# Patient Record
Sex: MALE
[De-identification: names, ages, dates, MRNs, and addresses within clinical notes are randomized per-mention and may not be internally consistent; named-entity substitution may affect disease eponyms.]

---

## 2020-01-02 NOTE — ER
Nurse's Notes                                                                                     

 [facility]                                                                 

Name: Robert Tietz                                                                                

Age: 54 yrs                                                                                       

Sex: Male                                                                                         

: 1965                                                                                   

MRN: [id number]                                                                                   

Arrival Date: 2020                                                                          

Time: 00:29                                                                                       

Account#: [id number]                                                                            

Bed 18                                                                                            

Private MD: Reese Avila T                                                                        

Diagnosis: Twitching left eye. Hypertension                                                       

                                                                                                  

Presentation:                                                                                     

                                                                                             

00:58 Presenting complaint: Patient states: using the language line pt c/o twitching to left  bb  

      eye area no changes in vision x 2 or 3 days. Transition of care: patient was not            

      received from another setting of care. Onset of symptoms was 2019. Risk        

      Assessment: Do you want to hurt yourself or someone else? Patient reports no desire to      

      harm self or others. Initial Sepsis Screen: Does the patient meet any 2 criteria? No.       

      Patient's initial sepsis screen is negative. Does the patient have a suspected source       

      of infection? No. Patient's initial sepsis screen is negative. Care prior to arrival:       

      None.                                                                                       

00:58 Method Of Arrival: Ambulatory                                                           bb  

00:58 Acuity: AMOS 3                                                                           bb  

                                                                                                  

Historical:                                                                                       

- Allergies:                                                                                      

01:00 No Known Allergies;                                                                     bb  

- Home Meds:                                                                                      

01:00 None [Active];                                                                          bb  

- PMHx:                                                                                           

01:00 Deaf;                                                                                   bb  

- PSHx:                                                                                           

01:00 Tonsillectomy;                                                                          bb  

                                                                                                  

- Immunization history:: Adult Immunizations up to date.                                          

- Social history:: Smoking status: Patient/guardian denies using tobacco.                         

- Ebola Screening: : No symptoms or risks identified at this time.                                

                                                                                                  

                                                                                                  

Screenin:00 Abuse screen: Denies threats or abuse. Nutritional screening: No deficits noted.        fc  

      Tuberculosis screening: No symptoms or risk factors identified. Fall Risk None              

      identified.                                                                                 

                                                                                                  

Assessment:                                                                                       

01:00 General: Appears in no apparent distress. comfortable, slender, well groomed, Behavior  fc  

      is calm, cooperative, appropriate for age. Pain: Denies pain. Neuro: Level of               

      Consciousness is awake, alert, obeys commands, Oriented to person, place, time,             

      situation, Appropriate for age. Cardiovascular: No deficits noted. Respiratory: Airway      

      is patent Respiratory effort is even, unlabored, Respiratory pattern is regular,            

      symmetrical, Breath sounds are clear bilaterally. Denies cough, shortness of breath.        

      GI: No deficits noted. : No deficits noted. EENT: Eyes twitching of left eye. Denies      

      pain or vision loss. Derm: Skin is pink, warm \T\ dry. Musculoskeletal: Circulation,        

      motion, and sensation intact. Capillary refill < 3 seconds, Range of motion: intact in      

      all extremities.                                                                            

01:20 Reassessment: Pt has just returned from CT Scan via stretcher.                          fc  

01:55 Reassessment: No changes from previously documented assessment. Patient and/or family   fc  

      updated on plan of care and expected duration. Pain level reassessed. Patient is alert,     

      oriented x 3, equal unlabored respirations, skin warm/dry/pink. Discussed bp with dr        

      and new orders rec'd.                                                                       

02:53 Reassessment: No changes from previously documented assessment. Patient and/or family   fc  

      updated on plan of care and expected duration. Pain level reassessed. Patient is alert,     

      oriented x 3, equal unlabored respirations, skin warm/dry/pink. Dr Mcclain states that all      

      test are normal and pt is ready to be discharged.                                           

03:02 Reassessment: discharge instructions given to pt with help of  Brando RODRÍGUEZ #[id number].   

                                                                                                  

Vital Signs:                                                                                      

01:00  / 109; Pulse 89; Resp 14 S; Temp 98.5(O); Pulse Ox 96% on R/A; Weight 70.76 kg   bb  

      (R); Height 5 ft. 5 in. (165.10 cm) (R); Pain 0/10;                                         

01:22  / 99; Pulse 96; Resp 18; Pulse Ox 97% ;                                          fc  

01:50  / 104; Pulse 75; Resp 18; Pulse Ox 98% on R/A; Pain 0/10;                        fc  

02:19  / 94; Pulse 78; Resp 18; Pulse Ox 98% on R/A;                                    fc  

02:47  / 89; Pulse 72; Resp 18; Pulse Ox 96% on R/A; Pain 0/10;                         fc  

01:00 Body Mass Index 25.96 (70.76 kg, 165.10 cm)                                             bb  

                                                                                                  

Visual Acuity:                                                                                    

01:02 Left Eye Visual acuity 20/25, Pupil size 4 mm, Reactive To Accomodation; Right Eye      bb  

      Visual acuity 20/25, Pupil size 4 mm, Reactive To Accomodation; Both Eyes Visual acuity     

      20/20; With Lenses;                                                                         

                                                                                                  

ED Course:                                                                                        

00:29 Patient arrived in ED.                                                                  es  

00:30 Reese Avila MD is Private Physician.                                                   es  

00:44 Armando Mcclain MD is Attending Physician.                                                    pkl 

00:59 Triage completed.                                                                       bb  

01:00 Arm band placed on Patient placed in an exam room, on a stretcher, on pulse oximetry.   bb  

01:00 Patient has correct armband on for positive identification. Placed in gown. Bed in low  fc  

      position. Call light in reach. Side rails up X2. Cardiac monitor on. Pulse ox on. NIBP      

      on.                                                                                         

01:00 No provider procedures requiring assistance completed.                                  fc  

01:29 Initial lab(s) drawn, by me, sent to lab.                                               fc  

01:31 CT completed. Patient tolerated procedure well. Patient moved to CT via stretcher.        

      Patient moved back from CT.                                                                 

01:40 CT Head Brain wo Cont In Process Unspecified.                                           EDMS

02:28 Amada Aguilar MD is Referral Physician.                                               pkl 

03:03 Patient did not have IV access during this emergency room visit.                        fc  

                                                                                                  

Administered Medications:                                                                         

01:54 Not Given (Physician Discretion): cloNIDine 0.1 mg PO once                              bb  

01:57 Drug: Metoprolol TARTRATE (Lopressor) 50 mg Route: PO;                                  fc  

03:04 Follow up: Response: No adverse reaction; Marked relief of symptoms; Blood pressure is  fc  

      lowered                                                                                     

                                                                                                  

                                                                                                  

Outcome:                                                                                          

02:29 Discharge ordered by MD.                                                                pkl 

03:03 Discharged to home ambulatory.                                                          fc  

03:03 Condition: good                                                                             

03:03 Discharge instructions given to patient, Instructed on discharge instructions, follow       

      up and referral plans. medication usage, Demonstrated understanding of instructions,        

      follow-up care, medications, Prescriptions given X 1.                                       

03:03 Patient left the ED.                                                                    fc  

                                                                                                  

Signatures:                                                                                       

Dispatcher [employer]                           EDMS                                                 

Armando Mcclain MD MD   pkl                                                  

Renu Saldaña Ervin                                                                                   

Leigha Jalloh RN                   RN                                                      

Norah Page RN                     RN   bb                                                   

                                                                                                  

Corrections: (The following items were deleted from the chart)                                    

01:38 01:00 EENT: Eyes twitching of right eye. Denies pain or vision loss. fc                 fc  

                                                                                                  

**************************************************************************************************

## 2020-01-02 NOTE — RAD REPORT
EXAM DESCRIPTION:  CT - Head Brain Wo Cont - 1/2/2020 1:40 am

 

CLINICAL HISTORY:  The patient is 54 years old and is Male; twitching left   eye

 

TECHNIQUE:  Axial computed tomography images of the head/brain without intravenous contrast.   Sagitt
al and coronal reformatted images were created and reviewed.   This CT exam was performed using one o
r more of the following dose reduction techniques:   automated exposure control, adjustment of the mA
 and/or kV according to patient size, and/or use of iterative reconstruction technique.

 

COMPARISON:  No relevant prior studies available.

 

FINDINGS:  BRAIN:   Unremarkable.   The gray-white matter differentiation is preserved . No hemorrhag
e.   No significant white matter disease.   No edema. No extra-axial fluid collections.

   VENTRICLES:   Unremarkable.   No ventriculomegaly.

   BONES/JOINTS:   No acute fracture.

   SOFT TISSUES:   Unremarkable.

   SINUSES: A right maxillary sinus mucus retention cyst is present.   No acute sinusitis.

   MASTOID AIR CELLS:   Unremarkable as visualized.   No mastoid effusion.

   ORBITS:   Unremarkable as visualized.

 

IMPRESSION:  No acute intracranial findings.

 

Electronically signed by:   Lia Herrera MD   1/2/2020 1:58 AM CST Workstation: [id number]

 

 

Due to temporary technical issues with the PACS/Fluency reporting system, reports are being signed by
 the in house radiologist as a courtesy to ensure prompt reporting. The interpreting radiologist is f
ully responsible for the content of the report.

## 2021-09-28 ENCOUNTER — HOSPITAL ENCOUNTER (EMERGENCY)
Dept: HOSPITAL 97 - ER | Age: 56
LOS: 1 days | Discharge: HOME | End: 2021-09-29
Payer: COMMERCIAL

## 2021-09-28 DIAGNOSIS — R51.9: Primary | ICD-10-CM

## 2021-09-28 DIAGNOSIS — H55.89: ICD-10-CM

## 2021-09-28 DIAGNOSIS — N39.0: ICD-10-CM

## 2021-09-28 DIAGNOSIS — M25.511: ICD-10-CM

## 2021-09-28 DIAGNOSIS — I10: ICD-10-CM

## 2021-09-28 PROCEDURE — 85025 COMPLETE CBC W/AUTO DIFF WBC: CPT

## 2021-09-28 PROCEDURE — 81003 URINALYSIS AUTO W/O SCOPE: CPT

## 2021-09-28 PROCEDURE — 93005 ELECTROCARDIOGRAM TRACING: CPT

## 2021-09-28 PROCEDURE — 71045 X-RAY EXAM CHEST 1 VIEW: CPT

## 2021-09-28 PROCEDURE — 83880 ASSAY OF NATRIURETIC PEPTIDE: CPT

## 2021-09-28 PROCEDURE — 70450 CT HEAD/BRAIN W/O DYE: CPT

## 2021-09-28 PROCEDURE — 84484 ASSAY OF TROPONIN QUANT: CPT

## 2021-09-28 PROCEDURE — 83735 ASSAY OF MAGNESIUM: CPT

## 2021-09-28 PROCEDURE — 84100 ASSAY OF PHOSPHORUS: CPT

## 2021-09-28 PROCEDURE — 85610 PROTHROMBIN TIME: CPT

## 2021-09-28 PROCEDURE — 72125 CT NECK SPINE W/O DYE: CPT

## 2021-09-28 PROCEDURE — 36415 COLL VENOUS BLD VENIPUNCTURE: CPT

## 2021-09-28 PROCEDURE — 99283 EMERGENCY DEPT VISIT LOW MDM: CPT

## 2021-09-28 PROCEDURE — 80048 BASIC METABOLIC PNL TOTAL CA: CPT

## 2021-09-28 PROCEDURE — 80076 HEPATIC FUNCTION PANEL: CPT

## 2021-09-28 PROCEDURE — 81015 MICROSCOPIC EXAM OF URINE: CPT

## 2021-09-29 VITALS — DIASTOLIC BLOOD PRESSURE: 98 MMHG | TEMPERATURE: 98.3 F | SYSTOLIC BLOOD PRESSURE: 144 MMHG

## 2021-09-29 VITALS — OXYGEN SATURATION: 100 %

## 2021-09-29 LAB
ALBUMIN SERPL BCP-MCNC: 4.1 G/DL (ref 3.4–5)
ALP SERPL-CCNC: 123 U/L (ref 45–117)
ALT SERPL W P-5'-P-CCNC: 44 U/L (ref 12–78)
AST SERPL W P-5'-P-CCNC: 21 U/L (ref 15–37)
BUN BLD-MCNC: 18 MG/DL (ref 7–18)
GLUCOSE SERPLBLD-MCNC: 83 MG/DL (ref 74–106)
HCT VFR BLD CALC: 49.2 % (ref 39.6–49)
INR BLD: 1.03
LYMPHOCYTES # SPEC AUTO: 1.3 K/UL (ref 0.7–4.9)
MAGNESIUM SERPL-MCNC: 2.2 MG/DL (ref 1.8–2.4)
NT-PROBNP SERPL-MCNC: 51 PG/ML (ref ?–125)
PMV BLD: 8.3 FL (ref 7.6–11.3)
POTASSIUM SERPL-SCNC: 3.7 MMOL/L (ref 3.5–5.1)
RBC # BLD: 6.26 M/UL (ref 4.33–5.43)
TROPONIN (EMERG DEPT USE ONLY): < 0.02 NG/ML (ref 0–0.04)

## 2021-09-29 NOTE — RAD REPORT
EXAM DESCRIPTION:  RAD - Shoulder  Right 2 View - 9/29/2021 1:56 am

 

CLINICAL HISTORY:  PAIN

 

COMPARISON:  No comparisons

 

FINDINGS:  No acute fracture. No malalignment. No significant focal degenerative changes.

 

IMPRESSION:  No acute osseous abnormality involving the right shoulder.

## 2021-09-29 NOTE — RAD REPORT
Can shower daily, leave packing inside. Reapply dressing once done. Return to ED in 3 days for recheck.    EXAM DESCRIPTION:  CT - Head C Spine Mpr Wo Con - 9/29/2021 6:29 am

 

COMPARISON:  CT head January 2, 2020

 

CLINICAL HISTORY:  [facility] MAIN PAIN

 

TECHNIQUE:  Axial images were obtained from skull base to vertex without intravenous contrast.   Imag
es viewed on bone and brain windows. Multiplanar reformats were performed.   Automated exposure contr
ol was utilized on this examination as a dose lowering technique.

 

FINDINGS:  Brain parenchyma, ventricles, dura, meninges, and extra-axial spaces: Ventricles and sulci
 are normal.   No abnormal attenuation of brain parenchyma is present.    No acute intracranial hemor
rhage or abnormal extra-axial fluid collections are present.

Vascular structures: No hyperdense arteries or veins.

Calvarium, mastoid air cells, paranasal sinuses and orbits: The calvarium is normal. The mastoid air 
cells are clear. 1.8 cm right maxillary sinus mucosal retention cyst or polyp. Orbital structures are
 unremarkable.

 

EXAM DESCRIPTION:  CT Cervical Spine

 

COMPARISON:  None.

 

CLINICAL HISTORY:  [facility] MAIN PAIN

 

TECHNIQUE:  Axial CT images were obtained through the entire cervical spine without   contrast. Sagit
nathanael and coronal reconstructions are provided. Automated exposure control was utilized on this examina
tion as a dose lowering technique.

 

FINDINGS:  Vertebrae:   Vertebral statures and alignment are normal. No acute fracture, dislocation o
r destructive osseous process is present.

Spinal canal, foramina, and facet joints:

No significant spinal canal or foraminal stenoses. No significant facet arthropathy.

Paraspinous soft-tissues: Normal.

Thyroid: Normal.

Other Findings: None.

 

IMPRESSION:  HEAD IMPRESSION:

No acute intracranial abnormality.

C-SPINE IMPRESSION:

No acute findings of the cervical spine.

 

Electronically signed by:   Campos Ortiz MD   9/29/2021 2:29 AM CDT Workstation: [id number]

 

 

Due to temporary technical issues with the PACS/Fluency reporting system, reports are being signed by
 the in house radiologists without review as a courtesy to insure prompt reporting. The interpreting 
radiologist is fully responsible for the content of the report.

## 2021-09-29 NOTE — RAD REPORT
EXAM DESCRIPTION:  RAD - Chest Single View - 9/29/2021 1:56 am

 

CLINICAL HISTORY:  Hypertension

 

COMPARISON:  No comparisons

 

FINDINGS:  Lines: None.

Lungs: Low lung volumes which accentuates the pulmonary vasculature.

Pleural: No significant pleural effusions or pneumothorax.

Cardiac: Low lung volumes which accentuates the cardiac silhouette.

Bones: No acute fractures.

Other:

 

IMPRESSION:  Low lung volumes which accentuates the pulmonary vasculature. Difficult to exclude eithe
r vascular congestion and/or mild edema.

## 2021-09-29 NOTE — EDPHYS
Physician Documentation                                                                           

 [facility]                                                                 

Name: Robert Tietz                                                                                

Age: 55 yrs                                                                                       

Sex: Male                                                                                         

: 1965                                                                                   

MRN: [id number]                                                                                   

Arrival Date: 2021                                                                          

Time: 23:49                                                                                       

Account#: [id number]                                                                            

Bed 27                                                                                            

Private MD:                                                                                       

ED Physician Marco Amador                                                                      

HPI:                                                                                              

                                                                                             

00:57 This 55 yrs old  Male presents to ER via Ambulatory with complaints of          mh7 

      Headache. Left eye twitching.                                                               

00:57 The patient complains of pain to the Left side of head. The patient describes the       mh7 

      headache as throbbing. Onset: The symptoms/episode began/occurred 4 month(s) ago.           

      Associated signs and symptoms: Pertinent positives: dizziness, Left eye twitching,          

      Pertinent negatives: altered mental status, fever, malaise, nausea, neck stiffness,         

      paresthesias, Photophobia rash, sinus congestion, sinus tenderness, vision changes,         

      vision loss, vomiting, weakness, vertigo. Severity of symptoms: At its worst the pain       

      was moderate, 3 months ago, in the emergency department the pain has improved,              

      moderately. Headache History: The patient has had previous headaches and this one is        

      similar to previous episodes. The symptoms are alleviated by over the counter pain          

      medication, OTC NSAIDS, the symptoms are aggravated by nothing. The patient has             

      experienced similar episodes in the past, several times. Patient reports intermittent       

      left-sided headache for least 4 months. He also reports intermittent twitching of left      

      eye and some intermittent tearing of the eye. Denies any visual disturbances. He also       

      complains of pain and unusual sensation to right shoulder has been intermittent for         

      several days. Denies any fever, neck pain, chest pain, abdominal pain, shortness of         

      breath, cough, nausea, vomiting, diarrhea, weakness. He states that he has seen a           

      doctor in the past with the symptoms but was not given any information by doctors in        

      the past..                                                                                  

                                                                                                  

Historical:                                                                                       

- Allergies:                                                                                      

00:12 No Known Allergies;                                                                     wg  

- Home Meds:                                                                                      

00:12 None [Active];                                                                          wg  

- PMHx:                                                                                           

00:12 Deaf;                                                                                   wg  

                                                                                                  

- Immunization history:: Adult Immunizations up to date.                                          

- Social history:: Smoking status: Patient denies any tobacco usage or history of.                

                                                                                                  

                                                                                                  

ROS:                                                                                              

00:57 Constitutional: Negative for fever, chills, and weight loss, ENT: Negative for injury,  mh7 

      pain, and discharge, Neck: Negative for injury, pain, and swelling, Cardiovascular:         

      Negative for chest pain, palpitations, and edema, Respiratory: Negative for shortness       

      of breath, cough, wheezing, and pleuritic chest pain, Abdomen/GI: Negative for              

      abdominal pain, nausea, vomiting, diarrhea, and constipation, Back: Negative for injury     

      and pain, : Negative for injury, bleeding, discharge, and swelling, Skin: Negative        

      for injury, rash, and discoloration, Psych: Negative for depression, anxiety, suicide       

      ideation, homicidal ideation, and hallucinations, Allergy/Immunology: Negative for          

      hives, rash, and allergies, Endocrine: Negative for neck swelling, polydipsia,              

      polyuria, polyphagia, and marked weight changes, Hematologic/Lymphatic: Negative for        

      swollen nodes, abnormal bleeding, and unusual bruising.                                     

                                                                                                  

Exam:                                                                                             

00:57 Constitutional:  This is a well developed, well nourished patient who is awake, alert,  mh7 

      and in no acute distress. Head/Face:  Normocephalic, atraumatic. Eyes:  Pupils equal        

      round and reactive to light, extra-ocular motions intact.  Lids and lashes normal.          

      Conjunctiva and sclera are non-icteric and not injected.  Cornea within normal limits.      

      Periorbital areas with no swelling, redness, or edema. ENT:  Nares patent. No nasal         

      discharge, no septal abnormalities noted.  Tympanic membranes are normal and external       

      auditory canals are clear.  Oropharynx with no redness, swelling, or masses, exudates,      

      or evidence of obstruction, uvula midline.  Mucous membranes moist. Neck:  Trachea          

      midline, no thyromegaly or masses palpated, and no cervical lymphadenopathy.  Supple,       

      full range of motion without nuchal rigidity, or vertebral point tenderness.  No            

      Meningismus. Chest/axilla:  Normal chest wall appearance and motion.  Nontender with no     

      deformity.  No lesions are appreciated. Cardiovascular:  Regular rate and rhythm with a     

      normal S1 and S2.  No gallops, murmurs, or rubs.  Normal PMI, no JVD.  No pulse             

      deficits. Respiratory:  Lungs have equal breath sounds bilaterally, clear to                

      auscultation and percussion.  No rales, rhonchi or wheezes noted.  No increased work of     

      breathing, no retractions or nasal flaring. Abdomen/GI:  Soft, non-tender, with normal      

      bowel sounds.  No distension or tympany.  No guarding or rebound.  No evidence of           

      tenderness throughout. Back:  No spinal tenderness.  No costovertebral tenderness.          

      Full range of motion. Skin:  Warm, dry with normal turgor.  Normal color with no            

      rashes, no lesions, and no evidence of cellulitis. MS/ Extremity:  Pulses equal, no         

      cyanosis.  Neurovascular intact.  Full, normal range of motion. Neuro:  Awake and           

      alert, GCS 15, oriented to person, place, time, and situation.  Cranial nerves II-XII       

      grossly intact.  Motor strength 5/5 in all extremities.  Sensory grossly intact.            

      Cerebellar exam normal.  Normal gait. Psych:  Awake, alert, with orientation to person,     

      place and time.  Behavior, mood, and affect are within normal limits.                       

                                                                                                  

Vital Signs:                                                                                      

00:06  / 112; Pulse 78; Resp 18; Temp 98.5; Pulse Ox 100% on R/A; Weight 71.21 kg;      wg  

      Height 5 ft. 5 in. (165.10 cm); Pain 7/10;                                                  

04:01  / 98; Pulse 72; Resp 15; Temp 98.3(O); Pulse Ox 100% on R/A; Pain 0/10;          bc5 

00:06 Body Mass Index 26.13 (71.21 kg, 165.10 cm)                                             wg  

                                                                                                  

[place] Coma Score:                                                                               

03:37 Eye Response: spontaneous(4). Verbal Response: oriented(5). Motor Response: obeys       mh7 

      commands(6). Total: 15.                                                                     

                                                                                                  

MDM:                                                                                              

03:37 Differential diagnosis: cluster headache, hypertensive headache, hypoglycemia,          mh7 

      hyponatremia, intracerebral hemorrhage, migraine, neoplasm, tension headache. Data          

      reviewed: vital signs, nurses notes, old medical records, lab test result(s), cardiac       

      enzymes, CBC, electrolytes, urinalysis, EKG, radiologic studies, CT scan, plain films.      

      Data interpreted: Pulse oximetry: on room air is 100 %. Interpretation: normal.             

      Counseling: I had a detailed discussion with the patient and/or guardian regarding: the     

      historical points, exam findings, and any diagnostic results supporting the                 

      discharge/admit diagnosis, the presence of at least one elevated blood pressure reading     

      (>120/80) during this emergency department visit, lab results, radiology results, the       

      need for outpatient follow up, an opthalmologist, a neurologist, to return to the           

      emergency department if symptoms worsen or persist or if there are any questions or         

      concerns that arise at home. Response to treatment: the patient's symptoms have             

      markedly improved after treatment.                                                          

03:41 Patient medically screened.                                                             [facility] 

                                                                                                  

                                                                                             

00:40 Order name: Urine Dipstick-Ancillary                                                    [facility]

                                                                                             

00:54 Order name: Basic Metabolic Panel                                                       [facility] 

                                                                                             

00:54 Order name: CBC with Diff; Complete Time: 02:11                                         [facility] 

                                                                                             

00:54 Order name: LFT's; Complete Time: 02:11                                                 [facility] 

                                                                                             

00:54 Order name: Magnesium; Complete Time: 02:11                                             [facility] 

                                                                                             

00:54 Order name: NT PRO-BNP; Complete Time: 02:11                                            [facility] 

                                                                                             

00:54 Order name: PT-INR; Complete Time: 02:11                                                [facility] 

                                                                                             

00:54 Order name: Troponin (emerg Dept Use Only); Complete Time: 02:11                        [facility] 

                                                                                             

00:54 Order name: XRAY Chest (1 view)                                                         [facility] 

                                                                                             

00:54 Order name: Phosphorus; Complete Time: 02:11                                            [facility] 

                                                                                             

00:54 Order name: Shoulder Right (2 View) XRAY                                                [facility] 

                                                                                             

01:16 Order name: Basic Metabolic Panel; Complete Time: 02:11                                 [facility]

                                                                                             

01:36 Order name: Urine Dipstick-Ancillary; Complete Time: 01:58                              [facility]

                                                                                             

01:59 Order name: Urine Microscopic Only; Complete Time: 03:21                                [facility] 

                                                                                             

00:54 Order name: EKG; Complete Time: 01:16                                                   [facility] 

                                                                                             

00:54 Order name: Cardiac monitoring; Complete Time: 01:18                                    [facility] 

                                                                                             

00:54 Order name: EKG - Nurse/Tech; Complete Time: :29                                      [facility] 

                                                                                             

00:54 Order name: IV Saline Lock; Complete Time: 01:18                                        [facility] 

                                                                                             

00:54 Order name: Labs collected and sent; Complete Time: :23                               [facility] 

                                                                                             

00:54 Order name: O2 Per Protocol; Complete Time: 01:13                                       [facility] 

                                                                                             

00:54 Order name: O2 Sat Monitoring; Complete Time: 01:18                                     [facility] 

                                                                                             

00:54 Order name: CT Head C Spine                                                             [facility] 

                                                                                             

00:54 Order name: Urine Dipstick-Ancillary (obtain specimen); Complete Time: 01:28            mh7 

                                                                                                  

Administered Medications:                                                                         

01:18 Drug: Tylenol 1000 mg Route: PO;                                                        bc5 

04:00 Follow up: Response: Pain is decreased                                                  bc5 

                                                                                                  

                                                                                                  

Disposition Summary:                                                                              

21 03:41                                                                                    

Discharge Ordered                                                                                 

      Location: Home                                                                          [facility] 

      Problem: chronic                                                                        mh7 

      Symptoms: have improved                                                                 mh7 

      Condition: Stable                                                                       mh7 

      Diagnosis                                                                                   

        - Headache                                                                            mh7 

        - Left Eye Twitching                                                                  mh7 

        - Shoulder Pain, Right                                                                mh7 

        - Essential (primary) hypertension                                                    mh7 

        - UTI/ Urinary tract infection, site not specified                                    7 

      Followup:                                                                               7 

        - With: Private Physician                                                                  

        - When: 1 - 2 days                                                                         

        - Reason: Worsening of condition, Recheck today's complaints, Continuance of care,         

      Re-evaluation by your physician                                                             

      Followup:                                                                               7 

        - With: Rehan Upton MD                                                               

        - When: 1 - 2 days                                                                         

        - Reason: Worsening of condition, Recheck today's complaints                               

      Followup:                                                                               7 

        - With: Amada Aguilar MD                                                                 

        - When: 1 - 2 days                                                                         

        - Reason: Worsening of condition, Recheck today's complaints                               

      Followup:                                                                               7 

        - With: Tucker Armendariz MD                                                                  

        - When: 1 - 2 days                                                                         

        - Reason: Worsening of condition, Recheck today's complaints                               

      Discharge Instructions:                                                                     

        - Discharge Summary Sheet                                                             mh7 

        - General Headache Without Cause                                                      mh7 

        - Hypertension, Adult                                                                 mh7 

        - Muscle Pain, Adult                                                                  mh7 

        - Urinary Tract Infection, Adult, Easy-to-Read                                        7 

        - Form - Excuse from Work, School, or Physical Activity                               [facility] 

      Forms:                                                                                      

        - Medication Reconciliation Form                                                      7 

        - Thank You Letter                                                                    7 

        - Antibiotic Education                                                                7 

        - Prescription Opioid Use                                                             7 

        - Work release form                                                                   df1 

      Prescriptions:                                                                              

        - Cephalexin 500 mg Oral Capsule                                                           

            - take 1 capsule by ORAL route every 12 hours for 7 days; 14 capsule; Refills: 0, mh7 

      Product Selection Permitted                                                                 

Signatures:                                                                                       

Dispatcher [employer]                           Marco Hernandez MD MD   7                                                  

Slick Stewart RN wg Corado, Bella, RN                       RN   bc5                                                  

                                                                                                  

**************************************************************************************************

## 2021-09-29 NOTE — ER
Nurse's Notes                                                                                     

 [facility]                                                                 

Name: Robert Tietz                                                                                

Age: 55 yrs                                                                                       

Sex: Male                                                                                         

: 1965                                                                                   

MRN: [id number]                                                                                   

Arrival Date: 2021                                                                          

Time: 23:49                                                                                       

Account#: [id number]                                                                            

Bed 27                                                                                            

Private MD:                                                                                       

Diagnosis: Headache;Left Eye Twitching;Shoulder Pain, Right;Essential (primary) hypertension;UTI/ 

  Urinary tract infection, site not specified                                                     

                                                                                                  

Presentation:                                                                                     

                                                                                             

00:06 Chief complaint: Patient states: Pt is deaf and communicates through sign-language and  wg  

      writing. Sign- used. Pt c/o Left Eye throbbing sensation along with     

      the left side of his head throbbing that has been intermittent for the past 4 months.       

      States his eye bass often and makes it hard to drive. Pt states he was seen about 4       

      months ago and given eye drops but it hasn't gotten better. Pt a;sp complaining of his      

      right shoulder "Feels funny". Pt denies any trauma or injury but states when he wakes       

      up it feels funny. Pt states he had good ROM and denies pain in the arm/shoulder but        

      states it just feels funny. Pt denies SOB, CP, N/V/D, dizziness and abd pain.               

      Coronavirus screen: Vaccine status: Patient reports receiving the 2nd dose of the covid     

      vaccine. Date 2021 At this time, the client does not indicate any symptoms        

      associated with coronavirus-19. Ebola Screen: No symptoms or risks identified at this       

      time. Initial Sepsis Screen: Does the patient meet any 2 criteria? No. Patient's            

      initial sepsis screen is negative. Does the patient have a suspected source of              

      infection? No. Patient's initial sepsis screen is negative. Risk Assessment: Do you         

      want to hurt yourself or someone else? Patient reports no desire to harm self or            

      others. Onset of symptoms was May 2021.                                                     

00:06 Method Of Arrival: Ambulatory                                                           wg  

00:06 Acuity: AMOS 3                                                                           wg  

                                                                                                  

Triage Assessment:                                                                                

00:12 General: Appears uncomfortable, Behavior is calm, cooperative, appropriate for age.     wg  

      Pain: Complains of pain in Left eye, left side of head and right shoulder Pain              

      currently is 7 out of 10 on a pain scale.                                                   

                                                                                                  

Historical:                                                                                       

- Allergies:                                                                                      

00:12 No Known Allergies;                                                                     wg  

- Home Meds:                                                                                      

00:12 None [Active];                                                                          wg  

- PMHx:                                                                                           

00:12 Deaf;                                                                                   wg  

                                                                                                  

- Immunization history:: Adult Immunizations up to date.                                          

- Social history:: Smoking status: Patient denies any tobacco usage or history of.                

                                                                                                  

                                                                                                  

Screenin:54 Abuse screen: Denies threats or abuse. Denies injuries from another. Nutritional        bc5 

      screening: No deficits noted. Tuberculosis screening: No symptoms or risk factors           

      identified. Fall Risk None identified.                                                      

                                                                                                  

Assessment:                                                                                       

00:54 Reassessment:  Rafal [id number]. Pt c/o left eye watering and twitching, left  bc5 

      sided head pain onset 4 months ago and right shoulder pain onset 9 am yesterday             

      21. Pt denies injury. A\T\O x 3, RR is even and unlabored, speaking in clear and       

      complete sentences at this time. Pt agrees to use written communication for short and       

      simple questions/requests "but for more detailed communication like results please use      

      the ".                                                                           

01:37 Reassessment:.                                                                          bc5 

04:02 Reassessment:  Rafal [id number] used to teach discharge instructions.          bc5 

                                                                                                  

Vital Signs:                                                                                      

00:06  / 112; Pulse 78; Resp 18; Temp 98.5; Pulse Ox 100% on R/A; Weight 71.21 kg;      wg  

      Height 5 ft. 5 in. (165.10 cm); Pain 7/10;                                                  

04:01  / 98; Pulse 72; Resp 15; Temp 98.3(O); Pulse Ox 100% on R/A; Pain 0/10;          bc5 

00:06 Body Mass Index 26.13 (71.21 kg, 165.10 cm)                                               

                                                                                                  

[place] Score:                                                                               

03:37 Eye Response: spontaneous(4). Verbal Response: oriented(5). Motor Response: obeys       mh7 

      commands(6). Total: 15.                                                                     

                                                                                                  

ED Course:                                                                                        

                                                                                             

23:49 Patient arrived in ED.                                                                  cf2 

                                                                                             

00:12 Triage completed.                                                                       wg  

00:12 Arm band placed on left wrist.                                                          wg  

00:19 Priti Bansal, RN is Primary Nurse.                                                     bc5 

00:26 Marco Amador MD is Attending Physician.                                             7 

00:43 Urine Dipstick-Ancillary Sent.                                                          bc5 

00:54 Patient has correct armband on for positive identification. Call light in reach. Side   bc5 

      rails up X 1.                                                                               

00:54 No provider procedures requiring assistance completed.                                  bc5 

01:46 CT Head C Spine In Process Unspecified.                                                 EDMS

01:56 XRAY Chest (1 view) In Process Unspecified.                                             EDMS

01:56 Shoulder Right (2 View) XRAY In Process Unspecified.                                    EDMS

03:39 Rehan Upton MD is Referral Physician.                                             mh7 

03:39 Amada Aguilar MD is Referral Physician.                                               7 

03:40 Tucker Armendariz MD is Referral Physician.                                                7 

04:01 Patient did not have IV access during this emergency room visit.                        bc5 

                                                                                                  

Administered Medications:                                                                         

01:18 Drug: Tylenol 1000 mg Route: PO;                                                        bc5 

04:00 Follow up: Response: Pain is decreased                                                  [facility] 

                                                                                                  

                                                                                                  

Outcome:                                                                                          

03:41 Discharge ordered by MD.                                                                [facility] 

04:00 Condition: improved                                                                     bc5 

04:00 Discharge instructions given to patient, family, Instructed on discharge instructions,      

      follow up and referral plans. medication usage.                                             

04:01 Discharged to home ambulatory, with family.                                             bc5 

04:03 Patient left the ED.                                                                    bc5 

                                                                                                  

Signatures:                                                                                       

Dispatcher [employer]                           Kwabena Burnett                             cf2                                                  

Marco Amador MD MD   7                                                  

Slick Stewart, RN                                                                                 

Priti Bansal, CHIDI                       RN   bc5                                                  

                                                                                                  

Corrections: (The following items were deleted from the chart)                                    

00:14 00:06 Chief complaint: Patient states: Pt communicates through sign-language and          

      writing. Sign- used. Pt c/o Left Eye throbbing sensation along with     

      the left side of his head throbbing that has been intermittent for the past 4 months.       

      States his eye bass often and makes it hard to drive. Pt states he was seen about 4       

      months ago and given eye drops but it hasn't gotten better. Pt a;sp complaining of his      

      right shoulder "Feels funny". Pt denies any trauma or injury but states when he wakes       

      up it feels funny. Pt states he had good ROM and denies pain in the arm/shoulder but        

      states it just feels funny. Pt denies SOB, CP, N/V/D, dizziness and abd pain.             

01:40 00:54 Reassessment:  Rafal [id number]. Pt c/o left eye watering and twitching, bc5 

      left sided head pain onset 4 months ago and right shoulder pain onset 9 am yesterday        

      21. Pt denies injury. A\T\O x 3, RR is even and unlabored, speaking in clear and       

      complete sentences at this time bc5                                                         

                                                                                                  

**************************************************************************************************

## 2021-09-30 NOTE — EKG
Test Date:    2021-09-29               Test Time:    01:25:31

Technician:   PRESTON                                    

                                                     

MEASUREMENT RESULTS:                                       

Intervals:                                           

Rate:         69                                     

LA:           156                                    

QRSD:         94                                     

QT:           398                                    

QTc:          426                                    

Axis:                                                

P:            17                                     

LA:           156                                    

QRS:          22                                     

T:            16                                     

                                                     

INTERPRETIVE STATEMENTS:                                       

                                                     

Normal sinus rhythm

Minimal voltage criteria for LVH, may be normal variant

Borderline ECG

Compared to ECG 01/26/2016 23:12:21

Left ventricular hypertrophy now present



Electronically Signed On 09-30-21 15:56:43 CDT by Chavo Ortiz

## 2022-02-27 ENCOUNTER — HOSPITAL ENCOUNTER (EMERGENCY)
Dept: HOSPITAL 97 - ER | Age: 57
Discharge: ANOTHER HOSPITAL | End: 2022-02-27
Payer: COMMERCIAL

## 2022-02-27 VITALS — TEMPERATURE: 97.5 F

## 2022-02-27 VITALS — OXYGEN SATURATION: 95 % | DIASTOLIC BLOOD PRESSURE: 90 MMHG | SYSTOLIC BLOOD PRESSURE: 151 MMHG

## 2022-02-27 DIAGNOSIS — U07.1: ICD-10-CM

## 2022-02-27 DIAGNOSIS — R29.701: ICD-10-CM

## 2022-02-27 DIAGNOSIS — R27.0: ICD-10-CM

## 2022-02-27 DIAGNOSIS — I62.9: Primary | ICD-10-CM

## 2022-02-27 LAB
ALBUMIN SERPL BCP-MCNC: 3.6 G/DL (ref 3.4–5)
ALP SERPL-CCNC: 115 U/L (ref 45–117)
ALT SERPL W P-5'-P-CCNC: 34 U/L (ref 12–78)
AST SERPL W P-5'-P-CCNC: 16 U/L (ref 15–37)
BUN BLD-MCNC: 24 MG/DL (ref 7–18)
GLUCOSE SERPLBLD-MCNC: 158 MG/DL (ref 74–106)
HCT VFR BLD CALC: 47.7 % (ref 39.6–49)
INR BLD: 1.17
LYMPHOCYTES # SPEC AUTO: 1.5 K/UL (ref 0.7–4.9)
MAGNESIUM SERPL-MCNC: 2.2 MG/DL (ref 1.8–2.4)
NT-PROBNP SERPL-MCNC: 104 PG/ML (ref ?–125)
PMV BLD: 7.7 FL (ref 7.6–11.3)
POTASSIUM SERPL-SCNC: 3.6 MMOL/L (ref 3.5–5.1)
RBC # BLD: 6.06 M/UL (ref 4.33–5.43)
TROPONIN I SERPL HS-MCNC: 9.9 PG/ML (ref ?–58.9)

## 2022-02-27 PROCEDURE — 71045 X-RAY EXAM CHEST 1 VIEW: CPT

## 2022-02-27 PROCEDURE — 99285 EMERGENCY DEPT VISIT HI MDM: CPT

## 2022-02-27 PROCEDURE — 83735 ASSAY OF MAGNESIUM: CPT

## 2022-02-27 PROCEDURE — 85610 PROTHROMBIN TIME: CPT

## 2022-02-27 PROCEDURE — 80076 HEPATIC FUNCTION PANEL: CPT

## 2022-02-27 PROCEDURE — 96375 TX/PRO/DX INJ NEW DRUG ADDON: CPT

## 2022-02-27 PROCEDURE — 96374 THER/PROPH/DIAG INJ IV PUSH: CPT

## 2022-02-27 PROCEDURE — 84484 ASSAY OF TROPONIN QUANT: CPT

## 2022-02-27 PROCEDURE — 85025 COMPLETE CBC W/AUTO DIFF WBC: CPT

## 2022-02-27 PROCEDURE — 80048 BASIC METABOLIC PNL TOTAL CA: CPT

## 2022-02-27 PROCEDURE — 96361 HYDRATE IV INFUSION ADD-ON: CPT

## 2022-02-27 PROCEDURE — 36415 COLL VENOUS BLD VENIPUNCTURE: CPT

## 2022-02-27 PROCEDURE — 93005 ELECTROCARDIOGRAM TRACING: CPT

## 2022-02-27 PROCEDURE — 70450 CT HEAD/BRAIN W/O DYE: CPT

## 2022-02-27 PROCEDURE — 83880 ASSAY OF NATRIURETIC PEPTIDE: CPT

## 2022-02-27 NOTE — RAD REPORT
EXAM DESCRIPTION:  CT - Head Brain Wo Cont - 2/27/2022 5:24 pm

 

CLINICAL HISTORY:  L facial droop and ataxia

 

COMPARISON:  No comparisonsHead Brain Wo Cont dated 1/2/2020

 

TECHNIQUE:  All CT scans are performed using dose optimization technique as appropriate and may inclu
de automated exposure control or mA/KV adjustment according to patient size.

 

FINDINGS:  2.8 cm x 1.3 cm right basal ganglia hemorrhageno mass effect or midline shift.No areas of 
brain edema or evidence of midline shift.

 

Mucous retention cysts in the right maxillary sinus. The calvarium is intact.

 

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 No sig
nificant mass effect.

 

Discussed with Dr. Guaman by Dr. Carballo at 173 on 2/27/22

## 2022-02-27 NOTE — ER
Nurse's Notes                                                                                     

 [facility]                                                                 

Name: Robert Tietz                                                                                

Age: 56 yrs                                                                                       

Sex: Male                                                                                         

: 1965                                                                                   

MRN: [id number]                                                                                   

Arrival Date: 2022                                                                          

Time: 16:36                                                                                       

Account#: [id number]                                                                            

Bed 23                                                                                            

Private MD:                                                                                       

Diagnosis: hemorrhagic stroke;Coronavirus infection, unspecified                                  

                                                                                                  

Presentation:                                                                                     

                                                                                             

16:38 Chief complaint: Patient states: Intermittent dizziness, L arm numbness/ tingling and L ss  

      sided facial droop that began 3.5 days ago. Pt is frustrated because he is deaf and has     

      had trouble communicating with individuals at work. Coronavirus screen: Client denies       

      travel out of the [place] in the last 14 days. Ebola Screen: Patient denies exposure to        

      infectious person. Patient denies travel to an Ebola-affected area in the 21 days           

      before illness onset. Pre-hospital glucose is not applicable to this patient. No acute      

      neurological deficit is noted. Initial Sepsis Screen: Does the patient meet any 2           

      criteria? No. Patient's initial sepsis screen is negative. Does the patient have a          

      suspected source of infection? No. Patient's initial sepsis screen is negative. Risk        

      Assessment: Do you want to hurt yourself or someone else? Patient reports no desire to      

      harm self or others. Onset of symptoms was 2022.                               

16:38 Method Of Arrival: Ambulatory                                                           ss  

16:38 Acuity: AMOS 3                                                                           ss  

                                                                                                  

Triage Assessment:                                                                                

18:21 The onset of the patients symptoms was 2022 at 05:00.                      lr4 

                                                                                                  

Stroke Activation: Symptom onset > 6 hours                                                        

 Physician: Stroke Attending; Name: ; Notified At: ; Arrived At:                                  

 Physician: Chief Stroke Resident; Name: ; Notified At: ; Arrived At:                             

 Physician: Stroke Resident; Name: ; Notified At: ; Arrived At:                                   

 Physician: ED Attending; Name: ; Notified At: ; Arrived At:                                      

 Physician: ED Resident; Name: ; Notified At: ; Arrived At:                                       

                                                                                                  

Historical:                                                                                       

- Allergies:                                                                                      

16:53 No Known Allergies;                                                                     ss  

- Home Meds:                                                                                      

16:53 None [Active];                                                                          ss  

- PMHx:                                                                                           

16:53 Deaf;                                                                                   ss  

- PSHx:                                                                                           

16:53 None;                                                                                   ss  

                                                                                                  

- Immunization history:: Client reports receiving the 2nd dose of the Covid vaccine.              

- Social history:: Smoking status: Patient denies any tobacco usage or history of.                

                                                                                                  

                                                                                                  

Screenin:54 Abuse screen: Denies threats or abuse. Denies injuries from another. Nutritional        ss  

      screening: No deficits noted. Tuberculosis screening: Never had TB. Fall Risk None          

      identified.                                                                                 

                                                                                                  

Assessment:                                                                                       

18:16 VAN Scoring: Arm Drift: Patients demonstrates NO arm weakness. Patient is VAN Negative. lr4 

      Visual Disturbance: No visual disturbance noted. Aphasia: No aphasia noted. Neglect: No     

      neglect noted. The patient is alert, and able to follow commands. The patient does not      

      exhibit slurred or garbled speech. The patient is not exhibiting difficulty speaking.       

      The patient does not exhibit difficulty understanding words. The patient is able to         

      swallow own secretions with no drooling or need for suction. Patient tolerated one          

      teaspoon of water. No drooling, immediate coughing, gurgling, or clearing of the throat     

      was noted. The patient tolerated 90mL of water. No drooling, immediate coughing,            

      gurgling, or clearing of the throat was noted. The patient passed the bedside swallow       

      screening. Oral medications may be given as ordered. Contact Physician for further diet     

      orders. Provider notified of bedside swallow screening results: De Guaman MD. The        

      patient has not been NPO before screening. The patient is currently on the following        

      diet: regular. T-PA (Activase) Screening: Contraindications: Intracranial hemorrhage        

      and its risk factor and suspicion of subarachnoid bleed: Yes. General: Appears in no        

      apparent distress. comfortable, Behavior is calm, cooperative. Pain: Denies pain.           

      Neuro: No deficits noted. Reports dizziness, paresthesias. Cardiovascular: No deficits      

      noted. Respiratory: No deficits noted.                                                      

19:47 Reassessment: No changes from previously documented assessment. Patient denies pain at  lr4 

      this time. Patient states feeling better.                                                   

20:43 Reassessment: report given to Nichol MURILLO at . Patient denies pain at this time.        lr4 

      Patient states feeling better. Pt departed ed with [facility] with all             

      personal effects, resp even an unlabored, vss. .                                            

                                                                                                  

Vital Signs:                                                                                      

16:38 Pulse 102; Resp 18; Temp 97.5; Pulse Ox 99% ;                                           ss  

16:38  / 107; Weight 68.95 kg; Height 5 ft. 5 in. (165.10 cm); Pain 0/10;               ss  

18:02  / 110; Pulse 100; Resp 20; Pulse Ox 97% on R/A;                                  lr4 

18:20  / 110; Pulse 104; Resp 20; Pulse Ox 95% on R/A;                                  lr4 

18:34  / 98; Pulse 104 LA; Resp 18; Pulse Ox 99% on R/A;                                lr4 

18:54  / 92; Pulse 103; Resp 18; Pulse Ox 95% ; Pain 0/10;                              lr4 

19:00  / 88; Pulse 101; Resp 20; Pulse Ox 100% on R/A;                                  lr4 

19:15  / 93; Pulse 100; Resp 20; Pulse Ox 94% on R/A;                                   lr4 

19:30  / 128; Pulse 102; Resp 20; Pulse Ox 97% ;                                        lr4 

19:44  / 81; Pulse 102; Resp 18; Pulse Ox 96% on R/A; Pain 0/10;                        lr4 

20:00  / 87; Pulse 95; Resp 18; Pulse Ox 98% on R/A;                                    lr4 

20:07  / 87; Pulse 95; Resp 18; Pulse Ox 96% on R/A;                                    lr4 

20:42  / 90; Pulse 96; Resp 20; Pulse Ox 95% on R/A; Pain 0/10;                         lr4 

16:38 Body Mass Index 25.29 (68.95 kg, 165.10 cm)                                               

                                                                                                  

NIH Stroke Scale Scores:                                                                          

18:16 NIHSS Score: 1                                                                          lr4 

                                                                                                  

ED Course:                                                                                        

16:36 Patient arrived in ED.                                                                  ss  

16:42 De Guaman MD is Attending Physician.                                               [id number]

16:50 Triage completed.                                                                       ss  

16:53 Arm band placed on right wrist.                                                         ss  

16:54 Patient has correct armband on for positive identification. Bed in low position. Call   ss  

      light in reach.                                                                             

17:17 Ksenia Holt, RN is Primary Nurse.                                                 lr4 

17:24 CT Head Brain wo Cont In Process Unspecified.                                           EDMS

17:35 Inserted saline lock: 20 gauge in right antecubital area, using aseptic technique.      lr4 

17:38 XRAY Chest (1 view) Sent.                                                               lr4 

18:00 initiated a transfer with Nunu Costa Rn from the [facility].      eb  

18:03 COVID-19 SARS RT PCR (Document "Date of Onset" if Symptomatic) Sent.                    lr4 

18:06 XRAY Chest (1 view) In Process Unspecified.                                             EDMS

18:18 connected Dr. Paez the Neuro on call with Dr. Guaman for patient transfer            eb  

      consultation.                                                                               

18:21 No provider procedures requiring assistance completed.                                  lr4 

18:37 administrative approval given by Nunu Lazaro/ patient has been accepted to [address] to [address] Neuro ICU [address] [address] bed 7512/ Dr. Paez accepted the patient in           

      transfer/report to be called to [phone].                                               

19:17 called [facility] spoke to Rachael Tobin to give her the patients Covid      [facility] 

      results.                                                                                    

19:34 [facility] denied transfer due to no Covid ICU beds.                                     mw2 

19:50 initiated a transfer with Bella from [facility].                 mw2 

20:[address] Conne[address] Amador with Dr. Shirley from [facility].                         mw2 

20:06 administrative approval given by Bella Shaver/ patient has been accepted to       [address] to the Stroke Unit/ Dr. Blake accepted the patient in transfer/        

      report to be called to [phone].                                                        

20:45 Report given to Nichol RN \T\ .                                                         lr4 

20:45 Patient transferred, IV remains in place.                                               lr4 

                                                                                                  

Administered Medications:                                                                         

17:38 Drug: NS 0.9% 1000 ml Route: IV; Rate: 1 bolus; Site: right antecubital;                lr4 

18:15 Follow up: IV Status: Infusion continued                                                lr4 

17:38 Drug: Reglan (metoCLOPramide) 10 mg Route: IVP; Site: right antecubital;                lr4 

18:15 Follow up: Response: No adverse reaction; Nausea is decreased                           lr4 

17:38 Drug: Tylenol 1000 mg Route: PO;                                                        lr4 

18:15 Follow up: Response: No adverse reaction; Pain is decreased                             lr4 

18:10 Drug: niCARdipine (25mg/250ml) 5 mg/hr Route: IV; Rate: 2.5 Titrate; Site: right        lr4 

      antecubital;                                                                                

18:16 Follow up: IV Status: Infusion continued                                                lr4 

18:36 Follow up: Rate change 7.5 mg/hr                                                        lr4 

19:45 Follow up: Rate change 5 mg/hr                                                          lr4 

                                                                                                  

                                                                                                  

Point of Care Testing:                                                                            

      Blood Glucose:                                                                              

18:21 Blood Glucose: 158 mg/dL;                                                               lr4 

      Ranges:                                                                                     

                                                                                                  

Outcome:                                                                                          

18:23 Condition: stable                                                                       lr4 

18:38 ER care complete, transfer ordered by MD.                                               [id number]

20:45 Instructed on the need for transfer.                                                    lr4 

20:46 Transferred by ground EMS to [facility], Transfer form completed. X-rays sent lr4 

      w/ patient.                                                                                 

20:46 Patient left the ED.                                                                    lr4 

                                                                                                  

                                                                                                  

NIH Stroke Scale - NIH Stroke Score                                                               

Date: 2022                                                                                  

Time: 18:16                                                                                       

Total Score = 1                                                                                   

  1a. Level of Consciousness (LOC) - 0(Alert)                                                     

  1b. Level of Consciousness (LOC) (Month \T\ Age) - 0(Both)                                      

  1c. LOC Commands (Open \T\ Closes Eyes/) - 0(Both)                                          

   2. Best Gaze (Lateral Gaze Paresis) - 0(Normal)                                                

   3. Visual Field Loss - 0(No visual loss)                                                       

   4. Facial Palsy - 1(Minor Paralysis)                                                           

  5a. Left Arm: Motor (10-second hold) - 0(No drift)                                              

  5b. Right Arm: Motor (10-second hold) - 0(No drift)                                             

  6a. Left Leg: Motor (5-second hold - always test supine) - 0(No drift)                          

  6b. Right Leg: Motor (5-second hold - always test supine) - 0(No drift)                         

   7. Limb Ataxia (finger/nose \T\ heel/shin - test with eyes open) - 0(Absent)                   

   8. Sensory Loss (pinprick arms/legs/face) - 0(Normal)                                          

   9. Best Language: Aphasia (description/naming/reading) - 0(No aphasia)                         

  10. Dysarthria (speech clarity - read or repeat words) - 0(Normal)                              

  11. Extinction and Inattention (visual/tactile/auditory/spatial/personal) - 0(No                

      abnormality)                                                                                

Initials: lr4                                                                                     

                                                                                                  

Signatures:                                                                                       

Dispatcher [employer]                           EDMS                                                 

Karissa Coe RN                      RN                                                      

Mark Nielsen                            mw2                                                  

Jillian Osorio Jose, MD MD   jr11                                                 

Ksenia Holt RN                   RN   lr4                                                  

                                                                                                  

Corrections: (The following items were deleted from the chart)                                    

19:35 18:50 administrative approval given by Nunu Lazaro/ patient has been        mw2         

      accepted to [facility] to the Neuro ICU [address] [place] [address] bed 7512/ Dr. Paez                

      accepted the patient in transfer/report to be called to [phone]                    

                                                                                                  

**************************************************************************************************

## 2022-02-27 NOTE — RAD REPORT
EXAM DESCRIPTION:  RAD - Chest Single View - 2/27/2022 6:06 pm

 

CLINICAL HISTORY:  dizziness

 

COMPARISON:  Abdomen   Pelvis Wo Contrast dated 11/20/2021; Stone Protocol dated 10/18/2021; Stone Pr
otocol dated 6/27/2021; Abdomen   Pelvis Wo Contrast dated 6/8/2021Head Brain Wo Cont dated 12/21/202
1; Head Brain Wo Cont dated 5/3/2021Chest Single View dated 9/29/2021

 

FINDINGS:  Lines: None.

Lungs: No evidence of edema or pneumonia.

Pleural: No significant pleural effusions or pneumothorax.

Cardiac: The heart size is within normal limits.

Bones: No acute fractures.

Other:

 

IMPRESSION:  No acute cardiopulmonary disease.

## 2022-02-27 NOTE — EDPHYS
Physician Documentation                                                                           

 [facility]                                                                 

Name: Robert Tietz                                                                                

Age: 56 yrs                                                                                       

Sex: Male                                                                                         

: 1965                                                                                   

MRN: [id number]                                                                                   

Arrival Date: 2022                                                                          

Time: 16:36                                                                                       

Account#: [id number]                                                                            

Bed 23                                                                                            

Private MD:                                                                                       

ED Physician De Guaman                                                                        

HPI:                                                                                              

                                                                                             

17:17 This 56 yrs old  Male presents to ER via Ambulatory with complaints of          jr11

      Dizziness, Arm Problem.                                                                     

17:17 The patient presents with dizziness, feeling faint, lightheadedness, feeling off        jr11

      balance, vertigo. Onset: The symptoms/episode began/occurred 3 day(s) ago. Context:         

      occurred at home, occurred while the patient was working. Modifying factors: The            

      symptoms are alleviated by nothing, the symptoms are aggravated by movement of head,        

      changing position. Associated signs and symptoms: Pertinent positives: ataxia,              

      headache, L facial droop and eye twitching x 1 mo. Severity of symptoms: At their worst     

      the symptoms were moderate in the emergency department the symptoms are worse Pain is       

      currently a 8 / 10. Patient is a 56-year-old male that has had a history of dizziness       

      for 3 days, has been falling over to the left, and has had a left-sided facial droop        

      for 3 months. Patient also with greater than 8 hours episode of chest pain, only when       

      coughing. Denies a productive cough or shortness of breath. Denies any weakness..           

                                                                                                  

Historical:                                                                                       

- Allergies:                                                                                      

16:53 No Known Allergies;                                                                     ss  

- Home Meds:                                                                                      

16:53 None [Active];                                                                          ss  

- PMHx:                                                                                           

16:53 Deaf;                                                                                   ss  

- PSHx:                                                                                           

16:53 None;                                                                                   ss  

                                                                                                  

- Immunization history:: Client reports receiving the 2nd dose of the Covid vaccine.              

- Social history:: Smoking status: Patient denies any tobacco usage or history of.                

                                                                                                  

                                                                                                  

ROS:                                                                                              

17:17 Constitutional: Negative for fever, chills Eyes: Negative for injury, pain, redness,    jr11

      and discharge, ENT: Negative for injury, pain, and discharge, Neck: Negative for            

      injury, pain, and swelling, Cardiovascular: Negative for chest pain, palpitations, and      

      edema, Respiratory: Negative for shortness of breath, cough Back: Negative for injury       

      and pain, : Negative for injury, bleeding, discharge, and swelling, MS/Extremity:         

      Negative for injury and deformity, Skin: Negative for injury, rash, and discoloration,      

      Endocrine: Negative for neck swelling, polydipsia, polyuria, polyphagia, and marked         

      weight changes.                                                                             

                                                                                                  

Exam:                                                                                             

17:17 Constitutional:  This is a well developed, well nourished patient who is awake, alert,  jr11

      and in no acute distress. Head/Face:  Normocephalic, atraumatic. L sided facial droop,      

      complete Eyes:  Extra-ocular motions intact.  Lids and lashes normal.  Conjunctiva and      

      sclera are non-icteric and not injected.  Cornea within normal limits.  Periorbital         

      areas with no swelling, redness, or edema. ENT:  Nares patent. No nasal discharge, no       

      septal abnormalities noted.  Oropharynx with no redness, swelling, or masses, exudates,     

      or evidence of obstruction, uvula midline.  Mucous membranes moist. Neck:  Trachea          

      midline, no thyromegaly or masses palpated, and no cervical lymphadenopathy.  Supple,       

      full range of motion without nuchal rigidity, or vertebral point tenderness.  No            

      Meningismus. Chest/axilla:  Normal chest wall appearance and motion.  Nontender with no     

      deformity.  No lesions are appreciated. Cardiovascular:  Regular rate and rhythm with a     

      normal S1 and S2.  No gallops, murmurs, or rubs.  Normal PMI, no JVD.  No pulse             

      deficits. Respiratory:  Lungs have equal breath sounds bilaterally, clear to                

      auscultation and percussion.  No rales, rhonchi or wheezes noted.  No increased work of     

      breathing, no retractions or nasal flaring. Abdomen/GI:  Soft, non-tender, with normal      

      bowel sounds.  No distension or tympany.  No guarding or rebound.  No evidence of           

      tenderness throughout. Skin:  Warm, dry with normal turgor.  Normal color with no           

      rashes, no lesions, and no evidence of cellulitis. Neuro:  Awake and alert, GCS 15,         

      oriented to person, place, time, and situation.  No gross motor or sensory deficits.        

      +ataxia and diziness on feet.                                                               

17:35 g EKG interpreted by me shows normal sinus rhythm, normal axis, T wave inversions in    jr11

      lead II and aVF also V5 V6 concerning for inferior and lateral ischemia.  No acute ST       

      changes.                                                                                    

                                                                                                  

Vital Signs:                                                                                      

16:38 Pulse 102; Resp 18; Temp 97.5; Pulse Ox 99% ;                                           ss  

16:38  / 107; Weight 68.95 kg; Height 5 ft. 5 in. (165.10 cm); Pain 0/10;               ss  

18:02  / 110; Pulse 100; Resp 20; Pulse Ox 97% on R/A;                                  lr4 

18:20  / 110; Pulse 104; Resp 20; Pulse Ox 95% on R/A;                                  lr4 

18:34  / 98; Pulse 104 LA; Resp 18; Pulse Ox 99% on R/A;                                lr4 

18:54  / 92; Pulse 103; Resp 18; Pulse Ox 95% ; Pain 0/10;                              lr4 

19:00  / 88; Pulse 101; Resp 20; Pulse Ox 100% on R/A;                                  lr4 

19:15  / 93; Pulse 100; Resp 20; Pulse Ox 94% on R/A;                                   lr4 

19:30  / 128; Pulse 102; Resp 20; Pulse Ox 97% ;                                        lr4 

19:44  / 81; Pulse 102; Resp 18; Pulse Ox 96% on R/A; Pain 0/10;                        lr4 

20:00  / 87; Pulse 95; Resp 18; Pulse Ox 98% on R/A;                                    lr4 

20:07  / 87; Pulse 95; Resp 18; Pulse Ox 96% on R/A;                                    lr4 

20:42  / 90; Pulse 96; Resp 20; Pulse Ox 95% on R/A; Pain 0/10;                         lr4 

16:38 Body Mass Index 25.29 (68.95 kg, 165.10 cm)                                             ss  

                                                                                                  

NIH Stroke Scale Scores:                                                                          

18:16 NIHSS Score: 1                                                                          lr4 

                                                                                                  

MDM:                                                                                              

17:06 Patient medically screened.                                                             jr11

17:17 Differential diagnosis: cardiac arrhythmia, generalized weakness, TIA, vertigo. Data    [facility]

      reviewed: vital signs, nurses notes, lab test result(s), EKG.                               

20:15 ED course: Patient not accepted to [facility] due to positive COVID test and no COVID   [facility] 

      beds available. Accepted for transfer to [facility]..                             

                                                                                                  

                                                                                             

17:09 Order name: Basic Metabolic Panel; Complete Time: 18:17                                 

                                                                                             

17:09 Order name: CBC with Diff; Complete Time: 17:55                                         [facility]

                                                                                             

17:09 Order name: LFT's; Complete Time: 18:17                                                 [facility]

                                                                                             

17:09 Order name: Magnesium; Complete Time: 18:17                                                                                                                                          

17:09 Order name: NT PRO-BNP; Complete Time: 18:17                                                                                                                                         

17:09 Order name: PT-INR; Complete Time: 17:55                                                                                                                                             

17:09 Order name: Troponin HS; Complete Time: 18:17                                                                                                                                        

17:09 Order name: XRAY Chest (1 view); Complete Time: 19:37                                                                                                                                

17:10 Order name: CT Head Brain wo Cont; Complete Time: 17:55                                                                                                                              

17:48 Order name: COVID-19 SARS RT PCR (Document "Date of Onset" if Symptomatic); Complete    ss  

      Time: 19:37                                                                                 

                                                                                             

17:09 Order name: EKG; Complete Time: 17:09                                                                                                                                                

17:09 Order name: Cardiac monitoring; Complete Time: 17:38                                                                                                                                 

17:09 Order name: EKG - Nurse/Tech; Complete Time: 17:39                                                                                                                                   

17:09 Order name: IV Saline Lock; Complete Time: 17:39                                                                                                                                     

17:09 Order name: Labs collected and sent; Complete Time: 17:39                                                                                                                            

17:09 Order name: O2 Per Protocol; Complete Time: 17:39                                                                                                                                    

17:09 Order name: O2 Sat Monitoring; Complete Time: 17:39                                     

                                                                                                  

Administered Medications:                                                                         

17:38 Drug: NS 0.9% 1000 ml Route: IV; Rate: 1 bolus; Site: right antecubital;                lr4 

18:15 Follow up: IV Status: Infusion continued                                                lr4 

17:38 Drug: Reglan (metoCLOPramide) 10 mg Route: IVP; Site: right antecubital;                lr4 

18:15 Follow up: Response: No adverse reaction; Nausea is decreased                           lr4 

17:38 Drug: Tylenol 1000 mg Route: PO;                                                        lr4 

18:15 Follow up: Response: No adverse reaction; Pain is decreased                             lr4 

18:10 Drug: niCARdipine (25mg/250ml) 5 mg/hr Route: IV; Rate: 2.5 Titrate; Site: right        lr4 

      antecubital;                                                                                

18:16 Follow up: IV Status: Infusion continued                                                lr4 

18:36 Follow up: Rate change 7.5 mg/hr                                                        lr4 

19:45 Follow up: Rate change 5 mg/hr                                                          lr4 

                                                                                                  

                                                                                                  

Point of Care Testing:                                                                            

      Blood Glucose:                                                                              

18:21 Blood Glucose: 158 mg/dL;                                                               lr4 

      Ranges:                                                                                     

      Critical Glucose Levels:Adult <50 mg/dl or >400 mg/dl  <40 mg/dl or >180 mg/dl       

Disposition Summary:                                                                              

22 18:38                                                                                    

Transfer Ordered                                                                                  

      Reason: Higher level of care                                                            jr11

      Condition: Serious                                                                      jr11

      Problem: new                                                                            jr11

      Symptoms: are unchanged                                                                 jr11

      Transfer Location: [facility](22 20:15)                              [facility] 

      Accepting Physician: Dr. Blake(22 20:46)                                          lr4 

      Diagnosis                                                                                   

        - hemorrhagic stroke                                                                  jr11

        - Coronavirus infection, unspecified                                                  [facility] 

      Forms:                                                                                      

        - Medication Reconciliation Form                                                      jr11

        - SBAR form                                                                           jr11

Critical care time excluding procedures:                                                          

18:00 Critical care time: Bedside Care: 15 minutes, Consultation: 15 minutes, Family          jr11

      Intervention: 7 minutes. Total time: 37 minutes                                             

                                                                                                  

                                                                                                  

NIH Stroke Scale - NIH Stroke Score                                                               

Date: 2022                                                                                  

Time: 18:16                                                                                       

Total Score = 1                                                                                   

  1a. Level of Consciousness (LOC) - 0(Alert)                                                     

  1b. Level of Consciousness (LOC) (Month \T\ Age) - 0(Both)                                      

  1c. LOC Commands (Open \T\ Closes Eyes/) - 0(Both)                                          

   2. Best Gaze (Lateral Gaze Paresis) - 0(Normal)                                                

   3. Visual Field Loss - 0(No visual loss)                                                       

   4. Facial Palsy - 1(Minor Paralysis)                                                           

  5a. Left Arm: Motor (10-second hold) - 0(No drift)                                              

  5b. Right Arm: Motor (10-second hold) - 0(No drift)                                             

  6a. Left Leg: Motor (5-second hold - always test supine) - 0(No drift)                          

  6b. Right Leg: Motor (5-second hold - always test supine) - 0(No drift)                         

   7. Limb Ataxia (finger/nose \T\ heel/shin - test with eyes open) - 0(Absent)                   

   8. Sensory Loss (pinprick arms/legs/face) - 0(Normal)                                          

   9. Best Language: Aphasia (description/naming/reading) - 0(No aphasia)                         

  10. Dysarthria (speech clarity - read or repeat words) - 0(Normal)                              

  11. Extinction and Inattention (visual/tactile/auditory/spatial/personal) - 0(No                

      abnormality)                                                                                

Initials: lr4                                                                                     

                                                                                                  

Signatures:                                                                                       

Dispatcher [employer]                           Karissa Bell, RN                      RN   ss                                                   

Marco Amador MD MD   7                                                  

De Guaman MD MD   [facility]                                                 

Ksenia Holt RN                   RN   lr4                                                  

                                                                                                  

Corrections: (The following items were deleted from the chart)                                    

20:15 18:38 Destiny [address]         

20:15 18:38 [place] [address]         

20:46 20:15 Dr. Blake [facility]                                                             lr4         

                                                                                                  

**************************************************************************************************

## 2022-02-28 NOTE — EKG
Test Date:    2022-02-27               Test Time:    17:34:09

Technician:   СЕРГЕЙ                                   

                                                     

MEASUREMENT RESULTS:                                       

Intervals:                                           

Rate:         90                                     

KS:           134                                    

QRSD:         86                                     

QT:           362                                    

QTc:          442                                    

Axis:                                                

P:            40                                     

KS:           134                                    

QRS:          51                                     

T:            -30                                    

                                                     

INTERPRETIVE STATEMENTS:                                       

                                                     

Normal sinus rhythm

Cannot rule out Inferior infarct, age undetermined

Cannot rule out Anterior infarct, age undetermined

Abnormal ECG

Compared to ECG 09/29/2021 01:25:31

Myocardial infarct finding now present

Left ventricular hypertrophy no longer present



Electronically Signed On 02-28-22 08:47:43 CST by Chavo Ortiz

## 2023-07-03 ENCOUNTER — HOSPITAL ENCOUNTER (EMERGENCY)
Dept: HOSPITAL 97 - ER | Age: 58
Discharge: ANOTHER HOSPITAL | End: 2023-07-03
Payer: COMMERCIAL

## 2023-07-03 VITALS — SYSTOLIC BLOOD PRESSURE: 162 MMHG | DIASTOLIC BLOOD PRESSURE: 94 MMHG

## 2023-07-03 VITALS — OXYGEN SATURATION: 97 %

## 2023-07-03 VITALS — TEMPERATURE: 97.8 F

## 2023-07-03 DIAGNOSIS — R29.730: ICD-10-CM

## 2023-07-03 DIAGNOSIS — I61.9: Primary | ICD-10-CM

## 2023-07-03 LAB
ALBUMIN SERPL BCP-MCNC: 3.8 G/DL (ref 3.4–5)
ALP SERPL-CCNC: 99 U/L (ref 45–117)
ALT SERPL W P-5'-P-CCNC: 25 U/L (ref 16–61)
AST SERPL W P-5'-P-CCNC: 20 U/L (ref 15–37)
BILIRUB INDIRECT SERPL-MCNC: (no result) MG/DL (ref 0.2–0.8)
BUN BLD-MCNC: 20 MG/DL (ref 7–18)
GLUCOSE SERPLBLD-MCNC: 122 MG/DL (ref 74–106)
HCT VFR BLD CALC: 47.4 % (ref 39.6–49)
INR BLD: 1.02
LYMPHOCYTES # SPEC AUTO: 0.3 K/UL (ref 0.7–4.9)
MCV RBC: 79.2 FL (ref 80–100)
METHAMPHET UR QL SCN: NEGATIVE
PMV BLD: 7.8 FL (ref 7.6–11.3)
POTASSIUM SERPL-SCNC: 4 MEQ/L (ref 3.5–5.1)
RBC # BLD: 5.99 M/UL (ref 4.33–5.43)
THC SERPL-MCNC: NEGATIVE NG/ML

## 2023-07-03 PROCEDURE — 51702 INSERT TEMP BLADDER CATH: CPT

## 2023-07-03 PROCEDURE — 85730 THROMBOPLASTIN TIME PARTIAL: CPT

## 2023-07-03 PROCEDURE — 85610 PROTHROMBIN TIME: CPT

## 2023-07-03 PROCEDURE — 85025 COMPLETE CBC W/AUTO DIFF WBC: CPT

## 2023-07-03 PROCEDURE — 82077 ASSAY SPEC XCP UR&BREATH IA: CPT

## 2023-07-03 PROCEDURE — 93005 ELECTROCARDIOGRAM TRACING: CPT

## 2023-07-03 PROCEDURE — 84484 ASSAY OF TROPONIN QUANT: CPT

## 2023-07-03 PROCEDURE — 80307 DRUG TEST PRSMV CHEM ANLYZR: CPT

## 2023-07-03 PROCEDURE — 72125 CT NECK SPINE W/O DYE: CPT

## 2023-07-03 PROCEDURE — 36415 COLL VENOUS BLD VENIPUNCTURE: CPT

## 2023-07-03 PROCEDURE — 70450 CT HEAD/BRAIN W/O DYE: CPT

## 2023-07-03 PROCEDURE — 71045 X-RAY EXAM CHEST 1 VIEW: CPT

## 2023-07-03 PROCEDURE — 80076 HEPATIC FUNCTION PANEL: CPT

## 2023-07-03 PROCEDURE — 80143 DRUG ASSAY ACETAMINOPHEN: CPT

## 2023-07-03 PROCEDURE — 80179 DRUG ASSAY SALICYLATE: CPT

## 2023-07-03 PROCEDURE — 99285 EMERGENCY DEPT VISIT HI MDM: CPT

## 2023-07-03 PROCEDURE — 80048 BASIC METABOLIC PNL TOTAL CA: CPT

## 2023-07-03 NOTE — RAD REPORT
EXAM DESCRIPTION:  RAD - Chest Single View - 7/3/2023 9:11 pm

 

CLINICAL HISTORY:  COUGH

Chest pain.

 

COMPARISON:  Chest Single View dated 2/27/2022; Chest Single View dated 9/29/2021

 

FINDINGS:  Portable technique limits examination quality.

 

The lungs are underinflated but grossly clear. The heart is normal in size. No displaced fractures.

 

IMPRESSION:  No acute intrathoracic process suspected.

## 2023-07-03 NOTE — RAD REPORT
EXAM DESCRIPTION:  CT - CTHCSPWOC - 7/3/2023 9:35 pm

 

CLINICAL HISTORY:  Trauma, head and neck injury.

DIZZINESS

 

COMPARISON:  Head C Spine Mpr Wo Con dated 9/29/2021

 

TECHNIQUE:  Axial 5 mm thick images of the head were obtained.

 

Axial 2 mm thick images of the cervical spine were obtained with sagittal and coronal reconstruction 
images generated and reviewed.

 

All CT scans are performed using dose optimization technique as appropriate and may include automated
 exposure control or mA/KV adjustment according to patient size.

 

FINDINGS:  CT HEAD WITHOUT CONTRAST:

 

There is a 23 x 19 mm acute hemorrhage in the left aspect of the brainstem, predominately in the rosa
on of the morgan. There is moderate mass effect on the fourth ventricle without obstructive hydrocephal
us.No significant midline shift seen. No additional areas of hemorrhage. No hydrocephalus or extra-ax
ial collection.

 

The paranasal sinuses and mastoids are clear.The calvarium is intact.

 

CT CERVICAL SPINE WITHOUT CONTRAST:

 

No fracture or subluxation.Mild lower cervical spondylosis.No prevertebral soft tissues swelling is i
dentified.

 

IMPRESSION:  23 x 19 mm acute bleed left aspect of the brainstem.

 

The findings were discussed with doctor Aguilar in the ER on 07/03/2023 at 9:40 p.m. by telephone.

## 2023-07-03 NOTE — XMS REPORT
Continuity of Care Document

                             Created on:July 3, 2023



Patient:TIETZ, ROBERT EDWARD

Sex:Male

:1965

External Reference #:[id number]





Demographics







                          Address                   [address]



                                                    [place] [address]

 

                          Home Phone                [phone]

 

                          Work Phone                [phone]

 

                          Email Address             NONE

 

                          Preferred Language        sgn

 

                          Marital Status            Unknown

 

                          Amish Affiliation     Unknown

 

                          Race                      Unknown

 

                          Ethnic Group              Unknown









Author







                          Organization              [facility]

t

 

                          Address                   [address]



                                                    [place] [address]

 

                          Phone                     [phone]









Support







                Name            Relationship    Address         Phone

 

                SHEREE MERINO          [address]    [phone]

1



                                                [place] [address] 

 

                TIETZ, HEIDI M               Unavailable     [phone]









Care Team Providers







                    Name                Role                Phone

 

                    JERICHO LOWERY      Attending Clinician Unavailable

 

                    VAZQUEZ PAYNE     Attending Clinician Unavailable

 

                    GUILLERMO CONNELL Attending Clinician Unavailable

 

                    Gio Williamson Attending Clinician [phone]

 

                    GUILLERMO CONNELL Admitting Clinician Unavailable









Payers







           Payer Name Policy Type Policy Number Effective Date Expiration Date S

ource

 

           [employer] PPO            [id number] 2022 00:00:00            

 

           [employer] PPO POS EPO            [id number] 2022 00:00:00       

     



           CHOICE                                                 







Problems







       Condition Condition Condition Status Onset  Resolution Last   Treating Co

mments 

Source



       Name   Details Category        Date   Date   Treatment Clinician        



                                                 Date                 

 

       Hemifacial  Hemifacia Problem Active               2022            

   [facility]



       spasm  l spasm                             22:01:28               l



       (finding) (finding)                                                  Mary lucero



              Active                                                  



              Problem                                                  



              2022                                                  



              [facility]                                                  



              Neuro                                                   

 

       Cerebral  Cerebral Problem Active               2022               

[facility]



       hemorrhage hemorrhage                             22:01:28               

l



       (disorder) (disorder)                                                  Charan jain



              Active                                                  



              Problem                                                  



              2022                                                  



              [facility]                                                  



              Neuro                                                   

 

       Hypertensi  Hypertens Problem Active               2022            

   [facility]



       ve     [place]                                22:01:28               l



       disorder, disorder,                                                  Mary lucero



       systemic systemic                                                  



       arterial arterial                                                  



       (disorder) (disorder)                                                  



              Active                                                  



              Problem                                                  



              2022                                                  



              [facility]                                                  



              Neuro                                                   

 

       Hypertensi  Hypertens Problem Active               2022            

   [facility]



       ve     [place]                                22:01:28               l



       emergency emergency                                                  Mary lucero



       (disorder) (disorder)                                                  



              Active                                                  



              Problem                                                  



              2022                                                  



               [facility]                                                  



              Neuro                                                   

 

       Blepharosp  Blepharos Problem Active               2022            

   [facility]



       asm    pasm                               22:01:28               l



       (disorder) (disorder)                                                  He

rmann



              Active                                                  



              Problem                                                  



              2022                                                  



              [facility]                                                  



              Neuro                                                   







Allergies, Adverse Reactions, Alerts

This patient has no known allergies or adverse reactions.



Social History







           Social Habit Start Date Stop Date  Quantity   Comments   Source

 

           Social History 2022                       [facility]



                      05:05:44   05:05:44                         

 

           Sex Assigned At [birth year]                       PHIL Rahman



           [facility]







Medications







       Ordered Filled Start  Stop   Current Ordering Indication Dosage Frequency

 Signature

                    Comments            Components          Source



     Medication Medication Date Date Medication? Clinician                (SIG) 

          



     Name Name                                                   

 

     baclofen 10            Yes                      10 mg = 1           M

emoria



     mg oral      4-27                               tab, PO,           l



     tablet      18:43:                               Bedtime, #           Libby

nn



               00                                 30 tab, 3           



                                                  Refill(s),           



                                                  Pharmacy:           



                                                  [facility]           



                                                  Pharmacy           



                                                  [id number],           



                                                  162.56,           



                                                  cm,            



                                                  22           



                                                  13:19:00           



                                                  CDT,           



                                                  Height,           



                                                  75.455,           



                                                  kg,            



                                                  22           



                                                  13:19:00           



                                                  CDT,           



                                                  Weight           







Vital Signs







             Vital Name   Observation Time Observation Value Comments     Source

 

             Systolic (mm Hg) 2022 18:10:00                           [facility]

rial [facility]

 

             Diastolic (mm Hg) 2022 18:10:00                           [facility] [place]

 

             Heart Rate   2022 18:10:00                           [facility]

 [place]

 

             Respitory Rate 2022 18:10:00                           [facility]

al [place]

 

             Height       2022 18:10:00 162.56 cm                 [facility]

 

             Weight       2022 18:10:00                           [facility]

 

             BMI Calculated 2022 18:10:00                           [facility]

al [place]

 

             Systolic (mm Hg) 2020 20:22:00                           [facility]

rial [place]

 

             Diastolic (mm Hg) 2020 20:22:00                           [facility]

orial [place]

 

             Heart Rate   2020 20:22:00                           [facility]

 [place]

 

             Respitory Rate 2020 20:22:00                           [facility]

al [place]

 

             Height       2020 20:22:00 162.56 cm                 [facility]

 

             Weight       2020 20:22:00                           [facility]

 [place]

 

             BMI Calculated 2020 20:22:00                           [facility]







Procedures

This patient has no known procedures.



Encounters







        Start   End     Encounter Admission Attending Care    Care    Encounter 

Source



        Date/Time Date/Time Type    Type    Clinicians Facility Department ID   

   

 

        2022         Outpatient                 [facility]     [facility]     [id number]

 [facility]



        12:58:22                                                 [id number] [facility]

 

        2022         Outpatient         [facility], [facility]     [id number]

 [place]



        17:00:59                         [facility]

 

        2022         Outpatient         KERRY, [facility]     [id number]

 [place]



        10:22:46                         [facility]

 

        2022         Inpatient ER      [facility]   Neurosurger [id number]

[address]



        18:28:49                         [facility]

 

        2022 Ambulatory                 nullFlavo [facility]     [id number]

[id number] [facility]



        16:00:00 16:00:00 Pre-Reg                 r       Neurology 02      l



                                                        Sari         [place]

 

        2022 Outpatient         [facility]

[id number] 



        11:00:00 11:00:00                 Gio                   02      



                                        Joaquin                         

 

        2022 Outpatient                 [facility]    [id number]

365 [facility]



        13:45:00 13:45:00                                         02      cirilo Castillo

 

        2022 Outpatient                 nullFlavo [facility]     [id number]

[id number] [facility]



        18:00:00 04:59:59                         r       Neurology 01      l



                                                        Sari Castillo

 

        2022 Outpatient         [facility] [address]

[id number] 



        13:00:00 23:59:59                 Gio                   01      



                                        Joaquin                         

 

        2022 Outpatient                 [facility]    [id number]

[facility]



        13:00:00 13:00:00                                         01      cirilo Castillo

 

        2020 Outpatient                 nullFlavo [facility]     [id number]

[id number] [facility]



        20:15:00 05:59:59                         r       Neurology 00      l



                                                        Sari Castillo

 

        2020 Outpatient         [facility] [address]

[id number] 



        14:15:00 23:59:59                 Gio                   00      



                                        Joaquin                         

 

        2020 Outpatient                 [place]    [id number]

[facility]



        14:15:00 14:15:00                                         00      cirilo Castillo







Results

This patient has no known results.

## 2023-07-03 NOTE — ER
Nurse's Notes                                                                                     

 [facility]                                                                 

Name: Robert Tietz                                                                                

Age: 57 yrs                                                                                       

Sex: Male                                                                                         

: 1965                                                                                   

MRN: [id number]                                                                                   

Arrival Date: 2023                                                                          

Time: 20:04                                                                                       

Account#: [id number]                                                                            

Bed 3                                                                                             

Private MD:                                                                                       

Diagnosis: Nontraumatic intracerebral hemorrhage, unspecified                                     

                                                                                                  

Presentation:                                                                                     

                                                                                             

20:15 Chief complaint: EMS states: called out for fall/unresponsive. pt has a history of      as6 

      seizures. it appears pt had a seizure, had a fall and hit head and is now post ictal.       

      Coronavirus screen: At this time, the client does not indicate any symptoms associated      

      with coronavirus-19. Ebola Screen: No symptoms or risks identified at this time.            

      Initial Sepsis Screen: Does the patient meet any 2 criteria? No. Patient's initial          

      sepsis screen is negative. Does the patient have a suspected source of infection? No.       

      Patient's initial sepsis screen is negative. Risk Assessment: Do you want to hurt           

      yourself or someone else? Unable to obtain. Onset of symptoms was 2023.            

20:15 Method Of Arrival: EMS: [facility]                                                as6 

20:15 Acuity: AMOS 2                                                                           as6 

                                                                                                  

Historical:                                                                                       

- PMHx:                                                                                           

20:18 Deaf; Seizure;                                                                          as6 

                                                                                                  

- Immunization history:: Adult Immunizations unknown.                                             

- Social history:: Smoking status: unknown.                                                       

- Family history:: not pertinent.                                                                 

                                                                                                  

                                                                                                  

Screenin:20 [facility] ED Fall Risk Assessment (Adult) Score/Fall Risk Level 0 - 2 = Low Risk. Abuse  as6 

      screen: Denies threats or abuse. Denies injuries from another. Nutritional screening:       

      No deficits noted. Tuberculosis screening: No symptoms or risk factors identified.          

                                                                                                  

Assessment:                                                                                       

20:18 General: Appears in no apparent distress. ill, Behavior is drowsy. Pain: Unable to use  as6 

      pain scale. FLACC scale score is 0 out of 10. Neuro: Level of Consciousness is              

      lethargic, post ictal. Respiratory: Respiratory effort is even, unlabored.                  

20:19 Derm: Wound noted left flank and left knee Wound is abrasions noted.                    as6 

20:28 General: wife reports pt was at work and was walking to the bathroom and had a seizure  as6 

      and fell.                                                                                   

20:59 General:  used with wife. wife reports pt does not have a history of         as6 

      seizures and this is the first time this had happened .                                     

21:25 Reassessment: Pt remains drowsy. Does not open eyes to stimuli. IV fluids infusing      jb4 

      without issue. Family remains at the bedside.                                               

21:56 Reassessment: Received verbal order for nicardipine drip to maintain a Systolic B/p no  jb4 

      greater than 130.                                                                           

                                                                                                  

Vital Signs:                                                                                      

20:15  / 98; Pulse 75; Resp 20 S; Temp 97.1(Ca); Pulse Ox 98% on R/A; Weight 68.04 kg;  as6 

21:01  / 94; Pulse 80; Resp 20 S; Temp 98.3(Ca); Pulse Ox 97% on R/A;                   as6 

21:47  / 101; Pulse 77; Resp 17 S; Temp 97.8(Ca); Pulse Ox 97% on R/A;                  as6 

21:56  / 88; Pulse 84; Resp 18 S; Temp 97.8(Ca); Pulse Ox 98% on R/A;                   as6 

22:07  / 88; Pulse 94; Resp 16; Pulse Ox 97% on R/A;                                    jb4 

22:20  / 94; Pulse 90; Resp 17; Pulse Ox 97% on R/A;                                    jb4 

                                                                                                  

[place] Score:                                                                               

21:27 Eye Response: to pain(2). Modifying Factors: Medicated. Motor Response: localizes       jb4 

      pain(5). Verbal Response: none(1). Total: 8.                                                

22:32 Eye Response: to pain(2). Modifying Factors: Medicated. Motor Response: localizes       jb4 

      pain(5). Verbal Response: none(1). Total: 8.                                                

                                                                                                  

NIH Stroke Scale Scores:                                                                          

21:44 NIHSS Score: 30                                                                         [facility] 

                                                                                                  

ED Course:                                                                                        

20:15 Patient arrived in ED.                                                                  as6 

20:15 Arm band placed on.                                                                     as6 

20:16 Dallin Aguilar MD is Attending Physician.                                             [facility] 

20:18 Triage completed.                                                                       as6 

20:20 Phuc John, CHIDI is Primary Nurse.                                                    as6 

20:20 Placed in gown. Bed in low position. Call light in reach. Side rails up X2. Client      as6 

      placed on continuous cardiac and pulse oximetry monitoring. NIBP monitoring applied.        

20:20 Lopez cath inserted, using sterile technique, 16 Fr., by me, balloon inflated, to       as6 

      gravity drainage, urine specimen collected. returned clear yellow urine. Patient            

      tolerated well. Maintain EMS IV. Dressing intact. Good blood return noted. Site clean \T\   

      dry. Gauge \T\ site: 20G Left hand.                                                         

20:21 EKG done, by ED staff, reviewed by Dallin Aguilar MD.                                   mc5 

20:31 Seizure precautions initiated.                                                          mc5 

20:32 Acetaminophen Sent.                                                                     jb4 

20:32 Basic Metabolic Panel Sent.                                                             jb4 

20:32 CBC with Diff Sent.                                                                     jb4 

20:32 ETOH Level Sent.                                                                        jb4 

20:32 Hepatic Function Sent.                                                                  jb4 

20:32 PT-INR Sent.                                                                            jb4 

20:32 Ptt, Activated Sent.                                                                    jb4 

20:32 Salicylate Sent.                                                                        jb4 

20:33 Urine Drug Screen Sent.                                                                 jb4 

21:13 Chest Single View XRAY In Process Unspecified.                                          EDMS

21:37 CT Head C Spine In Process Unspecified.                                                 EDMS

21:47 Inserted saline lock: 18 gauge in right forearm, using aseptic technique.               as6 

21:58 Physician approval from [facility] by dr Arthur Paez.                         1 

22:00 [facility] [employer] contacted.                                                 ah1 

22:09 Faxed over face sheet to [facility] [email or web address].                                  ah1 

22:11 Hospital approval from [facility] room 7 south 5 bed 11, [facility]             1 

22:23 [facility] arrived.                                                                    1 

22:25 No provider procedures requiring assistance completed. Patient transferred, IV remains  as6 

      in place.                                                                                   

22:30 Initial contact to [facility] by Dr Aguilar. He spoke with Ana Street RN [facility].    1 

                                                                                                  

Administered Medications:                                                                         

20:39 Drug: NS 0.9%  ml Route: IV; Rate: bolus; Site: left hand;                        jb4 

22:26 Follow up: Response: No adverse reaction; IV Status: Completed infusion; IV Intake:     as6 

      500ml                                                                                       

20:39 Drug: NS 0.9% IV 1000 ml Route: IV; Rate: 125 ml/hr; Site: left hand;                   jb4 

22:26 Follow up: Response: No adverse reaction; IV Status: Infusion continued upon transfer;  as6 

      IV Intake: 200ml                                                                            

20:56 Drug: Keppra IV 2000 mg Route: IV; Rate: per protocol; Site: left hand;                 jb4 

22:26 Follow up: Response: No adverse reaction; IV Status: Completed infusion; IV Intake:     as6 

      200ml                                                                                       

20:56 Drug: Ativan IVP 1 mg Route: IVP; Site: left hand;                                      jb4 

22:26 Follow up: Response: No adverse reaction                                                as6 

20:56 Drug: foLIC Acid IVPB 1 mg Route: IVPB; Site: left hand;                                jb4 

22:26 Follow up: Response: No adverse reaction; IV Status: Completed infusion; IV Intake: [id number] 

21:52 Drug: niCARdipine IV 5 mg/hr Route: IV; Rate: per protocol; Site: right forearm;        jb4 

22:07 Follow up: Rate change 7.5 mg/hr                                                        jb4 

22:25 Follow up: Response: No adverse reaction; IV Status: Infusion continued upon transfer;  as6 

      IV Intake: 20ml                                                                             

21:55 Drug: Decadron - Dexamethasone IVP 10 mg Route: IVP; Site: left hand;                   jb4 

22:25 Follow up: Response: No adverse reaction                                                as6 

22:05 Drug: Mannitol IVP (25%) 50 ml Route: IVP; Site: left hand;                             as6 

22:25 Follow up: Response: No adverse reaction                                                as6 

                                                                                                  

                                                                                                  

Medication:                                                                                       

20:20 VIS not applicable for this client.                                                     as6 

                                                                                                  

Intake:                                                                                           

22:25 IV: 20ml; Total: 20ml.                                                                  as6 

22:26 IV: 50ml; Total: 70ml.                                                                  as6 

22:26 IV: 200ml; Total: 270ml.                                                                as6 

22:26 IV: 500ml; Total: 770ml.                                                                as6 

22:26 IV: 200ml; Total: 970ml.                                                                as6 

                                                                                                  

Outcome:                                                                                          

21:58 ER care complete, transfer ordered by MD. naranjo 

22:25 Transferred by helicopter to [facility], Transfer form completed.    as6 

      X-rays sent w/ patient.                                                                     

22:25 critical                                                                                    

22:25 Instructed on the need for transfer.                                                        

22:27 Patient left the ED.                                                                    as6 

                                                                                                  

                                                                                                  

NIH Stroke Scale - NIH Stroke Score                                                               

Date: 2023                                                                                  

Time: 21:44                                                                                       

Total Score = 30                                                                                  

  10. Dysarthria (speech clarity - read or repeat words) - 2(Severe)                              

  11. Extinction and Inattention (visual/tactile/auditory/spatial/personal) -                     

      2(Profound)                                                                                 

  1a. Level of Consciousness (LOC) - 3(Unresponsive)                                              

  1b. Level of Consciousness (LOC) (Month \T\ Age) - 2(Neither)                                   

  1c. LOC Commands (Open \T\ Closes Eyes/) - 2(Neither)                                       

   2. Best Gaze (Lateral Gaze Paresis) - 0(Normal)                                                

   3. Visual Field Loss - 0(No visual loss)                                                       

   4. Facial Palsy - 1(Minor Paralysis)                                                           

  5a. Left Arm: Motor (10-second hold) - 4(No movement)                                           

  5b. Right Arm: Motor (10-second hold) - 4(No movement)                                          

  6a. Left Leg: Motor (5-second hold - always test supine) - 2(Drift, some effort                 

      against gravity)                                                                            

  6b. Right Leg: Motor (5-second hold - always test supine) - 2(Drift, some effort                

      against gravity)                                                                            

   7. Limb Ataxia (finger/nose \T\ heel/shin - test with eyes open) - 2(Present in two            

      limbs)                                                                                      

   8. Sensory Loss (pinprick arms/legs/face) - 1(Mild to moderate loss)                           

   9. Best Language: Aphasia (description/naming/reading) - 3(Mute, global aphasia)               

Initials: jose a                                                                                     

                                                                                                  

Signatures:                                                                                       

Dispatcher [employer]                           EDDallin Weber MD MD cha Bryson, James, RN RN   jb4                                                  

Phuc John RN RN as6                                                  

Juvenal Broussard                            1                                                  

Madisyn Borja                               5                                                  

                                                                                                  

Corrections: (The following items were deleted from the chart)                                    

: 21:25 Reassessment: Pt remains drowsy. Does not open eyes to stimuli. IV fluids jb4         

      infusing without issue. Family remains at the bedside. jb4                                  

21:28 21:25 Reassessment: Pt remains drowsy. Does not open eyes to verbal stimuli. IV jb4         

      fluids infusing without issue. Family remains at the bedside. jb4                           

: 21:27 GCS: 8, Medicated, jb4                                                    jb4         

22:33 21:27 GCS: 9, Medicated, jb4                                                    jb4         

22:33 22:32 GCS: 8, jb4                                                               jb4         

                                                                                                  

**************************************************************************************************

## 2023-07-03 NOTE — EDPHYS
Physician Documentation                                                                           

 [facility]                                                                 

Name: Robert Tietz                                                                                

Age: 57 yrs                                                                                       

Sex: Male                                                                                         

: 1965                                                                                   

MRN: [id number]                                                                                   

Arrival Date: 2023                                                                          

Time: 20:04                                                                                       

Account#: [id number]                                                                            

Bed 3                                                                                             

Private MD:                                                                                       

ED Physician Dallin Aguilar                                                                      

HPI:                                                                                              

                                                                                             

21:44 This 57 yrs old  Male presents to ER via EMS with complaints of unresponsive,   jose a 

      seizure at work.                                                                            

21:44 The patient's problem is reported as unresponsive. Onset: The symptoms/episode          jose a 

      began/occurred just prior to arrival. Duration: The episode is continuous. The symptoms     

      are alleviated by nothing. The symptoms are aggravated by nothing. The patient presents     

      with decreased responsiveness. Possible causes: CVA or TIA, head injury, seizure,           

      sepsis. The patient presents after having a single isolated seizure, that lasted an         

      unknown period of time. Seizure onset: just prior to arrival. Associated injury:            

      Head/face: forehead.                                                                        

                                                                                                  

Historical:                                                                                       

- PMHx:                                                                                           

20:18 Deaf; Seizure;                                                                          as6 

                                                                                                  

- Immunization history:: Adult Immunizations unknown.                                             

- Social history:: Smoking status: unknown.                                                       

- Family history:: not pertinent.                                                                 

                                                                                                  

                                                                                                  

ROS:                                                                                              

21:44 Constitutional: Negative for fever, chills, and weight loss, Eyes: Negative for injury, jose a 

      pain, redness, and discharge, ENT: Negative for injury, pain, and discharge, Neck:          

      Negative for injury, pain, and swelling, Cardiovascular: Negative for chest pain,           

      palpitations, and edema, Respiratory: Negative for shortness of breath, cough,              

      wheezing, and pleuritic chest pain, Abdomen/GI: Negative for abdominal pain, nausea,        

      vomiting, diarrhea, and constipation, Back: Negative for injury and pain, : Negative      

      for injury, bleeding, discharge, and swelling, MS/Extremity: Negative for injury and        

      deformity, Skin: Negative for injury, rash, and discoloration, Psych: Negative for          

      depression, anxiety, suicide ideation, homicidal ideation, and hallucinations,              

      Allergy/Immunology: Negative for hives, rash, and allergies, Endocrine: Negative for        

      neck swelling, polydipsia, polyuria, polyphagia, and marked weight changes,                 

      Hematologic/Lymphatic: Negative for swollen nodes, abnormal bleeding, and unusual           

      bruising.                                                                                   

21:44 Neuro: Positive for altered mental status, seizure activity.                                

                                                                                                  

Exam:                                                                                             

21:44 Radiologist reports: left brainstem hemorrhage [id number]                                  jose a 

21:44 Constitutional:  This is a well developed, well nourished patient who is awake, alert,      

      and in no acute distress. Head/Face:  Normocephalic, atraumatic. Eyes:  Pupils equal        

      round and reactive to light, extra-ocular motions intact.  Lids and lashes normal.          

      Conjunctiva and sclera are non-icteric and not injected.  Cornea within normal limits.      

      Periorbital areas with no swelling, redness, or edema. ENT:  Nares patent. No nasal         

      discharge, no septal abnormalities noted.  Tympanic membranes are normal and external       

      auditory canals are clear.  Oropharynx with no redness, swelling, or masses, exudates,      

      or evidence of obstruction, uvula midline.  Mucous membranes moist. Neck:  Trachea          

      midline, no thyromegaly or masses palpated, and no cervical lymphadenopathy.  Supple,       

      full range of motion without nuchal rigidity, or vertebral point tenderness.  No            

      Meningismus. Chest/axilla:  Normal chest wall appearance and motion.  Nontender with no     

      deformity.  No lesions are appreciated. Cardiovascular:  Regular rate and rhythm with a     

      normal S1 and S2.  No gallops, murmurs, or rubs.  Normal PMI, no JVD.  No pulse             

      deficits. Respiratory:  Lungs have equal breath sounds bilaterally, clear to                

      auscultation and percussion.  No rales, rhonchi or wheezes noted.  No increased work of     

      breathing, no retractions or nasal flaring. Abdomen/GI:  Soft, non-tender, with normal      

      bowel sounds.  No distension or tympany.  No guarding or rebound.  No evidence of           

      tenderness throughout. Back:  No spinal tenderness.  No costovertebral tenderness.          

      Full range of motion. Male :  Normal genitalia with no discharge or lesions. Skin:        

      Warm, dry with normal turgor.  Normal color with no rashes, no lesions, and no evidence     

      of cellulitis. MS/ Extremity:  Pulses equal, no cyanosis.  Neurovascular intact.  Full,     

      normal range of motion. Psych:  Awake, alert, with orientation to person, place and         

      time.  Behavior, mood, and affect are within normal limits.                                 

21:44 Neuro: Orientation: unable to test, the patient is comatose, Mentation: unable to           

      follow commands, Memory: unable to test, Cranial nerves: unable to test, Cerebellar         

      function: unable to test, Sensation: unable to test, Gait: not tested. Deep tendon          

      reflexes are 1 (trace) + in the  bilateral brachioradialis, bicep, tricep and patellar      

      and Achilles tendons, seizure activity.                                                     

                                                                                                  

Vital Signs:                                                                                      

20:15  / 98; Pulse 75; Resp 20 S; Temp 97.1(Ca); Pulse Ox 98% on R/A; Weight 68.04 kg;  as6 

21:01  / 94; Pulse 80; Resp 20 S; Temp 98.3(Ca); Pulse Ox 97% on R/A;                   as6 

21:47  / 101; Pulse 77; Resp 17 S; Temp 97.8(Ca); Pulse Ox 97% on R/A;                  as6 

21:56  / 88; Pulse 84; Resp 18 S; Temp 97.8(Ca); Pulse Ox 98% on R/A;                   as6 

22:07  / 88; Pulse 94; Resp 16; Pulse Ox 97% on R/A;                                    jb4 

22:20  / 94; Pulse 90; Resp 17; Pulse Ox 97% on R/A;                                    jb4 

                                                                                                  

NIH Stroke Scale Scores:                                                                          

21:44 NIHSS Score: 30                                                                         jose a 

                                                                                                  

[place] Coma Score:                                                                               

21:27 Eye Response: to pain(2). Modifying Factors: Medicated. Motor Response: localizes       jb4 

      pain(5). Verbal Response: none(1). Total: 8.                                                

22:32 Eye Response: to pain(2). Modifying Factors: Medicated. Motor Response: localizes       jb4 

      pain(5). Verbal Response: none(1). Total: 8.                                                

                                                                                                  

MDM:                                                                                              

20:16 Patient medically screened.                                                             jose a 

21:53 Differential diagnosis: CVA, TIA, Dementia, paralysis, metabolic disorder. Differential jose a 

      Diagnosis: CVA, electrolyte abnormality, hypoglycemia, intracranial bleed, meningitis,      

      seizure, TIA, volume depletion, cardiac arrhythmia. Data reviewed: vital signs, nurses      

      notes, lab test result(s), EKG, radiologic studies, CT scan, plain films. Consideration     

      of Admission/Observation Escalation of care including admission/observation considered.     

      I considered the following discharge prescriptions or medication management in the          

      emergency department Medications were administered in the Emergency Department. See         

      MAR. Test considered but Not performed: MRI: no brain mri. Care significantly affected      

      by the following chronic conditions: Hypertension, deaf, seizures. Counseling: I had a      

      detailed discussion with the patient and/or guardian regarding: the historical points,      

      exam findings, and any diagnostic results supporting the discharge/admit diagnosis, the     

      presence of at least one elevated blood pressure reading (>120/80) during this              

      emergency department visit, lab results, radiology results, the need to transfer to         

      another facility, for higher level of care, [facility] does not           

      immediately have the required specialist.                                                   

                                                                                                  

                                                                                             

20:23 Order name: Acetaminophen; Complete Time: 21:38                                         [facility] 

                                                                                             

20:23 Order name: Basic Metabolic Panel; Complete Time: 21:38                                 [facility] 

                                                                                             

20:23 Order name: CBC with Diff; Complete Time: 21:38                                         [facility] 

                                                                                             

20:23 Order name: ETOH Level; Complete Time: 21:38                                            [facility] 

                                                                                             

20:23 Order name: Hepatic Function; Complete Time: 21:38                                      [facility] 

                                                                                             

20:23 Order name: PT-INR; Complete Time: 21:38                                                [facility] 

                                                                                             

20:23 Order name: Ptt, Activated; Complete Time: 21:38                                        [facility] 

                                                                                             

20:23 Order name: Salicylate; Complete Time: 21:38                                            [facility] 

                                                                                             

20:23 Order name: Urine Drug Screen; Complete Time: 21:38                                     [facility] 

                                                                                             

20:38 Order name: Troponin High Sensitivity; Complete Time: 21:38                             [facility] 

                                                                                             

20:25 Order name: CT Head C Spine; Complete Time: 21:58                                       [facility] 

                                                                                             

20:45 Order name: Chest Single View XRAY; Complete Time: 21:38                                [facility] 

                                                                                             

20:23 Order name: EKG; Complete Time: 20:24                                                   [facility] 

                                                                                             

20:23 Order name: EKG - Nurse/Tech; Complete Time: 20:23                                      [facility] 

                                                                                             

20:23 Order name: IV Saline Lock; Complete Time: 20:23                                        [facility] 

                                                                                             

20:23 Order name: Labs collected and sent; Complete Time: 20:23                               [facility] 

                                                                                             

20:26 Order name: Seizure Precautions; Complete Time: 20:30                                   [facility] 

                                                                                             

21:43 Order name: IV Saline Lock - Large Bore; Complete Time: 21:47                           [facility] 

                                                                                             

22:00 Order name: Lopez; Complete Time: 22:00                                                 [facility] 

                                                                                                  

Administered Medications:                                                                         

20:39 Drug: NS 0.9%  ml Route: IV; Rate: bolus; Site: left hand;                        [facility] 

22:26 Follow up: Response: No adverse reaction; IV Status: Completed infusion; IV Intake:     as6 

      500ml                                                                                       

20:39 Drug: NS 0.9% IV 1000 ml Route: IV; Rate: 125 ml/hr; Site: left hand;                   jb4 

22:26 Follow up: Response: No adverse reaction; IV Status: Infusion continued upon transfer;  as6 

      IV Intake: 200ml                                                                            

20:56 Drug: Keppra IV 2000 mg Route: IV; Rate: per protocol; Site: left hand;                 jb4 

22:26 Follow up: Response: No adverse reaction; IV Status: Completed infusion; IV Intake:     as6 

      200ml                                                                                       

20:56 Drug: Ativan IVP 1 mg Route: IVP; Site: left hand;                                      jb4 

22:26 Follow up: Response: No adverse reaction                                                as6 

20:56 Drug: foLIC Acid IVPB 1 mg Route: IVPB; Site: left hand;                                jb4 

22:26 Follow up: Response: No adverse reaction; IV Status: Completed infusion; IV Intake: [id number] 

21:52 Drug: niCARdipine IV 5 mg/hr Route: IV; Rate: per protocol; Site: right forearm;        jb4 

22:07 Follow up: Rate change 7.5 mg/hr                                                        jb4 

22:25 Follow up: Response: No adverse reaction; IV Status: Infusion continued upon transfer;  as6 

      IV Intake: 20ml                                                                             

21:55 Drug: Decadron - Dexamethasone IVP 10 mg Route: IVP; Site: left hand;                   jb4 

22:25 Follow up: Response: No adverse reaction                                                as6 

22:05 Drug: Mannitol IVP (25%) 50 ml Route: IVP; Site: left hand;                             as6 

22:25 Follow up: Response: No adverse reaction                                                as6 

                                                                                                  

                                                                                                  

Disposition Summary:                                                                              

23 21:58                                                                                    

Transfer Ordered                                                                                  

      Transfer Location: [facility]                                jose a 

      Reason: Higher level of care                                                            jose a 

      Condition: Critical                                                                     jose a 

      Problem: new                                                                            jose a 

      Symptoms: have improved                                                                 jose a 

      Accepting Physician: dr dotson(23 22:27)                                         as6 

      Diagnosis                                                                                   

        - Nontraumatic intracerebral hemorrhage, unspecified                                  jose a 

      Forms:                                                                                      

        - Medication Reconciliation Form                                                      jose a 

        - SBAR form                                                                           jose a 

                                                                                                  

NIH Stroke Scale - NIH Stroke Score                                                               

Date: 2023                                                                                  

Time: 21:44                                                                                       

Total Score = 30                                                                                  

  10. Dysarthria (speech clarity - read or repeat words) - 2(Severe)                              

  11. Extinction and Inattention (visual/tactile/auditory/spatial/personal) -                     

      2(Profound)                                                                                 

  1a. Level of Consciousness (LOC) - 3(Unresponsive)                                              

  1b. Level of Consciousness (LOC) (Month \T\ Age) - 2(Neither)                                   

  1c. LOC Commands (Open \T\ Closes Eyes/) - 2(Neither)                                       

   2. Best Gaze (Lateral Gaze Paresis) - 0(Normal)                                                

   3. Visual Field Loss - 0(No visual loss)                                                       

   4. Facial Palsy - 1(Minor Paralysis)                                                           

  5a. Left Arm: Motor (10-second hold) - 4(No movement)                                           

  5b. Right Arm: Motor (10-second hold) - 4(No movement)                                          

  6a. Left Leg: Motor (5-second hold - always test supine) - 2(Drift, some effort                 

      against gravity)                                                                            

  6b. Right Leg: Motor (5-second hold - always test supine) - 2(Drift, some effort                

      against gravity)                                                                            

   7. Limb Ataxia (finger/nose \T\ heel/shin - test with eyes open) - 2(Present in two            

      limbs)                                                                                      

   8. Sensory Loss (pinprick arms/legs/face) - 1(Mild to moderate loss)                           

   9. Best Language: Aphasia (description/naming/reading) - 3(Mute, global aphasia)               

Initials: jose a                                                                                     

                                                                                                  

Signatures:                                                                                       

Dispatcher [employer]                           EDDallin Weber MD MD cha Bryson, James RN                       CHIDI   jb4                                                  

Phuc John RN RN   as6                                                  

                                                                                                  

Corrections: (The following items were deleted from the chart)                                    

20:26 20:23 Suicide Screening ([place]) ordered. lambert naranjo         

22:27 21:58 dr nila naranjo                                                            as6         

                                                                                                  

**************************************************************************************************

## 2023-07-05 NOTE — EKG
Test Date:    2023-07-03               Test Time:    20:17:40

Technician:   CM                                     

                                                     

MEASUREMENT RESULTS:                                       

Intervals:                                           

Rate:         75                                     

AK:           152                                    

QRSD:         88                                     

QT:           392                                    

QTc:          437                                    

Axis:                                                

P:            34                                     

AK:           152                                    

QRS:          57                                     

T:            11                                     

                                                     

INTERPRETIVE STATEMENTS:                                       

                                                     

Normal sinus rhythm

Minimal voltage criteria for LVH, may be normal variant

Borderline ECG

Compared to ECG 02/27/2022 17:34:09

Left ventricular hypertrophy now present

Myocardial infarct finding no longer present



Electronically Signed On 07-05-23 12:32:40 CDT by Jose Ibrahim

## 2023-07-26 ENCOUNTER — HOSPITAL ENCOUNTER (INPATIENT)
Dept: HOSPITAL 97 - 5TH | Age: 58
LOS: 2 days | Discharge: ANOTHER HOSPITAL | DRG: 56 | End: 2023-07-28
Attending: PSYCHIATRY & NEUROLOGY | Admitting: PSYCHIATRY & NEUROLOGY
Payer: COMMERCIAL

## 2023-07-26 VITALS — BODY MASS INDEX: 25 KG/M2

## 2023-07-26 DIAGNOSIS — D64.9: ICD-10-CM

## 2023-07-26 DIAGNOSIS — G47.00: ICD-10-CM

## 2023-07-26 DIAGNOSIS — R13.12: ICD-10-CM

## 2023-07-26 DIAGNOSIS — E46: ICD-10-CM

## 2023-07-26 DIAGNOSIS — Z93.1: ICD-10-CM

## 2023-07-26 DIAGNOSIS — E83.52: ICD-10-CM

## 2023-07-26 DIAGNOSIS — R33.9: ICD-10-CM

## 2023-07-26 DIAGNOSIS — J18.9: ICD-10-CM

## 2023-07-26 DIAGNOSIS — I10: ICD-10-CM

## 2023-07-26 DIAGNOSIS — I69.391: Primary | ICD-10-CM

## 2023-07-26 PROCEDURE — 97165 OT EVAL LOW COMPLEX 30 MIN: CPT

## 2023-07-26 PROCEDURE — 87086 URINE CULTURE/COLONY COUNT: CPT

## 2023-07-26 PROCEDURE — 94640 AIRWAY INHALATION TREATMENT: CPT

## 2023-07-26 PROCEDURE — 92526 ORAL FUNCTION THERAPY: CPT

## 2023-07-26 PROCEDURE — 80048 BASIC METABOLIC PNL TOTAL CA: CPT

## 2023-07-26 PROCEDURE — 92610 EVALUATE SWALLOWING FUNCTION: CPT

## 2023-07-26 PROCEDURE — 84134 ASSAY OF PREALBUMIN: CPT

## 2023-07-26 PROCEDURE — 81001 URINALYSIS AUTO W/SCOPE: CPT

## 2023-07-26 PROCEDURE — 97110 THERAPEUTIC EXERCISES: CPT

## 2023-07-26 PROCEDURE — 97112 NEUROMUSCULAR REEDUCATION: CPT

## 2023-07-26 PROCEDURE — 97163 PT EVAL HIGH COMPLEX 45 MIN: CPT

## 2023-07-26 PROCEDURE — 82947 ASSAY GLUCOSE BLOOD QUANT: CPT

## 2023-07-26 PROCEDURE — 36415 COLL VENOUS BLD VENIPUNCTURE: CPT

## 2023-07-26 PROCEDURE — 82040 ASSAY OF SERUM ALBUMIN: CPT

## 2023-07-26 PROCEDURE — 94668 MNPJ CHEST WALL SBSQ: CPT

## 2023-07-26 PROCEDURE — 74176 CT ABD & PELVIS W/O CONTRAST: CPT

## 2023-07-26 PROCEDURE — 97530 THERAPEUTIC ACTIVITIES: CPT

## 2023-07-26 PROCEDURE — 85025 COMPLETE CBC W/AUTO DIFF WBC: CPT

## 2023-07-26 PROCEDURE — 74018 RADEX ABDOMEN 1 VIEW: CPT

## 2023-07-26 PROCEDURE — 83735 ASSAY OF MAGNESIUM: CPT

## 2023-07-26 PROCEDURE — 71045 X-RAY EXAM CHEST 1 VIEW: CPT

## 2023-07-26 PROCEDURE — 97116 GAIT TRAINING THERAPY: CPT

## 2023-07-26 PROCEDURE — 87088 URINE BACTERIA CULTURE: CPT

## 2023-07-26 RX ADMIN — SODIUM CHLORIDE SCH MLS: 9 INJECTION, SOLUTION INTRAVENOUS at 21:22

## 2023-07-26 RX ADMIN — ALBUTEROL SULFATE SCH MG: 2.5 SOLUTION RESPIRATORY (INHALATION) at 21:40

## 2023-07-26 RX ADMIN — DULOXETINE HYDROCHLORIDE SCH MG: 30 CAPSULE, DELAYED RELEASE ORAL at 21:01

## 2023-07-26 RX ADMIN — HUMAN INSULIN SCH: 100 INJECTION, SOLUTION SUBCUTANEOUS at 17:15

## 2023-07-26 RX ADMIN — TERAZOSIN HYDROCHLORIDE ANHYDROUS SCH MG: 1 CAPSULE ORAL at 21:00

## 2023-07-26 RX ADMIN — IPRATROPIUM BROMIDE SCH MG: 0.5 SOLUTION RESPIRATORY (INHALATION) at 21:40

## 2023-07-26 RX ADMIN — PANTOPRAZOLE SODIUM SCH MG: 40 GRANULE, DELAYED RELEASE ORAL at 21:01

## 2023-07-26 RX ADMIN — ENOXAPARIN SODIUM SCH MG: 40 INJECTION SUBCUTANEOUS at 17:42

## 2023-07-26 RX ADMIN — Medication SCH DROP: at 17:44

## 2023-07-26 RX ADMIN — Medication SCH DROP: at 21:54

## 2023-07-26 NOTE — RAD REPORT
EXAM DESCRIPTION:  RAD - Chest Single View - 7/26/2023 5:15 pm

 

CLINICAL HISTORY:  cough

Chest pain.

 

COMPARISON:  Chest Single View dated 7/3/2023; Chest Single View dated 2/27/2022; Chest Single View d
ated 9/29/2021; Head C Spine Mpr Wo Con dated 7/3/2023

 

FINDINGS:  Portable technique limits examination quality.

 

The lungs are underinflated but grossly clear. The heart is normal in size. No displaced fractures.

## 2023-07-26 NOTE — XMS REPORT
Continuity of Care Document

                            Created on:2023



Patient:TIETZ, ROBERT EDWARD

Sex:Male

:1965

External Reference #:[id number]





Demographics







                          Address                   [address]



                                                    [place] [address]

 

                          Home Phone                [phone]

 

                          Work Phone                [phone]

 

                          Email Address             [email or web address]

 

                          Preferred Language        en-US

 

                          Marital Status            Unknown

 

                          Yazdanism Affiliation     Unknown

 

                          Race                      Unknown

 

                          Additional Race(s)        Unavailable

 

                          Ethnic Group              Unknown









Author







                          Organization              [facility]

t

 

                          Address                   [address]



                                                    [place] [address]

 

                          Phone                     [phone]









Support







                Name            Relationship    Address         Phone

 

                SHEREE MERINO Sister          [address]    [phone]

1



                                                [place] [address] 

 

                TIETZ, HEIDI M               Unavailable     [phone]









Care Team Providers







                    Name                Role                Phone

 

                    NISHANT MUELLER Attending Clinician Unavailable

 

                    CURLY WRIGHT Attending Clinician Unavailable

 

                    ARNOL IYER Attending Clinician Unavailable

 

                    VIC RIVERA Attending Clinician Unavailable

 

                    GUILLERMO CONNELL Attending Clinician Unavailable

 

                    JERICHO LOWERY      Attending Clinician Unavailable

 

                    VAZQUEZ PAYNE     Attending Clinician Unavailable

 

                    Gio Williamson Attending Clinician [phone]

 

                    VIC RIVERA Admitting Clinician Unavailable

 

                    GUILLERMO CONNELL Admitting Clinician Unavailable









Payers







           Payer Name Policy Type Policy Number Effective Date Expiration Date S

ource

 

           [employer]               [id number] 2023 00:00:00            



           POS/PPO/EPO                                             

 

           [place]            [id number] 2022 00:00:00        

    



           OUT OF STATE                                             







Problems







       Condition Condition Condition Status Onset  Resolution Last   Treating Co

mments 

Source



       Name   Details Category        Date   Date   Treatment Clinician        



                                                 Date                 

 

       Blepharosp  Blepharos Problem Active               2022            

   [facility]



       asm    pasm                               22:01:28               l



       (disorder) (disorder)                                                  Charan baileyann



              Active                                                  



              Problem                                                  



              2022                                                  



              [facility]                                                  



              Neuro                                                   

 

       Hemifacial  Hemifacia Problem Active               2022            

   [facility]



       spasm  l spasm                             22:01:28               l



       (finding) (finding)                                                  Mary

jazmine



              Active                                                  



              Problem                                                  



              2022                                                  



              [facility]                                                  



              Neuro                                                   

 

       Cerebral  Cerebral Problem Active               2022               

[facility]



       hemorrhage hemorrhage                             22:01:28               

l



       (disorder) (disorder)                                                  He

rmann



              Active                                                  



              Problem                                                  



              2022                                                  



              [facility]                                                  



              Neuro                                                   

 

       Hypertensi  Hypertens Problem Active               2022            

   [facility]



       ve     dakota                                22:01:28               l



       disorder, disorder,                                                  Herm

jazmine



       systemic systemic                                                  



       arterial arterial                                                  



       (disorder) (disorder)                                                  



              Active                                                  



              Problem                                                  



              2022                                                  



              [facility]                                                  



              Neuro                                                   

 

       Hypertensi  Hypertens Problem Active               2022            

   [facility]



       ve     d[place]ota                                22:01:28               l



       emergency emergency                                                  Herm

jazmine



       (disorder) (disorder)                                                  



              Active                                                  



              Problem                                                  



              2022                                                  



              [facility]                                                  



              Neuro                                                   







Allergies, Adverse Reactions, Alerts







       Allergy Allergy Status Severity Reaction(s) Onset  Inactive Treating Comm

ents 

Source



       Name   Type                        Date   Date   Clinician        

 

       NO KNOWN Allergy Active                                           [facility]







Social History







           Social Habit Start Date Stop Date  Quantity   Comments   Source

 

           Social History 2022                       [facility]



                      05:05:44   05:05:44                         

 

           Sex Assigned At [birth year]                       [facility]



           Birth      00:00:00   00:00:00                         [facility]







Medications







       Ordered Filled Start  Stop   Current Ordering Indication Dosage Frequency

 Signature

                    Comments            Components          Source



     Medication Medication Date Date Medication? Clinician                (SIG) 

          



     Name Name                                                   

 

     baclofen 10      2022-0      Yes                      10 mg = 1           M

[facility]



     mg oral      4-27                               tab, PO,           l



     tablet      18:43:                               Bedtime, #           [facility]

nn



               00                                 30 tab, 3           



                                                  Refill(s),           



                                                  Pharmacy:           



                                                  [facility]           



                                                  Pharmacy           



                                                  808,           



                                                  162.56,           



                                                  cm,            



                                                  22           



                                                  13:19:00           



                                                  CDT,           



                                                  Height,           



                                                  75.455,           



                                                  kg,            



                                                  22           



                                                  13:19:00           



                                                  CDT,           



                                                  Weight           

 

     baclofen 10      2022-0      Yes                      10 mg = 1           M

[facility]



     mg oral      4-27                               tab, PO,           l



     tablet      18:43:                               Bedtime, #           [facility]

nn



               00                                 30 tab, 3           



                                                  Refill(s),           



                                                  Pharmacy:           



                                                  [facility]           



                                                  Pharmacy           



                                                  808,           



                                                  162.56,           



                                                  cm,            



                                                  22           



                                                  13:19:00           



                                                  CDT,           



                                                  Height,           



                                                  75.455,           



                                                  kg,            



                                                  22           



                                                  13:19:00           



                                                  CDT,           



                                                  Weight           

 

     baclofen 10            Yes                      10 mg = 1           M

[facility]



     mg oral      4-27                               tab, PO,           l



     tablet      18:43:                               Bedtime, #           [facility]

nn



               00                                 30 tab, 3           



                                                  Refill(s),           



                                                  Pharmacy:           



                                                  [facility]           



                                                  Pharmacy           



                                                  808,           



                                                  162.56,           



                                                  cm,            



                                                  22           



                                                  13:19:00           



                                                  CDT,           



                                                  Height,           



                                                  75.455,           



                                                  kg,            



                                                  22           



                                                  13:19:00           



                                                  CDT,           



                                                  Weight           

 

     baclofen 10      -0      Yes                      10 mg = 1           M

[facility]



     mg oral      4-27                               tab, PO,           l



     tablet      18:43:                               Bedtime, #           [facility]

nn



               00                                 30 tab, 3           



                                                  Refill(s),           



                                                  Pharmacy:           



                                                  [facility]           



                                                  Pharmacy           



                                                  808,           



                                                  162.56,           



                                                  cm,            



                                                  22           



                                                  13:19:00           



                                                  CDT,           



                                                  Height,           



                                                  75.455,           



                                                  kg,            



                                                  22           



                                                  13:19:00           



                                                  CDT,           



                                                  Weight           

 

     baclofen 10      -0      Yes                      10 mg = 1           M

[facility]



     mg oral      4-27                               tab, PO,           l



     tablet      18:43:                               Bedtime, #           [facility]

nn



               00                                 30 tab, 3           



                                                  Refill(s),           



                                                  Pharmacy:           



                                                  [facility]           



                                                  Pharmacy           



                                                  808,           



                                                  162.56,           



                                                  cm,            



                                                  22           



                                                  13:19:00           



                                                  CDT,           



                                                  Height,           



                                                  75.455,           



                                                  kg,            



                                                  22           



                                                  13:19:00           



                                                  CDT,           



                                                  Weight           

 

     baclofen 10      -0      Yes                      10 mg = 1           M

[facility]



     mg oral      4-27                               tab, PO,           l



     tablet      18:43:                               Bedtime, #           [facility]

nn



               00                                 30 tab, 3           



                                                  Refill(s),           



                                                  Pharmacy:           



                                                  [facility]           



                                                  Pharmacy           



                                                  808,           



                                                  162.56,           



                                                  cm,            



                                                  22           



                                                  13:19:00           



                                                  CDT,           



                                                  Height,           



                                                  75.455,           



                                                  kg,            



                                                  22           



                                                  13:19:00           



                                                  CDT,           



                                                  Weight           

 

     baclofen 10      -0      Yes                      10 mg = 1           M

[facility]



     mg oral      4-27                               tab, PO,           l



     tablet      18:43:                               Bedtime, #           [facility]

nn



               00                                 30 tab, 3           



                                                  Refill(s),           



                                                  Pharmacy:           



                                                  [facility]           



                                                  Pharmacy           



                                                  808,           



                                                  162.56,           



                                                  cm,            



                                                  22           



                                                  13:19:00           



                                                  CDT,           



                                                  Height,           



                                                  75.455,           



                                                  kg,            



                                                  22           



                                                  13:19:00           



                                                  CDT,           



                                                  Weight           

 

     baclofen 10      -0      Yes                      10 mg = 1           M

[facility]



     mg oral      4-27                               tab, PO,           l



     tablet      18:43:                               Bedtime, #           [facility]

nn



               00                                 30 tab, 3           



                                                  Refill(s),           



                                                  Pharmacy:           



                                                  [facility]           



                                                  Pharmacy           



                                                  808,           



                                                  162.56,           



                                                  cm,            



                                                  22           



                                                  13:19:00           



                                                  CDT,           



                                                  Height,           



                                                  75.455,           



                                                  kg,            



                                                  22           



                                                  13:19:00           



                                                  CDT,           



                                                  Weight           

 

     baclofen 10      -0      Yes                      10 mg = 1           M

[facility]oria



     mg oral      4-27                               tab, PO,           l



     tablet      18:43:                               Bedtime, #           [facility]

nn



               00                                 30 tab, 3           



                                                  Refill(s),           



                                                  Pharmacy:           



                                                  [facility]           



                                                  Pharmacy           



                                                  808,           



                                                  162.56,           



                                                  cm,            



                                                  22           



                                                  13:19:00           



                                                  CDT,           



                                                  Height,           



                                                  75.455,           



                                                  kg,            



                                                  22           



                                                  13:19:00           



                                                  CDT,           



                                                  Weight           

 

     baclofen 10      -0      Yes                      10 mg = 1           M

[facility]



     mg oral      4-27                               tab, PO,           l



     tablet      18:43:                               Bedtime, #           [facility]

nn



               00                                 30 tab, 3           



                                                  Refill(s),           



                                                  Pharmacy:           



                                                  [facility]           



                                                  Pharmacy           



                                                  808,           



                                                  162.56,           



                                                  cm,            



                                                  22           



                                                  13:19:00           



                                                  CDT,           



                                                  Height,           



                                                  75.455,           



                                                  kg,            



                                                  22           



                                                  13:19:00           



                                                  CDT,           



                                                  Weight           

 

     baclofen 10      -0      Yes                      10 mg = 1           M

[facility]



     mg oral      4-27                               tab, PO,           l



     tablet      18:43:                               Bedtime, #           [facility]

nn



               00                                 30 tab, 3           



                                                  Refill(s),           



                                                  Pharmacy:           



                                                  [facility]           



                                                  808,           



                                                  162.56,           



                                                  cm,            



                                                  22           



                                                  13:19:00           



                                                  CDT,           



                                                  Height,           



                                                  75.455,           



                                                  kg,            



                                                  22           



                                                  13:19:00           



                                                  CDT,           



                                                  Weight           

 

     baclofen 10      -0      Yes                      10 mg = 1           M

[facility]



     mg oral      4-27                               tab, PO,           l



     tablet      18:43:                               Bedtime, #           [place]

nn



               00                                 30 tab, 3           



                                                  Refill(s),           



                                                  Pharmacy:           



                                                  [facility]           



                                                  Pharmacy           



                                                  808,           



                                                  162.56,           



                                                  cm,            



                                                  22           



                                                  13:19:00           



                                                  CDT,           



                                                  Height,           



                                                  75.455,           



                                                  kg,            



                                                  22           



                                                  13:19:00           



                                                  CDT,           



                                                  Weight           

 

     baclofen 10      -0      Yes                      10 mg = 1           M

em[facility]



     mg oral      4-27                               tab, PO,           l



     tablet      18:43:                               Bedtime, #           [facility]

nn



               00                                 30 tab, 3           



                                                  Refill(s),           



                                                  Pharmacy:           



                                                  [facility]           



                                                  Pharmacy           



                                                  808,           



                                                  162.56,           



                                                  cm,            



                                                  22           



                                                  13:19:00           



                                                  CDT,           



                                                  Height,           



                                                  75.455,           



                                                  kg,            



                                                  22           



                                                  13:19:00           



                                                  CDT,           



                                                  Weight           

 

     baclofen 10      -0      Yes                      10 mg = 1           M

[facility]



     mg oral      4-27                               tab, PO,           l



     tablet      18:43:                               Bedtime, #           [facility]

nn



               00                                 30 tab, 3           



                                                  Refill(s),           



                                                  Pharmacy:           



                                                  [facility]           



                                                  Pharmacy           



                                                  808,           



                                                  162.56,           



                                                  cm,            



                                                  22           



                                                  13:19:00           



                                                  CDT,           



                                                  Height,           



                                                  75.455,           



                                                  kg,            



                                                  22           



                                                  13:19:00           



                                                  CDT,           



                                                  Weight           

 

     baclofen 10      -0      Yes                      10 mg = 1           M

[facility]



     mg oral      4-27                               tab, PO,           l



     tablet      18:43:                               Bedtime, #           [place]

nn



               00                                 30 tab, 3           



                                                  Refill(s),           



                                                  Pharmacy:           



                                                  [facility]           



                                                  Pharmacy           



                                                  808,           



                                                  162.56,           



                                                  cm,            



                                                  22           



                                                  13:19:00           



                                                  CDT,           



                                                  Height,           



                                                  75.455,           



                                                  kg,            



                                                  22           



                                                  13:19:00           



                                                  CDT,           



                                                  Weight           







Vital Signs







             Vital Name   Observation Time Observation Value Comments     Source

 

             WEIGHT       2023 05:26:00 68.675 kg                 

 

             WEIGHT       2023-07-15 06:00:00 68 kg                     

 

             WEIGHT       2023 06:00:00 68 kg                     

 

             WEIGHT       2023 00:00:00 68.5 kg                   

 

             WEIGHT       2023 06:00:00 68.3 kg                   

 

             WEIGHT       2023 06:00:00 68.2 kg                   

 

             HEIGHT       2023 07:00:00 162.6 cm                  

 

             WEIGHT       2023 00:00:00 68 kg                     

 

             WEIGHT       2023 05:26:00 68.675 kg                 

 

             WEIGHT       2023-07-15 06:00:00 68 kg                     

 

             WEIGHT       2023 06:00:00 68 kg                     

 

             WEIGHT       2023 00:00:00 68.5 kg                   

 

             WEIGHT       2023 06:00:00 68.3 kg                   

 

             WEIGHT       2023 06:00:00 68.2 kg                   

 

             HEIGHT       2023 07:00:00 162.6 cm                  

 

             WEIGHT       2023 00:00:00 68 kg                     

 

             Systolic (mm Hg) 2022 18:10:00                           [facility]

[facility] [place]

 

             Diastolic (mm Hg) 2022 18:10:00                           [facility]

[facility] [place]

 

             Heart Rate   2022 18:10:00                           [facility]

 [facility]

 

             Respitory Rate 2022 18:10:00                           [facility]

[facility] [place]

 

             Height       2022 18:10:00 162.56 cm                 [facility]

 [facility]

 

             Weight       2022 18:10:00                           [facility]

 [place]

 

             BMI Calculated 2022 18:10:00                           [facility]

[facility] [place]

 

             Systolic (mm Hg) 2020 20:22:00                           [facility]

[facility] [place]

 

             Diastolic (mm Hg) 2020 20:22:00                           [facility]

[facility] [place]

 

             Heart Rate   2020 20:22:00                           [facility]

 [place]

 

             Respitory Rate 2020 20:22:00                           [facility]

[facility] [place]

 

             Height       2020 20:22:00 162.56 cm                 [facility]

 [place]

 

             Weight       2020 20:22:00                           [facility]

 [place]

 

             BMI Calculated 2020 20:22:00                           [facility]

[facility] [place]







Procedures

This patient has no known procedures.



Encounters







        Start   End     Encounter Admission Attending Care    Care    Encounter 

Source



        Date/Time Date/Time Type    Type    Clinicians Facility Department ID   

   

 

        2023         [facility]    [id number]

8 [facility]



        16:43:43                         MRINALINI                         

 

        2023         Inpatient ER      KYLE [facility]    [id number]

4 [facility]



        00:00:00                         CURLY                          

 

        2023         Inpatient ER      ERVIN [facility]    [id number]

5 [facility]



        13:43:58                         MRINALINI                         

 

        2023         Inpatient ER      ERVIN [facility]    [id number]

7 [facility]



        12:07:38                         MRINALINI                         

 

        2023         Inpatient ER      ERVIN [facility]    [id number]

9 [facility]



        18:57:40                         MRINALINI                         

 

        2023         Inpatient ER      ERVIN [facility]    [id number]

7 [facility]



        13:21:32                         MRINALINI                         

 

        2023         Inpatient ER      ERVIN [facility]    [id number]

9 [facility]



        [facility]                         

 

        2023-07-15         Inpatient ER      [facility]IN[facility]    [facility]    [id number]

5 [facility]



        18:09:15                         MRINALINI                         

 

        2023         Inpatient ER      RAMACHANDRA [facility]    [facility]    [id number]

091 [facility]



        10:47:24                         N, [facility]                         

 

        2023         Inpatient ER      BERSHAD, [facility]    [facility]    [id number]

 [facility]



        08:14:58                         [facility]                            

 

        2023         Inpatient ER      BERSHAD, [facility]    [facility]    [id number]

 [facility]



        08:14:51                         VIC                            

 

        2023         Inpatient ER      WRIGHT, [facility]    [facility]    [id number]

0 [facility]



        04:37:56                         [facility]                          

 

        2023         Inpatient ER      WRIGHT, [facility]    [facility]    [id number]

8 [facility]



        04:20:00                         [facility]                          

 

        2023         Inpatient ER      BERSHAD, [facility]    [facility]    [id number]

 [facility]



        00:25:30                         [facility]                            

 

        2023-07-10         Inpatient ER      BERSHAD, [facility]    [facility]    [id number]

 [facility]



        00:05:49                         VIC                            

 

        2023         Inpatient ER      BERSHAD, [facility]    [facility]    [id number]

 [facility]



        00:27:15                         VIC                            

 

        2023         Inpatient ER              [facility]    [facility]    [id number] 

[facility]



        00:26:38                                                         

 

        2023         Inpatient ER              [facility]    [facility]    [id number] 

[facility]



        15:12:58                                                         

 

        2023         Inpatient ER      BERSHAD, [facility]    [facility]    [id number]

 [facility]



        14:35:00                         [facility]IC                            

 

        2023         Inpatient ER              [facility]    [facility]    [id number] 

[facility]



        13:27:25                                                         

 

        2023         Inpatient ER      RAMACHANDRA [facility]    [facility]    [id number]

018 [facility]



        08:29:48                         [facility]                         

 

        2023         Inpatient ER      RAMACHANDRA [facility]    [facility]    [id number]

127 [facility]



        07:58:55                         N, [facility]                         

 

        2023         Inpatient ER              [facility]    [facility]    [id number] 

[facility]



        00:07:44                                                         

 

        2023         Inpatient ER      BERSHAD, [facility]    [facility]    [id number]

 [facility]



        20:29:11                         VIC                            

 

        2023         Inpatient ER              [facility]    [facility]    [id number] 

[facility]



        17:15:27                                                         

 

        2023         Inpatient ER              [facility]    [facility]    [id number] 

[facility]



        17:15:20                                                         

 

        2023         Inpatient ER      RAMACHANDRA [facility]    SLE    [id number]

918 [facility]



        16:03:43                         N, [facility]                         

 

        2023         Inpatient ER      RAMACHANDRA [facility]    [facility]    [id number]

911 [facility]



        16:03:37                         N, [facility]                         

 

        2023         Inpatient ER      BERSHAD, [facility]    [facility]    [id number]

 [facility]



        13:19:28                         [facility]                            

 

        2023         Inpatient ER      BERSHAD, [facility]    [facility]    [id number]

 [facility]



        00:25:37                         [facility]                            

 

        2023         Inpatient ER      BERSHAD, [facility]    [facility]    [id number]

 [facility]



        09:20:39                         [facility]                            

 

        2023         Inpatient ER      BERSHAD, [facility]    [facility]    [id number]

 [facility]



        14:30:51                         [facility]                            

 

        2023         Inpatient ER      RAMACHANDRA [facility]    [facility]    [id number]

039 [facility]



        10:05:39                         N, [facility]                         

 

        2023         Inpatient ER      KO, [facility]    [facility]    [id number] 

[facility]



        06:27:46                         [facility]                           

 

        2023         Inpatient ER      KO, [facility]    [facility]    [id number] 

[facility]



        06:27:39                         [facility]                           

 

        2023         Inpatient ER      KO, [facility]    [facility]    [id number] 

[facility]



        06:27:32                         [facility]                           

 

        2023         Inpatient ER      KO, [facility]    [facility]    [id number] 

[facility]



        00:28:35                         [facility]                           

 

        2023         Outpatient                 UTH     UTH     [id number]

 [facility]



        23:37:31                                                 [id number] [facility]

 

        2023         Inpatient ER      KO, [facility]    [facility]    [id number] 

[facility]



        23:02:31                         [facility]                           

 

        2022         Outpatient                 UTH     UTH     [id number]

 [facility]



        12:58:22                                                 [id number] [facility]

 

        2022         Outpatient         BEVERLEY, [facility]     [id number]

 [place]



        17:00:59                         [facility]

 

        2022         Outpatient         KERRY [facility]     [id number]

 [facility]



        10:22:46                         [facility]

 

        2022         Inpatient ER      KO [facility]   Neurosurger [id number]

[address]



        18:28:49                         [facility]

 

        2023 Inpatient ER      ERVIN, [facility]    Neuro ICU 

[id number] [facility]



        22:50:00 10:56:00                 MRINALINI                         

 

        2023 Inpatient ER      RAMACHANDRA [facility]    

[id number] [facility]



        12:12:14 00:00:00                 NARNOL                         

 

        2023 Inpatient ER      KO [facility]    [id number]

85 [facility]



        00:38:59 00:00:00                 [facility]                           

 

        2022 Ambulatory                 nullFlavo [facility]     [id number]

[id number] [facility]



        16:00:00 16:00:00 Pre-Reg                 r       Neurology 02      l



                                                        Sari         [place]

 

        2022 Ambulatory                 nullFlavo [facility]     [id number]

[id number] [facility]



        16:00:00 16:00:00 Pre-Reg                 r       Neurology 02      l



                                                        Sari         [place]

 

        2022 [facility]

[id number] 



        11:00:00 11:00:00                 Gio                   02      



                                        Joaquin                         

 

        2022 Outpatient                 [facility]    SUZIEIE    [id number]

365 [facility]



        13:45:00 13:45:00                                         02      cirilo Hernandezann

 

        2022 Outpatient                 nullFlavo [facility]     [id number]

[id number] [facility]



        18:00:00 04:59:59                         r       Neurology 01      l



                                                        Sari         [place]

 

        2022 Outpatient                 nullFlavo [facility]     [id number]

[id number] [facility]



        18:00:00 04:59:59                         r       Neurology 01      l



                                                        [place]         [place]

 

        2022 Outpatient         WHITNEY Williamson [facility] [address]

[id number] 



        13:00:00 23:59:59                 Gio                   01      



                                        Joaquin                         

 

        2022 Outpatient                 [facility]    IE    [id number]

365 [facility]



        13:00:00 13:00:00                                         01      l



                                                                        [place]

 

        2020 Outpatient                 nullFlavo [facility]     [id number]

[id number] [facility]



        20:15:00 05:59:59                         r       Neurology 00      l



                                                        Sari Castillo

 

        2020 Outpatient                 nullFlavo [facility]     [id number]

[id number] [facility]



        20:15:00 05:59:59                         r       Neurology 00      l



                                                        Sari Castillo

 

        2020 [facility]         [facility]

[id number] 



        14:15:00 23:59:59                 Gio                   00      



                                        Joaquin                         

 

        2020 Outpatient                 [facility]    IE    [id number]

365 [facility]



        14:15:00 14:15:00                                         00      l



                                                                        [place]







Results







           Test Description Test Time  Test Comments Results    Result Comments 

Source









                    POCT-GLUCOSE METER  2023 07:10:15 









                      Test Item  Value      Reference Range Interpretation Comme

nts









             POC-GLUCOSE METER (BEAKER) 113 mg/dL           H            :

 TESTED AT [facility] [address] [facility]



             (test code = 1538)                                        [facility]

X, [address]205:



                                                                 /Techni

deejay ID = [id number] for



                                                                 PRECIOUS SMITH

FRIEDMAN



[id number] 06:34:45





             Test Item    Value        Reference Range Interpretation Comments

 

             MAGNESIUM (BEAKER) (test code = 1.8 mg/dL    1.6-2.6               

    



             627)                                                



 ID - [id number] 06:34:45





             Test Item    Value        Reference Range Interpretation Comments

 

             PHOSPHORUS (BEAKER) (test code = 2.8 mg/dL    2.3-4.7              

     



             604)                                                



 ID - ADMINPOCT-GLUCOSE [id number] 01:10:15





             Test Item    Value        Reference Range Interpretation Comments

 

             POC-GLUCOSE METER 100 mg/dL                        : TESTED A

[facility] [address]



             (BEAKER) (test code =                                        [place],



             1538)                                               [id number]:



                                                                 /Techni

deejay ID



                                                                 = [id number] for FABBY MARTIN



POCT-GLUCOSE [id number] 17:12:52





             Test Item    Value        Reference Range Interpretation Comments

 

             POC-GLUCOSE METER 104 mg/dL                        : TESTED A

[facility] [address]



             (BEAKER) (test code =                                        [place],



             1538)                                               [address]:



                                                                 /Techni

deejay ID



                                                                 = [id number] for An

Vannesa adams



POCT-GLUCOSE [id number] 12:42:29





             Test Item    Value        Reference Range Interpretation Comments

 

             POC-GLUCOSE METER 109 mg/dL                        : TESTED A

T [facility] [address]



             (BEAKER) (test code =                                        [place],



             1538)                                               [id number]:



                                                                 /Techni

deejay ID



                                                                 = [id number] for An

Vannesa adams



[id number] 05:29:16





             Test Item    Value        Reference Range Interpretation Comments

 

             PHOSPHORUS (BEAKER) (test code = 2.7 mg/dL    2.3-4.7              

     



             604)                                                



 ID - CHIKIS WBASIC METABOLIC [id number] 05:29:15





             Test Item    Value        Reference Range Interpretation Comments

 

             SODIUM (BEAKER) 142 meq/L    136-145                   



             (test code = 381)                                        

 

             POTASSIUM    3.9 meq/L    3.5-5.1                   



             (BEAKER) (test                                        



             code = 379)                                         

 

             CHLORIDE (BEAKER) 109 meq/L           H            



             (test code = 382)                                        

 

             CO2 (BEAKER) 24 meq/L     22-29                     



             (test code = 355)                                        

 

             BLOOD UREA   28 mg/dL     7-21         H            



             NITROGEN (BEAKER)                                        



             (test code = 354)                                        

 

             CREATININE   0.78 mg/dL   0.57-1.25                 



             (BEAKER) (test                                        



             code = 358)                                         

 

             GLUCOSE RANDOM 112 mg/dL           H            



             (BEAKER) (test                                        



             code = 652)                                         

 

             CALCIUM (BEAKER) 8.9 mg/dL    8.4-10.2                  



             (test code = 697)                                        

 

             EGFR (BEAKER) 105                                     Interpretatio

n of eGFR



             (test code = mL/min/1.73                            values Stage De

scription



             1092)        sq m                                   Result G1 Althea

l or high



                                                                 >=90 G2 Mildly 

decreased



                                                                 60-89 G3a Mildl

y to



                                                                 moderately 45-5

9 G3b



                                                                 Moderately to s

everely



                                                                 30-44 G4 Severl

y decreased



                                                                 15-29 G5 Kidney

 failure



                                                                 <15Reported eGF

R is based



                                                                 on the CKD-EPI 





                                                                 equation that d

oes not use



                                                                 a race



                                                                 coefficientEsti

mated GFR



                                                                 is not as accur

ate as



                                                                 Creatinine Porsha

ekaterina in



                                                                 predicting glom

erular



                                                                 filtration rate

. Estimated



                                                                 GFR is not appl

icable for



                                                                 dialysis patien

ts



 ID - CHIKIS [id number] 05:29:15





             Test Item    Value        Reference Range Interpretation Comments

 

             MAGNESIUM (BEAKER) (test code = 2.0 mg/dL    1.6-2.6               

    



             627)                                                



 ID - CHIKIS WPOCT-GLUCOSE [id number] 01:39:19





             Test Item    Value        Reference Range Interpretation Comments

 

             POC-GLUCOSE METER 129 mg/dL           H            : TESTED A

T [facility] [address]



             (BEAKER) (test code =                                        [place],



             [address]38)                                               [id number]:



                                                                 /Techni

deejay ID



                                                                 = [id number] for FABBY MARTIN



CBC W/PLT COUNT &amp; AUTO [id number] 10:38:05





             Test Item    Value        Reference Range Interpretation Comments

 

             WHITE BLOOD CELL COUNT (BEAKER) 17.7 K/ L    3.5-10.5     H        

    



             (test code = 775)                                        

 

             RED BLOOD CELL COUNT (BEAKER) 4.70 M/ L    4.63-6.08               

  



             (test code = 761)                                        

 

             HEMOGLOBIN (BEAKER) (test code = 12.2 GM/DL   13.7-17.5    L       

     



             410)                                                

 

             HEMATOCRIT (BEAKER) (test code = 38.1 %       40.1-51.0    L       

     



             411)                                                

 

             MEAN CORPUSCULAR VOLUME (BEAKER) 81 fL        79-92                

     



             (test code = 753)                                        

 

             MEAN CORPUSCULAR HEMOGLOBIN 26.0 pg      25.7-32.2                 



             (BEAKER) (test code = 751)                                        

 

             MEAN CORPUSCULAR HEMOGLOBIN CONC 32.0 GM/DL   32.3-36.5    L       

     



             (BEAKER) (test code = 752)                                        

 

             RED CELL DISTRIBUTION WIDTH 14.3 %       11.6-14.4                 



             (BEAKER) (test code = 412)                                        

 

             PLATELET COUNT (BEAKER) (test 382 K/CU MM  150-450                 

  



             code = 756)                                         

 

             MEAN PLATELET VOLUME (BEAKER) 9.5 fL       9.4-12.4                

  



             (test code = 754)                                        

 

             NUCLEATED RED BLOOD CELLS 0 /100 WBC   0-0                       



             (BEAKER) (test code = 413)                                        

 

             NEUTROPHILS RELATIVE PERCENT 86 %                                  

 



             (BEAKER) (test code = 429)                                        

 

             LYMPHOCYTES RELATIVE PERCENT 6 %                                   

 



             (BEAKER) (test code = 430)                                        

 

             MONOCYTES RELATIVE PERCENT 6 %                                    



             (BEAKER) (test code = 431)                                        

 

             EOSINOPHILS RELATIVE PERCENT 1 %                                   

 



             (BEAKER) (test code = 432)                                        

 

             BASOPHILS RELATIVE PERCENT 0 %                                    



             (BEAKER) (test code = 437)                                        

 

             NEUTROPHILS ABSOLUTE COUNT 15.23 K/ L   1.78-5.38    H            



             (BEAKER) (test code = 670)                                        

 

             LYMPHOCYTES ABSOLUTE COUNT 1.01 K/ L    1.32-3.57    L            



             (BEAKER) (test code = 414)                                        

 

             MONOCYTES ABSOLUTE COUNT (BEAKER) 1.13 K/ L    0.30-0.82    H      

      



             (test code = 415)                                        

 

             EOSINOPHILS ABSOLUTE COUNT 0.18 K/ L    0.04-0.54                 



             (BEAKER) (test code = 416)                                        

 

             BASOPHILS ABSOLUTE COUNT (BEAKER) 0.06 K/ L    0.01-0.08           

      



             (test code = 417)                                        

 

             IMMATURE GRANULOCYTES-RELATIVE 0.50 %       0.00-1.00              

   



             PERCENT (BEAKER) (test code =                                      

  



             2801)                                               



POCT-GLUCOSE [id number] 06:03:32





             Test Item    Value        Reference Range Interpretation Comments

 

             POC-GLUCOSE METER 123 mg/dL           H            : TESTED A

T [facility] [address]



             (BEAKER) (test code =                                        [facility],



             153)                                               [address]:



                                                                 /Techni

deejay ID



                                                                 = [id number] for SON MONTANA



[id number] 05:07:42





             Test Item    Value        Reference Range Interpretation Comments

 

             PHOSPHORUS (BEAKER) (test code = 2.5 mg/dL    2.3-4.7              

     



             604)                                                



 ID - CHIKIS [id number] 05:07:41





             Test Item    Value        Reference Range Interpretation Comments

 

             MAGNESIUM (BEAKER) (test code = 2.0 mg/dL    1.6-2.6               

    



             627)                                                



 ID - CHIKIS WPOCT-GLUCOSE [id number] 00:05:37





             Test Item    Value        Reference Range Interpretation Comments

 

             POC-GLUCOSE METER 122 mg/dL           H            : TESTED A

T [facility] [address]



             (BEAKER) (test code =                                        [facility],



             1538)                                               [id number]:



                                                                 /Techni

deejay ID



                                                                 = [id number] for SON MONTANA



URINALYSIS W/ REFLEX URINE [id number] 18:50:27





             Test Item    Value        Reference Range Interpretation Comments

 

             COLOR (BEAKER) (test code = 470) Yellow                            

     

 

             CLARITY (BEAKER) (test code = 469) Hazy                            

       

 

             SPECIFIC GRAVITY UA (BEAKER) (test 1.029        1.001-1.035        

       



             code = 468)                                         

 

             PH UA (BEAKER) (test code = 467) 7.0          5.0-8.0              

     

 

             PROTEIN UA (BEAKER) (test code = 20 mg/dL     Negative     A       

     



             464)                                                

 

             GLUCOSE UA (BEAKER) (test code = Negative     Negative             

     



             365)                                                

 

             KETONES UA (BEAKER) (test code = Negative     Negative             

     



             371)                                                

 

             BILIRUBIN UA (BEAKER) (test code = Negative     Negative           

       



             462)                                                

 

             BLOOD UA (BEAKER) (test code = 461) Negative     Negative          

        

 

             NITRITE UA (BEAKER) (test code = Negative     Negative             

     



             465)                                                

 

             LEUKOCYTE ESTERASE UA (BEAKER) (test Negative     Negative         

         



             code = 466)                                         

 

             UROBILINOGEN UA (BEAKER) (test code 2            0.2-1.0      H    

        



             = 463)                                              

 

             RBC UA (BEAKER) (test code = 519) 2 /HPF                           

      

 

             WBC UA (BEAKER) (test code = 520) 5 /HPF                           

      

 

             MUCUS (BEAKER) (test code = 1574) Rare                             

      

 

             SOURCE(BEAKER) (test code = 2795)                                  

      



 ID - [auto] ID - techPOCT-GLUCOSE [id number] 17:27:05





             Test Item    Value        Reference Range Interpretation Comments

 

             POC-GLUCOSE METER 144 mg/dL           H            : TESTED A

T [facility] [address]



             (BEAKER) (test code =                                        [place] [facility],



             [address]38)                                               [id number]:



                                                                 /Techni

deejay ID



                                                                 = [id number] for An

Vannesa adams



XR CHEST 1 VIEW PORTABLE / [id number] 17:17:52
************************************************************[facility] 1965 Sex: 
M************************************************************Chest one v
iew:HISTORY: follow up PneumoniaComparison: 2023There has been partial 
clearing of the left base. Patchy airspaceopacity in the right lung base is now 
noted. There is no pleuraleffusion identified. Cardiac size is within normal 
limits. The feedingtube noted previously has been removed in the inter
kimberly.IMPRESSION:Partial clearing of the left lung base with new limitedpatchy 
airspace disease in theright base.Electronically Signed By: Tawanda Balbuena2023 17:19 CDTWorkstation Name: [id number]-GLUCOSE METER
2023 12:28:58





             Test Item    Value        Reference Range Interpretation Comments

 

             POC-GLUCOSE METER 124 mg/dL           H            : TESTED A

T [facility] [address]



             (BEAKER) (test code =                                        [facility],



             1538)                                               [address]:



                                                                 /Techni

deejay ID



                                                                 = [id number] for Vannesa Griffiths



POCT-GLUCOSE [id number] 06:37:26





             Test Item    Value        Reference Range Interpretation Comments

 

             POC-GLUCOSE METER 130 mg/dL           H            : TESTED A

T [facility] [address]



             (BEAKER) (test code =                                        [facility],



             1538)                                               [address]:



                                                                 /Techni

deejay ID



                                                                 = [id number] for SON MONTANA



[id number] 05:52:45





             Test Item    Value        Reference Range Interpretation Comments

 

             MAGNESIUM (BEAKER) (test code = 2.1 mg/dL    1.6-2.6               

    



             627)                                                



 ID - [id number] 05:52:45





             Test Item    Value        Reference Range Interpretation Comments

 

             PHOSPHORUS (BEAKER) (test code = 2.7 mg/dL    2.3-4.7              

     



             604)                                                



 ID - MMPOCT-GLUCOSE [id number] 00:24:47





             Test Item    Value        Reference Range Interpretation Comments

 

             POC-GLUCOSE METER 105 mg/dL                        : TESTED A

T [facility] [address]



             (BEAKER) (test code =                                        [facility],



             1538)                                               [address]:



                                                                 /Techni

deejay ID



                                                                 = [id number] for AKHIL MONTANAA



POCT-GLUCOSE [id number] 12:59:07





             Test Item    Value        Reference Range Interpretation Comments

 

             POC-GLUCOSE METER 90 mg/dL                         : TESTED A

T [facility] [address]



             (BEAKER) (test code =                                        [place],



             [address]38)                                               [address]:



                                                                 /Techni

deejay ID =



                                                                 [id number] for MIMI OFX



POCT-GLUCOSE [id number] 06:53:06





             Test Item    Value        Reference Range Interpretation Comments

 

             POC-GLUCOSE METER 79 mg/dL                         : TESTED A

T [facility] [address]



             (BEAKER) (test code =                                        [place],



             [address])                                               [address]:



                                                                 /Techni

deejay ID =



                                                                 [id number] for SOPHIA RAY



[id number] 06:00:35





             Test Item    Value        Reference Range Interpretation Comments

 

             MAGNESIUM (BEAKER) (test code = 2.1 mg/dL    1.6-2.6               

    



             627)                                                



 ID - [id number] 06:00:35





             Test Item    Value        Reference Range Interpretation Comments

 

             PHOSPHORUS (BEAKER) (test code = 3.2 mg/dL    2.3-4.7              

     



             604)                                                



 ID - BSBASIC METABOLIC [id number] 06:00:34





             Test Item    Value        Reference Range Interpretation Comments

 

             SODIUM (BEAKER) 144 meq/L    136-145                   



             (test code = 381)                                        

 

             POTASSIUM    4.4 meq/L    3.5-5.1                   



             (BEAKER) (test                                        



             code = 379)                                         

 

             CHLORIDE (BEAKER) 111 meq/L           H            



             (test code = 382)                                        

 

             CO2 (BEAKER) 25 meq/L     22-29                     



             (test code = 355)                                        

 

             BLOOD UREA   35 mg/dL     7-21         H            



             NITROGEN (BEAKER)                                        



             (test code = 354)                                        

 

             CREATININE   0.87 mg/dL   0.57-1.25                 



             (BEAKER) (test                                        



             code = 358)                                         

 

             GLUCOSE RANDOM 81 mg/dL                         



             (BEAKER) (test                                        



             code = 652)                                         

 

             CALCIUM (BEAKER) 8.9 mg/dL    8.4-10.2                  



             (test code = 697)                                        

 

             EGFR (BEAKER) 102                                     Interpretatio

n of eGFR



             (test code = mL/min/1.73                            values Stage De

scription



             1092)        sq m                                   Result G1 Althea

l or high



                                                                 >=90 G2 Mildly 

decreased



                                                                 60-89 G3a Mildl

y to



                                                                 moderately 45-5

9 G3b



                                                                 Moderately to s

everely



                                                                 30-44 G4 Severl

y decreased



                                                                 15-29 G5 Kidney

 failure



                                                                 <15Reported eGF

R is based



                                                                 on the CKD-EPI 





                                                                 equation that d

oes not use



                                                                 a race



                                                                 coefficientEsti

mated GFR



                                                                 is not as accur

ate as



                                                                 Creatinine Porsha tobar in



                                                                 predicting glom

erular



                                                                 filtration rate

. Estimated



                                                                 GFR is not appl

icable for



                                                                 dialysis patien

ts



 ID - BSCBC W/PLT COUNT &amp; AUTO [id number] 05:31:38





             Test Item    Value        Reference Range Interpretation Comments

 

             WHITE BLOOD CELL COUNT (BEAKER) 10.1 K/ L    3.5-10.5              

    



             (test code = 775)                                        

 

             RED BLOOD CELL COUNT (BEAKER) 4.79 M/ L    4.63-6.08               

  



             (test code = 761)                                        

 

             HEMOGLOBIN (BEAKER) (test code = 12.4 GM/DL   13.7-17.5    L       

     



             410)                                                

 

             HEMATOCRIT (BEAKER) (test code = 40.1 %       40.1-51.0            

     



             411)                                                

 

             MEAN CORPUSCULAR VOLUME (BEAKER) 84 fL        79-92                

     



             (test code = 753)                                        

 

             MEAN CORPUSCULAR HEMOGLOBIN 25.9 pg      25.7-32.2                 



             (BEAKER) (test code = 751)                                        

 

             MEAN CORPUSCULAR HEMOGLOBIN CONC 30.9 GM/DL   32.3-36.5    L       

     



             (BEAKER) (test code = 752)                                        

 

             RED CELL DISTRIBUTION WIDTH 14.1 %       11.6-14.4                 



             (BEAKER) (test code = 412)                                        

 

             PLATELET COUNT (BEAKER) (test 392 K/CU MM  150-450                 

  



             code = 756)                                         

 

             MEAN PLATELET VOLUME (BEAKER) 9.7 fL       9.4-12.4                

  



             (test code = 754)                                        

 

             NUCLEATED RED BLOOD CELLS 0 /100 WBC   0-0                       



             (BEAKER) (test code = 413)                                        

 

             NEUTROPHILS RELATIVE PERCENT 78 %                                  

 



             (BEAKER) (test code = 429)                                        

 

             LYMPHOCYTES RELATIVE PERCENT 11 %                                  

 



             (BEAKER) (test code = 430)                                        

 

             MONOCYTES RELATIVE PERCENT 8 %                                    



             (BEAKER) (test code = 431)                                        

 

             EOSINOPHILS RELATIVE PERCENT 2 %                                   

 



             (BEAKER) (test code = 432)                                        

 

             BASOPHILS RELATIVE PERCENT 1 %                                    



             (BEAKER) (test code = 437)                                        

 

             NEUTROPHILS ABSOLUTE COUNT 7.85 K/ L    1.78-5.38    H            



             (BEAKER) (test code = 670)                                        

 

             LYMPHOCYTES ABSOLUTE COUNT 1.12 K/ L    1.32-3.57    L            



             (BEAKER) (test code = 414)                                        

 

             MONOCYTES ABSOLUTE COUNT (BEAKER) 0.76 K/ L    0.30-0.82           

      



             (test code = 415)                                        

 

             EOSINOPHILS ABSOLUTE COUNT 0.24 K/ L    0.04-0.54                 



             (BEAKER) (test code = 416)                                        

 

             BASOPHILS ABSOLUTE COUNT (BEAKER) 0.08 K/ L    0.01-0.08           

      



             (test code = 417)                                        

 

             IMMATURE GRANULOCYTES-RELATIVE 0.50 %       0.00-1.00              

   



             PERCENT (BEAKER) (test code =                                      

  



             2801)                                               



POCT-GLUCOSE [id number] 01:00:35





             Test Item    Value        Reference Range Interpretation Comments

 

             POC-GLUCOSE METER 93 mg/dL                         : TESTED A

T [facility] [address]



             (BEAKER) (test code =                                        [facility],



             1538)                                               [address]:



                                                                 /Techni

deejay ID =



                                                                 [id number] for SOPHIA RAY



POCT-GLUCOSE [id number] 18:15:53





             Test Item    Value        Reference Range Interpretation Comments

 

             POC-GLUCOSE METER 88 mg/dL                         : TESTED A

T [facility] [address]



             (BEAKER) (test code =                                        [facility],



             1538)                                               [id number]:



                                                                 /Techni

deejay ID =



                                                                 [id number] for MART

YOLA AVILAILIA



POCT-GLUCOSE [id number] 13:34:49





             Test Item    Value        Reference Range Interpretation Comments

 

             POC-GLUCOSE METER 100 mg/dL                        : TESTED A

T [facility] 6720



             (BEAKER) (test code =                                        [facility],



             1538)                                               [address]:



                                                                 /Techni

deejay ID



                                                                 = [id number] for MA

EDUARDO SOTO



PROTHROMBIN TIME/[id number] 06:38:54





             Test Item    Value        Reference Range Interpretation Comments

 

             PROTIME (BEAKER) (test code = 14.7 seconds 11.9-14.2    H          

  



             759)                                                

 

             INR (BEAKER) (test code = 370) 1.23         <=5.90                 

   



RECOMMENDED COUMADIN/WARFARIN INR THERAPY RANGESSTANDARD DOSE: 2.0 - 3.0 
Includes: PROPHYLAXIS for venous thrombosis, systemic embolization; TREATMENT 
for venous thrombosis and/or pulmonary embolus.HIGH RISK: Target INR is 2.5-3.5 
for patients with mechanical heart valves.POCT-GLUCOSE [id number] 06:23:43





             Test Item    Value        Reference Range Interpretation Comments

 

             POC-GLUCOSE METER 108 mg/dL                        : TESTED A

T [facility] [address]



             (BEAKER) (test code =                                        [place],



             1538)                                               [address]:



                                                                 /Techni

deejay ID



                                                                 = [id number] for VAZQUEZ SIERRA



[id number] 04:40:04





             Test Item    Value        Reference Range Interpretation Comments

 

             MAGNESIUM (BEAKER) (test code = 2.0 mg/dL    1.6-2.6               

    



             627)                                                



 ID - [id number] 04:40:04





             Test Item    Value        Reference Range Interpretation Comments

 

             PHOSPHORUS (BEAKER) (test code = 3.1 mg/dL    2.3-4.7              

     



             604)                                                



 ID - DBBASIC METABOLIC [id number] 04:40:03





             Test Item    Value        Reference Range Interpretation Comments

 

             SODIUM (BEAKER) 143 meq/L    136-145                   



             (test code = 381)                                        

 

             POTASSIUM    3.8 meq/L    3.5-5.1                   



             (BEAKER) (test                                        



             code = 379)                                         

 

             CHLORIDE (BEAKER) 109 meq/L           H            



             (test code = 382)                                        

 

             CO2 (BEAKER) 25 meq/L     22-29                     



             (test code = 355)                                        

 

             BLOOD UREA   36 mg/dL     7-21         H            



             NITROGEN (BEAKER)                                        



             (test code = 354)                                        

 

             CREATININE   0.92 mg/dL   0.57-1.25                 



             (BEAKER) (test                                        



             code = 358)                                         

 

             GLUCOSE RANDOM 110 mg/dL           H            



             (BEAKER) (test                                        



             code = 652)                                         

 

             CALCIUM (BEAKER) 8.7 mg/dL    8.4-10.2                  



             (test code = 697)                                        

 

             EGFR (BEAKER) 99                                      Interpretatio

n of eGFR



             (test code = mL/min/1.73                            values Stage De

scription



             1092)        sq m                                   Result  G1 Norm

al or high



                                                                 >=90 G2 Mildly 

decreased



                                                                 60-89 G3a Mildl

y to



                                                                 moderately 45-5

9 G3b



                                                                 Moderately to s

everely



                                                                 30-44 G4 Severl

y decreased



                                                                 15-29 G5 Kidney

 failure



                                                                 <15Reported eGF

R is based



                                                                 on the CKD-EPI 

1



                                                                 equation that d

oes not use



                                                                 a race



                                                                 coefficientEsti

mated GFR



                                                                 is not as accur

ate as



                                                                 Creatinine Porsha

ekaterina in



                                                                 predicting glom

erular



                                                                 filtration rate

. Estimated



                                                                 GFR is not appl

icable for



                                                                 dialysis patien

ts



 ID - DBCBC W/PLT COUNT &amp; AUTO [id number] 04:38:30





             Test Item    Value        Reference Range Interpretation Comments

 

             WHITE BLOOD CELL COUNT (BEAKER) 11.4 K/ L    3.5-10.5     H        

    



             (test code = 775)                                        

 

             RED BLOOD CELL COUNT (BEAKER) 4.69 M/ L    4.63-6.08               

  



             (test code = 761)                                        

 

             HEMOGLOBIN (BEAKER) (test code = 11.9 GM/DL   13.7-17.5    L       

     



             410)                                                

 

             HEMATOCRIT (BEAKER) (test code = 37.9 %       40.1-51.0    L       

     



             411)                                                

 

             MEAN CORPUSCULAR VOLUME (BEAKER) 81 fL        79-92                

     



             (test code = 753)                                        

 

             MEAN CORPUSCULAR HEMOGLOBIN 25.4 pg      25.7-32.2    L            



             (BEAKER) (test code = 751)                                        

 

             MEAN CORPUSCULAR HEMOGLOBIN CONC 31.4 GM/DL   32.3-36.5    L       

     



             (BEAKER) (test code = 752)                                        

 

             RED CELL DISTRIBUTION WIDTH 14.5 %       11.6-14.4    H            



             (BEAKER) (test code = 412)                                        

 

             PLATELET COUNT (BEAKER) (test 455 K/CU MM  150-450      H          

  



             code = 756)                                         

 

             MEAN PLATELET VOLUME (BEAKER) 9.6 fL       9.4-12.4                

  



             (test code = 754)                                        

 

             NUCLEATED RED BLOOD CELLS 0 /100 WBC   0-0                       



             (BEAKER) (test code = 413)                                        

 

             NEUTROPHILS RELATIVE PERCENT 76 %                                  

 



             (BEAKER) (test code = 429)                                        

 

             LYMPHOCYTES RELATIVE PERCENT 12 %                                  

 



             (BEAKER) (test code = 430)                                        

 

             MONOCYTES RELATIVE PERCENT 9 %                                    



             (BEAKER) (test code = 431)                                        

 

             EOSINOPHILS RELATIVE PERCENT 3 %                                   

 



             (BEAKER) (test code = 432)                                        

 

             BASOPHILS RELATIVE PERCENT 1 %                                    



             (BEAKER) (test code = 437)                                        

 

             NEUTROPHILS ABSOLUTE COUNT 8.59 K/ L    1.78-5.38    H            



             (BEAKER) (test code = 670)                                        

 

             LYMPHOCYTES ABSOLUTE COUNT 1.32 K/ L    1.32-3.57                 



             (BEAKER) (test code = 414)                                        

 

             MONOCYTES ABSOLUTE COUNT (BEAKER) 1.01 K/ L    0.30-0.82    H      

      



             (test code = 415)                                        

 

             EOSINOPHILS ABSOLUTE COUNT 0.29 K/ L    0.04-0.54                 



             (BEAKER) (test code = 416)                                        

 

             BASOPHILS ABSOLUTE COUNT (BEAKER) 0.07 K/ L    0.01-0.08           

      



             (test code = 417)                                        

 

             IMMATURE GRANULOCYTES-RELATIVE 0.60 %       0.00-1.00              

   



             PERCENT (BEAKER) (test code =                                      

  



             2801)                                               



POCT-GLUCOSE [id number] 23:40:37





             Test Item    Value        Reference Range Interpretation Comments

 

             POC-GLUCOSE METER 140 mg/dL           H            : TESTED A

T [facility] [address]



             (BEAKER) (test code =                                        [facility],



             153)                                               [address]:



                                                                 /Techni

deejay ID



                                                                 = [id number] for VAZQUEZ SIERRA



POCT-GLUCOSE [id number] 17:58:13





             Test Item    Value        Reference Range Interpretation Comments

 

             POC-GLUCOSE METER 118 mg/dL           H            : TESTED A

T [facility] [address]



             (BEAKER) (test code =                                        [facility],



             153)                                               [address]:



                                                                 /Techni

deejay ID



                                                                 = [id number] for Brianna adams



                                                                 Vannesa



POCT-GLUCOSE [id number] 12:12:53





             Test Item    Value        Reference Range Interpretation Comments

 

             POC-GLUCOSE METER 123 mg/dL           H            : TESTED A

T [facility] [address]



             (BEAKER) (test code =                                        [facility],



             153)                                               [address]:



                                                                 /Techni

deejay ID



                                                                 = [id number] for Brianna adams



                                                                 Vannesa



POCT-GLUCOSE [id number] 06:03:50





             Test Item    Value        Reference Range Interpretation Comments

 

             POC-GLUCOSE METER 120 mg/dL           H            : TESTED A

T [facility] [address]



             (BEAKER) (test code =                                        [facility],



             153)                                               [address]:



                                                                 /Techni

deejay ID



                                                                 = [id number] for SON [place]



[id number] 06:03:44





             Test Item    Value        Reference Range Interpretation Comments

 

             PHOSPHORUS (BEAKER) (test code = 3.2 mg/dL    2.3-4.7              

     



             604)                                                



 ID - [id number] 06:03:43





             Test Item    Value        Reference Range Interpretation Comments

 

             MAGNESIUM (BEAKER) (test code = 2.1 mg/dL    1.6-2.6               

    



             627)                                                



 ID - BSBASIC METABOLIC [id number] 06:03:42





             Test Item    Value        Reference Range Interpretation Comments

 

             SODIUM (BEAKER) 139 meq/L    136-145                   



             (test code = 381)                                        

 

             POTASSIUM    4.1 meq/L    3.5-5.1                   



             (BEAKER) (test                                        



             code = 379)                                         

 

             CHLORIDE (BEAKER) 107 meq/L                        



             (test code = 382)                                        

 

             CO2 (BEAKER) 25 meq/L     22-29                     



             (test code = 355)                                        

 

             BLOOD UREA   37 mg/dL     7-21         H            



             NITROGEN (BEAKER)                                        



             (test code = 354)                                        

 

             CREATININE   0.93 mg/dL   0.57-1.25                 



             (BEAKER) (test                                        



             code = 358)                                         

 

             GLUCOSE RANDOM 125 mg/dL           H            



             (BEAKER) (test                                        



             code = 652)                                         

 

             CALCIUM (BEAKER) 8.8 mg/dL    8.4-10.2                  



             (test code = 697)                                        

 

             EGFR (BEAKER) 98                                      Interpretatio

n of eGFR



             (test code = mL/min/1.73                            values Stage De

scription



             1092)        sq m                                   Result G1  Norm

al or high



                                                                 >=90 G2 Mildly 

decreased



                                                                 60-89 G3a Mildl

y to



                                                                 moderately 45-5

9 G3b



                                                                 Moderately to s

everely



                                                                 30-44 G4 Severl

y decreased



                                                                  15-29 G5 Kidne

y failure



                                                                 <15Reported eGF

R is based



                                                                 on the CKD-EPI 

202



                                                                 equation that d

oes not use



                                                                 a race



                                                                 coefficientEsti

mated GFR



                                                                 is not as accur

ate as



                                                                 Creatinine Porsha tobar in



                                                                 predicting glom

erular



                                                                 filtration rate

. Estimated



                                                                 GFR is not appl

icable for



                                                                 dialysis patien

ts



 ID - BSCBC W/PLT COUNT &amp; AUTO [id number] 05:13:39





             Test Item    Value        Reference Range Interpretation Comments

 

             WHITE BLOOD CELL COUNT (BEAKER) 17.6 K/ L    3.5-10.5     H        

    



             (test code = 775)                                        

 

             RED BLOOD CELL COUNT (BEAKER) 4.84 M/ L    4.63-6.08               

  



             (test code = 761)                                        

 

             HEMOGLOBIN (BEAKER) (test code = 12.7 GM/DL   13.7-17.5    L       

     



             410)                                                

 

             HEMATOCRIT (BEAKER) (test code = 38.9 %       40.1-51.0    L       

     



             411)                                                

 

             MEAN CORPUSCULAR VOLUME (BEAKER) 80 fL        79-92                

     



             (test code = 753)                                        

 

             MEAN CORPUSCULAR HEMOGLOBIN 26.2 pg      25.7-32.2                 



             (BEAKER) (test code = 751)                                        

 

             MEAN CORPUSCULAR HEMOGLOBIN CONC 32.6 GM/DL   32.3-36.5            

     



             (BEAKER) (test code = 752)                                        

 

             RED CELL DISTRIBUTION WIDTH 14.5 %       11.6-14.4    H            



             (BEAKER) (test code = 412)                                        

 

             PLATELET COUNT (BEAKER) (test 438 K/CU MM  150-450                 

  



             code = 756)                                         

 

             MEAN PLATELET VOLUME (BEAKER) 9.6 fL       9.4-12.4                

  



             (test code = 754)                                        

 

             NUCLEATED RED BLOOD CELLS 0 /100 WBC   0-0                       



             (BEAKER) (test code = 413)                                        

 

             NEUTROPHILS RELATIVE PERCENT 81 %                                  

 



             (BEAKER) (test code = 429)                                        

 

             LYMPHOCYTES RELATIVE PERCENT 8 %                                   

 



             (BEAKER) (test code = 430)                                        

 

             MONOCYTES RELATIVE PERCENT 8 %                                    



             (BEAKER) (test code = 431)                                        

 

             EOSINOPHILS RELATIVE PERCENT 2 %                                   

 



             (BEAKER) (test code = 432)                                        

 

             BASOPHILS RELATIVE PERCENT 1 %                                    



             (BEAKER) (test code = 437)                                        

 

             NEUTROPHILS ABSOLUTE COUNT 14.33 K/ L   1.78-5.38    H            



             (BEAKER) (test code = 670)                                        

 

             LYMPHOCYTES ABSOLUTE COUNT 1.43 K/ L    1.32-3.57                 



             (BEAKER) (test code = 414)                                        

 

             MONOCYTES ABSOLUTE COUNT (BEAKER) 1.35 K/ L    0.30-0.82    H      

      



             (test code = 415)                                        

 

             EOSINOPHILS ABSOLUTE COUNT 0.26 K/ L    0.04-0.54                 



             (BEAKER) (test code = 416)                                        

 

             BASOPHILS ABSOLUTE COUNT (BEAKER) 0.08 K/ L    0.01-0.08           

      



             (test code = 417)                                        

 

             IMMATURE GRANULOCYTES-RELATIVE 0.90 %       0.00-1.00              

   



             PERCENT (BEAKER) (test code =                                      

  



             2801)                                               



POCT-GLUCOSE [id number] 00:21:43





             Test Item    Value        Reference Range Interpretation Comments

 

             POC-GLUCOSE METER 122 mg/dL           H            : TESTED A

T [facility] [address]



             (BEAKER) (test code =                                        [facility],



             1538)                                               [id number]:



                                                                 /Techni

deejay ID



                                                                 = [id number] for PRECIOUS MONTANALLUVIALANDY



POCT-GLUCOSE [id number] 17:44:59





             Test Item    Value        Reference Range Interpretation Comments

 

             POC-GLUCOSE METER 91 mg/dL                         : TESTED A

T [facility] 6720



             (BEAKER) (test code =                                        [facility],



             1538)                                               [address]:



                                                                 /Techni

deejay ID =



                                                                 [id number] for RODG

ERS,



                                                                 PAULIEECA



XR ABDOMEN/KUB 1 VIEW [id number] 15:07:20
************************************************************[facility] : 1965 Sex: 
M************************************************************TECHNIQUE:XR 
ABDOMEN/KUB 1 VIEW PORTABLEINDICATION: NG tube placement.COMPARISON: 
2023FINDINGS:Feeding tube tip projects over the second portion of the 
duodenum. Acatheter projects over the pelvis. Nonobstructive bowel gas 
pattern.Supine radiographs are insensitive for detection of free 
intraperitonealair.IMPRESSION:1. Feeding tube tip projects over the second 
portion of the duodenum.Electronically SignedBy: Ravi Ppdgedcnvbw99/19/2023 
15:09 CDTWorkstation Name: [id number]-GLUCOSE [id number] 11:55:25





             Test Item    Value        Reference Range Interpretation Comments

 

             POC-GLUCOSE METER 85 mg/dL                         : TESTED A

T [facility] [address]



             (BEAKER) (test code =                                        [place],



             1538)                                               [id number]:



                                                                 /Techni

deejay ID =



                                                                 [id number] for RODPAULIE BENTLEYECA



(CELLAVISION MANUAL DIFF)2023 06:41:39





             Test Item    Value        Reference Range Interpretation Comments

 

             NEUTROPHILS - REL 88 %                                   



             (CELLAVISION)(BEAKER) (test code                                   

     



             = 2816)                                             

 

             LYMPHOCYTES - REL 7 %                                    



             (CELLAVISION)(BEAKER) (test code                                   

     



             = 2817)                                             

 

             MONOCYTES - REL 3 %                                    



             (CELLAVISION)(BEAKER) (test code                                   

     



             = 2818)                                             

 

             EOSINOPHILS - REL 1 %                                    



             (CELLAVISION)(BEAKER) (test code                                   

     



             = 2819)                                             

 

             BASOPHILS - REL 1 %                                    



             (CELLAVISION)(BEAKER) (test code                                   

     



             = 2820)                                             

 

             NEUTROPHILS - ABS 16.10 K/ul   1.78-5.38    H            



             (CELLAVISION)(BEAKER) (test code                                   

     



             = 2830)                                             

 

             LYMPHOCYTES - ABS 1.28 K/ul    1.32-3.57    L            



             (CELLAVISION)(BEAKER) (test code                                   

     



             = 2831)                                             

 

             MONOCYTES - ABS 0.55 K/uL    0.30-0.82                 



             (CELLAVISION)(BEAKER) (test code                                   

     



             = 2832)                                             

 

             EOSINOPHILS - ABS 0.18 K/uL    0.04-0.54                 



             (CELLAVISION)(BEAKER) (test code                                   

     



             = 2834)                                             

 

             BASOPHILS - ABS 0.18 K/uL    0.01-0.08    H            



             (CELLAVISION)(BEAKER) (test code                                   

     



             = 2835)                                             

 

             TOTAL COUNTED (BEAKER) (test code 100                              

      



             = 1351)                                             

 

             WBC MORPHOLOGY (BEAKER) (test Normal                               

  



             code = 487)                                         

 

             PLT MORPHOLOGY (BEAKER) (test Normal                               

  



             code = 486)                                         

 

             ANISOCYTOSIS (BEAKER) (test code 2+ moderate                       

     



             = 961)                                              

 

             MICROCYTES (BEAKER) (test code = 2+ moderate                       

     



             965)                                                

 

             POIKILOCYTES (BEAKER) (test code 1+ few                            

     



             = 966)                                              

 

             OVALOCYTES (BEAKER) (test code = 1+ few                            

     



             477)                                                

 

             ARTIFACT (CELLAVISION)(BEAKER) Present                             

   



             (test code = 3432)                                        

 

             PLATELET CONCENTRATION Adequate                               



             (CELLAVISION)(BEAKER) (test code                                   

     



             = 3438)                                             



 ID - Laurie Bashir comments: Slide comments:CBC W/PLT COUNT 
&amp; AUTO [id number] 06:41:38





             Test Item    Value        Reference Range Interpretation Comments

 

             WHITE BLOOD CELL COUNT (BEAKER) 18.3 K/ L    3.5-10.5     H        

    



             (test code = 775)                                        

 

             RED BLOOD CELL COUNT (BEAKER) 5.21 M/ L    4.63-6.08               

  



             (test code = 761)                                        

 

             HEMOGLOBIN (BEAKER) (test code = 13.5 GM/DL   13.7-17.5    L       

     



             410)                                                

 

             HEMATOCRIT (BEAKER) (test code = 41.8 %       40.1-51.0            

     



             411)                                                

 

             MEAN CORPUSCULAR VOLUME (BEAKER) 80 fL        79-92                

     



             (test code = 753)                                        

 

             MEAN CORPUSCULAR HEMOGLOBIN 25.9 pg      25.7-32.2                 



             (BEAKER) (test code = 751)                                        

 

             MEAN CORPUSCULAR HEMOGLOBIN CONC 32.3 GM/DL   32.3-36.5            

     



             (BEAKER) (test code = 752)                                        

 

             RED CELL DISTRIBUTION WIDTH 14.6 %       11.6-14.4    H            



             (BEAKER) (test code = 412)                                        

 

             PLATELET COUNT (BEAKER) (test 410 K/CU MM  150-450                 

  



             code = 756)                                         

 

             MEAN PLATELET VOLUME (BEAKER) 9.4 fL       9.4-12.4                

  



             (test code = 754)                                        

 

             NUCLEATED RED BLOOD CELLS 0 /100 WBC   0-0                       



             (BEAKER) (test code = 413)                                        



POCT-GLUCOSE [id number] 06:12:53





             Test Item    Value        Reference Range Interpretation Comments

 

             POC-GLUCOSE METER 98 mg/dL                         : TESTED A

[facility]



             (BEAKER) (test code =                                        [place]

[place],



             [address])                                               [id number]:



                                                                 /Techni

deejay ID =



                                                                 [id number] for SON GLYNN



[id number] 04:43:04





             Test Item    Value        Reference Range Interpretation Comments

 

             PHOSPHORUS (BEAKER) (test code = 3.5 mg/dL    2.3-4.7              

     



             604)                                                



 ID - EOOBASIC METABOLIC [id number] 04:43:03





             Test Item    Value        Reference Range Interpretation Comments

 

             SODIUM (BEAKER) 139 meq/L    136-145                   



             (test code = 381)                                        

 

             POTASSIUM    4.2 meq/L    3.5-5.1                   



             (BEAKER) (test                                        



             code = 379)                                         

 

             CHLORIDE (BEAKER) 107 meq/L                        



             (test code = 382)                                        

 

             CO2 (BEAKER) 23 meq/L     22-29                     



             (test code = 355)                                        

 

             BLOOD UREA   34 mg/dL     7-21         H            



             NITROGEN (BEAKER)                                        



             (test code = 354)                                        

 

             CREATININE   0.99 mg/dL   0.57-1.25                 



             (BEAKER) (test                                        



             code = 358)                                         

 

             GLUCOSE RANDOM 96 mg/dL                         



             (BEAKER) (test                                        



             code = 652)                                         

 

             CALCIUM (BEAKER) 9.0 mg/dL    8.4-10.2                  



             (test code = 697)                                        

 

             EGFR (BEAKER) 90                                      Interpretatio

n of eGFR



             (test code = mL/min/1.73                            values Stage De

scription



             1092)        sq m                                   Result G1 Althea

l or high



                                                                 >=90 G2 Mildly 

decreased



                                                                 60-89 G3a Mildl

y to



                                                                 moderately 45-5

9  G3b



                                                                 Moderately to s

everely



                                                                 30-44 G4 Severl

y decreased



                                                                 15-29 G5 Kidney

 failure



                                                                 <15Reported eGF

R is based



                                                                 on the CKD-EPI 





                                                                 equation that d

oes not use



                                                                 a race



                                                                 coefficientEsti

mated GFR



                                                                 is not as accur

ate as



                                                                 Creatinine Porsha

ekaterina in



                                                                 predicting glom

erular



                                                                 filtration rate

. Estimated



                                                                 GFR is not appl

icable for



                                                                 dialysis patien

ts



 ID - [id number] 04:43:03





             Test Item    Value        Reference Range Interpretation Comments

 

             MAGNESIUM (BEAKER) (test code = 2.1 mg/dL    1.6-2.6               

    



             627)                                                



 ID - EOOPOCT-GLUCOSE [id number] 23:57:38





             Test Item    Value        Reference Range Interpretation Comments

 

             POC-GLUCOSE METER 93 mg/dL                         : TESTED A

T [facility] [address]



             (BEAKER) (test code =                                        [facility],



             1538)                                               [address]:



                                                                 /Techni

deejay ID =



                                                                 [id number] for SON GLYNN



POCT-GLUCOSE [id number] 19:00:28





             Test Item    Value        Reference Range Interpretation Comments

 

             POC-GLUCOSE METER 125 mg/dL           H            : TESTED A

T [facility] [address]



             (BEAKER) (test code =                                        [facility],



             1538)                                               [address]:



                                                                 /Techni

deejay ID



                                                                 = [id number] for Nikolas Montananda



POCT-GLUCOSE [id number] 12:41:10





             Test Item    Value        Reference Range Interpretation Comments

 

             POC-GLUCOSE METER 116 mg/dL           H            : TESTED A

T [facility] [address]



             (BEAKER) (test code =                                        [facility],



             1538)                                               [address]:



                                                                 /Techni

deejay ID



                                                                 = [id number] for Katiuska segal,



                                                                 [place]



(CELLAVISION MANUAL DIFF)2023 07:54:05





             Test Item    Value        Reference Range Interpretation Comments

 

             NEUTROPHILS - REL 90 %                                   



             (CELLAVISION)(BEAKER) (test code =                                 

       



             2816)                                               

 

             LYMPHOCYTES - REL 5 %                                    



             (CELLAVISION)(BEAKER) (test code =                                 

       



             2817)                                               

 

             MONOCYTES - REL 3 %                                    



             (CELLAVISION)(BEAKER) (test code =                                 

       



             2818)                                               

 

             ATYPICAL LYMPHOCYTES - REL 2 %          0-0          H            



             (CELLAVISION)(BEAKER) (test code =                                 

       



             2829)                                               

 

             NEUTROPHILS - ABS 23.76 K/ul   1.78-5.38    H            



             (CELLAVISION)(BEAKER) (test code =                                 

       



             2830)                                               

 

             LYMPHOCYTES - ABS 1.32 K/ul    1.32-3.57                 



             (CELLAVISION)(BEAKER) (test code =                                 

       



             2831)                                               

 

             MONOCYTES - ABS 0.79 K/uL    0.30-0.82                 



             (CELLAVISION)(BEAKER) (test code =                                 

       



             2832)                                               

 

             ATYPICAL LYMPHOCYTES - ABS 0.53 K/uL    0.00-0.00    H            



             (CELLAVISION)(BEAKER) (test code =                                 

       



             2858)                                               

 

             TOTAL COUNTED (BEAKER) (test code 100                              

      



             = 1351)                                             

 

             WBC MORPHOLOGY (BEAKER) (test code Normal                          

       



             = 487)                                              

 

             GIANT PLATELETS (BEAKER) (test Present                             

   



             code = 313)                                         

 

             ANISOCYTOSIS (BEAKER) (test code = 1+ few                          

       



             961)                                                

 

             MICROCYTES (BEAKER) (test code = 1+ few                            

     



             965)                                                

 

             POIKILOCYTES (BEAKER) (test code = 1+ few                          

       



             966)                                                

 

             ELLIPTOCYTES (BEAKER) (test code = 1+ few                          

       



             962)                                                

 

             ARTIFACT (CELLAVISION)(BEAKER) Present                             

   



             (test code = 3432)                                        

 

             PLATELET CONCENTRATION Adequate                               



             (CELLAVISION)(BEAKER) (test code =                                 

       



             3438)                                               



 ID - rhoda Robertson comments: Slide comments:CBC W/PLT COUNT &amp; 
AUTO [id number] 07:54:04





             Test Item    Value        Reference Range Interpretation Comments

 

             WHITE BLOOD CELL COUNT (BEAKER) 26.4 K/ L    3.5-10.5     H        

    



             (test code = 775)                                        

 

             RED BLOOD CELL COUNT (BEAKER) 5.51 M/ L    4.63-6.08               

  



             (test code = 761)                                        

 

             HEMOGLOBIN (BEAKER) (test code = 14.1 GM/DL   13.7-17.5            

     



             410)                                                

 

             HEMATOCRIT (BEAKER) (test code = 44.0 %       40.1-51.0            

     



             411)                                                

 

             MEAN CORPUSCULAR VOLUME (BEAKER) 80 fL        79-92                

     



             (test code = 753)                                        

 

             MEAN CORPUSCULAR HEMOGLOBIN 25.6 pg      25.7-32.2    L            



             (BEAKER) (test code = 751)                                        

 

             MEAN CORPUSCULAR HEMOGLOBIN CONC 32.0 GM/DL   32.3-36.5    L       

     



             (BEAKER) (test code = 752)                                        

 

             RED CELL DISTRIBUTION WIDTH 14.7 %       11.6-14.4    H            



             (BEAKER) (test code = 412)                                        

 

             PLATELET COUNT (BEAKER) (test 426 K/CU MM  150-450                 

  



             code = 756)                                         

 

             MEAN PLATELET VOLUME (BEAKER) 9.8 fL       9.4-12.4                

  



             (test code = 754)                                        

 

             NUCLEATED RED BLOOD CELLS 0 /100 WBC   0-0                       



             (BEAKER) (test code = 413)                                        



POCT-GLUCOSE [id number] 07:17:47





             Test Item    Value        Reference Range Interpretation Comments

 

             POC-GLUCOSE METER 128 mg/dL           H            : TESTED A

T [facility] [address]



             (BEAKER) (test code =                                        [place],



             [address])                                               [address]:



                                                                 /Techni

deejay ID



                                                                 = [id number] for Ch

Chelle goldstein



[id number] 06:22:58





             Test Item    Value        Reference Range Interpretation Comments

 

             MAGNESIUM (BEAKER) 2.1 mg/dL    1.6-2.6                   Specimen 

slightly



             (test code = 627)                                        hemolyzed



 ID Yo DIOP [id number] 06:22:58





             Test Item    Value        Reference Range Interpretation Comments

 

             PHOSPHORUS (BEAKER) 3.6 mg/dL    2.3-4.7                   Specimen

 slightly



             (test code = 604)                                        hemolyzed



 ID - CHIKIS WBASIC METABOLIC [id number] 06:22:58





             Test Item    Value        Reference Range Interpretation Comments

 

             SODIUM (BEAKER) 139 meq/L    136-145                   



             (test code = 381)                                        

 

             POTASSIUM    4.8 meq/L    3.5-5.1                   Specimen slight

ly



             (BEAKER) (test                                        hemolyzed



             code = 379)                                         

 

             CHLORIDE (BEAKER) 106 meq/L                        



             (test code = 382)                                        

 

             CO2 (BEAKER) 25 meq/L     22-29                     



             (test code = 355)                                        

 

             BLOOD UREA   29 mg/dL     7-21         H            



             NITROGEN (BEAKER)                                        



             (test code = 354)                                        

 

             CREATININE   0.95 mg/dL   0.57-1.25                 Specimen slight

ly



             (BEAKER) (test                                        hemolyzed



             code = 358)                                         

 

             GLUCOSE RANDOM 112 mg/dL           H            



             (TONIO) (test                                        



             code = 652)                                         

 

             CALCIUM (TONIO) 9.4 mg/dL    8.4-10.2                  



             (test code = 697)                                        

 

             EGFR (TONIO) 95                                      Interpretatio

n of eGFR



             (test code = mL/min/1.73                            values Stage De

scription



             1092)        sq m                                   Result G1 Althea

l or high



                                                                 >=90 G2 Mildly 

decreased



                                                                 60-89 G3a Mildl

y to



                                                                 moderately 45-5

9 G3b



                                                                 Moderately to s

everely



                                                                 30-44 G4 Severl

y decreased



                                                                 15-29 G5 Kidney

 failure



                                                                 <15Reported eGF

R is based



                                                                 on the CKD-EPI 





                                                                 equation that d

oes not use



                                                                 a race



                                                                 coefficientEsti

mated GFR



                                                                 is not as accur

ate as



                                                                 Creatinine Porsha

ekaterina in



                                                                 predicting glom

erular



                                                                 filtration rate

. Estimated



                                                                 GFR is not appl

icable for



                                                                 dialysis patien

ts



 ID - CHIKIS WPOCT-GLUCOSE [id number] 23:56:34





             Test Item    Value        Reference Range Interpretation Comments

 

             POC-GLUCOSE METER 106 mg/dL                        : TESTED A

T [facility] [address]



             (TONIO) (test code =                                        [place] [facility],



             [address]38)                                               [address]:



                                                                 /Techni

deejay ID



                                                                 = [id number] for Ch

Chelle goldstein



XR ABDOMEN/KUB 1 VIEW [id number] 20:53:28
************************************************************[facility] : 1965 Sex: 
M************************************************************EXAMINATION: XR 
ABDOMEN/KUB 1 VIEW PORTABLE INDICATION: NG tube placementCOMPARISON: KUB of 
23 FINDINGS/IMPRESSION:Enteric tube tip terminates in the proximal 
duodenum. No bowelobstruction or free air. No acute osseous 
injury.Electronically Signed By: Laura Treviño2023 20:55 CDTWorkstation Name:
 [id number] [id number] 18:09:40





             Test Item    Value        Reference Range Interpretation Comments

 

             POC-GLUCOSE METER 113 mg/dL           H            : TESTED A

[facility]



             ([employer]) (test code =                                        [place] [facility],



             1538)                                               [address]:



                                                                 /Techni

deejay ID



                                                                 = [id number] for KOLE SOTO



                                                                 EDUARDO



XR CHEST 1 VIEW PORTABLE / [id number] 15:35:17
************************************************************[facility]  : 1965 Sex: 
M************************************************************AP view of the 
chest dated 3COMPARISON: LINICAL INFORMATION: follow up 
infiltartesComment: Heart is normal in size. Pulmonary vasculature 
isunremarkable. Opacity is seen in the left lower lobe suggestivepneumonia 
unchanged from prior study. The rest of lungs are clear. Nopleural effusion or 
pneumothorax is seen. Feeding tube remains in place.IMPRESSION:Left lower lobe 
pneumonia unchanged from prior study.Electronically Signed By: Maciej Almonte2023 15:37 CDTWorkstation Name: [id number] [id number] 
12:18:49





             Test Item    Value        Reference Range Interpretation Comments

 

             POC-GLUCOSE METER 120 mg/dL           H            : TESTED A

T [facility] [address]



             ([employer]) (test code =                                        [place] [facility],



             1538)                                               [id number]:



                                                                 /Techni

deejay ID



                                                                 = [id number] for EDUARDO DUTTA



BLOOD [id number] 06:00:10





             Test Item    Value        Reference Range Interpretation Comments

 

             CULTURE (BEAKER) (test No growth in 5 days                         

  



             code = 1095)                                        



BLOOD [id number] 06:00:09





             Test Item    Value        Reference Range Interpretation Comments

 

             CULTURE (BEAKER) (test No growth in 5 days                         

  



             code = 1095)                                        



[id number] 04:53:04





             Test Item    Value        Reference Range Interpretation Comments

 

             MAGNESIUM (BEAKER) (test code = 2.1 mg/dL    1.6-2.6               

    



             627)                                                



[id number] 04:53:04





             Test Item    Value        Reference Range Interpretation Comments

 

             PHOSPHORUS (BEAKER) (test code = 3.1 mg/dL    2.3-4.7              

     



             604)                                                



BASIC METABOLIC [id number] 04:53:03





             Test Item    Value        Reference Range Interpretation Comments

 

             SODIUM (BEAKER) 137 meq/L    136-145                   



             (test code = 381)                                        

 

             POTASSIUM    4.4 meq/L    3.5-5.1                   



             (BEAKER) (test                                        



             code = 379)                                         

 

             CHLORIDE (BEAKER) 103 meq/L                        



             (test code = 382)                                        

 

             CO2 (BEAKER) 25 meq/L     22-29                     



             (test code = 355)                                        

 

             BLOOD UREA   34 mg/dL     7-21         H            



             NITROGEN (BEAKER)                                        



             (test code = 354)                                        

 

             CREATININE   0.87 mg/dL   0.57-1.25                 



             (BEAKER) (test                                        



             code = 358)                                         

 

             GLUCOSE RANDOM 122 mg/dL           H            



             (BEAKER) (test                                        



             code = 652)                                         

 

             CALCIUM (BEAKER) 8.7 mg/dL    8.4-10.2                  



             (test code = 697)                                        

 

             EGFR (BEAKER) 102                                     Interpretatio

n of eGFR



             (test code = mL/min/1.73                            values Stage De

scription



             1092)        sq m                                   Result G1 Althea

l or high



                                                                 >=90 G2 Mildly 

decreased



                                                                 60-89 G3a Mildl

y to



                                                                 moderately 45-5

9 G3b



                                                                 Moderately to s

everely



                                                                 30-44 G4 Severl

y decreased



                                                                 15-29 G5 Kidney

 failure



                                                                 <15Reported eGF

R is based



                                                                 on the CKD-EPI 

1



                                                                 equation that d

oes not use



                                                                 a race



                                                                 coefficientEsti

mated GFR



                                                                 is not as accur

ate as



                                                                 Creatinine Porsha tobar in



                                                                 predicting glom

erular



                                                                 filtration rate

. Estimated



                                                                 GFR is not appl

icable for



                                                                 dialysis patien

ts



CBC W/PLT COUNT &amp; AUTO [id number] 04:36:30





             Test Item    Value        Reference Range Interpretation Comments

 

             WHITE BLOOD CELL COUNT (BEAKER) 12.1 K/ L    3.5-10.5     H        

    



             (test code = 775)                                        

 

             RED BLOOD CELL COUNT (BEAKER) 5.10 M/ L    4.63-6.08               

  



             (test code = 761)                                        

 

             HEMOGLOBIN (BEAKER) (test code = 13.0 GM/DL   13.7-17.5    L       

     



             410)                                                

 

             HEMATOCRIT (BEAKER) (test code = 41.1 %       40.1-51.0            

     



             411)                                                

 

             MEAN CORPUSCULAR VOLUME (BEAKER) 81 fL        79-92                

     



             (test code = 753)                                        

 

             MEAN CORPUSCULAR HEMOGLOBIN 25.5 pg      25.7-32.2    L            



             (BEAKER) (test code = 751)                                        

 

             MEAN CORPUSCULAR HEMOGLOBIN CONC 31.6 GM/DL   32.3-36.5    L       

     



             (BEAKER) (test code = 752)                                        

 

             RED CELL DISTRIBUTION WIDTH 14.1 %       11.6-14.4                 



             (BEAKER) (test code = 412)                                        

 

             PLATELET COUNT (BEAKER) (test 350 K/CU MM  150-450                 

  



             code = 756)                                         

 

             MEAN PLATELET VOLUME (BEAKER) 9.7 fL       9.4-12.4                

  



             (test code = 754)                                        

 

             NUCLEATED RED BLOOD CELLS 0 /100 WBC   0-0                       



             (BEAKER) (test code = 413)                                        

 

             NEUTROPHILS RELATIVE PERCENT 76 %                                  

 



             (BEAKER) (test code = 429)                                        

 

             LYMPHOCYTES RELATIVE PERCENT 11 %                                  

 



             (BEAKER) (test code = 430)                                        

 

             MONOCYTES RELATIVE PERCENT 9 %                                    



             (BEAKER) (test code = 431)                                        

 

             EOSINOPHILS RELATIVE PERCENT 2 %                                   

 



             (BEAKER) (test code = 432)                                        

 

             BASOPHILS RELATIVE PERCENT 1 %                                    



             (BEAKER) (test code = 437)                                        

 

             NEUTROPHILS ABSOLUTE COUNT 9.23 K/ L    1.78-5.38    H            



             (BEAKER) (test code = 670)                                        

 

             LYMPHOCYTES ABSOLUTE COUNT 1.28 K/ L    1.32-3.57    L            



             (BEAKER) (test code = 414)                                        

 

             MONOCYTES ABSOLUTE COUNT (BEAKER) 1.08 K/ L    0.30-0.82    H      

      



             (test code = 415)                                        

 

             EOSINOPHILS ABSOLUTE COUNT 0.28 K/ L    0.04-0.54                 



             (BEAKER) (test code = 416)                                        

 

             BASOPHILS ABSOLUTE COUNT (BEAKER) 0.08 K/ L    0.01-0.08           

      



             (test code = 417)                                        

 

             IMMATURE GRANULOCYTES-RELATIVE 1.30 %       0.00-1.00    H         

   



             PERCENT (TONIO) (test code =                                      

  



             2801)                                               



POCT-GLUCOSE [id number] 21:57:20





             Test Item    Value        Reference Range Interpretation Comments

 

             POC-GLUCOSE METER 121 mg/dL           H            : TESTED A

T [facility] [address]



             (TONIO) (test code =                                        [facility],



             1538)                                               [id number]:



                                                                 /Techni

deejay ID



                                                                 = [id number] for Lauren Wade



POCT-GLUCOSE [id number] 17:46:20





             Test Item    Value        Reference Range Interpretation Comments

 

             POC-GLUCOSE METER 109 mg/dL                        : TESTED A

T [facility] [address]



             (BEAKER) (test code =                                        [facility],



             1538)                                               [id number]:



                                                                 /Techni

deejay ID



                                                                 = [id number] for Winnie Gunter



POCT-GLUCOSE [id number] 12:37:23





             Test Item    Value        Reference Range Interpretation Comments

 

             POC-GLUCOSE METER 94 mg/dL                         : TESTED A

T [facility] [address]



             ([employer]) (test code =                                        [facility],



             1538)                                               [id number]:



                                                                 /Techni

deejay ID =



                                                                 [id number] for Winnie De Los Santos



XR ABDOMEN/KUB 1 VIEW [id number] 12:18:19
************************************************************[facility] 1965 Sex: 
M************************************************************ONE VIEW AB
DOMENHISTORY: Feeding tube placementCOMPARISON:7/15/2023FINDINGS:2 supine AP 
images of the abdomen were obtained.The feeding tube has been advanced, with its
 tip now at the junction ofthe second and third portions of the duodenum.No 
dilated bowel loops are visualized.Electronically Signed By: Reese Núñez
 12:20 CDTWorkstation Name: [id number]-GLUCOSE [id number] 
08:28:32





             Test Item    Value        Reference Range Interpretation Comments

 

             POC-GLUCOSE METER 91 mg/dL                         : TESTED A

T [facility] [address]



             (BEAKER) (test code =                                        [place],



             [address])                                               [id number]:



                                                                 /Techni

deejay ID =



                                                                 [id number] for Winnie De Los Santos



CBC W/PLT COUNT &amp; AUTO [id number] 04:56:54





             Test Item    Value        Reference Range Interpretation Comments

 

             WHITE BLOOD CELL COUNT (BEAKER) 14.6 K/ L    3.5-10.5     H        

    



             (test code = 775)                                        

 

             RED BLOOD CELL COUNT (BEAKER) 5.16 M/ L    4.63-6.08               

  



             (test code = 761)                                        

 

             HEMOGLOBIN (BEAKER) (test code = 13.0 GM/DL   13.7-17.5    L       

     



             410)                                                

 

             HEMATOCRIT (BEAKER) (test code = 41.4 %       40.1-51.0            

     



             411)                                                

 

             MEAN CORPUSCULAR VOLUME (BEAKER) 80 fL        79-92                

     



             (test code = 753)                                        

 

             MEAN CORPUSCULAR HEMOGLOBIN 25.2 pg      25.7-32.2    L            



             (BEAKER) (test code = 751)                                        

 

             MEAN CORPUSCULAR HEMOGLOBIN CONC 31.4 GM/DL   32.3-36.5    L       

     



             (BEAKER) (test code = 752)                                        

 

             RED CELL DISTRIBUTION WIDTH 14.6 %       11.6-14.4    H            



             (BEAKER) (test code = 412)                                        

 

             PLATELET COUNT (BEAKER) (test 341 K/CU MM  150-450                 

  



             code = 756)                                         

 

             MEAN PLATELET VOLUME (BEAKER) 9.9 fL       9.4-12.4                

  



             (test code = 754)                                        

 

             NUCLEATED RED BLOOD CELLS 0 /100 WBC   0-0                       



             (BEAKER) (test code = 413)                                        

 

             NEUTROPHILS RELATIVE PERCENT 81 %                                  

 



             (BEAKER) (test code = 429)                                        

 

             LYMPHOCYTES RELATIVE PERCENT 7 %                                   

 



             (BEAKER) (test code = 430)                                        

 

             MONOCYTES RELATIVE PERCENT 8 %                                    



             (BEAKER) (test code = 431)                                        

 

             EOSINOPHILS RELATIVE PERCENT 2 %                                   

 



             (BEAKER) (test code = 432)                                        

 

             BASOPHILS RELATIVE PERCENT 1 %                                    



             (BEAKER) (test code = 437)                                        

 

             NEUTROPHILS ABSOLUTE COUNT 11.86 K/ L   1.78-5.38    H            



             (BEAKER) (test code = 670)                                        

 

             LYMPHOCYTES ABSOLUTE COUNT 1.08 K/ L    1.32-3.57    L            



             (BEAKER) (test code = 414)                                        

 

             MONOCYTES ABSOLUTE COUNT (BEAKER) 1.13 K/ L    0.30-0.82    H      

      



             (test code = 415)                                        

 

             EOSINOPHILS ABSOLUTE COUNT 0.26 K/ L    0.04-0.54                 



             (BEAKER) (test code = 416)                                        

 

             BASOPHILS ABSOLUTE COUNT (BEAKER) 0.07 K/ L    0.01-0.08           

      



             (test code = 417)                                        

 

             IMMATURE GRANULOCYTES-RELATIVE 1.40 %       0.00-1.00    H         

   



             PERCENT (BEAKER) (test code =                                      

  



             2801)                                               



[id number] 04:52:25





             Test Item    Value        Reference Range Interpretation Comments

 

             MAGNESIUM (BEAKER) (test code = 2.0 mg/dL    1.6-2.6               

    



             627)                                                



 ID - [id number] 04:52:25





             Test Item    Value        Reference Range Interpretation Comments

 

             PHOSPHORUS (BEAKER) (test code = 3.0 mg/dL    2.3-4.7              

     



             604)                                                



 ID - MARCOBASIC METABOLIC [id number] 04:52:24





             Test Item    Value        Reference Range Interpretation Comments

 

             SODIUM (BEAKER) 140 meq/L    136-145                   



             (test code = 381)                                        

 

             POTASSIUM    4.5 meq/L    3.5-5.1                   



             (BEAKER) (test                                        



             code = 379)                                         

 

             CHLORIDE (BEAKER) 107 meq/L                        



             (test code = 382)                                        

 

             CO2 (BEAKER) 22 meq/L     22-29                     



             (test code = 355)                                        

 

             BLOOD UREA   24 mg/dL     7-21         H            



             NITROGEN (BEAKER)                                        



             (test code = 354)                                        

 

             CREATININE   0.77 mg/dL   0.57-1.25                 



             (BEAKER) (test                                        



             code = 358)                                         

 

             GLUCOSE RANDOM 94 mg/dL                         



             (BEAKER) (test                                        



             code = 652)                                         

 

             CALCIUM (BEAKER) 9.2 mg/dL    8.4-10.2                  



             (test code = 697)                                        

 

             EGFR (BEAKER) 105                                     Interpretatio

n of eGFR



             (test code = mL/min/1.73                            values Stage De

scription



             1092)        sq m                                   Result G1 Althea

l or high



                                                                 >=90 G2 Mildly 

decreased



                                                                 60-89 G3a Mildl

y to



                                                                 moderately 45-5

9 G3b



                                                                 Moderately to s

everely



                                                                 30-44 G4 Severl

y decreased



                                                                 15-29 G5 Kidney

 failure



                                                                 <15Reported eGF

R is based



                                                                 on the CKD-EPI 





                                                                 equation that d

oes not use



                                                                 a race



                                                                 coefficientEsti

mated GFR



                                                                 is not as accur

ate as



                                                                 Creatinine Porsha

ekaterina in



                                                                 predicting glom

erular



                                                                 filtration rate

. Estimated



                                                                 GFR is not appl

icable for



                                                                 dialysis patien

ts



 ID - MARCOPOCT-GLUCOSE METER2023-07-15 22:19:57





             Test Item    Value        Reference Range Interpretation Comments

 

             POC-GLUCOSE METER 85 mg/dL                         : TESTED A

T [facility] [address]



             ([employer]) (test code =                                        [place],



             1538)                                               [id number]:



                                                                 /Techni

deejay ID =



                                                                 [id number] for Lauren Smith



XR ABDOMEN/KUB 1 VIEW PORTABLE2023-07-15 21:30:05
************************************************************[facility] : 1965 Sex: 
M************************************************************EXAM/TECHNIQUE: XR 
ABDOMEN/KUB 1 VIEW PORTABLEINDICATION: cortrak placementCOMPARISON: 
2023.FINDINGS: Feeding tube terminates in the stomach. Nondilated loops of 
large smallbowel. No acute osseous process.IMPRESSION:Impression:Feeding tube 
terminates in the stomach.Electronically Signed By: Cristobal Babin07/15/2023 
21:32 CDTWorkstation Name: [id number]-GLUCOSE METER2023-07-15 18:11:10





             Test Item    Value        Reference Range Interpretation Comments

 

             POC-GLUCOSE METER 88 mg/dL                         : TESTED A

T [facility] [address]



             ([employer]) (test code =                                        [place],



             1538)                                               [id number]:



                                                                 /Techni

deejay ID =



                                                                 [id number] for Danielito Glynn



LIPID PANEL2023-07-15 13:11:54





             Test Item    Value        Reference Range Interpretation Comments

 

             TRIGLYCERIDES (BEAKER) (test code = 77 mg/dL                       

        



             540)                                                

 

             CHOLESTEROL (BEAKER) (test code = 123 mg/dL                        

      



             631)                                                

 

             HDL CHOLESTEROL (BEAKER) (test code 24 mg/dL                       

        



             = 976)                                              

 

             LDL CHOLESTEROL CALCULATED (AKER) 84 mg/dL                       

        



             (test code = 633)                                        



Triglyceride Reference Range: Low Risk &lt;150 Borderline 150-199 High Risk 200-
499 Very High Risk &gt;=500Cholesterol Reference Range: Low Risk &lt;200 
Borderline 200-239 High Risk &gt;240HDL Cholesterol Reference Range: Low Risk 
&gt;=60 High Risk &lt;40LDL Cholesterol Reference Range: Optimal &lt;100 Near 
Optimal 100-129 Borderline 130-159 High 160-189 Very High &gt;=190  ID -
 ADMINPOCT-GLUCOSE [id number] 12:44:17





             Test Item    Value        Reference Range Interpretation Comments

 

             POC-GLUCOSE METER 113 mg/dL           H            : TESTED A

T [facility] [address]



             (BEAKER) (test code =                                        [facility],



             1538)                                               [id number]:



                                                                 /Techni

deejay ID



                                                                 = [id number] for Danielito Montana



POCT-GLUCOSE METER2023-07-15 08:51:18





             Test Item    Value        Reference Range Interpretation Comments

 

             POC-GLUCOSE METER 108 mg/dL                        : TESTED A

T [facility] [address]



             (BEAKER) (test code =                                        [facility],



             1538)                                               [address]:



                                                                 /Techni

deejay ID



                                                                 = [id number] for Katiuska segal,



                                                                 Nikolasnda



POCT-GLUCOSE METER2023-07-15 06:40:10





             Test Item    Value        Reference Range Interpretation Comments

 

             POC-GLUCOSE METER 117 mg/dL           H            : TESTED A

T [facility] [address]



             (BEAKER) (test code =                                        [facility],



             1538)                                               [address]:



                                                                 /Techni

deejay ID



                                                                 = [id number] for Lauren Wade



[id number] 06:06:26





             Test Item    Value        Reference Range Interpretation Comments

 

             PHOSPHORUS (BEAKER) (test code = 3.0 mg/dL    2.3-4.7              

     



             604)                                                



BASIC METABOLIC PANEL2023-07-15 06:06:25





             Test Item    Value        Reference Range Interpretation Comments

 

             SODIUM (BEAKER) 139 meq/L    136-145                   



             (test code = 381)                                        

 

             POTASSIUM    4.4 meq/L    3.5-5.1                   



             (BEAKER) (test                                        



             code = 379)                                         

 

             CHLORIDE (BEAKER) 108 meq/L           H            



             (test code = 382)                                        

 

             CO2 (BEAKER) 24 meq/L     22-29                     



             (test code = 355)                                        

 

             BLOOD UREA   28 mg/dL     7-21         H            



             NITROGEN (BEAKER)                                        



             (test code = 354)                                        

 

             CREATININE   0.81 mg/dL   0.57-1.25                 



             (BEAKER) (test                                        



             code = 358)                                         

 

             GLUCOSE RANDOM 111 mg/dL           H            



             (BEAKER) (test                                        



             code = 652)                                         

 

             CALCIUM (BEAKER) 8.6 mg/dL    8.4-10.2                  



             (test code = 697)                                        

 

             EGFR (BEAKER) 104                                     Interpretatio

n of eGFR



             (test code = mL/min/1.73                            values Stage De

scription



             1092)        sq m                                   Result G1 Althea

l or high



                                                                 >=90 G2 Mildly 

decreased



                                                                 60-89 G3a Mildl

y to



                                                                 moderately 45-5

9 G3b



                                                                 Moderately to s

everely



                                                                 30-44 G4 Severl

y decreased



                                                                 15-29 G5 Kidney

 failure



                                                                 <15Reported eGF

R is based



                                                                 on the CKD-EPI 





                                                                 equation that d

oes not use



                                                                 a race



                                                                 coefficientEsti

mated GFR



                                                                 is not as accur

ate as



                                                                 Creatinine Porsha tobar in



                                                                 predicting glom

erular



                                                                 filtration rate

. Estimated



                                                                 GFR is not appl

icable for



                                                                 dialysis patien

ts



[id number] 06:06:25





             Test Item    Value        Reference Range Interpretation Comments

 

             MAGNESIUM (BEAKER) (test code = 1.9 mg/dL    1.6-2.6               

    



             627)                                                



CBC W/PLT COUNT &amp; AUTO DIFFERENTIAL2023-07-15 05:31:57





             Test Item    Value        Reference Range Interpretation Comments

 

             WHITE BLOOD CELL COUNT (BEAKER) 11.3 K/ L    3.5-10.5     H        

    



             (test code = 775)                                        

 

             RED BLOOD CELL COUNT (BEAKER) 4.68 M/ L    4.63-6.08               

  



             (test code = 761)                                        

 

             HEMOGLOBIN (BEAKER) (test code = 11.9 GM/DL   13.7-17.5    L       

     



             410)                                                

 

             HEMATOCRIT (BEAKER) (test code = 37.9 %       40.1-51.0    L       

     



             411)                                                

 

             MEAN CORPUSCULAR VOLUME (BEAKER) 81 fL        79-92                

     



             (test code = 753)                                        

 

             MEAN CORPUSCULAR HEMOGLOBIN 25.4 pg      25.7-32.2    L            



             (BEAKER) (test code = 751)                                        

 

             MEAN CORPUSCULAR HEMOGLOBIN CONC 31.4 GM/DL   32.3-36.5    L       

     



             (BEAKER) (test code = 752)                                        

 

             RED CELL DISTRIBUTION WIDTH 14.7 %       11.6-14.4    H            



             (BEAKER) (test code = 412)                                        

 

             PLATELET COUNT (BEAKER) (test 327 K/CU MM  150-450                 

  



             code = 756)                                         

 

             MEAN PLATELET VOLUME (BEAKER) 9.9 fL       9.4-12.4                

  



             (test code = 754)                                        

 

             NUCLEATED RED BLOOD CELLS 0 /100 WBC   0-0                       



             (BEAKER) (test code = 413)                                        

 

             NEUTROPHILS RELATIVE PERCENT 77 %                                  

 



             (BEAKER) (test code = 429)                                        

 

             LYMPHOCYTES RELATIVE PERCENT 11 %                                  

 



             (BEAKER) (test code = 430)                                        

 

             MONOCYTES RELATIVE PERCENT 8 %                                    



             (BEAKER) (test code = 431)                                        

 

             EOSINOPHILS RELATIVE PERCENT 2 %                                   

 



             (BEAKER) (test code = 432)                                        

 

             BASOPHILS RELATIVE PERCENT 0 %                                    



             (BEAKER) (test code = 437)                                        

 

             NEUTROPHILS ABSOLUTE COUNT 8.67 K/ L    1.78-5.38    H            



             (BEAKER) (test code = 670)                                        

 

             LYMPHOCYTES ABSOLUTE COUNT 1.26 K/ L    1.32-3.57    L            



             (BEAKER) (test code = 414)                                        

 

             MONOCYTES ABSOLUTE COUNT (BEAKER) 0.93 K/ L    0.30-0.82    H      

      



             (test code = 415)                                        

 

             EOSINOPHILS ABSOLUTE COUNT 0.27 K/ L    0.04-0.54                 



             (BEAKER) (test code = 416)                                        

 

             BASOPHILS ABSOLUTE COUNT (BEAKER) 0.03 K/ L    0.01-0.08           

      



             (test code = 417)                                        

 

             IMMATURE GRANULOCYTES-RELATIVE 1.20 %       0.00-1.00    H         

   



             PERCENT (BEAKER) (test code =                                      

  



             2801)                                               



POCT-GLUCOSE [id number] 23:34:04





             Test Item    Value        Reference Range Interpretation Comments

 

             POC-GLUCOSE METER 109 mg/dL                        : TESTED LANDY GEE [facility] [address]



             (BEAKER) (test code =                                        [place],



             [address])                                               [address]:



                                                                 /Techni

deejay ID



                                                                 = [id number] for Lauren Wade



POCT-GLUCOSE [id number] 18:31:14





             Test Item    Value        Reference Range Interpretation Comments

 

             POC-GLUCOSE METER 112 mg/dL           H            : TESTED A

T [facility] [address]



             (BEAKER) (test code =                                        [place],



             [address]38)                                               [address]:



                                                                 /Techni

deejay ID



                                                                 = [id number] for AL

ONSO



                                                                 ANDREA, FANTASMA



SPUTUM CULTURE + GRAM [id number] 11:17:03





             Test Item    Value        Reference Range Interpretation Comments

 

             CULTURE (BEAKER) 4+ Normal respiratory                           



             (test code = 1095) clemente present                           

 

             GRAM STAIN RESULT 1+ WBCs                                



             (BEAKER) (test code =                                        



             1123)                                               

 

             GRAM STAIN RESULT 1+ budding yeast                           



             (BEAKER) (test code =                                        



             [address])                                              

 

             GRAM STAIN RESULT <1+ gram variable rods                           



             (BEAKER) (test code =                                        



             630[address])                                              



[id number] 07:15:02





             Test Item    Value        Reference Range Interpretation Comments

 

             MAGNESIUM (BEAKER) (test code = 1.8 mg/dL    1.6-2.6               

    



             627)                                                



 ID - [id number] 07:15:02





             Test Item    Value        Reference Range Interpretation Comments

 

             PHOSPHORUS (BEAKER) (test code = 2.6 mg/dL    2.3-4.7              

     



             604)                                                



 ID - MMBASIC METABOLIC [id number] 07:15:01





             Test Item    Value        Reference Range Interpretation Comments

 

             SODIUM (BEAKER) 141 meq/L    136-145                   



             (test code = 381)                                        

 

             POTASSIUM    4.3 meq/L    3.5-5.1                   



             (BEAKER) (test                                        



             code = 379)                                         

 

             CHLORIDE (BEAKER) 112 meq/L           H            



             (test code = 382)                                        

 

             CO2 (BEAKER) 22 meq/L     22-29                     



             (test code = 355)                                        

 

             BLOOD UREA   26 mg/dL     7-21         H            



             NITROGEN (BEAKER)                                        



             (test code = 354)                                        

 

             CREATININE   0.76 mg/dL   0.57-1.25                 



             (BEAKER) (test                                        



             code = 358)                                         

 

             GLUCOSE RANDOM 104 mg/dL                        



             (BEAKER) (test                                        



             code = 652)                                         

 

             CALCIUM (BEAKER) 8.2 mg/dL    8.4-10.2     L            



             (test code = 697)                                        

 

             EGFR (BEAKER) 106                                     Interpretatio

n of eGFR



             (test code = mL/min/1.73                            values Stage De

scription



             1092)        sq m                                   Result G1 Althea

l or high



                                                                 >=90 G2 Mildly 

decreased



                                                                 60-89 G3a Mildl

y to



                                                                 moderately 45-5

9 G3b



                                                                 Moderately to s

everely



                                                                 30-44 G4 Severl

y decreased



                                                                 15-29 G5 Kidney

 failure



                                                                 <15Reported eGF

R is based



                                                                 on the CKD-EPI 





                                                                 equation that d

oes not use



                                                                 a race



                                                                 coefficientEsti

mated GFR



                                                                 is not as accur

ate as



                                                                 Creatinine Porsha

ekaterina in



                                                                 predicting glom

erular



                                                                 filtration rate

. Estimated



                                                                 GFR is not appl

icable for



                                                                 dialysis patien

ts



 ID - MMCBC W/PLT COUNT &amp; AUTO [id number] 06:11:39





             Test Item    Value        Reference Range Interpretation Comments

 

             WHITE BLOOD CELL COUNT (BEAKER) 13.3 K/ L    3.5-10.5     H        

    



             (test code = 775)                                        

 

             RED BLOOD CELL COUNT (BEAKER) 4.46 M/ L    4.63-6.08    L          

  



             (test code = 761)                                        

 

             HEMOGLOBIN (BEAKER) (test code = 11.4 GM/DL   13.7-17.5    L       

     



             410)                                                

 

             HEMATOCRIT (BEAKER) (test code = 36.6 %       40.1-51.0    L       

     



             411)                                                

 

             MEAN CORPUSCULAR VOLUME (BEAKER) 82 fL        79-92                

     



             (test code = 753)                                        

 

             MEAN CORPUSCULAR HEMOGLOBIN 25.6 pg      25.7-32.2    L            



             (BEAKER) (test code = 751)                                        

 

             MEAN CORPUSCULAR HEMOGLOBIN CONC 31.1 GM/DL   32.3-36.5    L       

     



             (BEAKER) (test code = 752)                                        

 

             RED CELL DISTRIBUTION WIDTH 14.8 %       11.6-14.4    H            



             (BEAKER) (test code = 412)                                        

 

             PLATELET COUNT (BEAKER) (test 277 K/CU MM  150-450                 

  



             code = 756)                                         

 

             MEAN PLATELET VOLUME (BEAKER) 10.1 fL      9.4-12.4                

  



             (test code = 754)                                        

 

             NUCLEATED RED BLOOD CELLS 0 /100 WBC   0-0                       



             (BEAKER) (test code = 413)                                        

 

             NEUTROPHILS RELATIVE PERCENT 78 %                                  

 



             (BEAKER) (test code = 429)                                        

 

             LYMPHOCYTES RELATIVE PERCENT 10 %                                  

 



             (BEAKER) (test code = 430)                                        

 

             MONOCYTES RELATIVE PERCENT 8 %                                    



             (BEAKER) (test code = 431)                                        

 

             EOSINOPHILS RELATIVE PERCENT 3 %                                   

 



             (BEAKER) (test code = 432)                                        

 

             BASOPHILS RELATIVE PERCENT 0 %                                    



             (BEAKER) (test code = 437)                                        

 

             NEUTROPHILS ABSOLUTE COUNT 10.42 K/ L   1.78-5.38    H            



             (BEAKER) (test code = 670)                                        

 

             LYMPHOCYTES ABSOLUTE COUNT 1.34 K/ L    1.32-3.57                 



             (BEAKER) (test code = 414)                                        

 

             MONOCYTES ABSOLUTE COUNT (BEAKER) 1.02 K/ L    0.30-0.82    H      

      



             (test code = 415)                                        

 

             EOSINOPHILS ABSOLUTE COUNT 0.34 K/ L    0.04-0.54                 



             (BEAKER) (test code = 416)                                        

 

             BASOPHILS ABSOLUTE COUNT (BEAKER) 0.04 K/ L    0.01-0.08           

      



             (test code = 417)                                        

 

             IMMATURE GRANULOCYTES-RELATIVE 1.30 %       0.00-1.00    H         

   



             PERCENT (AKER) (test code =                                      

  



             2801)                                               



POCT-GLUCOSE [id number] 06:09:35





             Test Item    Value        Reference Range Interpretation Comments

 

             POC-GLUCOSE METER 111 mg/dL           H            : TESTED A

T [facility] [address]



             ([place]) (test code =                                        [facility],



             [address])                                               [address]:



                                                                 /Techni

deejay ID



                                                                 = [id number] for Debora Lujan



POCT-GLUCOSE [id number] 01:00:38





             Test Item    Value        Reference Range Interpretation Comments

 

             POC-GLUCOSE METER 105 mg/dL                        : TESTED A

T [facility] [address]



             ([facility]) (test code                                        [facility],



             = 1538)                                             [address]:



                                                                 /Techni

deejay ID =



                                                                 [id number] for Tanja Gao



POCT-GLUCOSE [id number] 00:22:13





             Test Item    Value        Reference Range Interpretation Comments

 

             POC-GLUCOSE METER 101 mg/dL                        : TESTED A

T [facility] [address]



             ([place]) (test code =                                        [facility],



             [address]38)                                               [address]:



                                                                 /Techni

deejay ID



                                                                 = [id number] for JULIO SERRANO



VANCOMYCIN LEVEL, [id number] 18:01:21





             Test Item    Value        Reference Range Interpretation Comments

 

             VANCOMYCIN TROUGH ([facility]) (test 8.2 ug/mL    10.0-20.0    L       

     



             code = 522)                                         



 ID - BSPOCT-GLUCOSE [id number] 16:47:25





             Test Item    Value        Reference Range Interpretation Comments

 

             POC-GLUCOSE METER 106 mg/dL                        : TESTED A

T [facility] [address]



             ([place]) (test code =                                        [facility],



             [address]38)                                               [address]:



                                                                 /Techni

deejay ID



                                                                 = [id number] for Susan Bills



POCT-GLUCOSE [id number] 11:49:02





             Test Item    Value        Reference Range Interpretation Comments

 

             POC-GLUCOSE METER 121 mg/dL           H            : TESTED A

T [facility] [address]



             (BEAKER) (test code =                                        [facility],



             [address]38)                                               [address]:



                                                                 /Techni

deejay ID



                                                                 = [id number] for Susan Bills



POCT-GLUCOSE [id number] 06:31:39





             Test Item    Value        Reference Range Interpretation Comments

 

             POC-GLUCOSE METER 109 mg/dL                        : TESTED A

T [facility] [address]



             (BEAKER) (test code =                                        [facility],



             [address]38)                                               [address]:



                                                                 /Techni

deejay ID



                                                                 = [id number] for RAYMA KAPLAN



CBC W/PLT COUNT &amp; AUTO [id number] 04:20:15





             Test Item    Value        Reference Range Interpretation Comments

 

             WHITE BLOOD CELL COUNT (BEAKER) 15.2 K/ L    3.5-10.5     H        

    



             (test code = 775)                                        

 

             RED BLOOD CELL COUNT (BEAKER) 4.62 M/ L    4.63-6.08    L          

  



             (test code = 761)                                        

 

             HEMOGLOBIN (BEAKER) (test code = 11.9 GM/DL   13.7-17.5    L       

     



             410)                                                

 

             HEMATOCRIT (BEAKER) (test code = 37.6 %       40.1-51.0    L       

     



             411)                                                

 

             MEAN CORPUSCULAR VOLUME (BEAKER) 81 fL        79-92                

     



             (test code = 753)                                        

 

             MEAN CORPUSCULAR HEMOGLOBIN 25.8 pg      25.7-32.2                 



             (BEAKER) (test code = 751)                                        

 

             MEAN CORPUSCULAR HEMOGLOBIN CONC 31.6 GM/DL   32.3-36.5    L       

     



             (BEAKER) (test code = 752)                                        

 

             RED CELL DISTRIBUTION WIDTH 14.9 %       11.6-14.4    H            



             (BEAKER) (test code = 412)                                        

 

             PLATELET COUNT (BEAKER) (test 253 K/CU MM  150-450                 

  



             code = 756)                                         

 

             MEAN PLATELET VOLUME (BEAKER) 10.1 fL      9.4-12.4                

  



             (test code = 754)                                        

 

             NUCLEATED RED BLOOD CELLS 0 /100 WBC   0-0                       



             (BEAKER) (test code = 413)                                        

 

             NEUTROPHILS RELATIVE PERCENT 80 %                                  

 



             (BEAKER) (test code = 429)                                        

 

             LYMPHOCYTES RELATIVE PERCENT 9 %                                   

 



             (BEAKER) (test code = 430)                                        

 

             MONOCYTES RELATIVE PERCENT 7 %                                    



             (BEAKER) (test code = 431)                                        

 

             EOSINOPHILS RELATIVE PERCENT 2 %                                   

 



             (BEAKER) (test code = 432)                                        

 

             BASOPHILS RELATIVE PERCENT 0 %                                    



             (BEAKER) (test code = 437)                                        

 

             NEUTROPHILS ABSOLUTE COUNT 12.16 K/ L   1.78-5.38    H            



             (BEAKER) (test code = 670)                                        

 

             LYMPHOCYTES ABSOLUTE COUNT 1.43 K/ L    1.32-3.57                 



             (BEAKER) (test code = 414)                                        

 

             MONOCYTES ABSOLUTE COUNT (BEAKER) 1.07 K/ L    0.30-0.82    H      

      



             (test code = 415)                                        

 

             EOSINOPHILS ABSOLUTE COUNT 0.31 K/ L    0.04-0.54                 



             (BEAKER) (test code = 416)                                        

 

             BASOPHILS ABSOLUTE COUNT (BEAKER) 0.05 K/ L    0.01-0.08           

      



             (test code = 417)                                        

 

             IMMATURE GRANULOCYTES-RELATIVE 1.10 %       0.00-1.00    H         

   



             PERCENT (BEAKER) (test code =                                      

  



             2801)                                               



BASIC METABOLIC [id number] 04:10:46





             Test Item    Value        Reference Range Interpretation Comments

 

             SODIUM (BEAKER) 142 meq/L    136-145                   



             (test code = 381)                                        

 

             POTASSIUM    4.1 meq/L    3.5-5.1                   



             (BEAKER) (test                                        



             code = 379)                                         

 

             CHLORIDE (BEAKER) 111 meq/L           H            



             (test code = 382)                                        

 

             CO2 (BEAKER) 25 meq/L     22-29                     



             (test code = 355)                                        

 

             BLOOD UREA   31 mg/dL     7-21         H            



             NITROGEN (BEAKER)                                        



             (test code = 354)                                        

 

             CREATININE   0.81 mg/dL   0.57-1.25                 



             (BEAKER) (test                                        



             code = 358)                                         

 

             GLUCOSE RANDOM 110 mg/dL           H            



             (BEAKER) (test                                        



             code = 652)                                         

 

             CALCIUM (BEAKER) 8.2 mg/dL    8.4-10.2     L            



             (test code = 697)                                        

 

             EGFR (BEAKER) 104                                     Interpretatio

n of eGFR



             (test code = mL/min/1.73                            values Stage De

scription



             1092)        sq m                                   Result G1 Althea

l or high



                                                                 >=90 G2 Mildly 

decreased



                                                                 60-89 G3a Mildl

y to



                                                                 moderately 45-5

9 G3b



                                                                 Moderately to s

everely



                                                                 30-44 G4 Severl

y decreased



                                                                 15-29 G5 Kidney

 failure



                                                                 <15Reported eGF

R is based



                                                                 on the CKD-EPI 





                                                                 equation that d

oes not use



                                                                 a race



                                                                 coefficientEsti

mated GFR



                                                                 is not as accur

ate as



                                                                 Creatinine Porsha

ekaterina in



                                                                 predicting glom

erular



                                                                 filtration rate

. Estimated



                                                                 GFR is not appl

icable for



                                                                 dialysis patien

ts



 ID - [id number] 04:10:46





             Test Item    Value        Reference Range Interpretation Comments

 

             MAGNESIUM (BEAKER) (test code = 1.9 mg/dL    1.6-2.6               

    



             627)                                                



 ID - [id number] 04:10:46





             Test Item    Value        Reference Range Interpretation Comments

 

             PHOSPHORUS (BEAKER) (test code = 2.6 mg/dL    2.3-4.7              

     



             604)                                                



 ID - ADMINPOCT-GLUCOSE [id number] 00:36:00





             Test Item    Value        Reference Range Interpretation Comments

 

             POC-GLUCOSE METER 115 mg/dL           H            : TESTED A

T [facility] [address]



             (BEAKER) (test code =                                        [facility],



             1538)                                               [address]:



                                                                 /Techni

deejay ID



                                                                 = [id number] for RAMYA KAPLAN



POCT-GLUCOSE [id number] 17:40:35





             Test Item    Value        Reference Range Interpretation Comments

 

             POC-GLUCOSE METER 107 mg/dL                        : TESTED A

T [facility] [address]



             (BEAKER) (test code =                                        [facility],



             1538)                                               [id number]:



                                                                 /Techni

deejay ID



                                                                 = [id number] for Susan Bills



XR ABDOMEN/KUB 1 VIEW [id number] 13:44:03
************************************************************[facility] 1965 Sex: 
M************************************************************EXAMINATION: XR 
ABDOMEN/KUB 1 VIEW PORTABLE INDICATION: Diarreha, concern for colitisCOMPARISON:
 2023 FINDINGS/IMPRESSION:No bowel obstruction or free air. No 
radiographically apparent urinarycalculi. No acute osseous injury.The tip of the
 Dobbhoff feeding tube overlies the mid duodenum.Electronically Signed By: Jennifer Drew2023 13:46 CDTWorkstation Name: [id number] METER
2023 11:58:32





             Test Item    Value        Reference Range Interpretation Comments

 

             POC-GLUCOSE METER 124 mg/dL           H            : TESTED A

T [facility] [address]



             (BEAKER) (test code =                                        [place],



             1538)                                               [address]:



                                                                 /Techni

deejay ID



                                                                 = [id number] for BARBARA NEWTON



LACTIC ACID, [id number] 08:57:10





             Test Item    Value        Reference Range Interpretation Comments

 

             LACTATE BLOOD VENOUS (2) (BEAKER) 0.78 mmol/L  0.50-2.00           

      



             (test code = 2872)                                        



(CELLAVISION MANUAL DIFF)2023 07:51:51





             Test Item    Value        Reference Range Interpretation Comments

 

             NEUTROPHILS - REL 93 %                                   



             (CELLAVISION)(BEAKER) (test code                                   

     



             = 2816)                                             

 

             LYMPHOCYTES - REL 2 %                                    



             (CELLAVISION)(BEAKER) (test code                                   

     



             = 2817)                                             

 

             MONOCYTES - REL 3 %                                    



             (CELLAVISION)(BEAKER) (test code                                   

     



             = 2818)                                             

 

             EOSINOPHILS - REL 2 %                                    



             (CELLAVISION)(BEAKER) (test code                                   

     



             = 2819)                                             

 

             NEUTROPHILS - ABS 21.76 K/ul   1.78-5.38    H            



             (CELLAVISION)(BEAKER) (test code                                   

     



             = 2830)                                             

 

             LYMPHOCYTES - ABS 0.47 K/ul    1.32-3.57    L            



             (CELLAVISION)(BEAKER) (test code                                   

     



             = 2831)                                             

 

             MONOCYTES - ABS 0.70 K/uL    0.30-0.82                 



             (CELLAVISION)(BEAKER) (test code                                   

     



             = 2832)                                             

 

             EOSINOPHILS - ABS 0.47 K/uL    0.04-0.54                 



             (CELLAVISION)(BEAKER) (test code                                   

     



             = 2834)                                             

 

             TOTAL COUNTED (BEAKER) (test code 100                              

      



             = 1351)                                             

 

             WBC MORPHOLOGY (BEAKER) (test Normal                               

  



             code = 487)                                         

 

             PLT MORPHOLOGY (BEAKER) (test Normal                               

  



             code = 486)                                         

 

             POLYCHROMATOPHILLIC RBCS(BEAKER) 2+ moderate                       

     



             (test code = 478)                                        

 

             ANISOCYTOSIS (BEAKER) (test code 1+ few                            

     



             = 961)                                              

 

             MICROCYTES (BEAKER) (test code = 1+ few                            

     



             965)                                                

 

             POIKILOCYTES (BEAKER) (test code 1+ few                            

     



             = 966)                                              

 

             OVALOCYTES (BEAKER) (test code = 1+ few                            

     



             477)                                                

 

             ARTIFACT (CELLAVISION)(BEAKER) Present                             

   



             (test code = 3432)                                        

 

             PLATELET CONCENTRATION Adequate                               



             (CELLAVISION)(BEAKER) (test code                                   

     



             = 3438)                                             



 ID - Laurie Bashir comments: Slide comments:CBC W/PLT COUNT 
&amp; AUTO [id number] 07:51:37





             Test Item    Value        Reference Range Interpretation Comments

 

             WHITE BLOOD CELL COUNT (BEAKER) 23.4 K/ L    3.5-10.5     H        

    



             (test code = 775)                                        

 

             RED BLOOD CELL COUNT (BEAKER) 5.22 M/ L    4.63-6.08               

  



             (test code = 761)                                        

 

             HEMOGLOBIN (BEAKER) (test code = 13.4 GM/DL   13.7-17.5    L       

     



             410)                                                

 

             HEMATOCRIT (BEAKER) (test code = 42.0 %       40.1-51.0            

     



             411)                                                

 

             MEAN CORPUSCULAR VOLUME (BEAKER) 81 fL        79-92                

     



             (test code = 753)                                        

 

             MEAN CORPUSCULAR HEMOGLOBIN 25.7 pg      25.7-32.2                 



             (BEAKER) (test code = 751)                                        

 

             MEAN CORPUSCULAR HEMOGLOBIN CONC 31.9 GM/DL   32.3-36.5    L       

     



             (BEAKER) (test code = 752)                                        

 

             RED CELL DISTRIBUTION WIDTH 14.6 %       11.6-14.4    H            



             (BEAKER) (test code = 412)                                        

 

             PLATELET COUNT (BEAKER) (test 262 K/CU MM  150-450                 

  



             code = 756)                                         

 

             MEAN PLATELET VOLUME (BEAKER) 9.8 fL       9.4-12.4                

  



             (test code = 754)                                        

 

             NUCLEATED RED BLOOD CELLS 0 /100 WBC   0-0                       



             (BEAKER) (test code = 413)                                        



URINALYSIS [id number] 06:44:11





             Test Item    Value        Reference Range Interpretation Comments

 

             RBC UA (BEAKER) (test code = 519) 40 /HPF                          

      

 

             WBC UA (BEAKER) (test code = 520) 3 /HPF                           

      

 

             MUCUS (BEAKER) (test code = 1574) Rare                             

      

 

             SQUAMOUS EPITHELIAL (BEAKER) (test 4 /HPF                          

       



             code = 516)                                         



 ID - techURINALYSIS WITH MICROSCOPIC IF [id number] 06:26:58





             Test Item    Value        Reference Range Interpretation Comments

 

             COLOR (BEAKER) (test code = 470) Yellow                            

     

 

             CLARITY (BEAKER) (test code = 469) Clear                           

       

 

             SPECIFIC GRAVITY UA (BEAKER) (test 1.026        1.001-1.035        

       



             code = 468)                                         

 

             PH UA (BEAKER) (test code = 467) 6.0          5.0-8.0              

     

 

             PROTEIN UA (BEAKER) (test code = 30 mg/dL     Negative     A       

     



             464)                                                

 

             GLUCOSE UA (BEAKER) (test code = Negative     Negative             

     



             365)                                                

 

             KETONES UA (BEAKER) (test code = 10 mg/dL     Negative     A       

     



             371)                                                

 

             BILIRUBIN UA (BEAKER) (test code = Negative     Negative           

       



             462)                                                

 

             BLOOD UA (BEAKER) (test code = 461) Small        Negative     A    

        

 

             NITRITE UA (BEAKER) (test code = Negative     Negative             

     



             465)                                                

 

             LEUKOCYTE ESTERASE UA (BEAKER) (test Negative     Negative         

         



             code = 466)                                         

 

             UROBILINOGEN UA (BEAKER) (test code 4            0.2-1.0      H    

        



             = 463)                                              

 

             SOURCE(BEAKER) (test code = 2795)                                  

      



 ID - [auto]XR CHEST 1 VIEW PORTABLE / [id number] 05:32:07
********************************[facility] 1965 Sex: 
M************************************************************CLINICAL IN
DICATION: c/f pneumoniaComparison: 2023The cardiomediastinal contours are 
stable.The lung volumes remain low. Central pulmonary vascular prominence 
andbilateral parenchymal opacities are unchanged.There is no pneumothorax.A 
feeding tube remains in place.Electronically Signed By: Sunny Cárdenas2023 
05:34 CDTWorkstation Name: [id number] 04:01:45





             Test Item    Value        Reference Range Interpretation Comments

 

             PROCALCITONIN (BEAKER) (test code 0.26 ng/mL   <0.05        H      

      



             = 3036)                                             



SEPSIS RISK (ng/mL)Low: 0.05-0.50Intermediate: 0.51-2.00High: &gt;=2.01BASIC 
METABOLIC [id number] 03:34:39





             Test Item    Value        Reference Range Interpretation Comments

 

             SODIUM (BEAKER) 143 meq/L    136-145                   



             (test code = 381)                                        

 

             POTASSIUM    4.2 meq/L    3.5-5.1                   



             (BEAKER) (test                                        



             code = 379)                                         

 

             CHLORIDE (BEAKER) 110 meq/L           H            



             (test code = 382)                                        

 

             CO2 (BEAKER) 25 meq/L     22-29                     



             (test code = 355)                                        

 

             BLOOD UREA   35 mg/dL     7-21         H            



             NITROGEN (BEAKER)                                        



             (test code = 354)                                        

 

             CREATININE   0.91 mg/dL   0.57-1.25                 



             (BEAKER) (test                                        



             code = 358)                                         

 

             GLUCOSE RANDOM 142 mg/dL           H            



             (BEAKER) (test                                        



             code = 652)                                         

 

             CALCIUM (BEAKER) 8.2 mg/dL    8.4-10.2     L            



             (test code = 697)                                        

 

             EGFR (BEAKER) 100                                     Interpretatio

n of eGFR



             (test code = mL/min/1.73                            values Stage De

scription



             1092)        sq m                                   Result G1 Althea

l or high



                                                                 >=90 G2 Mildly 

decreased



                                                                 60-89 G3a Mildl

y to



                                                                 moderately 45-5

9 G3b



                                                                 Moderately to s

everely



                                                                 30-44 G4 Severl

y decreased



                                                                 15-29 G5 Kidney

 failure



                                                                 <15Reported eGF

R is based



                                                                 on the CKD-EPI 





                                                                 equation that d

oes not use



                                                                 a race



                                                                 coefficientEsti

mated GFR



                                                                 is not as accur

ate as



                                                                 Creatinine Porsha

ekaterina in



                                                                 predicting glom

erular



                                                                 filtration rate

. Estimated



                                                                 GFR is not appl

icable for



                                                                 dialysis patien

ts



 ID - CHIKIS [id number] 03:34:39





             Test Item    Value        Reference Range Interpretation Comments

 

             MAGNESIUM (BEAKER) (test code = 2.0 mg/dL    1.6-2.6               

    



             627)                                                



 ID - CHIKIS [id number] 03:34:39





             Test Item    Value        Reference Range Interpretation Comments

 

             PHOSPHORUS (BEAKER) (test code = 2.4 mg/dL    2.3-4.7              

     



             604)                                                



 ID - CHIKIS WPOCT-GLUCOSE [id number] 03:21:18





             Test Item    Value        Reference Range Interpretation Comments

 

             POC-GLUCOSE METER 140 mg/dL           H            : TESTED A

T [facility] [address]



             (BEAKER) (test code =                                        [facility],



             1538)                                               [address]:



                                                                 /Techni

deejay ID



                                                                 = [id number] for YE

 (V),



                                                                 [place]



POCT-GLUCOSE [id number] 21:27:28





             Test Item    Value        Reference Range Interpretation Comments

 

             POC-GLUCOSE METER 129 mg/dL           H            : TESTED A

T [facility] [address]



             (BEAKER) (test code =                                        [facility],



             1538)                                               [address]:



                                                                 /Techni

deejay ID



                                                                 = [id number] for YE

 (V),



                                                                 WEIPING



POCT-GLUCOSE [id number] 12:21:08





             Test Item    Value        Reference Range Interpretation Comments

 

             POC-GLUCOSE METER 142 mg/dL           H            : TESTED A

T [facility] [address]



             (BEAKER) (test code =                                        [facility],



             1538)                                               [address]:



                                                                 /Techni

deejay ID



                                                                 = [id number] for Susan Bills



XR CHEST 1 VIEW PORTABLE / [id number] 08:50:01
************************************************************[facility] : 1965 Sex: 
M************************************************************XR CHEST 1VIEW 
PORTABLE / BEDSIDEINDICATION: IntubationCOMPARISON: Prior day's examFINDINGS: 
Portable frontal view of the chest. IMPRESSION:Support Lines: Endotracheal tube 
removed. Stable enteric tube.Lungs andpleura: Low lung volumes. Increased right 
lung baseatelectasis. Stable left lower lobe streaky opacities. No 
pneumothorax.Heart and mediastinum: Stable contours. Additional findings: 
None.Electronically Signed By: Ritchie Gómez2023 08:52 CDTWorkstation 
Name: [id number] W/PLT COUNT &amp; AUTO [id number] 08:44:26





             Test Item    Value        Reference Range Interpretation Comments

 

             WHITE BLOOD CELL COUNT 13.6 K/ L    3.5-10.5     H            



             (BEAKER) (test code =                                        



             775)                                                

 

             RED BLOOD CELL COUNT 5.82 M/ L    4.63-6.08                 



             (BEAKER) (test code =                                        



             761)                                                

 

             HEMOGLOBIN (BEAKER) 15.0 GM/DL   13.7-17.5                 DISCORDA

NT HGB



             (test code = 410)                                        RESULT COM

PARED TO



                                                                 PREVIOUS RESULT

;



                                                                 CLINICAL CORREL

ATION



                                                                 REQUIRED.

 

             HEMATOCRIT (BEAKER) 46.3 %       40.1-51.0                 



             (test code = 411)                                        

 

             MEAN CORPUSCULAR 80 fL        79-92                     



             VOLUME (BEAKER) (test                                        



             code = 753)                                         

 

             MEAN CORPUSCULAR 25.8 pg      25.7-32.2                 



             HEMOGLOBIN (BEAKER)                                        



             (test code = 751)                                        

 

             MEAN CORPUSCULAR 32.4 GM/DL   32.3-36.5                 



             HEMOGLOBIN CONC                                        



             (BEAKER) (test code =                                        



             752)                                                

 

             RED CELL DISTRIBUTION 14.7 %       11.6-14.4    H            



             WIDTH (BEAKER) (test                                        



             code = 412)                                         

 

             PLATELET COUNT 256 K/CU MM  150-450                   



             (BEAKER) (test code =                                        



             756)                                                

 

             MEAN PLATELET VOLUME 10.0 fL      9.4-12.4                  



             (BEAKER) (test code =                                        



             754)                                                

 

             NUCLEATED RED BLOOD 0 /100 WBC   0-0                       



             CELLS (BEAKER) (test                                        



             code = 413)                                         

 

             NEUTROPHILS RELATIVE 82 %                                   



             PERCENT (BEAKER) (test                                        



             code = 429)                                         

 

             LYMPHOCYTES RELATIVE 7 %                                    



             PERCENT (BEAKER) (test                                        



             code = 430)                                         

 

             MONOCYTES RELATIVE 8 %                                    



             PERCENT (BEAKER) (test                                        



             code = 431)                                         

 

             EOSINOPHILS RELATIVE 2 %                                    



             PERCENT (BEAKER) (test                                        



             code = 432)                                         

 

             BASOPHILS RELATIVE 0 %                                    



             PERCENT (BEAKER) (test                                        



             code = 437)                                         

 

             NEUTROPHILS ABSOLUTE 11.18 K/ L   1.78-5.38    H            



             COUNT (BEAKER) (test                                        



             code = 670)                                         

 

             LYMPHOCYTES ABSOLUTE 0.93 K/ L    1.32-3.57    L            



             COUNT (BEAKER) (test                                        



             code = 414)                                         

 

             MONOCYTES ABSOLUTE 1.07 K/ L    0.30-0.82    H            



             COUNT (BEAKER) (test                                        



             code = 415)                                         

 

             EOSINOPHILS ABSOLUTE 0.27 K/ L    0.04-0.54                 



             COUNT (BEAKER) (test                                        



             code = 416)                                         

 

             BASOPHILS ABSOLUTE 0.05 K/ L    0.01-0.08                 



             COUNT (BEAKER) (test                                        



             code = 417)                                         

 

             IMMATURE     0.50 %       0.00-1.00                 



             GRANULOCYTES-RELATIVE                                        



             PERCENT (BEAKER) (test                                        



             code = 2801)                                        



POCT-GLUCOSE [id number] 06:38:49





             Test Item    Value        Reference Range Interpretation Comments

 

             POC-GLUCOSE METER 91 mg/dL                         : TESTED A

[facility] [address]



             (BEAKER) (test code =                                        [place],



             [address]38)                                               [id number]:



                                                                 /Techni

deejay ID =



                                                                 [id number] for Kendra Tate



[id number] 05:32:17





             Test Item    Value        Reference Range Interpretation Comments

 

             PHOSPHORUS (BEAKER) 2.6 mg/dL    2.3-4.7                   Specimen

 slightly



             (test code = 604)                                        hemolyzed



 ID - CHIKIS WBASIC METABOLIC [id number] 05:32:17





             Test Item    Value        Reference Range Interpretation Comments

 

             SODIUM (BEAKER) 141 meq/L    136-145                   



             (test code = 381)                                        

 

             POTASSIUM    4.4 meq/L    3.5-5.1                   Specimen slight

ly



             (BEAKER) (test                                        hemolyzed



             code = 379)                                         

 

             CHLORIDE (BEAKER) 109 meq/L           H            



             (test code = 382)                                        

 

             CO2 (BEAKER) 21 meq/L     22-29        L            



             (test code = 355)                                        

 

             BLOOD UREA   28 mg/dL     7-21         H            



             NITROGEN (BEAKER)                                        



             (test code = 354)                                        

 

             CREATININE   0.81 mg/dL   0.57-1.25                 Specimen slight

ly



             (BEAKER) (test                                        hemolyzed



             code = 358)                                         

 

             GLUCOSE RANDOM 103 mg/dL                        



             (BEAKER) (test                                        



             code = 652)                                         

 

             CALCIUM (BEAKER) 8.8 mg/dL    8.4-10.2                  



             (test code = 697)                                        

 

             EGFR (BEAKER) 104                                     Interpretatio

n of eGFR



             (test code = mL/min/1.73                            values Stage De

scription



             1092)        sq m                                   Result G1 Althea

l or high



                                                                 >=90 G2 Mildly 

decreased



                                                                 60-89 G3a Mildl

y to



                                                                 moderately 45-5

9 G3b



                                                                 Moderately to s

everely



                                                                 30-44 G4 Severl

y decreased



                                                                 15-29 G5 Kidney

 failure



                                                                 <15Reported eGF

R is based



                                                                 on the CKD-EPI 





                                                                 equation that d

oes not use



                                                                 a race



                                                                 coefficientEsti

mated GFR



                                                                 is not as accur

ate as



                                                                 Creatinine Porsha

ekaterina in



                                                                 predicting glom

erular



                                                                 filtration rate

. Estimated



                                                                 GFR is not appl

icable for



                                                                 dialysis patien

ts



 ID Yo DIOP [id number] 05:32:16





             Test Item    Value        Reference Range Interpretation Comments

 

             MAGNESIUM (BEAKER) 2.1 mg/dL    1.6-2.6                   Specimen 

slightly



             (test code = 627)                                        hemolyzed



 ID Yo DIOP WPOCT-GLUCOSE [id number] 23:53:55





             Test Item    Value        Reference Range Interpretation Comments

 

             POC-GLUCOSE METER 87 mg/dL                         : TESTED A

T [facility] [address]



             (BEAKER) (test code =                                        [place] [facility],



             1538)                                               [id number]:



                                                                 /Techni

deejay ID =



                                                                 [id number] for Kendra Tate



BLOOD [id number] 15:01:19





             Test Item    Value        Reference Range Interpretation Comments

 

             CULTURE (BEAKER) (test No growth in 5 days                         

  



             code = 1095)                                        



The specimen volume collected for this blood culture was below the optimum (10 
mL per bottle or 20 mL total). Use of lower volumes may adversely affect 
recovery and/or detection times of some organisms.BLOOD CULTURE2023-07-10 
15:01:19





             Test Item    Value        Reference Range Interpretation Comments

 

             CULTURE (BEAKER) (test No growth in 5 days                         

  



             code = 1095)                                        



The specimen volume collected for this blood culture was below the optimum (10 
mL per bottle or 20 mL total). Use of lower volumes may adversely affect 
recovery and/or detection times of some organisms.POCT-GLUCOSE METER2023-07-10 
14:36:04





             Test Item    Value        Reference Range Interpretation Comments

 

             POC-GLUCOSE METER 115 mg/dL           H            : TESTED A

T [facility] [address]



             (BEAKER) (test code                                        [facility]



             = [facility])                                             [id number]:



                                                                 /Techni

deejay ID =



                                                                 [id number] for hWit reynaga



                                                                 ([facility])Treva



BASIC METABOLIC [id number] 14:32:42





             Test Item    Value        Reference Range Interpretation Comments

 

             SODIUM (BEAKER) 142 meq/L    136-145                   



             (test code = 381)                                        

 

             POTASSIUM    4.4 meq/L    3.5-5.1                   



             (BEAKER) (test                                        



             code = 379)                                         

 

             CHLORIDE (BEAKER) 109 meq/L           H            



             (test code = 382)                                        

 

             CO2 (BEAKER) 25 meq/L     22-29                     



             (test code = 355)                                        

 

             BLOOD UREA   24 mg/dL     7-21         H            



             NITROGEN (BEAKER)                                        



             (test code = 354)                                        

 

             CREATININE   0.86 mg/dL   0.57-1.25                 



             (BEAKER) (test                                        



             code = 358)                                         

 

             GLUCOSE RANDOM 107 mg/dL           H            



             (BEAKER) (test                                        



             code = 652)                                         

 

             CALCIUM (BEAKER) 8.9 mg/dL    8.4-10.2                  



             (test code = 697)                                        

 

             EGFR (BEAKER) 103                                     Interpretatio

n of eGFR



             (test code = mL/min/1.73                            values Stage De

scription



             1092)        sq m                                   Result G1 Althea

l or high



                                                                 >=90 G2 Mildly 

decreased



                                                                 60-89 G3a Mildl

y to



                                                                 moderately 45-5

9 G3b



                                                                 Moderately to s

everely



                                                                 30-44 G4 Severl

y decreased



                                                                 15-29 G5 Kidney

 failure



                                                                 <15Reported eGF

R is based



                                                                 on the CKD-EPI 

202



                                                                 equation that d

oes not use



                                                                 a race



                                                                 coefficientEsti

mated GFR



                                                                 is not as accur

ate as



                                                                 Creatinine Porsha tobar in



                                                                 predicting glom

erular



                                                                 filtration rate

. Estimated



                                                                 GFR is not appl

icable for



                                                                 dialysis patien

ts



 ID - EOOMAGNESIUM2023-07-10 14:32:42





             Test Item    Value        Reference Range Interpretation Comments

 

             MAGNESIUM (BEAKER) (test code = 2.3 mg/dL    1.6-2.6               

    



             627)                                                



 ID - EOOPOCT-GLUCOSE METER2023-07-10 11:22:50





             Test Item    Value        Reference Range Interpretation Comments

 

             POC-GLUCOSE METER 114 mg/dL           H            : TESTED A

T [facility] [address]



             (BEAKER) (test code =                                        [place] [facility],



             [address]38)                                               [id number]:



                                                                 /Techni

deejay ID



                                                                 = [id number] for An

Chastity daniels



XR CHEST 1 VIEW PORTABLE / BEDSIDE2023-07-10 06:21:45
************************************************************[facility]ENTERName: TIETZ, ROBERT : 1965 Sex: 
M************************************************************XR CHEST 1VIEW 
PORTABLE / BEDSIDEINDICATION: IntubationCOMPARISON: Prior day's examFINDINGS: 
Portable frontal view of the chest. IMPRESSION:Support Lines: ET tube terminates
 4 cm above the karen. Otherwisestable support apparatus. Lungs and pleura: Low
 lung volumes with stable small patchy opacitieswithout newlung consolidation. 
Small effusions not excluded. Nopneumothorax.Heart and mediastinum: Stable 
contours. Additional findings: None.Electronically Signed By: Ritchie Gómez07/10/2023 06:23 CDTWorkstation Name: [id number] METABOLIC PANEL
2023-07-10 04:58:19





             Test Item    Value        Reference Range Interpretation Comments

 

             SODIUM (BEAKER) 139 meq/L    136-145                   Verified by 

clinical



             (test code = 381)                                        history

 

             POTASSIUM    4.0 meq/L    3.5-5.1                   



             (BEAKER) (test                                        



             code = 379)                                         

 

             CHLORIDE (BEAKER) 110 meq/L           H            



             (test code = 382)                                        

 

             CO2 (BEAKER) 24 meq/L     22-29                     



             (test code = 355)                                        

 

             BLOOD UREA   29 mg/dL     7-21         H            



             NITROGEN (BEAKER)                                        



             (test code = 354)                                        

 

             CREATININE   0.83 mg/dL   0.57-1.25                 



             (BEAKER) (test                                        



             code = 358)                                         

 

             GLUCOSE RANDOM 137 mg/dL           H            



             (BEAKER) (test                                        



             code = 652)                                         

 

             CALCIUM (BEAKER) 8.1 mg/dL    8.4-10.2     L            



             (test code = 697)                                        

 

             EGFR (BEAKER) 103                                     Interpretatio

n of eGFR



             (test code = mL/min/1.73                            values Stage De

scription



             1092)        sq m                                   Result G1 Althea

l or high



                                                                 >=90 G2 Mildly 

decreased



                                                                 60-89 G3a Mildl

y to



                                                                 moderately 45-5

9 G3b



                                                                 Moderately to s

everely



                                                                 30-44 G4 Severl

y decreased



                                                                 15-29 G5 Kidney

 failure



                                                                 <15Reported eGF

R is based



                                                                 on the CKD-EPI 

202



                                                                 equation that d

oes not use



                                                                 a race



                                                                 coefficientEsti

mated GFR



                                                                 is not as accur

ate as



                                                                 Creatinine Porsha

ekaterina in



                                                                 predicting glom

erular



                                                                 filtration rate

. Estimated



                                                                 GFR is not appl

icable for



                                                                 dialysis patien

ts



 ID - BVOperator ID - EOOPHOSPHORUS2023-07-10 04:14:08





             Test Item    Value        Reference Range Interpretation Comments

 

             PHOSPHORUS (BEAKER) (test code = 3.3 mg/dL    2.3-4.7              

     



             604)                                                



 ID - BVMAGNESIUM2023-07-10 04:14:07





             Test Item    Value        Reference Range Interpretation Comments

 

             MAGNESIUM (BEAKER) (test code = 1.9 mg/dL    1.6-2.6               

    



             627)                                                



 ID - BVPOCT-GLUCOSE METER2023-07-10 03:53:24





             Test Item    Value        Reference Range Interpretation Comments

 

             POC-GLUCOSE METER 105 mg/dL                        : TESTED A

[facility] [address]



             (BEAKER) (test code =                                        [place]

[facility],



             1538)                                               [id number]:



                                                                 /Techni

deejay ID



                                                                 = [id number] for



                                                                 STEPHANIE KEENE



CBC W/PLT COUNT &amp; AUTO DIFFERENTIAL2023-07-10 03:46:39





             Test Item    Value        Reference Range Interpretation Comments

 

             WHITE BLOOD CELL COUNT (BEAKER) 8.3 K/ L     3.5-10.5              

    



             (test code = 775)                                        

 

             RED BLOOD CELL COUNT (BEAKER) 4.57 M/ L    4.63-6.08    L          

  



             (test code = 761)                                        

 

             HEMOGLOBIN (BEAKER) (test code = 11.6 GM/DL   13.7-17.5    L       

     



             410)                                                

 

             HEMATOCRIT (BEAKER) (test code = 37.9 %       40.1-51.0    L       

     



             411)                                                

 

             MEAN CORPUSCULAR VOLUME (BEAKER) 83 fL        79-92                

     



             (test code = 753)                                        

 

             MEAN CORPUSCULAR HEMOGLOBIN 25.4 pg      25.7-32.2    L            



             (BEAKER) (test code = 751)                                        

 

             MEAN CORPUSCULAR HEMOGLOBIN CONC 30.6 GM/DL   32.3-36.5    L       

     



             (BEAKER) (test code = 752)                                        

 

             RED CELL DISTRIBUTION WIDTH 14.8 %       11.6-14.4    H            



             (BEAKER) (test code = 412)                                        

 

             PLATELET COUNT (BEAKER) (test 182 K/CU MM  150-450                 

  



             code = 756)                                         

 

             MEAN PLATELET VOLUME (BEAKER) 10.5 fL      9.4-12.4                

  



             (test code = 754)                                        

 

             NUCLEATED RED BLOOD CELLS 0 /100 WBC   0-0                       



             (BEAKER) (test code = 413)                                        

 

             NEUTROPHILS RELATIVE PERCENT 68 %                                  

 



             (BEAKER) (test code = 429)                                        

 

             LYMPHOCYTES RELATIVE PERCENT 16 %                                  

 



             (BEAKER) (test code = 430)                                        

 

             MONOCYTES RELATIVE PERCENT 12 %                                   



             (BEAKER) (test code = 431)                                        

 

             EOSINOPHILS RELATIVE PERCENT 4 %                                   

 



             (BEAKER) (test code = 432)                                        

 

             BASOPHILS RELATIVE PERCENT 0 %                                    



             (BEAKER) (test code = 437)                                        

 

             NEUTROPHILS ABSOLUTE COUNT 5.62 K/ L    1.78-5.38    H            



             (BEAKER) (test code = 670)                                        

 

             LYMPHOCYTES ABSOLUTE COUNT 1.30 K/ L    1.32-3.57    L            



             (BEAKER) (test code = 414)                                        

 

             MONOCYTES ABSOLUTE COUNT (BEAKER) 0.96 K/ L    0.30-0.82    H      

      



             (test code = 415)                                        

 

             EOSINOPHILS ABSOLUTE COUNT 0.33 K/ L    0.04-0.54                 



             (BEAKER) (test code = 416)                                        

 

             BASOPHILS ABSOLUTE COUNT (BEAKER) 0.03 K/ L    0.01-0.08           

      



             (test code = 417)                                        

 

             IMMATURE GRANULOCYTES-RELATIVE 0.60 %       0.00-1.00              

   



             PERCENT (BEAKER) (test code =                                      

  



             2801)                                               



BLOOD GAS, [id number] 03:43:46





             Test Item    Value        Reference Range Interpretation Comments

 

             PH ARTERIAL (BEAKER) (test code = 7.45         7.35-7.45           

      



             383)                                                

 

             PCO2 ARTERIAL (BEAKER) (test code 37 mm Hg     35-45               

      



             = 384)                                              

 

             PO2 ARTERIAL (BEAKER) (test code = 225 mm Hg    80-90        H     

       



             385)                                                

 

             O2 SATURATION ARTERIAL (BEAKER) 99.5 %       96.0-97.0    H        

    



             (test code = 386)                                        

 

             HCO3 ARTERIAL (BEAKER) (test code 25 mmol/L    21-29               

      



             = 388)                                              

 

             BASE EXCESS ARTERIAL (BEAKER) 1.1 mmol/L   -2.0-3.0                

  



             (test code = 387)                                        

 

             PATIENT TEMPERATURE (BEAKER) (test 35.8                            

       



             code = 1818)                                        

 

             FIO2 (BEAKER) (test code = 1819) 32.0                              

     



POCT-GLUCOSE [id number] 22:29:45





             Test Item    Value        Reference Range Interpretation Comments

 

             POC-GLUCOSE METER 149 mg/dL           H            : TESTED A

T [facility] [address]



             (BEAKER) (test code =                                        [facility],



             1538)                                               [id number]:



                                                                 /Techni

deejay ID



                                                                 = [id number] for



                                                                 STEPHANIE KEENE



POCT-GLUCOSE [id number] 14:53:25





             Test Item    Value        Reference Range Interpretation Comments

 

             POC-GLUCOSE METER 131 mg/dL           H            : TESTED A

T [facility] [address]



             (BEAKER) (test code =                                        [facility],



             1538)                                               [address]:



                                                                 /Techni

deejay ID



                                                                 = [id number] for An

Chastity daniels



POCT-GLUCOSE [id number] 11:25:13





             Test Item    Value        Reference Range Interpretation Comments

 

             POC-GLUCOSE METER 125 mg/dL           H            : TESTED A

T [facility] [address]



             (BEAKER) (test code =                                        [facility],



             1538)                                               [address]:



                                                                 /Techni

deejay ID



                                                                 = [id number] for An

Chastity daniels



XR CHEST 1 VIEW PORTABLE / [id number] 09:08:16
************************************************************[facility] 1965 Sex: 
M************************************************************EXAMINATION: XR 
CHEST 1 VIEW PORTABLE / BEDSIDE INDICATION: IntubationCOMPARISON: CXR of the 
prior day FINDINGS:LINES/TUBES: Endotracheal tube terminates 2.4 cm above the 
karen.Interval removal of previously seen feeding tube and placement of an 
NGtube which courses below the diaphragm and out of view. LUNGS: Stable lung 
volumes. No new airspace consolidation.PLEURA: No pleural effusion or 
pneumothorax.MEDIASTINUM: The cardiomediastinal silhouette is stable in size 
andshape.BONES/SOFT TISSUES: No acute osseous injury.ABDOMEN: No free air under 
the diaphragm.IMPRESSION:No significant interval change.Electronically Signed 
By: Laura Treviño2023 09:10 CDTWorkstation Name: [id number]
 03:52:49





             Test Item    Value        Reference Range Interpretation Comments

 

             MAGNESIUM (BEAKER) (test code = 2.3 mg/dL    1.6-2.6               

    



             627)                                                



 ID - [id number] 03:52:49





             Test Item    Value        Reference Range Interpretation Comments

 

             PHOSPHORUS (BEAKER) (test code = 2.4 mg/dL    2.3-4.7              

     



             604)                                                



 ID - MARCOBASIC METABOLIC [id number] 03:52:48





             Test Item    Value        Reference Range Interpretation Comments

 

             SODIUM (BEAKER) 148 meq/L    136-145      H            



             (test code = 381)                                        

 

             POTASSIUM    3.9 meq/L    3.5-5.1                   



             (BEAKER) (test                                        



             code = 379)                                         

 

             CHLORIDE (BEAKER) 113 meq/L           H            



             (test code = 382)                                        

 

             CO2 (BEAKER) 27 meq/L     22-29                     



             (test code = 355)                                        

 

             BLOOD UREA   38 mg/dL     7-21         H            



             NITROGEN (BEAKER)                                        



             (test code = 354)                                        

 

             CREATININE   1.44 mg/dL   0.57-1.25    H            



             (BEAKER) (test                                        



             code = 358)                                         

 

             GLUCOSE RANDOM 111 mg/dL           H            



             (BEAKER) (test                                        



             code = 652)                                         

 

             CALCIUM (BEAKER) 8.1 mg/dL    8.4-10.2     L            



             (test code = 697)                                        

 

             EGFR (BEAKER) 58                                      Interpretatio

n of eGFR



             (test code = mL/min/1.73                            values Stage De

scription



             1092)        sq m                                   Result G1 Althea

l or high



                                                                 >=90 G2 Mildly 

decreased



                                                                 60-89 G3a Mildl

y to



                                                                 moderately 45-5

9 G3b



                                                                 Moderately to s

everely



                                                                 30-44 G4 Severl

y decreased



                                                                 15-29 G5 Kidney

 failure



                                                                 <15Reported eGF

R is based



                                                                 on the CKD-EPI 





                                                                 equation that d

oes not use



                                                                 a race



                                                                 coefficientEsti

mated GFR



                                                                 is not as accur

ate as



                                                                 Creatinine Porsha

ekaterina in



                                                                 predicting glom

erular



                                                                 filtration rate

. Estimated



                                                                 GFR is not appl

icable for



                                                                 dialysis patien

ts



 ID - MARCOCBC W/PLT COUNT &amp; AUTO [id number] 03:35:35





             Test Item    Value        Reference Range Interpretation Comments

 

             WHITE BLOOD CELL COUNT (BEAKER) 11.7 K/ L    3.5-10.5     H        

    



             (test code = 775)                                        

 

             RED BLOOD CELL COUNT (BEAKER) 4.41 M/ L    4.63-6.08    L          

  



             (test code = 761)                                        

 

             HEMOGLOBIN (BEAKER) (test code = 11.3 GM/DL   13.7-17.5    L       

     



             410)                                                

 

             HEMATOCRIT (BEAKER) (test code = 35.5 %       40.1-51.0    L       

     



             411)                                                

 

             MEAN CORPUSCULAR VOLUME (BEAKER) 81 fL        79-92                

     



             (test code = 753)                                        

 

             MEAN CORPUSCULAR HEMOGLOBIN 25.6 pg      25.7-32.2    L            



             (BEAKER) (test code = 751)                                        

 

             MEAN CORPUSCULAR HEMOGLOBIN CONC 31.8 GM/DL   32.3-36.5    L       

     



             (BEAKER) (test code = 752)                                        

 

             RED CELL DISTRIBUTION WIDTH 14.6 %       11.6-14.4    H            



             (BEAKER) (test code = 412)                                        

 

             PLATELET COUNT (BEAKER) (test 197 K/CU MM  150-450                 

  



             code = 756)                                         

 

             MEAN PLATELET VOLUME (BEAKER) 10.3 fL      9.4-12.4                

  



             (test code = 754)                                        

 

             NUCLEATED RED BLOOD CELLS 0 /100 WBC   0-0                       



             (BEAKER) (test code = 413)                                        

 

             NEUTROPHILS RELATIVE PERCENT 79 %                                  

 



             (BEAKER) (test code = 429)                                        

 

             LYMPHOCYTES RELATIVE PERCENT 8 %                                   

 



             (BEAKER) (test code = 430)                                        

 

             MONOCYTES RELATIVE PERCENT 11 %                                   



             (BEAKER) (test code = 431)                                        

 

             EOSINOPHILS RELATIVE PERCENT 1 %                                   

 



             (BEAKER) (test code = 432)                                        

 

             BASOPHILS RELATIVE PERCENT 0 %                                    



             (BEAKER) (test code = 437)                                        

 

             NEUTROPHILS ABSOLUTE COUNT 9.28 K/ L    1.78-5.38    H            



             (BEAKER) (test code = 670)                                        

 

             LYMPHOCYTES ABSOLUTE COUNT 0.97 K/ L    1.32-3.57    L            



             (BEAKER) (test code = 414)                                        

 

             MONOCYTES ABSOLUTE COUNT (BEAKER) 1.29 K/ L    0.30-0.82    H      

      



             (test code = 415)                                        

 

             EOSINOPHILS ABSOLUTE COUNT 0.08 K/ L    0.04-0.54                 



             (BEAKER) (test code = 416)                                        

 

             BASOPHILS ABSOLUTE COUNT (BEAKER) 0.02 K/ L    0.01-0.08           

      



             (test code = 417)                                        

 

             IMMATURE GRANULOCYTES-RELATIVE 0.50 %       0.00-1.00              

   



             PERCENT (BEAKER) (test code =                                      

  



             2801)                                               



BLOOD GAS, [id number] 03:29:39





             Test Item    Value        Reference Range Interpretation Comments

 

             PH ARTERIAL (BEAKER) (test code = 7.47         7.35-7.45    H      

      



             383)                                                

 

             PCO2 ARTERIAL (BEAKER) (test code 43 mm Hg     35-45               

      



             = 384)                                              

 

             PO2 ARTERIAL (BEAKER) (test code = 219 mm Hg    80-90        H     

       



             385)                                                

 

             O2 SATURATION ARTERIAL (BEAKER) 99.5 %       96.0-97.0    H        

    



             (test code = 386)                                        

 

             HCO3 ARTERIAL (BEAKER) (test code 30 mmol/L    21-29        H      

      



             = 388)                                              

 

             BASE EXCESS ARTERIAL (BEAKER) 5.8 mmol/L   -2.0-3.0     H          

  



             (test code = 387)                                        

 

             PATIENT TEMPERATURE (BEAKER) (test 38.0                            

       



             code = 1818)                                        

 

             FIO2 (BEAKER) (test code = 1819) 24.0                              

     



POCT-GLUCOSE [id number] 02:52:45





             Test Item    Value        Reference Range Interpretation Comments

 

             POC-GLUCOSE METER 110 mg/dL                        : TESTED A

T [facility] [address]



             (BEAKER) (test code =                                        [facility],



             1538)                                               [address]:



                                                                 /Techni

deejay ID



                                                                 = [id number] for



                                                                 AKHIONBARE, STEPHANIE





POCT-GLUCOSE [id number] 02:49:54





             Test Item    Value        Reference Range Interpretation Comments

 

             POC-GLUCOSE METER 167 mg/dL           H            : TESTED A

T [facility] [address]



             (BEAKER) (test code =                                        [facility],



             1538)                                               [address]:



                                                                 /Techni

deejay ID



                                                                 = [id number] for



                                                                 AKHIONBARE, STEPHANIE

TH



BASIC METABOLIC [id number] 16:07:43





             Test Item    Value        Reference Range Interpretation Comments

 

             SODIUM (BEAKER) 148 meq/L    136-145      H            



             (test code = 381)                                        

 

             POTASSIUM    3.9 meq/L    3.5-5.1                   



             (BEAKER) (test                                        



             code = 379)                                         

 

             CHLORIDE (BEAKER) 112 meq/L           H            



             (test code = 382)                                        

 

             CO2 (BEAKER) 30 meq/L     22-29        H            



             (test code = 355)                                        

 

             BLOOD UREA   39 mg/dL     7-21         H            



             NITROGEN (BEAKER)                                        



             (test code = 354)                                        

 

             CREATININE   1.77 mg/dL   0.57-1.25    H            



             (BEAKER) (test                                        



             code = 358)                                         

 

             GLUCOSE RANDOM 152 mg/dL           H            



             (BEAKER) (test                                        



             code = 652)                                         

 

             CALCIUM (BEAKER) 8.3 mg/dL    8.4-10.2     L            



             (test code = 697)                                        

 

             EGFR (BEAKER) 45                                      Interpretatio

n of eGFR



             (test code = mL/min/1.73                            values Stage De

scription



             1092)        sq m                                   Result G1 Althea

l or high



                                                                 >=90 G2 Mildly 

decreased



                                                                 60-89 G3a Mildl

y to



                                                                 moderately 45-5

9 G3b



                                                                 Moderately to s

everely



                                                                 30-44 G4 Severl

y decreased



                                                                 15-29 G5 Kidney

 failure



                                                                 <15Reported eGF

R is based



                                                                 on the CKD-EPI 





                                                                 equation that d

oes not use



                                                                 a race



                                                                 coefficientEsti

mated GFR



                                                                 is not as accur

ate as



                                                                 Creatinine Porsha tobar in



                                                                 predicting glom

erular



                                                                 filtration rate

. Estimated



                                                                 GFR is not appl

icable for



                                                                 dialysis patien

ts



 ID - DBSPUTUM CULTURE + GRAM [id number] 10:16:15





             Test Item    Value        Reference Range Interpretation Comments

 

             CULTURE                                A            1+ Beta-hemolyt

ic



             (BEAKER) (test                                        streptococcus

 group



             code = 1095)                                        G, by serologic

al



                                                                 grouping

 

             GRAM STAIN   4+ WBCs                                



             RESULT (BEAKER)                                        



             (test code =                                        



             1123)                                               

 

             GRAM STAIN   1+ gram positive                           



             RESULT (BEAKER) cocci in chains and                           



             (test code = pairs                                  



             063399)                                             

 

             GRAM STAIN   1+ gram negative                           



             RESULT (BEAKER) coccobacilli                           



             (test code =                                        



             771923)                                             

 

             GRAM STAIN   <1+ gram positive                           



             RESULT (BEAKER) rods                                   



             (test code =                                        



             603509)                                             

 

             GRAM STAIN   <1+ gram positive                           



             RESULT (BEAKER) cocci in clusters                           



             (test code =                                        



             728586)                                             



3+ Normal respiratory clemente presentXR CHEST 1 VIEW PORTABLE / [id number] 
06:31:38************************************************************[facility] : 1965 Sex: 
M************************************************************Chest one v
iew:HISTORY: PneumoniaCompared to 2023. Bibasilar subsegmental atelectasis 
is present and improved on the left.There is a minimal left pleural effusion. 
Cardiac size remains withinnormal limits. Anendotracheal tube and enteric 
feeding tube appearunchanged in position.Electronically Signed By: Tawanda Balbuena2023 06:33 CDTWorkstation Name: [id number] 
03:37:56





             Test Item    Value        Reference Range Interpretation Comments

 

             MAGNESIUM (BEAKER) (test code = 2.3 mg/dL    1.6-2.6               

    



             627)                                                



 ID - [id number] 03:37:56





             Test Item    Value        Reference Range Interpretation Comments

 

             PHOSPHORUS (BEAKER) (test code = 3.2 mg/dL    2.3-4.7              

     



             604)                                                



 ID - ADMINBASIC METABOLIC [id number] 03:37:55





             Test Item    Value        Reference Range Interpretation Comments

 

             SODIUM (BEAKER) 145 meq/L    136-145                   



             (test code = 381)                                        

 

             POTASSIUM    4.0 meq/L    3.5-5.1                   



             (BEAKER) (test                                        



             code = 379)                                         

 

             CHLORIDE (BEAKER) 111 meq/L           H            



             (test code = 382)                                        

 

             CO2 (BEAKER) 25 meq/L     22-29                     



             (test code = 355)                                        

 

             BLOOD UREA   37 mg/dL     7-21         H            



             NITROGEN (BEAKER)                                        



             (test code = 354)                                        

 

             CREATININE   2.17 mg/dL   0.57-1.25    H            



             (BEAKER) (test                                        



             code = 358)                                         

 

             GLUCOSE RANDOM 152 mg/dL           H            



             (BEAKER) (test                                        



             code = 652)                                         

 

             CALCIUM (BEAKER) 8.2 mg/dL    8.4-10.2     L            



             (test code = 697)                                        

 

             EGFR (BEAKER) 35                                      Interpretatio

n of eGFR



             (test code = mL/min/1.73                            values Stage De

scription



             1092)        sq m                                   Result G1 Althea

l or high



                                                                 >=90 G2 Mildly 

decreased



                                                                 60-89 G3a Mildl

y to



                                                                 moderately 45-5

9 G3b



                                                                 Moderately to s

everely



                                                                 30-44 G4 Severl

y decreased



                                                                 15-29 G5 Kidney

 failure



                                                                 <15Reported eGF

R is based



                                                                 on the CKD-EPI 





                                                                 equation that d

oes not use



                                                                 a race



                                                                 coefficientEsti

mated GFR



                                                                 is not as accur

ate as



                                                                 Creatinine Porsha

ekaterina in



                                                                 predicting glom

erular



                                                                 filtration rate

. Estimated



                                                                 GFR is not appl

icable for



                                                                 dialysis patien

ts



 ID - ADMINCBC W/PLT COUNT &amp; AUTO [id number] 03:32:03





             Test Item    Value        Reference Range Interpretation Comments

 

             WHITE BLOOD CELL COUNT (BEAKER) 11.8 K/ L    3.5-10.5     H        

    



             (test code = 775)                                        

 

             RED BLOOD CELL COUNT (BEAKER) 4.93 M/ L    4.63-6.08               

  



             (test code = 761)                                        

 

             HEMOGLOBIN (BEAKER) (test code = 12.8 GM/DL   13.7-17.5    L       

     



             410)                                                

 

             HEMATOCRIT (BEAKER) (test code = 38.7 %       40.1-51.0    L       

     



             411)                                                

 

             MEAN CORPUSCULAR VOLUME (BEAKER) 79 fL        79-92                

     



             (test code = 753)                                        

 

             MEAN CORPUSCULAR HEMOGLOBIN 26.0 pg      25.7-32.2                 



             (BEAKER) (test code = 751)                                        

 

             MEAN CORPUSCULAR HEMOGLOBIN CONC 33.1 GM/DL   32.3-36.5            

     



             (BEAKER) (test code = 752)                                        

 

             RED CELL DISTRIBUTION WIDTH 14.6 %       11.6-14.4    H            



             (BEAKER) (test code = 412)                                        

 

             PLATELET COUNT (BEAKER) (test 190 K/CU MM  150-450                 

  



             code = 756)                                         

 

             MEAN PLATELET VOLUME (BEAKER) 10.1 fL      9.4-12.4                

  



             (test code = 754)                                        

 

             NUCLEATED RED BLOOD CELLS 0 /100 WBC   0-0                       



             (BEAKER) (test code = 413)                                        

 

             NEUTROPHILS RELATIVE PERCENT 95 %                                  

 



             (BEAKER) (test code = 429)                                        

 

             LYMPHOCYTES RELATIVE PERCENT 2 %                                   

 



             (BEAKER) (test code = 430)                                        

 

             MONOCYTES RELATIVE PERCENT 2 %                                    



             (BEAKER) (test code = 431)                                        

 

             EOSINOPHILS RELATIVE PERCENT 0 %                                   

 



             (BEAKER) (test code = 432)                                        

 

             BASOPHILS RELATIVE PERCENT 0 %                                    



             (BEAKER) (test code = 437)                                        

 

             NEUTROPHILS ABSOLUTE COUNT 11.30 K/ L   1.78-5.38    H            



             (BEAKER) (test code = 670)                                        

 

             LYMPHOCYTES ABSOLUTE COUNT 0.25 K/ L    1.32-3.57    L            



             (BEAKER) (test code = 414)                                        

 

             MONOCYTES ABSOLUTE COUNT (BEAKER) 0.21 K/ L    0.30-0.82    L      

      



             (test code = 415)                                        

 

             EOSINOPHILS ABSOLUTE COUNT 0.00 K/ L    0.04-0.54    L            



             (BEAKER) (test code = 416)                                        

 

             BASOPHILS ABSOLUTE COUNT (BEAKER) 0.02 K/ L    0.01-0.08           

      



             (test code = 417)                                        

 

             IMMATURE GRANULOCYTES-RELATIVE 0.50 %       0.00-1.00              

   



             PERCENT (BEAKER) (test code =                                      

  



             2801)                                               



BLOOD GAS, [id number] 03:15:14





             Test Item    Value        Reference Range Interpretation Comments

 

             PH ARTERIAL (BEAKER) (test code = 7.47         7.35-7.45    H      

      



             383)                                                

 

             PCO2 ARTERIAL (BEAKER) (test code 42 mm Hg     35-45               

      



             = 384)                                              

 

             PO2 ARTERIAL (BEAKER) (test code = 100 mm Hg    80-90        H     

       



             385)                                                

 

             O2 SATURATION ARTERIAL (BEAKER) 97.8 %       96.0-97.0    H        

    



             (test code = 386)                                        

 

             HCO3 ARTERIAL (BEAKER) (test code 30 mmol/L    21-29        H      

      



             = 388)                                              

 

             BASE EXCESS ARTERIAL (BEAKER) 5.5 mmol/L   -2.0-3.0     H          

  



             (test code = 387)                                        

 

             PATIENT TEMPERATURE (BEAKER) (test 37.0                            

       



             code = 1818)                                        

 

             FIO2 (BEAKER) (test code = 1819) 24.0                              

     



POCT-GLUCOSE [id number] 23:26:56





             Test Item    Value        Reference Range Interpretation Comments

 

             POC-GLUCOSE METER 133 mg/dL           H            : TESTED A

[facility] [address]



             (BEAKER) (test code =                                        [place],



             [address])                                               [id number]:



                                                                 /Techni

deejay ID



                                                                 = [id number] for IR

RED REVELES



POCT-GLUCOSE [id number] 19:00:51





             Test Item    Value        Reference Range Interpretation Comments

 

             POC-GLUCOSE METER 125 mg/dL           H            : Notified

 RN/MD:



             (TONIO) (test code =                                        TESTED

 AT [facility] [address]



             [address])                                               [place] [facility],



                                                                 [id number]:



                                                                 /Techni

deejay ID



                                                                 = [id number] for BARBARA NEWTON



XR CHEST 1 VIEW PORTABLE / [id number] 18:19:28
************************************************************[facility] : 1965 Sex: 
M************************************************************Exam: XR CHEST 1 
VIEW PORTABLE / BEDSIDEDate: 2023 6:18 PMIndication:worsening 
hypoxiaComparison: Chest radiograph from earlier 
today.IMPRESSION:Lines/Tubes:Unchanged support devices.Lungs and Pleura : Low 
lung volumes. Mildly increasing perihilaropacities. Trace bilateral pleural 
effusions. No pneumothorax.Heart/Mediastinum:Unchanged.Bones/Soft Tissues: No 
acute osseous abnormality.Upper abdomen: Unremarkable.Electronically Signed By: 
Oneal Welch2023 18:21 CDTWorkstation Name: [id number] METABOLIC PANEL
2023 17:42:19





             Test Item    Value        Reference Range Interpretation Comments

 

             SODIUM (BEAKER) 145 meq/L    136-145                   



             (test code = 381)                                        

 

             POTASSIUM    3.3 meq/L    3.5-5.1      L            



             (BEAKER) (test                                        



             code = 379)                                         

 

             CHLORIDE (BEAKER) 113 meq/L           H            



             (test code = 382)                                        

 

             CO2 (BEAKER) 22 meq/L     22-29                     



             (test code = 355)                                        

 

             BLOOD UREA   46 mg/dL     7-21         H            



             NITROGEN (BEAKER)                                        



             (test code = 354)                                        

 

             CREATININE   3.80 mg/dL   0.57-1.25    H            



             (BEAKER) (test                                        



             code = 358)                                         

 

             GLUCOSE RANDOM 160 mg/dL           H            



             (BEAKER) (test                                        



             code = 652)                                         

 

             CALCIUM (BEAKER) 7.6 mg/dL    8.4-10.2     L            



             (test code = 697)                                        

 

             EGFR (BEAKER) 18                                      Interpretatio

n of eGFR



             (test code = mL/min/1.73                            values Stage De

scription



             1092)        sq m                                   Result G1 Althea

l or high



                                                                 >=90 G2 Mildly 

decreased



                                                                 60-89 G3a Mildl

y to



                                                                 moderately 45-5

9 G3b



                                                                 Moderately to s

everely



                                                                 30-44 G4 Severl

y decreased



                                                                 15-29 G5 Kidney

 failure



                                                                 <15Reported eGF

R is based



                                                                 on the CKD-EPI 





                                                                 equation that d

oes not use



                                                                 a race



                                                                 coefficientEsti

mated GFR



                                                                 is not as accur

ate as



                                                                 Creatinine Porsha

ekaterina in



                                                                 predicting glom

erular



                                                                 filtration rate

. Estimated



                                                                 GFR is not appl

icable for



                                                                 dialysis patien

ts



 ID - ADMINBLOOD GAS, [id number] 17:07:40





             Test Item    Value        Reference Range Interpretation Comments

 

             PH ARTERIAL (BEAKER) (test code = 7.45         7.35-7.45           

      



             383)                                                

 

             PCO2 ARTERIAL (BEAKER) (test code 32 mm Hg     35-45        L      

      



             = 384)                                              

 

             PO2 ARTERIAL (BEAKER) (test code 101 mm Hg    80-90        H       

     



             = 385)                                              

 

             O2 SATURATION ARTERIAL (BEAKER) 97.9 %       96.0-97.0    H        

    



             (test code = 386)                                        

 

             HCO3 ARTERIAL (BEAKER) (test code 22 mmol/L    21-29               

      



             = 388)                                              

 

             BASE EXCESS ARTERIAL (BEAKER) -1.2 mmol/L  -2.0-3.0                

  



             (test code = 387)                                        

 

             PATIENT TEMPERATURE (BEAKER) 37.0                                  

 



             (test code = 1818)                                        

 

             FIO2 (BEAKER) (test code = 1819) 21.0                              

     



BASIC METABOLIC [id number] 16:56:17





             Test Item    Value        Reference Range Interpretation Comments

 

             SODIUM (BEAKER) 141 meq/L    136-145                   



             (test code = 381)                                        

 

             POTASSIUM    4.4 meq/L    3.5-5.1                   Specimen modera

tely



             (BEAKER) (test                                        hemolyzed



             code = 379)                                         

 

             CHLORIDE (BEAKER) 113 meq/L           H            



             (test code = 382)                                        

 

             CO2 (BEAKER) 16 meq/L     22-29        L            



             (test code = 355)                                        

 

             BLOOD UREA   46 mg/dL     7-21         H            



             NITROGEN (BEAKER)                                        



             (test code = 354)                                        

 

             CREATININE   4.66 mg/dL   0.57-1.25    H            Specimen modera

tely



             (BEAKER) (test                                        hemolyzed



             code = 358)                                         

 

             GLUCOSE RANDOM 151 mg/dL           H            



             (BEAKER) (test                                        



             code = 652)                                         

 

             CALCIUM (BEAKER) 7.9 mg/dL    8.4-10.2     L            



             (test code = 697)                                        

 

             EGFR (BEAKER) 14                                      Interpretatio

n of eGFR



             (test code = mL/min/1.73                            values Stage De

scription



             1092)        sq m                                   Result G1 Althea

l or high



                                                                 >=90 G2 Mildly 

decreased



                                                                 60-89 G3a Mildl

y to



                                                                 moderately 45-5

9 G3b



                                                                 Moderately to s

everely



                                                                 30-44 G4 Severl

y decreased



                                                                 15-29 G5 Kidney

 failure



                                                                 <15Reported eGF

R is based



                                                                 on the CKD-EPI 





                                                                 equation that d

oes not use



                                                                 a race



                                                                 coefficientEsti

mated GFR



                                                                 is not as accur

ate as



                                                                 Creatinine Porsha

ekaterina in



                                                                 predicting glom

erular



                                                                 filtration rate

. Estimated



                                                                 GFR is not appl

icable for



                                                                 dialysis patien

ts



 ID - BSUS RENAL [id number] 16:15:21
************************************************************[facility] : 1965 Sex: 
M************************************************************EXAM: Renal 
UltrasoundINDICATION: EWELINA vs. Renal Failure COMPARISON: None TECHNIQUE: 
Transverse and longitudinalimages of the kidneys and bladderwere obtained. 
FINDINGS: Right Kidney: Length: 10.7 cmAppearance: Normal echogenicity. 
Collecting system: No hydronephrosisStones: NoneCyst/Mass: NoneLeft Kidney: 
Length: 10.2 cmAppearance: Normal echogenicity. Collecting system: No 
hydronephrosisStones: NoneCyst/Mass:NoneBladder: Lopez catheter in the 
decompressed bladder.IMPRESSION:No hydronephrosis or renal calclul
i.Electronically Signed By: Laura Treviño2023 16:17 CDTWorkstation Name: 
[id number] NITROGEN, RANDOM [id number] 14:25:38





             Test Item    Value        Reference Range Interpretation Comments

 

             UREA NITROGEN URINE (BEAKER) (test 351 mg/dL                       

       



             code = 538)                                         



Reference Range: No NormalsOperator ID - ADMINCREATININE, RANDOM [id number]
 14:25:37





             Test Item    Value        Reference Range Interpretation Comments

 

             CREATININE URINE (BEAKER) (test 117.9 mg/dL                        

    



             code = 375)                                         



Reference Range: No NormalsOperator ID - ADMINSODIUM, RANDOM [id number] 
14:25:37





             Test Item    Value        Reference Range Interpretation Comments

 

             SODIUM URINE (BEAKER) (test code = 43 meq/L                        

       



             243)                                                



Reference Range: No NormalsOperator ID - ADMINBASIC METABOLIC [id number] 
14:01:34





             Test Item    Value        Reference Range Interpretation Comments

 

             SODIUM (BEAKER) 143 meq/L    136-145                   



             (test code = 381)                                        

 

             POTASSIUM    3.8 meq/L    3.5-5.1                   



             (BEAKER) (test                                        



             code = 379)                                         

 

             CHLORIDE (BEAKER) 112 meq/L           H            



             (test code = 382)                                        

 

             CO2 (BEAKER) 18 meq/L     22-29        L            



             (test code = 355)                                        

 

             BLOOD UREA   48 mg/dL     7-21         H            



             NITROGEN (BEAKER)                                        



             (test code = 354)                                        

 

             CREATININE   4.83 mg/dL   0.57-1.25    H            



             (BEAKER) (test                                        



             code = 358)                                         

 

             GLUCOSE RANDOM 154 mg/dL           H            



             (BEAKER) (test                                        



             code = 652)                                         

 

             CALCIUM (BEAKER) 7.8 mg/dL    8.4-10.2     L            



             (test code = 697)                                        

 

             EGFR (BEAKER) 14                                      Interpretatio

n of eGFR



             (test code = mL/min/1.73                            values Stage De

scription



             1092)        sq m                                   Result G1 Althea

l or high



                                                                 >=90 G2 Mildly 

decreased



                                                                 60-89 G3a Mildl

y to



                                                                 moderately 45-5

9 G3b



                                                                 Moderately to s

everely



                                                                 30-44 G4 Severl

y decreased



                                                                 15-29 G5 Kidney

 failure



                                                                 <15Reported eGF

R is based



                                                                 on the CKD-EPI 





                                                                 equation that d

oes not use



                                                                 a race



                                                                 coefficientEsti

mated GFR



                                                                 is not as accur

ate as



                                                                 Creatinine Porsha tobar in



                                                                 predicting glom

erular



                                                                 filtration rate

. Estimated



                                                                 GFR is not appl

icable for



                                                                 dialysis patien

ts



 ID - MARCOB-TYPE NATRIURETIC FACTOR (BNP)2023 13:54:47





             Test Item    Value        Reference Range Interpretation Comments

 

             B-TYPE NATRIURETIC PEPTIDE (BEAKER) 108 pg/mL    0-100        H    

        



             (test code = 700)                                        



 ID - MARCOBLOOD GAS, [id number] 13:38:48





             Test Item    Value        Reference Range Interpretation Comments

 

             PH ARTERIAL (BEAKER) (test code = 7.47         7.35-7.45    H      

      



             383)                                                

 

             PCO2 ARTERIAL (BEAKER) (test code 25 mm Hg     35-45        L      

      



             = 384)                                              

 

             PO2 ARTERIAL (BEAKER) (test code 208 mm Hg    80-90        H       

     



             = 385)                                              

 

             O2 SATURATION ARTERIAL (BEAKER) 99.5 %       96.0-97.0    H        

    



             (test code = 386)                                        

 

             HCO3 ARTERIAL (BEAKER) (test code 17 mmol/L    21-29        L      

      



             = 388)                                              

 

             BASE EXCESS ARTERIAL (BEAKER) -4.5 mmol/L  -2.0-3.0     L          

  



             (test code = 387)                                        

 

             PATIENT TEMPERATURE (BEAKER) 37.0                                  

 



             (test code = 1818)                                        

 

             FIO2 (BEAKER) (test code = 1819) 21                                

     



OSMOLALITY, [id number] 12:37:47





             Test Item    Value        Reference Range Interpretation Comments

 

             OSMOLALITY, SERUM (BEAKER) (test 302 mOsm/kg  275-295      H       

     



             code = 615)                                         



POCT-GLUCOSE [id number] 12:14:10





             Test Item    Value        Reference Range Interpretation Comments

 

             POC-GLUCOSE METER 144 mg/dL           H            : Notified

 RN/MD:



             (BEAKER) (test code =                                        TESTED

 AT [facility] [address]41)                                               [facility],



                                                                 [id number]:



                                                                 /Techni

deejay ID



                                                                 = [id number] for BARBARA NEWTON



CREATINE KINASE (CK)2023 12:06:52





             Test Item    Value        Reference Range Interpretation Comments

 

             CREATINE KINASE TOTAL (BEAKER) (test 393 U/L             H   

         



             code = 380)                                         



 ID - SEPIDEH [id number] 11:47:18





             Test Item    Value        Reference Range Interpretation Comments

 

             OSMOLALITY URINE 355 mOsm/kg  See_Comment                [Automated

 message]



             (BEAKER) (test code =                                        The sy

stem which



             614)                                                generated this 

result



                                                                 transmitted ref

erence



                                                                 range: 50-1,200



                                                                 mOsm/kg. The



                                                                 reference range

 was



                                                                 not used to int

erpret



                                                                 this result as



                                                                 normal/abnormal

.



URINALYSIS WITH MICROSCOPIC IF [id number] 11:46:16





             Test Item    Value        Reference Range Interpretation Comments

 

             COLOR (BEAKER) (test code = 470) Pink                              

     

 

             CLARITY (BEAKER) (test code = 469) Hazy                            

       

 

             SPECIFIC GRAVITY UA (BEAKER) (test 1.020        1.001-1.035        

       



             code = 468)                                         

 

             PH UA (BEAKER) (test code = 467) 6.0          5.0-8.0              

     

 

             PROTEIN UA (BEAKER) (test code = 100 mg/dL    Negative     A       

     



             464)                                                

 

             GLUCOSE UA (BEAKER) (test code = 30 mg/dL     Negative     A       

     



             365)                                                

 

             KETONES UA (BEAKER) (test code = Trace        Negative     A       

     



             371)                                                

 

             BILIRUBIN UA (BEAKER) (test code = Negative     Negative           

       



             462)                                                

 

             BLOOD UA (BEAKER) (test code = 461) Moderate     Negative     A    

        

 

             NITRITE UA (BEAKER) (test code = Negative     Negative             

     



             465)                                                

 

             LEUKOCYTE ESTERASE UA (BEAKER) Moderate     Negative     A         

   



             (test code = 466)                                        

 

             UROBILINOGEN UA (BEAKER) (test code 6            0.2-1.0      H    

        



             = 463)                                              

 

             SOURCE(BEAKER) (test code = 2795)                                  

      



 ID - [auto] ID - techURINALYSIS [id number] 11:46:16





             Test Item    Value        Reference Range Interpretation Comments

 

             RBC UA (BEAKER) (test code = 519) 56 /HPF                          

      

 

             WBC UA (BEAKER) (test code = 520) 14 /HPF                          

      

 

             MUCUS (BEAKER) (test code = 1574) Rare                             

      

 

             SQUAMOUS EPITHELIAL (BEAKER) (test < /HPF                          

       



             code = 516)                                         

 

             CASTS (BEAKER) (test code = 1579) 1 /LPF                           

      

 

             CRYSTALS, URINE (BEAKER) (test Occasional   None Seen    A         

   



             code = 1521)                                        



 ID - techBLOOD GAS, [id number] 09:07:12





             Test Item    Value        Reference Range Interpretation Comments

 

             PH ARTERIAL (BEAKER) (test code 7.46         7.35-7.45    H        

    



             = 383)                                              

 

             PCO2 ARTERIAL (BEAKER) (test 15 mm Hg     35-45        LL          

 



             code = 384)                                         

 

             PO2 ARTERIAL (BEAKER) (test code 202 mm Hg    80-90        H       

     



             = 385)                                              

 

             O2 SATURATION ARTERIAL (BEAKER) 99.5 %       96.0-97.0    H        

    



             (test code = 386)                                        

 

             HCO3 ARTERIAL (BEAKER) (test 10 mmol/L    21-29        LL          

 



             code = 388)                                         

 

             BASE EXCESS ARTERIAL (BEAKER) -12.0 mmol/L -2.0-3.0     L          

  



             (test code = 387)                                        

 

             PATIENT TEMPERATURE (BEAKER) 37.0                                  

 



             (test code = 1818)                                        

 

             FIO2 (BEAKER) (test code = 1819) 30.0                              

     



HEPATIC FUNCTION [id number] 08:17:04





             Test Item    Value        Reference Range Interpretation Comments

 

             TOTAL PROTEIN (BEAKER) (test code = 5.8 gm/dL    6.0-8.3      L    

        



             770)                                                

 

             ALBUMIN (BEAKER) (test code = 1145) 3.1 g/dL     3.5-5.0      L    

        

 

             BILIRUBIN TOTAL (BEAKER) (test code 0.9 mg/dL    0.2-1.2           

        



             = 377)                                              

 

             BILIRUBIN DIRECT (BEAKER) (test 0.4 mg/dL    0.1-0.5               

    



             code = 706)                                         

 

             ALKALINE PHOSPHATASE (BEAKER) (test 84 U/L                   

        



             code = 346)                                         

 

             AST (SGOT) (BEAKER) (test code = 34 U/L       5-34                 

     



             353)                                                

 

             ALT (SGPT) (BEAKER) (test code = 24 U/L       6-55                 

     



             347)                                                



 ID - MARCOXR CHEST 1 VIEW PORTABLE / [id number] 06:56:03
************************************************************[facility] : 1965 Sex: 
M************************************************************CLINICAL HISTORY: 
pneumonia follow-upTECHNIQUE: 1 view of the chest.COMPARISON: 
2023IMPRESSION:ETT terminates 3 cm above the karen. Feeding tube now seen 
in theduodenum. Low lung volumes are again seen with decreased right 
perihilarairspace opacity. There are no significant appearing effusions. 
Thecardiomediastinal silhouette is magnified by technique.Electronically Signed 
By: Abimael Art2023 06:58 CDTWorkstation Name: [id number] METABOLIC 
[id number] 04:01:18





             Test Item    Value        Reference Range Interpretation Comments

 

             SODIUM (BEAKER) 141 meq/L    136-145                   



             (test code = 381)                                        

 

             POTASSIUM    3.8 meq/L    3.5-5.1                   



             (BEAKER) (test                                        



             code = 379)                                         

 

             CHLORIDE (BEAKER) 113 meq/L           H            



             (test code = 382)                                        

 

             CO2 (BEAKER) 16 meq/L     22-29        L            



             (test code = 355)                                        

 

             BLOOD UREA   42 mg/dL     7-21         H            



             NITROGEN (BEAKER)                                        



             (test code = 354)                                        

 

             CREATININE   3.25 mg/dL   0.57-1.25    H            



             (BEAKER) (test                                        



             code = 358)                                         

 

             GLUCOSE RANDOM 159 mg/dL           H            



             (BEAKER) (test                                        



             code = 652)                                         

 

             CALCIUM (BEAKER) 7.9 mg/dL    8.4-10.2     L            



             (test code = 697)                                        

 

             EGFR (BEAKER) 22                                      Interpretatio

n of eGFR



             (test code = mL/min/1.73                            values Stage De

scription



             1092)        sq m                                   Result G1 Althea

l or high



                                                                 >=90 G2 Mildly 

decreased



                                                                 60-89 G3a Mildl

y to



                                                                 moderately 45-5

9 G3b



                                                                 Moderately to s

everely



                                                                 30-44 G4 Severl

y decreased



                                                                 15-29 G5 Kidney

 failure



                                                                 <15Reported eGF

R is based



                                                                 on the CKD-EPI 

202



                                                                 equation that d

oes not use



                                                                 a race



                                                                 coefficientEsti

mated GFR



                                                                 is not as accur

ate as



                                                                 Creatinine Porsha

ekaterina in



                                                                 predicting glom

erular



                                                                 filtration rate

. Estimated



                                                                 GFR is not appl

icable for



                                                                 dialysis patien

ts



 ID - [id number] 03:53:48





             Test Item    Value        Reference Range Interpretation Comments

 

             MAGNESIUM (BEAKER) (test code = 2.0 mg/dL    1.6-2.6               

    



             627)                                                



 ID - [id number] 03:53:48





             Test Item    Value        Reference Range Interpretation Comments

 

             PHOSPHORUS (BEAKER) (test code = 4.8 mg/dL    2.3-4.7      H       

     



             604)                                                



 ID - ADMINCBC W/PLT COUNT &amp; AUTO [id number] 03:39:24





             Test Item    Value        Reference Range Interpretation Comments

 

             WHITE BLOOD CELL COUNT (BEAKER) 13.3 K/ L    3.5-10.5     H        

    



             (test code = 775)                                        

 

             RED BLOOD CELL COUNT (BEAKER) 4.96 M/ L    4.63-6.08               

  



             (test code = 761)                                        

 

             HEMOGLOBIN (BEAKER) (test code = 12.9 GM/DL   13.7-17.5    L       

     



             410)                                                

 

             HEMATOCRIT (BEAKER) (test code = 39.3 %       40.1-51.0    L       

     



             411)                                                

 

             MEAN CORPUSCULAR VOLUME (BEAKER) 79 fL        79-92                

     



             (test code = 753)                                        

 

             MEAN CORPUSCULAR HEMOGLOBIN 26.0 pg      25.7-32.2                 



             (BEAKER) (test code = 751)                                        

 

             MEAN CORPUSCULAR HEMOGLOBIN CONC 32.8 GM/DL   32.3-36.5            

     



             (BEAKER) (test code = 752)                                        

 

             RED CELL DISTRIBUTION WIDTH 14.6 %       11.6-14.4    H            



             (BEAKER) (test code = 412)                                        

 

             PLATELET COUNT (BEAKER) (test 171 K/CU MM  150-450                 

  



             code = 756)                                         

 

             MEAN PLATELET VOLUME (BEAKER) 10.2 fL      9.4-12.4                

  



             (test code = 754)                                        

 

             NUCLEATED RED BLOOD CELLS 0 /100 WBC   0-0                       



             (BEAKER) (test code = 413)                                        

 

             NEUTROPHILS RELATIVE PERCENT 87 %                                  

 



             (BEAKER) (test code = 429)                                        

 

             LYMPHOCYTES RELATIVE PERCENT 4 %                                   

 



             (BEAKER) (test code = 430)                                        

 

             MONOCYTES RELATIVE PERCENT 9 %                                    



             (BEAKER) (test code = 431)                                        

 

             EOSINOPHILS RELATIVE PERCENT 0 %                                   

 



             (BEAKER) (test code = 432)                                        

 

             BASOPHILS RELATIVE PERCENT 0 %                                    



             (BEAKER) (test code = 437)                                        

 

             NEUTROPHILS ABSOLUTE COUNT 11.55 K/ L   1.78-5.38    H            



             (BEAKER) (test code = 670)                                        

 

             LYMPHOCYTES ABSOLUTE COUNT 0.49 K/ L    1.32-3.57    L            



             (BEAKER) (test code = 414)                                        

 

             MONOCYTES ABSOLUTE COUNT (BEAKER) 1.16 K/ L    0.30-0.82    H      

      



             (test code = 415)                                        

 

             EOSINOPHILS ABSOLUTE COUNT 0.00 K/ L    0.04-0.54    L            



             (BEAKER) (test code = 416)                                        

 

             BASOPHILS ABSOLUTE COUNT (BEAKER) 0.02 K/ L    0.01-0.08           

      



             (test code = 417)                                        

 

             IMMATURE GRANULOCYTES-RELATIVE 0.50 %       0.00-1.00              

   



             PERCENT (BEAKER) (test code =                                      

  



             2801)                                               



BLOOD GAS, [id number] 03:20:41





             Test Item    Value        Reference Range Interpretation Comments

 

             PH ARTERIAL (BEAKER) (test code = 7.43         7.35-7.45           

      



             383)                                                

 

             PCO2 ARTERIAL (BEAKER) (test code 26 mm Hg     35-45        L      

      



             = 384)                                              

 

             PO2 ARTERIAL (BEAKER) (test code 155 mm Hg    80-90        H       

     



             = 385)                                              

 

             O2 SATURATION ARTERIAL (BEAKER) 99.1 %       96.0-97.0    H        

    



             (test code = 386)                                        

 

             HCO3 ARTERIAL (BEAKER) (test code 17 mmol/L    21-29        L      

      



             = 388)                                              

 

             BASE EXCESS ARTERIAL (BEAKER) -5.7 mmol/L  -2.0-3.0     L          

  



             (test code = 387)                                        

 

             PATIENT TEMPERATURE (BEAKER) 37.0                                  

 



             (test code = 1818)                                        

 

             FIO2 (BEAKER) (test code = 1819) 60.0                              

     



BLOOD GAS, [id number] 23:12:54





             Test Item    Value        Reference Range Interpretation Comments

 

             PH ARTERIAL (BEAKER) (test code = 7.42         7.35-7.45           

      



             383)                                                

 

             PCO2 ARTERIAL (BEAKER) (test code 27 mm Hg     35-45        L      

      



             = 384)                                              

 

             PO2 ARTERIAL (BEAKER) (test code 184 mm Hg    80-90        H       

     



             = 385)                                              

 

             O2 SATURATION ARTERIAL (BEAKER) 99.3 %       96.0-97.0    H        

    



             (test code = 386)                                        

 

             HCO3 ARTERIAL (BEAKER) (test code 17 mmol/L    21-29        L      

      



             = 388)                                              

 

             BASE EXCESS ARTERIAL (BEAKER) -6.0 mmol/L  -2.0-3.0     L          

  



             (test code = 387)                                        

 

             PATIENT TEMPERATURE (BEAKER) 36.5                                  

 



             (test code = 1818)                                        

 

             FIO2 (BEAKER) (test code = 1819) 80.0                              

     



XR ABDOMEN/KUB 1 VIEW [id number] 22:12:42
************************************************************[facility]: [facility] : 1965 Sex: 
M************************************************************TECHNIQUE:XR 
ABDOMEN/KUB 1 VIEW PORTABLEINDICATION: corpak.COMPARISON: 
2023FINDINGS:Feeding tube tip projects over the first portion of the 
duodenum. Nospecific evidence for bowel obstruction. Supine radiographs 
areinsensitive for detection of free intraperitoneal air.IMPRESSION:Feeding tube
 tip now projects over the first portion of the duodenum.Electronically Signed 
By: Ravi Fisher2023 22:14 CDTWorkstation Name: [id number] GAS,
 [id number] 20:54:23





             Test Item    Value        Reference Range Interpretation Comments

 

             PH ARTERIAL (BEAKER) (test code = 7.40         7.35-7.45           

      



             383)                                                

 

             PCO2 ARTERIAL (BEAKER) (test code 30 mm Hg     35-45        L      

      



             = 384)                                              

 

             PO2 ARTERIAL (BEAKER) (test code 187 mm Hg    80-90        H       

     



             = 385)                                              

 

             O2 SATURATION ARTERIAL (BEAKER) 99.3 %       96.0-97.0    H        

    



             (test code = 386)                                        

 

             HCO3 ARTERIAL (BEAKER) (test code 18 mmol/L    21-29        L      

      



             = 388)                                              

 

             BASE EXCESS ARTERIAL (BEAKER) -5.5 mmol/L  -2.0-3.0     L          

  



             (test code = 387)                                        

 

             PATIENT TEMPERATURE (BEAKER) 37.0                                  

 



             (test code = 1818)                                        

 

             FIO2 (BEAKER) (test code = 1819) 100.0                             

     



BLOOD GAS, [id number] 20:16:35





             Test Item    Value        Reference Range Interpretation Comments

 

             PH ARTERIAL (BEAKER) (test code = 7.36         7.35-7.45           

      



             383)                                                

 

             PCO2 ARTERIAL (BEAKER) (test code 32 mm Hg     35-45        L      

      



             = 384)                                              

 

             PO2 ARTERIAL (BEAKER) (test code 211 mm Hg    80-90        H       

     



             = 385)                                              

 

             O2 SATURATION ARTERIAL (BEAKER) 99.4 %       96.0-97.0    H        

    



             (test code = 386)                                        

 

             HCO3 ARTERIAL (BEAKER) (test code 18 mmol/L    21-29        L      

      



             = 388)                                              

 

             BASE EXCESS ARTERIAL (BEAKER) -6.3 mmol/L  -2.0-3.0     L          

  



             (test code = 387)                                        

 

             PATIENT TEMPERATURE (BEAKER) 37.9                                  

 



             (test code = 1818)                                        

 

             FIO2 (BEAKER) (test code = 1819) 100.0                             

     



XR ABDOMEN/KUB 1 VIEW [id number] 19:23:18
************************************************************[facility] 1965 Sex: 
M************************************************************TECHNIQUE:XR 
ABDOMEN/KUB 1 VIEW PORTABLEINDICATION: corpak.COMPARISON: 
2023FINDINGS:Feeding tube with the tip projecting over the gastric fundus. 
Mildgaseous distention of the stomach. No specific evidence for 
bowelobstruction. Supine radiographs are insensitive for detection of 
freeintraperitoneal air.IMPRESSION:Feeding tube tip projects over the gastric 
fundus.Electronically Signed By: Ravi Fisher2023 19:25 
CDTWorkstation Name: [id number] CHEST 1 VIEW PORTABLE / [id number] 
19:07:36************************************************************[facility] : 1965 Sex: 
M************************************************************TECHNIQUE:Frontal 
view of the chest.INDICATION: Intubation.COMPARISON: 2023 at at 1:49 
AM.FINDINGS:LINES/TUBES: Endotracheal tube tip projected 4.4 cm above the level 
ofthe karen. Feeding tube tip projectedover the gastric fundus.HEART AND 
MEDIASTINUM: Cardiomediastinal contour is stable. LUNGS: Increase in right 
perihilar opacity. Hazy left infrahilaropacity.PLEURA: No pneumothorax. No 
significant pleural effusion.SOFT TISSUES AND BONES: Unremarkable.IMPRESSION:1. 
Endotracheal tube tip projected 4.4 cmabove the level of the karen.There are 
two increasing right perihilar and left infrahilar opacities.This may be related
 to asymmetric edema or developing pneumonia.Follow-up to resolution is 
advised.Electronically Signed By: Ravi Fisher2023 19:09 
CDTWorkstation Name: [id number] GAS, [id number] 18:22:09





             Test Item    Value        Reference Range Interpretation Comments

 

             PH ARTERIAL (BEAKER) (test code = 7.37         7.35-7.45           

      



             383)                                                

 

             PCO2 ARTERIAL (BEAKER) (test code 29 mm Hg     35-45        L      

      



             = 384)                                              

 

             PO2 ARTERIAL (BEAKER) (test code 151 mm Hg    80-90        H       

     



             = 385)                                              

 

             O2 SATURATION ARTERIAL (BEAKER) 98.8 %       96.0-97.0    H        

    



             (test code = 386)                                        

 

             HCO3 ARTERIAL (BEAKER) (test code 16 mmol/L    21-29        L      

      



             = 388)                                              

 

             BASE EXCESS ARTERIAL (BEAKER) -7.5 mmol/L  -2.0-3.0     L          

  



             (test code = 387)                                        

 

             PATIENT TEMPERATURE (BEAKER) 38.5                                  

 



             (test code = 1818)                                        

 

             FIO2 (BEAKER) (test code = 1819) 100.0                             

     



POCT-GLUCOSE [id number] 17:11:20





             Test Item    Value        Reference Range Interpretation Comments

 

             POC-GLUCOSE METER 143 mg/dL           H            : TESTED A

[facility] [address]



             (BEAKER) (test code =                                        [place],



             1538)                                               [id number]:



                                                                 /Techni

deejay ID



                                                                 = [id number] for



                                                                 Kingsley Diallo



BLOOD GAS, [id number] 17:03:39





             Test Item    Value        Reference Range Interpretation Comments

 

             PH ARTERIAL (BEAKER) (test code = 7.23         7.35-7.45    L      

      



             383)                                                

 

             PCO2 ARTERIAL (BEAKER) (test code 51 mm Hg     35-45        H      

      



             = 384)                                              

 

             PO2 ARTERIAL (BEAKER) (test code 169 mm Hg    80-90        H       

     



             = 385)                                              

 

             O2 SATURATION ARTERIAL (BEAKER) 98.8 %       96.0-97.0    H        

    



             (test code = 386)                                        

 

             HCO3 ARTERIAL (BEAKER) (test code 21 mmol/L    21-29               

      



             = 388)                                              

 

             BASE EXCESS ARTERIAL (BEAKER) -6.9 mmol/L  -2.0-3.0     L          

  



             (test code = 387)                                        

 

             PATIENT TEMPERATURE (BEAKER) 38.1                                  

 



             (test code = 1818)                                        

 

             FIO2 (BEAKER) (test code = 1819) 50.0                              

     



XR CHEST 1 VIEW PORTABLE / [id number] 15:04:51
************************************************************[facility] : 1965 Sex: 
M************************************************************CLINICAL HISTORY: 
Respiratory distress TECHNIQUE: 1 view of the chest.COMPARISON: 
2023IMPRESSION:ETT removal. Feeding tube remains below the diaphragm.There 
is new right lung base consolidation/atelectasis. Left infrahilaratelectasis is 
unchanged. There is blunting of the right costophrenicangle. The cardiome
diastinal silhouette is magnified by technique.Electronically Signed By: Abimael Art2023 15:06 CDTWorkstation Name: [id number] GAS, ARTERIAL
2023 13:58:39





             Test Item    Value        Reference Range Interpretation Comments

 

             PH ARTERIAL (BEAKER) (test code = 7.41         7.35-7.45           

      



             383)                                                

 

             PCO2 ARTERIAL (BEAKER) (test code 26 mm Hg     35-45        L      

      



             = 384)                                              

 

             PO2 ARTERIAL (BEAKER) (test code 197 mm Hg    80-90        H       

     



             = 385)                                              

 

             O2 SATURATION ARTERIAL (BEAKER) 99.4 %       96.0-97.0    H        

    



             (test code = 386)                                        

 

             HCO3 ARTERIAL (BEAKER) (test code 16 mmol/L    21-29        L      

      



             = 388)                                              

 

             BASE EXCESS ARTERIAL (BEAKER) -6.7 mmol/L  -2.0-3.0     L          

  



             (test code = 387)                                        

 

             PATIENT TEMPERATURE (BEAKER) 37.2                                  

 



             (test code = 1818)                                        

 

             FIO2 (BEAKER) (test code = 1819) 44.0                              

     



POCT-GLUCOSE [id number] 13:32:55





             Test Item    Value        Reference Range Interpretation Comments

 

             POC-GLUCOSE METER 100 mg/dL                        : TESTED A

[facility]



             ([employer]) (test code =                                        [facility],



             1538)                                               [id number]:



                                                                 /Techni

deejay ID



                                                                 = [id number] for



                                                                 CardoliverJavy sanchez



                                                                 Kingsley



[id number] 12:02:30





             Test Item    Value        Reference Range Interpretation Comments

 

             PROCALCITONIN (BEAKER) (test code 0.12 ng/mL   <0.05        H      

      



             = 3036)                                             



SEPSIS RISK (ng/mL)Low: 0.05-0.50Intermediate: 0.51-2.00High: &gt;=2.01LACTIC 
ACID, [id number] 11:45:29





             Test Item    Value        Reference Range Interpretation Comments

 

             LACTATE BLOOD VENOUS 0.94 mmol/L  0.50-2.00                 Specime

n slightly



             (2) (BEAKER) (test                                        hemolyzed



             code = 2872)                                        



 ID - MARCOPOCT-GLUCOSE [id number] 09:00:12





             Test Item    Value        Reference Range Interpretation Comments

 

             POC-GLUCOSE METER 114 mg/dL           H            : TESTED A

[facility] [address]



             ([employer]) (test code =                                        [facility]

[facility],



             1538)                                               [id number]:



                                                                 /Techni

deejay ID



                                                                 = [id number] for



                                                                 Kingsley Diallo



XR CHEST 1 VIEW PORTABLE / [id number] 08:33:53
********************************[facility] 1965 Sex: 
M************************************************************CLINICAL HISTORY: 
IntubatedTECHNIQUE: 1 view of the chest.COMPARISON: 2023IMPRESSION:ETT and 
feeding tube unchanged. Bibasilar bandlike opacities unchanged.No significant 
pleural fluid. The cardiomediastinal silhouette ismagnified by 
technique.Electronically Signed By: Abimael Art2023 08:35 CDTWorkstation 
Name: [id number] GAS, [id number] 04:54:00





             Test Item    Value        Reference Range Interpretation Comments

 

             PH ARTERIAL (BEAKER) (test code = 7.44         7.35-7.45           

      



             383)                                                

 

             PCO2 ARTERIAL (BEAKER) (test code 27 mm Hg     35-45        L      

      



             = 384)                                              

 

             PO2 ARTERIAL (BEAKER) (test code 174 mm Hg    80-90        H       

     



             = 385)                                              

 

             O2 SATURATION ARTERIAL (BEAKER) 99.3 %       96.0-97.0    H        

    



             (test code = 386)                                        

 

             HCO3 ARTERIAL (BEAKER) (test code 18 mmol/L    21-29        L      

      



             = 388)                                              

 

             BASE EXCESS ARTERIAL (BEAKER) -4.2 mmol/L  -2.0-3.0     L          

  



             (test code = 387)                                        

 

             PATIENT TEMPERATURE (BEAKER) 37.5                                  

 



             (test code = 1818)                                        

 

             FIO2 (BEAKER) (test code = 1819) 24.0                              

     



BASIC METABOLIC [id number] 04:25:31





             Test Item    Value        Reference Range Interpretation Comments

 

             SODIUM (BEAKER) 139 meq/L    136-145                   



             (test code = 381)                                        

 

             POTASSIUM    4.4 meq/L    3.5-5.1                   Specimen slight

ly



             (BEAKER) (test                                        hemolyzed



             code = 379)                                         

 

             CHLORIDE (BEAKER) 112 meq/L           H            



             (test code = 382)                                        

 

             CO2 (BEAKER) 19 meq/L     22-29        L            



             (test code = 355)                                        

 

             BLOOD UREA   19 mg/dL     7-21                      



             NITROGEN (BEAKER)                                        



             (test code = 354)                                        

 

             CREATININE   1.02 mg/dL   0.57-1.25                 Specimen slight

ly



             (BEAKER) (test                                        hemolyzed



             code = 358)                                         

 

             GLUCOSE RANDOM 111 mg/dL           H            



             (BEAKER) (test                                        



             code = 652)                                         

 

             CALCIUM (BEAKER) 8.2 mg/dL    8.4-10.2     L            



             (test code = 697)                                        

 

             EGFR (BEAKER) 87                                      Interpretatio

n of eGFR



             (test code = mL/min/1.73                            values Stage De

scription



             1092)        sq m                                   Result G1 Althea

l or high



                                                                 >=90 G2 Mildly 

decreased



                                                                 60-89 G3a Mildl

y to



                                                                 moderately 45-5

9 G3b



                                                                 Moderately to s

everely



                                                                 30-44 G4 Severl

y decreased



                                                                 15-29 G5 Kidney

 failure



                                                                 <15Reported eGF

R is based



                                                                 on the CKD-EPI 





                                                                 equation that d

oes not use



                                                                 a race



                                                                 coefficientEsti

mated GFR



                                                                 is not as accur

ate as



                                                                 Creatinine Porsha tobar in



                                                                 predicting glom

erular



                                                                 filtration rate

. Estimated



                                                                 GFR is not appl

icable for



                                                                 dialysis patien

ts



 ID - DBCBC W/PLT COUNT &amp; AUTO [id number] 04:12:40





             Test Item    Value        Reference Range Interpretation Comments

 

             WHITE BLOOD CELL COUNT (BEAKER) 13.6 K/ L    3.5-10.5     H        

    



             (test code = 775)                                        

 

             RED BLOOD CELL COUNT (BEAKER) 5.76 M/ L    4.63-6.08               

  



             (test code = 761)                                        

 

             HEMOGLOBIN (BEAKER) (test code = 14.6 GM/DL   13.7-17.5            

     



             410)                                                

 

             HEMATOCRIT (BEAKER) (test code = 45.8 %       40.1-51.0            

     



             411)                                                

 

             MEAN CORPUSCULAR VOLUME (BEAKER) 80 fL        79-92                

     



             (test code = 753)                                        

 

             MEAN CORPUSCULAR HEMOGLOBIN 25.3 pg      25.7-32.2    L            



             (BEAKER) (test code = 751)                                        

 

             MEAN CORPUSCULAR HEMOGLOBIN CONC 31.9 GM/DL   32.3-36.5    L       

     



             (BEAKER) (test code = 752)                                        

 

             RED CELL DISTRIBUTION WIDTH 14.1 %       11.6-14.4                 



             (BEAKER) (test code = 412)                                        

 

             PLATELET COUNT (BEAKER) (test 191 K/CU MM  150-450                 

  



             code = 756)                                         

 

             MEAN PLATELET VOLUME (BEAKER) 10.2 fL      9.4-12.4                

  



             (test code = 754)                                        

 

             NUCLEATED RED BLOOD CELLS 0 /100 WBC   0-0                       



             (BEAKER) (test code = 413)                                        

 

             NEUTROPHILS RELATIVE PERCENT 88 %                                  

 



             (BEAKER) (test code = 429)                                        

 

             LYMPHOCYTES RELATIVE PERCENT 4 %                                   

 



             (BEAKER) (test code = 430)                                        

 

             MONOCYTES RELATIVE PERCENT 7 %                                    



             (BEAKER) (test code = 431)                                        

 

             EOSINOPHILS RELATIVE PERCENT 0 %                                   

 



             (BEAKER) (test code = 432)                                        

 

             BASOPHILS RELATIVE PERCENT 0 %                                    



             (BEAKER) (test code = 437)                                        

 

             NEUTROPHILS ABSOLUTE COUNT 11.98 K/ L   1.78-5.38    H            



             (BEAKER) (test code = 670)                                        

 

             LYMPHOCYTES ABSOLUTE COUNT 0.58 K/ L    1.32-3.57    L            



             (BEAKER) (test code = 414)                                        

 

             MONOCYTES ABSOLUTE COUNT (BEAKER) 0.97 K/ L    0.30-0.82    H      

      



             (test code = 415)                                        

 

             EOSINOPHILS ABSOLUTE COUNT 0.02 K/ L    0.04-0.54    L            



             (BEAKER) (test code = 416)                                        

 

             BASOPHILS ABSOLUTE COUNT (BEAKER) 0.02 K/ L    0.01-0.08           

      



             (test code = 417)                                        

 

             IMMATURE GRANULOCYTES-RELATIVE 0.50 %       0.00-1.00              

   



             PERCENT (BEAKER) (test code =                                      

  



             2801)                                               



POCT-GLUCOSE [id number] 04:02:47





             Test Item    Value        Reference Range Interpretation Comments

 

             POC-GLUCOSE METER 118 mg/dL           H            : TESTED A

T [facility] [address]



             (BEAKER) (test code =                                        [facility],



             1538)                                               [address]:



                                                                 /Techni

deejay ID



                                                                 = [id number] for



                                                                 STEPHANIE KEENE



POCT-GLUCOSE [id number] 22:18:43





             Test Item    Value        Reference Range Interpretation Comments

 

             POC-GLUCOSE METER 112 mg/dL           H            : TESTED A

T [facility] [address]



             (BEAKER) (test code =                                        [place],



             1538)                                               [address]:



                                                                 /Techni

deejay ID



                                                                 = [id number] for



                                                                 STEPHANIE KEENE



POCT-GLUCOSE [id number] 18:11:35





             Test Item    Value        Reference Range Interpretation Comments

 

             POC-GLUCOSE METER 100 mg/dL                        : TESTED A

[facility] [address]



             (BEAKER) (test code =                                        [place] [facility],



             1538)                                               [address]:



                                                                 /Techni

deejay ID



                                                                 = [id number] for Allyssa Bui



URINALYSIS WITH MICROSCOPIC IF [id number] 14:21:46





             Test Item    Value        Reference Range Interpretation Comments

 

             COLOR (BEAKER) (test code = 470) Light Yellow                      

     

 

             CLARITY (BEAKER) (test code = Clear                                

  



             469)                                                

 

             SPECIFIC GRAVITY UA (BEAKER) 1.023        1.001-1.035              

 



             (test code = 468)                                        

 

             PH UA (BEAKER) (test code = 467) 6.0          5.0-8.0              

     

 

             PROTEIN UA (BEAKER) (test code = Negative     Negative             

     



             464)                                                

 

             GLUCOSE UA (BEAKER) (test code = Negative     Negative             

     



             365)                                                

 

             KETONES UA (BEAKER) (test code = Negative     Negative             

     



             371)                                                

 

             BILIRUBIN UA (BEAKER) (test code Negative     Negative             

     



             = 462)                                              

 

             BLOOD UA (BEAKER) (test code = Negative     Negative               

   



             461)                                                

 

             NITRITE UA (BEAKER) (test code = Negative     Negative             

     



             465)                                                

 

             LEUKOCYTE ESTERASE UA (BEAKER) Negative     Negative               

   



             (test code = 466)                                        

 

             UROBILINOGEN UA (BEAKER) (test 0.2          0.2-1.0                

   



             code = 463)                                         

 

             SOURCE(BEAKER) (test code =                                        



             2795)                                               



 ID - [auto]XR CHEST 1 VIEW PORTABLE / [id number] 13:26:09
************************************************************[facility] : 1965 Sex: 
M************************************************************CLINICAL HISTORY: 
IntubatedTECHNIQUE: 1 view of the chest.COMPARISON: 2023IMPRESSION:ETT again
 seen. New feeding tube in the distal stomach. There isincreased right basilar 
atelectasis. There is no significant pleuralfluid.Electronically Signed By: 
Abimael Art2023 13:28 CDTWorkstation Name: [id number] GAS, ARTERIAL
2023 09:38:22





             Test Item    Value        Reference Range Interpretation Comments

 

             PH ARTERIAL (BEAKER) (test code = 7.44         7.35-7.45           

      



             383)                                                

 

             PCO2 ARTERIAL (BEAKER) (test code 30 mm Hg     35-45        L      

      



             = 384)                                              

 

             PO2 ARTERIAL (BEAKER) (test code 130 mm Hg    80-90        H       

     



             = 385)                                              

 

             O2 SATURATION ARTERIAL (BEAKER) 98.7 %       96.0-97.0    H        

    



             (test code = 386)                                        

 

             HCO3 ARTERIAL (BEAKER) (test code 20 mmol/L    21-29        L      

      



             = 388)                                              

 

             BASE EXCESS ARTERIAL (BEAKER) -3.0 mmol/L  -2.0-3.0     L          

  



             (test code = 387)                                        

 

             PATIENT TEMPERATURE (BEAKER) 37.0                                  

 



             (test code = 1818)                                        

 

             FIO2 (BEAKER) (test code = 1819) 21.0                              

     



POCT-GLUCOSE [id number] 06:10:38





             Test Item    Value        Reference Range Interpretation Comments

 

             POC-GLUCOSE METER 94 mg/dL                         : TESTED A

[facility] [address]



             (BEAKER) (test code =                                        [place],



             1538)                                               [address]:



                                                                 /Techni

deejay ID =



                                                                 [id number] for Keshia de leon



                                                                 Kendra



BASIC METABOLIC [id number] 03:44:06





             Test Item    Value        Reference Range Interpretation Comments

 

             SODIUM (BEAKER) 140 meq/L    136-145                   



             (test code = 381)                                        

 

             POTASSIUM    4.0 meq/L    3.5-5.1                   



             (BEAKER) (test                                        



             code = 379)                                         

 

             CHLORIDE (BEAKER) 113 meq/L           H            



             (test code = 382)                                        

 

             CO2 (BEAKER) 21 meq/L     22-29        L            



             (test code = 355)                                        

 

             BLOOD UREA   22 mg/dL     7-21         H            



             NITROGEN (BEAKER)                                        



             (test code = 354)                                        

 

             CREATININE   0.90 mg/dL   0.57-1.25                 



             (BEAKER) (test                                        



             code = 358)                                         

 

             GLUCOSE RANDOM 118 mg/dL           H            



             (BEAKER) (test                                        



             code = 652)                                         

 

             CALCIUM (BEAKER) 8.2 mg/dL    8.4-10.2     L            



             (test code = 697)                                        

 

             EGFR (BEAKER) 101                                     Interpretatio

n of eGFR



             (test code = mL/min/1.73                            values Stage  D

escription



             1092)        sq m                                   Result G1 Althea

l or high



                                                                 >=90 G2 Mildly 

decreased



                                                                 60-89 G3a Mildl

y to



                                                                 moderately 45-5

9 G3b



                                                                 Moderately to s

everely



                                                                 30-44 G4 Severl

y decreased



                                                                 15-29 G5 Kidney

 failure



                                                                 <15Reported eGF

R is based



                                                                 on the CKD-EPI 





                                                                 equation that d

oes not use



                                                                 a race



                                                                 coefficientEsti

mated GFR



                                                                 is not as accur

ate as



                                                                 Creatinine Porsha

ekaterina in



                                                                 predicting glom

erular



                                                                 filtration rate

. Estimated



                                                                 GFR is not appl

icable for



                                                                 dialysis patien

ts



 ID - [id number] 03:44:06





             Test Item    Value        Reference Range Interpretation Comments

 

             MAGNESIUM (BEAKER) (test code = 2.5 mg/dL    1.6-2.6               

    



             627)                                                



 ID - [id number] 03:44:06





             Test Item    Value        Reference Range Interpretation Comments

 

             PHOSPHORUS (BEAKER) (test code = 2.7 mg/dL    2.3-4.7              

     



             604)                                                



 ID - [id number] 03:44:05





             Test Item    Value        Reference Range Interpretation Comments

 

             PROTIME (BEAKER) (test code = 14.7 seconds 11.9-14.2    H          

  



             759)                                                

 

             INR (BEAKER) (test code = 370) 1.17         <=5.90                 

   



RECOMMENDED COUMADIN/WARFARIN INR THERAPY RANGESSTANDARD DOSE: 2.0 - 3.0 
Includes: PROPHYLAXIS for venous thrombosis, systemic embolization; TREATMENT 
for venous thrombosis and/or pulmonary embolus.HIGH RISK: Target INR is 2.5-3.5 
for patients with mechanical heart valves.CBC W/PLT COUNT &amp; AUTO 
[id number] 03:34:51





             Test Item    Value        Reference Range Interpretation Comments

 

             WHITE BLOOD CELL COUNT (BEAKER) 19.1 K/ L    3.5-10.5     H        

    



             (test code = 775)                                        

 

             RED BLOOD CELL COUNT (BEAKER) 5.34 M/ L    4.63-6.08               

  



             (test code = 761)                                        

 

             HEMOGLOBIN (BEAKER) (test code = 13.9 GM/DL   13.7-17.5            

     



             410)                                                

 

             HEMATOCRIT (BEAKER) (test code = 41.8 %       40.1-51.0            

     



             411)                                                

 

             MEAN CORPUSCULAR VOLUME (BEAKER) 78 fL        79-92        L       

     



             (test code = 753)                                        

 

             MEAN CORPUSCULAR HEMOGLOBIN 26.0 pg      25.7-32.2                 



             (BEAKER) (test code = 751)                                        

 

             MEAN CORPUSCULAR HEMOGLOBIN CONC 33.3 GM/DL   32.3-36.5            

     



             (BEAKER) (test code = 752)                                        

 

             RED CELL DISTRIBUTION WIDTH 14.5 %       11.6-14.4    H            



             (BEAKER) (test code = 412)                                        

 

             PLATELET COUNT (BEAKER) (test 178 K/CU MM  150-450                 

  



             code = 756)                                         

 

             MEAN PLATELET VOLUME (BEAKER) 9.9 fL       9.4-12.4                

  



             (test code = 754)                                        

 

             NUCLEATED RED BLOOD CELLS 0 /100 WBC   0-0                       



             (BEAKER) (test code = 413)                                        

 

             NEUTROPHILS RELATIVE PERCENT 87 %                                  

 



             (BEAKER) (test code = 429)                                        

 

             LYMPHOCYTES RELATIVE PERCENT 7 %                                   

 



             (BEAKER) (test code = 430)                                        

 

             MONOCYTES RELATIVE PERCENT 6 %                                    



             (BEAKER) (test code = 431)                                        

 

             EOSINOPHILS RELATIVE PERCENT 0 %                                   

 



             (BEAKER) (test code = 432)                                        

 

             BASOPHILS RELATIVE PERCENT 0 %                                    



             (BEAKER) (test code = 437)                                        

 

             NEUTROPHILS ABSOLUTE COUNT 16.60 K/ L   1.78-5.38    H            



             (BEAKER) (test code = 670)                                        

 

             LYMPHOCYTES ABSOLUTE COUNT 1.25 K/ L    1.32-3.57    L            



             (BEAKER) (test code = 414)                                        

 

             MONOCYTES ABSOLUTE COUNT (BEAKER) 1.09 K/ L    0.30-0.82    H      

      



             (test code = 415)                                        

 

             EOSINOPHILS ABSOLUTE COUNT 0.03 K/ L    0.04-0.54    L            



             (BEAKER) (test code = 416)                                        

 

             BASOPHILS ABSOLUTE COUNT (BEAKER) 0.02 K/ L    0.01-0.08           

      



             (test code = 417)                                        

 

             IMMATURE GRANULOCYTES-RELATIVE 0.60 %       0.00-1.00              

   



             PERCENT (BEAKER) (test code =                                      

  



             2801)                                               



BLOOD GAS, [id number] 03:23:38





             Test Item    Value        Reference Range Interpretation Comments

 

             PH ARTERIAL (BEAKER) (test code = 7.44         7.35-7.45           

      



             383)                                                

 

             PCO2 ARTERIAL (BEAKER) (test code 31 mm Hg     35-45        L      

      



             = 384)                                              

 

             PO2 ARTERIAL (BEAKER) (test code 211 mm Hg    80-90        H       

     



             = 385)                                              

 

             O2 SATURATION ARTERIAL (BEAKER) 99.5 %       96.0-97.0    H        

    



             (test code = 386)                                        

 

             HCO3 ARTERIAL (BEAKER) (test code 21 mmol/L    21-29               

      



             = 388)                                              

 

             BASE EXCESS ARTERIAL (BEAKER) -2.3 mmol/L  -2.0-3.0     L          

  



             (test code = 387)                                        

 

             PATIENT TEMPERATURE (BEAKER) 37.0                                  

 



             (test code = 1818)                                        

 

             FIO2 (BEAKER) (test code = 1819) 100.0                             

     



CT BRAIN WITHOUT IV [id number] 01:08:18
************************************************************[facility] : 1965 Sex: 
M************************************************************EXAM: CT BRAIN 
WITHOUT IV CONTRASTINDICATION: Stroke, follow upTECHNIQUE: CT images from skull 
base to vertex without IV contrast.This exam was performed according to the 
departmental dose optimizationprogram which includes automated exposure control,
 adjustment of the mAand/or kV according to the patient size, and/or use of an 
iterativereconstruction technique.COMPARISON: Exam from 17 hours priorFINDINGS: 
Parenchyma: Unchanged intraparenchymal hemorrhage centered within theleft 
hemipons extending to the midbrain and left cerebral peduncle. Nochange in mild 
mass effect with slight effacement of the fourthventricle. No hydrocephalus. No 
intraventricular extension ofhemorrhage. No hydrocephalus. No evidence ofacute 
infarction. No newhemorrhage.Extra-axial Collection: NoneVentricular System: As 
above.Osseous Structures: No acute osseous abnormality. Included Orbits: 
NormalParanasal Sinuses: Predominantly clearTympanomastoid Cavities: 
NormalOther: NoneIMPRESSION:Unchanged hemorrhage centered within the left h
emipons extending to themidbrain and left cerebral peduncle. No acute interval 
abnormality.Electronically Signed By: Maciej Lozada2023 01:11 CDTWorkstation
 Name: [id number] ABDOMEN/KUB 1 VIEW [id number] 17:52:58
************************************************************[facility] : 1965 Sex: 
M************************************************************XR ABDOMEN/KUB 1 
VIEW PORTABLEINDICATION: [employer] advanceCOMPARISON: NoneTECHNIQUE: Limited 
portable radiograph of the lower chest and upperabdomen was acquired for 
purposes of evaluating tube placementFINDINGS/IMPRESSION:Feeding tube tip 
overlies the distal stomach. Electronically Signed By: Yojana Buckley2023 
17:55 CDTWorkstation Name: [id number] 17:26:34





             Test Item    Value        Reference Range Interpretation Comments

 

             PHOSPHORUS (BEAKER) (test code = 3.4 mg/dL    2.3-4.7              

     



             604)                                                



 ID - [id number] 17:26:34





             Test Item    Value        Reference Range Interpretation Comments

 

             POTASSIUM (BEAKER) (test code = 4.2 meq/L    3.5-5.1               

    



             379)                                                



 ID - [id number] 17:26:33





             Test Item    Value        Reference Range Interpretation Comments

 

             MAGNESIUM (BEAKER) (test code = 3.2 mg/dL    1.6-2.6      H        

    



             627)                                                



 ID - ADMINXR ABDOMEN/KUB 1 VIEW [id number] 14:07:03
************************************************************[facility] : 1965 Sex: 
M************************************************************XR ABDOMEN/KUB 1 
VIEW PORTABLETECHNIQUE: Supine radiograph(s) of the abdomen and pelvis.HISTORY: 
Enteric tube placement verificationCOMPARISON: NoneIMPRESSION:Lines and tubes: 
Enteric tube is seen with tip at thegastric antrum.Bowel gas pattern: 
Nonobstructive bowel gas pattern. Moderate stoolthroughout the colonOther: No 
acute osseous abnormality. Residual contrast is seen in thebilateral collecting 
system and urinary bladder.Electronically Signed By: Britta Wheatley2023 
14:09 CDTWorkstation Name: [id number] GAS, [id number] 11:02:55





             Test Item    Value        Reference Range Interpretation Comments

 

             PH ARTERIAL (BEAKER) (test code = 7.41         7.35-7.45           

      



             383)                                                

 

             PCO2 ARTERIAL (BEAKER) (test code 33 mm Hg     35-45        L      

      



             = 384)                                              

 

             PO2 ARTERIAL (BEAKER) (test code 168 mm Hg    80-90        H       

     



             = 385)                                              

 

             O2 SATURATION ARTERIAL (BEAKER) 99.2 %       96.0-97.0    H        

    



             (test code = 386)                                        

 

             HCO3 ARTERIAL (BEAKER) (test code 21 mmol/L    21-29               

      



             = 388)                                              

 

             BASE EXCESS ARTERIAL (BEAKER) -3.1 mmol/L  -2.0-3.0     L          

  



             (test code = 387)                                        

 

             PATIENT TEMPERATURE (BEAKER) 37.0                                  

 



             (test code = 1818)                                        

 

             FIO2 (BEAKER) (test code = 1819) 30.0                              

     



HEMOGLOBIN [id number] 11:01:34





             Test Item    Value        Reference Range Interpretation Comments

 

             HEMOGLOBIN A1C 5.6 %        See_Comment                [Automated m

essage]



             ELECTROPHORESIS (BEAKER)                                        The

 system which



             (test code = 3811)                                        generated

 this result



                                                                 transmitted ref

erence



                                                                 range: <=5.6%. 

The



                                                                 reference range

 was



                                                                 not used to int

erpret



                                                                 this result as



                                                                 normal/abnormal

.



"The A1c is measured using a [facility]-certified method. HbA1c value equal to or 
greater than 6.5% as thediagnosis cutoff for diabetes. An HbA1c value of 5.7-
6.4% indicates increased risk for diabetes (prediabetes)." ID - ADM
HEPATIC FUNCTION [id number] 10:57:01





             Test Item    Value        Reference Range Interpretation Comments

 

             TOTAL PROTEIN (BEAKER) (test code = 6.2 gm/dL    6.0-8.3           

        



             770)                                                

 

             ALBUMIN (BEAKER) (test code = 1145) 3.6 g/dL     3.5-5.0           

        

 

             BILIRUBIN TOTAL (BEAKER) (test code 0.5 mg/dL    0.2-1.2           

        



             = 377)                                              

 

             BILIRUBIN DIRECT (BEAKER) (test 0.2 mg/dL    0.1-0.5               

    



             code = 706)                                         

 

             ALKALINE PHOSPHATASE (BEAKER) (test 88 U/L                   

        



             code = 346)                                         

 

             AST (SGOT) (BEAKER) (test code = 25 U/L       5-34                 

     



             353)                                                

 

             ALT (SGPT) (BEAKER) (test code = 16 U/L       6-55                 

     



             347)                                                



 ID - EOOCTA [id number] 08:10:09
************************************************************[facility] : 1965 Sex: 
M************************************************************CT BRAIN WITHOUT IV
CONTRAST, CTA CAROTID, CTA BRAINBRAIN CT WITHOUT CONTRASTINDICATION: Cerebral 
hemorrhage suspectedCOMPARISON: CT head, 7/3/2023TECHNIQUE:Rapid acquisition 
spiral images were obtained between the aortic archand the cranial vertex during
intravenous contrast infusion toreconstruct axial images and angiographic 3D 
maximum intensityprojections (MIP). 3-D volumetric reformatted images were 
created at [employer] workstation. Precontrast images of the brain were 
alsoobtained. Stenosis evaluation reported in compliance with NASCET 
criteria.DOSE REDUCTION: Dose modulation, iterative reconstruction,and/orweight-
based adjustment of the mA/kV was utilized to reduce theradiation dose to as low
as reasonably achievable.FINDINGS:CT BRAIN:Chronic lacunar infarct in the left 
frontal corona radiata. Chroniclacunar infarct in the right putamen/external 
capsule.A 3.7 x 2.0 x 2.5 cm (CC X AP X transverse) intraparenchymal 
hematomaseen centered in the left morgan extending to the left midbrain. 
Midlinestructures are normally developed. Hypoattenuation within 
theperiventricular and subcortical white matter is present, nonspecific 
byimaging, however, statistically representing chronic microvascularchanges in
this age group. Diffuse senescent parenchymal volume loss.No 
hydrocephalus.Atherosclerotic calcification of the intracranial internal carotid
andvertebral arteries. Orbits are within normal limits.Retention cysts in the 
right maxillary sinus. Endotracheal tube ispartially imaged. CTA BRAIN:Internal 
carotid arteries: Petrous, cavernous and supraclinoid portionspatent. Middle 
cerebral arteries: Patent to distal branches.Anterior cerebral arteries: Patent.
No A-comm aneurysm.Basilar system: Patent vertebrobasilar system.Posterior 
cerebral arteries:Patent P1 and P2 segments. Venous opacification: Majordural 
sinuses unremarkable for bolus timing.Additional findings: None.CTA NECK:Common 
carotid arteries: The common carotid arteries are normal in size. Bifurcations: 
No flow-limiting stenosis. Cervicalinternal carotid arteries: No flow limiting 
stenosis.Vertebral arteries: Codominant. No origin stenosis.Arch anatomy: 
Conventional.Nonvascular findings:Osseous structures: No acute osseous 
abnormality.Intact calvarium andskull base. Mild spondylosis and facet 
arthropathy are present withinthe spine.Cervical soft tissues: No adenopathy. 
Patent aerodigestive tract. Theendotracheal tube extends to the proximal 
trachea.Lung apices: No apical consolidation or pneumothorax.IMPRESSION:CT 
brain:1. No significant change.2. Evolving 3.7 x 2.0 x 2.5 cm intraparenchymal 
hematoma in the leftpons extending to the left midbrain3. Chronic lacunar 
infarct in the right basal ganglia.CTA head and neck:1. No acute vascular 
abnormality in the head and neck.Electronically Signed By: Evaristo Villasenor2023 08:12 CDTWorkstation Name: [id number] 
08:10:09************************************************************[facility] : 1965 Sex: 
M************************************************************CT BRAIN WITHOUT IV
CONTRAST, CTA CAROTID, CTA BRAINBRAIN CT WITHOUT CONTRASTINDICATION: Cerebral 
hemorrhage suspectedCOMPARISON: CT head, 7/3/2023TECHNIQUE:Rapid acquisition 
spiral images were obtained between the aortic archand the cranial vertex during
intravenous contrast infusion toreconstruct axial images and angiographic 3D 
maximum intensityprojections (MIP). 3-D volumetric reformatted images were 
created at [employer] workstation. Precontrast images of the brain were 
alsoobtained. Stenosis evaluation reported in compliance with NASCET 
criteria.DOSE REDUCTION: Dose modulation, iterative reconstruction,and/orweight-
based adjustment of the mA/kV was utilized to reduce theradiation dose to as low
as reasonably achievable.FINDINGS:CT BRAIN:Chronic lacunar infarct in the left 
frontal corona radiata. Chroniclacunar infarct in the right putamen/external 
capsule.A 3.7 x 2.0 x 2.5 cm (CC X AP X transverse) intraparenchymal 
hematomaseen centered in the left morgan extending to the left midbrain. 
Midlinestructures are normally developed. Hypoattenuation within 
theperiventricular and subcortical white matter is present, nonspecific 
byimaging, however, statistically representing chronic microvascularchanges in
this age group. Diffuse senescent parenchymal volume loss.No 
hydrocephalus.Atherosclerotic calcification of the intracranial internal carotid
andvertebral arteries. Orbits are within normal limits.Retention cysts in the 
right maxillary sinus. Endotracheal tube ispartially imaged. CTA BRAIN:Internal 
carotid arteries: Petrous, cavernous and supraclinoid portionspatent. Middle 
cerebral arteries: Patent to distal branches.Anterior cerebral arteries: Patent.
No A-comm aneurysm.Basilar system: Patent vertebrobasilar system.Posterior 
cerebral arteries:Patent P1 and P2 segments. Venous opacification: Majordural 
sinuses unremarkable for bolus timing.Additional findings: None.CTA NECK:Common 
carotid arteries: The common carotid arteries are normal in size. Bifurcations: 
No flow-limiting stenosis. Cervicalinternal carotid arteries: No flow limiting 
stenosis.Vertebral arteries: Codominant. No origin stenosis.Arch anatomy: 
Conventional.Nonvascular findings:Osseous structures: No acute osseous 
abnormality.Intact calvarium andskull base. Mild spondylosis and facet 
arthropathy are present withinthe spine.Cervical soft tissues: No adenopathy. 
Patent aerodigestive tract. Theendotracheal tube extends to the proximal 
trachea.Lung apices: No apical consolidation or pneumothorax.IMPRESSION:CT 
brain:1. No significant change.2. Evolving 3.7 x 2.0 x 2.5 cm intraparenchymal 
hematoma in the leftpons extending to the left midbrain3. Chronic lacunar 
infarct in the right basal ganglia.CTA head and neck:1. No acute vascular 
abnormality in the head and neck.Electronically Signed By: Evaristo Villasenor2023 08:12 CDTWorkstation Name: [id number] BRAIN WITHOUT IV CONTRAST
2023 08:10:09
************************************************************[facility] : 1965 Sex: 
M************************************************************CT BRAIN WITHOUT IV
CONTRAST, CTA CAROTID, CTA BRAINBRAIN CT WITHOUT CONTRASTINDICATION: Cerebral 
hemorrhage suspectedCOMPARISON: CT head, 7/3/2023TECHNIQUE:Rapid acquisition 
spiral images were obtained between the aortic archand the cranial vertex during
intravenous contrast infusion toreconstruct axial images and angiographic 3D 
maximum intensityprojections (MIP). 3-D volumetric reformatted images were 
created at [employer] workstation. Precontrast images of the brain were 
alsoobtained. Stenosis evaluation reported in compliance with NASCET 
criteria.DOSE REDUCTION: Dose modulation, iterative reconstruction,and/orweight-
based adjustment of the mA/kV was utilized to reduce theradiation dose to as low
as reasonably achievable.FINDINGS:CT BRAIN:Chronic lacunar infarct in the left 
frontal corona radiata. Chroniclacunar infarct in the right putamen/external 
capsule.A 3.7 x 2.0 x 2.5 cm (CC X AP X transverse) intraparenchymal 
hematomaseen centered in the left morgan extending to the left midbrain. 
Midlinestructures are normally developed. Hypoattenuation within 
theperiventricular and subcortical white matter is present, nonspecific 
byimaging, however, statistically representing chronic microvascularchanges in
this age group. Diffuse senescent parenchymal volume loss.No 
hydrocephalus.Atherosclerotic calcification of the intracranial internal carotid
andvertebral arteries. Orbits are within normal limits.Retention cysts in the 
right maxillary sinus. Endotracheal tube ispartially imaged. CTA BRAIN:Internal 
carotid arteries: Petrous, cavernous and supraclinoid portionspatent. Middle 
cerebral arteries: Patent to distal branches.Anterior cerebral arteries: Patent.
No A-comm aneurysm.Basilar system: Patent vertebrobasilar system.Posterior 
cerebral arteries:Patent P1 and P2 segments. Venous opacification: Majordural 
sinuses unremarkable for bolus timing.Additional findings: None.CTA NECK:Common 
carotid arteries: The common carotid arteries are normal in size. Bifurcations: 
No flow-limiting stenosis. Cervicalinternal carotid arteries: No flow limiting 
stenosis.Vertebral arteries: Codominant. No origin stenosis.Arch anatomy: 
Conventional.Nonvascular findings:Osseous structures: No acute osseous 
abnormality.Intact calvarium andskull base. Mild spondylosis and facet 
arthropathy are present withinthe spine.Cervical soft tissues: No adenopathy. 
Patent aerodigestive tract. Theendotracheal tube extends to the proximal 
trachea.Lung apices: No apical consolidation or pneumothorax.IMPRESSION:CT 
brain:1. No significant change.2. Evolving 3.7 x 2.0 x 2.5 cm intraparenchymal 
hematoma in the leftpons extending to the left midbrain3. Chronic lacunar 
infarct in the right basal ganglia.CTA head and neck:1. No acute vascular 
abnormality in the head and neck.Electronically Signed By: Evaristo [place]2023 08:12 CDTWorkstation Name: [id number] 
08:03:30





             Test Item    Value        Reference Range Interpretation Comments

 

             PHOSPHORUS (BEAKER) (test code = 2.8 mg/dL    2.3-4.7              

     



             604)                                                



 ID - [id number] 07:50:14





             Test Item    Value        Reference Range Interpretation Comments

 

             FIBRINOGEN LEVEL (BEAKER) (test 279 mg/dl    225-434               

    



             code = 658)                                         



[id number] 07:50:14





             Test Item    Value        Reference Range Interpretation Comments

 

             PARTIAL THROMBOPLASTIN TIME 26.5 seconds 22.5-36.0                 



             (BEAKER) (test code = 760)                                        



PROTHROMBIN [id number] 07:49:36





             Test Item    Value        Reference Range Interpretation Comments

 

             PROTIME (BEAKER) (test code = 14.8 seconds 11.9-14.2    H          

  



             759)                                                

 

             INR (BEAKER) (test code = 370) 1.23         <=5.90                 

   



RECOMMENDED COUMADIN/WARFARIN INR THERAPY RANGESSTANDARD DOSE: 2.0 - 3.0 
Includes: PROPHYLAXIS for venous thrombosis, systemic embolization; TREATMENT 
for venous thrombosis and/or pulmonary embolus.HIGH RISK: Target INR is 2.5-3.5 
for patients with mechanical heart valves.BLOOD GAS, [id number] 06:29:10





             Test Item    Value        Reference Range Interpretation Comments

 

             PH ARTERIAL (BEAKER) (test code = 7.48         7.35-7.45    H      

      



             383)                                                

 

             PCO2 ARTERIAL (BEAKER) (test code 27 mm Hg     35-45        L      

      



             = 384)                                              

 

             PO2 ARTERIAL (BEAKER) (test code 184 mm Hg    80-90        H       

     



             = 385)                                              

 

             O2 SATURATION ARTERIAL (BEAKER) 99.4 %       96.0-97.0    H        

    



             (test code = 386)                                        

 

             HCO3 ARTERIAL (BEAKER) (test code 20 mmol/L    21-29        L      

      



             = 388)                                              

 

             BASE EXCESS ARTERIAL (BEAKER) -2.2 mmol/L  -2.0-3.0     L          

  



             (test code = 387)                                        

 

             PATIENT TEMPERATURE (BEAKER) 36.8                                  

 



             (test code = 1818)                                        

 

             FIO2 (BEAKER) (test code = 1819) 40.0                              

     



XR CHEST 1 VIEW PORTABLE / [id number] 06:26:18
********************************[facility] : 1965 Sex: 
M************************************************************XR CHEST 1VIEW 
PORTABLE / BEDSIDEINDICATION: post intubationCOMPARISON: Prior day's 
examFINDINGS: Portable frontal view of the chest. IMPRESSION:Support Lines: ET 
tube tip is 4 cm superior to the karen. Lungs and pleura: Lungs are clear No 
significant pneumothorax.Heart and mediastinum: Normal contours.Additional 
findings: None.Electronically Signed By: Yojana Buckley2023 06:28 
CDTWorkstation Name: [id number] 01:46:30





             Test Item    Value        Reference Range Interpretation Comments

 

             PHOSPHORUS (BEAKER) 1.0 mg/dL    2.3-4.7      LL           Specimen

 slightly



             (test code = 604)                                        hemolyzed



 ID - ADMINBASIC METABOLIC [id number] 01:43:50





             Test Item    Value        Reference Range Interpretation Comments

 

             SODIUM (BEAKER) 139 meq/L    136-145                   



             (test code = 381)                                        

 

             POTASSIUM    3.9 meq/L    3.5-5.1                   Specimen slight

ly



             (BEAKER) (test                                        hemolyzed



             code = 379)                                         

 

             CHLORIDE (BEAKER) 113 meq/L           H            



             (test code = 382)                                        

 

             CO2 (BEAKER) 19 meq/L     22-29        L            



             (test code = 355)                                        

 

             BLOOD UREA   14 mg/dL     7-21                      



             NITROGEN (BEAKER)                                        



             (test code = 354)                                        

 

             CREATININE   0.84 mg/dL   0.57-1.25                 Specimen slight

ly



             (BEAKER) (test                                        hemolyzed



             code = 358)                                         

 

             GLUCOSE RANDOM 131 mg/dL           H            



             (BEAKER) (test                                        



             code = 652)                                         

 

             CALCIUM (BEAKER) 7.9 mg/dL    8.4-10.2     L            



             (test code = 697)                                        

 

             EGFR (BEAKER) 103                                     Interpretatio

n of eGFR



             (test code = mL/min/1.73                            values Stage De

scription



             1092)        sq m                                   Result G1 Althea

l or high



                                                                 >=90 G2 Mildly 

decreased



                                                                 60-89 G3a Mildl

y to



                                                                 moderately 45-5

9 G3b



                                                                 Moderately to s

everely



                                                                 30-44  G4 Sever

ly



                                                                 decreased 15-29

 G5 Kidney



                                                                 failure <15Repo

rted eGFR



                                                                 is based on the

 CKD-EPI



                                                                  equation t

hat does



                                                                 not use a race



                                                                 coefficientEsti

mated GFR



                                                                 is not as accur

ate as



                                                                 Creatinine Porsha

ekaterina in



                                                                 predicting glom

erular



                                                                 filtration rate

. Estimated



                                                                 GFR is not appl

icable for



                                                                 dialysis patien

ts



 ID - [id number] 01:43:28





             Test Item    Value        Reference Range Interpretation Comments

 

             MAGNESIUM (BEAKER) 1.8 mg/dL    1.6-2.6                   Specimen 

slightly



             (test code = 627)                                        hemolyzed



 ID - ADMINBLOOD GAS, [id number] 01:29:20





             Test Item    Value        Reference Range Interpretation Comments

 

             PH ARTERIAL (BEAKER) (test code = 7.44         7.35-7.45           

      



             383)                                                

 

             PCO2 ARTERIAL (BEAKER) (test code 32 mm Hg     35-45        L      

      



             = 384)                                              

 

             PO2 ARTERIAL (BEAKER) (test code 184 mm Hg    80-90        H       

     



             = 385)                                              

 

             O2 SATURATION ARTERIAL (BEAKER) 99.3 %       96.0-97.0    H        

    



             (test code = 386)                                        

 

             HCO3 ARTERIAL (BEAKER) (test code 21 mmol/L    21-29               

      



             = 388)                                              

 

             BASE EXCESS ARTERIAL (BEAKER) -2.2 mmol/L  -2.0-3.0     L          

  



             (test code = 387)                                        

 

             PATIENT TEMPERATURE (BEAKER) 36.8                                  

 



             (test code = 1818)                                        

 

             FIO2 (BEAKER) (test code = 1819) 40.0                              

     



CBC W/PLT COUNT &amp; AUTO [id number] 01:01:10





             Test Item    Value        Reference Range Interpretation Comments

 

             WHITE BLOOD CELL COUNT (BEAKER) 12.0 K/ L    3.5-10.5     H        

    



             (test code = 775)                                        

 

             RED BLOOD CELL COUNT (BEAKER) 5.34 M/ L    4.63-6.08               

  



             (test code = 761)                                        

 

             HEMOGLOBIN (BEAKER) (test code = 14.2 GM/DL   13.7-17.5            

     



             410)                                                

 

             HEMATOCRIT (BEAKER) (test code = 41.9 %       40.1-51.0            

     



             411)                                                

 

             MEAN CORPUSCULAR VOLUME (BEAKER) 79 fL        79-92                

     



             (test code = 753)                                        

 

             MEAN CORPUSCULAR HEMOGLOBIN 26.6 pg      25.7-32.2                 



             (BEAKER) (test code = 751)                                        

 

             MEAN CORPUSCULAR HEMOGLOBIN CONC 33.9 GM/DL   32.3-36.5            

     



             (BEAKER) (test code = 752)                                        

 

             RED CELL DISTRIBUTION WIDTH 13.9 %       11.6-14.4                 



             (BEAKER) (test code = 412)                                        

 

             PLATELET COUNT (BEAKER) (test 187 K/CU MM  150-450                 

  



             code = 756)                                         

 

             MEAN PLATELET VOLUME (BEAKER) 9.4 fL       9.4-12.4                

  



             (test code = 754)                                        

 

             NUCLEATED RED BLOOD CELLS 0 /100 WBC   0-0                       



             (BEAKER) (test code = 413)                                        

 

             NEUTROPHILS RELATIVE PERCENT 96 %                                  

 



             (BEAKER) (test code = 429)                                        

 

             LYMPHOCYTES RELATIVE PERCENT 2 %                                   

 



             (BEAKER) (test code = 430)                                        

 

             MONOCYTES RELATIVE PERCENT 1 %                                    



             (BEAKER) (test code = 431)                                        

 

             EOSINOPHILS RELATIVE PERCENT 0 %                                   

 



             (BEAKER) (test code = 432)                                        

 

             BASOPHILS RELATIVE PERCENT 0 %                                    



             (BEAKER) (test code = 437)                                        

 

             NEUTROPHILS ABSOLUTE COUNT 11.49 K/ L   1.78-5.38    H            



             (BEAKER) (test code = 670)                                        

 

             LYMPHOCYTES ABSOLUTE COUNT 0.28 K/ L    1.32-3.57    L            



             (BEAKER) (test code = 414)                                        

 

             MONOCYTES ABSOLUTE COUNT (BEAKER) 0.14 K/ L    0.30-0.82    L      

      



             (test code = 415)                                        

 

             EOSINOPHILS ABSOLUTE COUNT 0.00 K/ L    0.04-0.54    L            



             (BEAKER) (test code = 416)                                        

 

             BASOPHILS ABSOLUTE COUNT (BEAKER) 0.02 K/ L    0.01-0.08           

      



             (test code = 417)                                        

 

             IMMATURE GRANULOCYTES-RELATIVE 0.50 %       0.00-1.00              

   



             PERCENT (BEAKER) (test code =                                      

  



             2801)                                               



CT BRAIN WITHOUT IV [id number] 23:50:40
************************************************************[facility] : 1965 Sex: 
M************************************************************EXAM: CT BRAIN 
WITHOUT IV CONTRASTINDICATION: Cerebral hemorrhage suspectedNeuro deficit, 
acute, stroke suspectedTECHNIQUE: CT images from skull base to vertex without IV
contrast.This exam was performed according to the departmental dose 
optimizationprogram which includes automated exposure control, adjustment of the
mAand/or kV according to the patient size, and/or use of an 
iterativereconstruction technique.COMPARISON: None.FINDINGS: Parenchyma: There 
is acute hemorrhage centered within the left hemiponsextending to the left 
midbrain and left cerebral peduncle withmeasurements of 2.5 x 1.6 x 2.1 cm (4.2 
cc). Mild associated vasogenicedema and slight effacement of the adjacent fourth
ventricle whichremainspatent. No intraventricular extension. No hydrocephalus. 
Noevidence of acute large territory infarction. Chronic infarction of theright 
external capsule and chronic appearing lacunar infarction of theanterior limb of
left internal capsule.Extra-axial Collection: NoneVentricular System: As 
aboveOsseousStructures: No acute osseous abnormality. Included Orbits: 
NormalParanasal Sinuses: Predominantly clearTympanomastoid Cavities: 
NormalOther: NoneIMPRESSION:Acute hemorrhage (4.2 cc) centered in the left 
hemipons extending to theleft midbrain and left cerebral peduncle. There is mild
associated masseffect without hydrocephalus or intraventricular 
extension.Chronic bilateral basal ganglia infarctions.Discussed the on-call 
neurology resident at 11:49 PM 7/3/2023Electronically Signed By: Maciej Lozada2023 23:52 CDTWorkstation Name: [id number]

## 2023-07-27 LAB
ALBUMIN SERPL BCP-MCNC: 2.7 G/DL (ref 3.4–5)
BUN BLD-MCNC: 38 MG/DL (ref 7–18)
GLUCOSE SERPLBLD-MCNC: 152 MG/DL (ref 74–106)
HCT VFR BLD CALC: 37.6 % (ref 39.6–49)
LYMPHOCYTES # SPEC AUTO: 0.8 K/UL (ref 0.7–4.9)
MAGNESIUM SERPL-MCNC: 2.1 MG/DL (ref 1.6–2.4)
MCV RBC: 78.7 FL (ref 80–100)
PMV BLD: 7.7 FL (ref 7.6–11.3)
POTASSIUM SERPL-SCNC: 3.8 MEQ/L (ref 3.5–5.1)
PREALB SERPL-MCNC: 19 MG/DL (ref 20–40)
RBC # BLD: 4.79 M/UL (ref 4.33–5.43)

## 2023-07-27 RX ADMIN — PANTOPRAZOLE SODIUM SCH MG: 40 GRANULE, DELAYED RELEASE ORAL at 19:41

## 2023-07-27 RX ADMIN — PANTOPRAZOLE SODIUM SCH MG: 40 GRANULE, DELAYED RELEASE ORAL at 07:52

## 2023-07-27 RX ADMIN — TERAZOSIN HYDROCHLORIDE ANHYDROUS SCH MG: 1 CAPSULE ORAL at 19:41

## 2023-07-27 RX ADMIN — FOLIC ACID SCH MG: 1 TABLET ORAL at 07:51

## 2023-07-27 RX ADMIN — Medication SCH DROP: at 10:28

## 2023-07-27 RX ADMIN — SODIUM CHLORIDE SCH MLS: 9 INJECTION, SOLUTION INTRAVENOUS at 03:06

## 2023-07-27 RX ADMIN — HUMAN INSULIN SCH: 100 INJECTION, SOLUTION SUBCUTANEOUS at 12:00

## 2023-07-27 RX ADMIN — POLYVINYL ALCOHOL SCH DROPS: 14 SOLUTION/ DROPS OPHTHALMIC at 21:24

## 2023-07-27 RX ADMIN — SODIUM CHLORIDE SCH MLS: 9 INJECTION, SOLUTION INTRAVENOUS at 16:45

## 2023-07-27 RX ADMIN — IPRATROPIUM BROMIDE SCH: 0.5 SOLUTION RESPIRATORY (INHALATION) at 02:00

## 2023-07-27 RX ADMIN — DULOXETINE HYDROCHLORIDE SCH MG: 30 CAPSULE, DELAYED RELEASE ORAL at 19:40

## 2023-07-27 RX ADMIN — Medication SCH DROP: at 21:24

## 2023-07-27 RX ADMIN — ENOXAPARIN SODIUM SCH MG: 40 INJECTION SUBCUTANEOUS at 17:27

## 2023-07-27 RX ADMIN — ALBUTEROL SULFATE SCH MG: 2.5 SOLUTION RESPIRATORY (INHALATION) at 20:00

## 2023-07-27 RX ADMIN — Medication SCH DROP: at 04:48

## 2023-07-27 RX ADMIN — POLYVINYL ALCOHOL SCH DROPS: 14 SOLUTION/ DROPS OPHTHALMIC at 19:41

## 2023-07-27 RX ADMIN — ALBUTEROL SULFATE SCH MG: 2.5 SOLUTION RESPIRATORY (INHALATION) at 14:00

## 2023-07-27 RX ADMIN — IPRATROPIUM BROMIDE SCH MG: 0.5 SOLUTION RESPIRATORY (INHALATION) at 14:00

## 2023-07-27 RX ADMIN — Medication SCH DROP: at 17:04

## 2023-07-27 RX ADMIN — ALBUTEROL SULFATE SCH MG: 2.5 SOLUTION RESPIRATORY (INHALATION) at 08:00

## 2023-07-27 RX ADMIN — SODIUM CHLORIDE SCH MLS: 9 INJECTION, SOLUTION INTRAVENOUS at 10:28

## 2023-07-27 RX ADMIN — Medication SCH DROP: at 12:16

## 2023-07-27 RX ADMIN — Medication SCH DROP: at 00:21

## 2023-07-27 RX ADMIN — HUMAN INSULIN SCH: 100 INJECTION, SOLUTION SUBCUTANEOUS at 16:17

## 2023-07-27 RX ADMIN — HUMAN INSULIN SCH: 100 INJECTION, SOLUTION SUBCUTANEOUS at 00:00

## 2023-07-27 RX ADMIN — ALBUTEROL SULFATE SCH: 2.5 SOLUTION RESPIRATORY (INHALATION) at 02:00

## 2023-07-27 RX ADMIN — IPRATROPIUM BROMIDE SCH MG: 0.5 SOLUTION RESPIRATORY (INHALATION) at 20:00

## 2023-07-27 RX ADMIN — ACETYLCYSTEINE SCH: 200 INHALANT RESPIRATORY (INHALATION) at 20:00

## 2023-07-27 RX ADMIN — Medication SCH ML: at 07:52

## 2023-07-27 RX ADMIN — IPRATROPIUM BROMIDE SCH MG: 0.5 SOLUTION RESPIRATORY (INHALATION) at 08:00

## 2023-07-27 RX ADMIN — SODIUM CHLORIDE SCH MLS: 9 INJECTION, SOLUTION INTRAVENOUS at 21:24

## 2023-07-27 RX ADMIN — HUMAN INSULIN SCH: 100 INJECTION, SOLUTION SUBCUTANEOUS at 05:00

## 2023-07-27 NOTE — RAD REPORT
EXAM DESCRIPTION:  RAD - Abdomen 1 View (KUB) - 7/27/2023 8:13 pm

 

CLINICAL HISTORY:  tender abd, absence of bm

 

COMPARISON:  <Comparisons>

 

FINDINGS:  Nonobstructive bowel gas pattern. No acute osseous abnormality.Visualized lungs are unrema
rkable.No abnormal calcifications. Gastrostomy tube .

 

IMPRESSION:  Nonobstructive bowel gas pattern.

## 2023-07-27 NOTE — HP
Date of Admission:  07/26/2023



Time Of Service:  1:30 p.m.



Chief Complaint:  Patient actually communicates via sign language and notes weakness on the right barrington
e.



History Of Present Illness:  Mr. Tietz is a 57-year-old, right-handed, Jehovah witness, with hemifaci
al spasm, deafness, right basal ganglia hemorrhage following a lacunar stroke, hypertension, tobacco 
abuse who presented to [facility] on July 4th with seizures.  His Burton Com
a Scale was 8 and CT scan showed a 2.3 x 1.9 cm acute hemorrhagic stroke in the left aspect of the br
ainstem.  He received 2 g of Keppra, 10 mg of Decadron and was being transferred to [facility]
 in the [facility] on the 4th when he desaturated requiring intubation.  He received 5 of Versed,
 100 fentanyl, 10 of etomidate and was intubated and unresponsive when he arrived to [facility].  Repeat CT scan showed a stable bleed with no hydrocephalus.  He did require Haldol
 for agitation and on 07/06, he failed an attempt at extubation and had arterial line placed on 07/07
.  By 07/10, he was extubated successfully and had light sedation for tachypnea.  He developed pneumo
isabella, likely with aspiration and has positive sputum cultures with Haemophilus influenzae and was star
lissa on meropenem on 07/06.  He had vomiting on 07/11, more tachycardia on 07/11, and had prerenal dys
function and obstructive nephropathy.  He did improve and had voiding trials.  He was started on arvind
zosin in ICU, which did help.  He was febrile and had leukocytosis, but that improved.  His chest x-r
ay was positive for pneumonia and he due to his stroke, had significant pooling, but was able to perf
orm self suctioning in the ICU.  Prior to his hemorrhagic stroke, patient was independent with his AD
Ls and mobilization transfers.  However, since this stroke, he requires maximal assist for all transf
ers, ambulation, and bed mobilization.  He does require an ASL  for deafness.  He has signi
ficant oropharyngeal dysphagia and uses a PEG tube for feeding due to his high aspiration risk.  He d
oes require 24-hour nursing and daily medical evaluations along with aggressive physical, occupationa
l, and speech therapy to help him recover.  He will have  input as well as for placement
 as he recovers.  He is now admitted to the inpatient rehabilitation unit at [facility] for 
physical, occupational, and speech therapy and skilled nursing along with daily physician evaluation 
management and for  placement as he improves.



Past Medical History:  Hemifacial spasms, deafness, prior right basal ganglia stroke on February 22, 
tobacco abuse, hypertension, and he is deaf.  His PEG tube was placed on 07/21/2023, had an endoscope
 done.



Allergies:  NO KNOWN DRUG ALLERGIES.



Current Medications:  Albuterol nebulizer 2.5 mg every 6 hours, ampicillin/sulbactam 3 g every 6 hour
s IV, Cymbalta 30 mg at bedtime, Lovenox 40 mg subcutaneously daily, folate 1 mg daily, Atrovent 0.5 
mg nebulizer every 6 hours, melatonin 3 mg at night, Provigil 100 mg daily, multivitamin liquid form 
5 mL per PEG tube daily, Protonix suspension 40 mL per PEG tube twice daily, and Hytrin 2 mg per PEG 
tube at night.



X-ray/imaging:  CT scan on 07/03 showed a 2.3 x 1.9 cm acute bleed in the left aspect of brainstem.  
It is in the left carolina morgan extending to the left midbrain and cerebellar peduncle.  There is mild as
sociated mass-effect without hydrocephalus or intraventricular extension.  There are chronic bilatera
l basal ganglia infarcts.  On 07/17, chest x-ray, 1 view, showed partial clearing of left lung base w
ith new limited patchy airspace disease in the right base.



Family History:  Noncontributory.



Review of Systems:

Not difficult to complete.  The patient does report, however, significant weakness with no functional
 use of the right hand.  Right leg has very minimal ability to bend at the knee.  He is signing with 
the left hand as he communicates through the remote ASL .



Laboratory Studies:  White blood cell count was up to 78.7, hemoglobin 12.1, hematocrit 38.1, platele
ts 282.  Sodium 144, potassium 4.4, glucose 81, BUN 35, creatinine 0.87, calcium 8.9.  Magnesium 2.0.




Social History:  The patient lives with family in single family home.  No recent alcohol, tobacco, or
 IV drug use.



Physical Examination:

Vital Signs:  Blood pressure 142/90, pulse 83, respiratory rate 16, temperature 97, oxygen saturation
 99% on 2 L. 

General:  Mr. Tietz is resting in bed.  He does have a significant asymmetry with decrease of the rig
ht nasolabial fold and head was turned towards the right.  He has dense paresis of the right upper ex
tremity and the right lower extremity with no obvious distal movement, 1/5 knee flexion noted.  Left 
upper and lower extremity 5/5.  Sensation is intact in the left, difficult to assess likely less perc
eption noted in the right upper and lower extremity.  Otherwise, he is normocephalic, atraumatic.  Sc
lerae anicteric.  Oropharynx moist. 

Neck:  Supple. 

Chest:  Clear. 

Heart:  Regular. 

Extremities:  Show no significant edema or cyanosis.



Current Level Of Functioning:  Currently, dependent for eating; moderate assist for oral hygiene; dep
endent for toileting, showering; moderate assist for upper body dressing; dependent for lower body dr
essing, donning and doffing of shoes; moderate assistance for rolling right-to-left, left-to-right; d
ependent for sitting and sit-to-stand; dependent for going from chair to bed and bed to chair and to 
toilet; dependent for walking; and unable to go up and down stairs.



Rehab And Medical Assessment And Plan:  Mr. Tietz is admitted to the rehabilitation unit with rehab o
f patient impairment category 01 stroke.  His impairment group code is 01.2 right body involvement, l
eft brain.  His etiologic diagnosis is left pontine hemorrhagic stroke.  His comorbidities are anemia
, decreased mobility, decrease in physical functioning, dysphagia with PEG tube, hypertension, leukoc
ytosis, pneumonia, tachycardia, seizures.  He also has hemifacial spasms, obstructive nephropathy, hy
percalcemia, elevated BUN.



Plan:  

1.He will have physical, occupational, and speech therapy for 3.5 hours, 5 of 7 days.

2.For his pneumonia, we will continue his ampicillin/sulbactam for 21 bags starting on 07/26.

3.Continue duloxetine for the depression.

4.Continue Lovenox for DVT prophylaxis.

5.Continue albuterol nebulizer for his respiratory failure.

6.Melatonin for insomnia.

7.Provigil for poor energy.

8.Hytrin for urinary retention.

9.Protonix for GE reflux.

10.His multivitamin will continue for his anemia and malnutrition.



Impact Of Comorbids:  Mr. Tietz has significant comorbid issues including pneumonia, seizure disorder
, significant leukocytosis, marked debility, which all will be addressed aggressively in rehabilitati
on.  He also has a PEG tube placement as he is unable to swallow safely.



Rehab Specific Plan:  

1.Mr. Tietz will have physical, occupational, and speech therapy for 3.5 hours, 5 of 7 days to impro
ve his ability to begin to swallow and determine what consistencies.  Modified barium swallow will be
 done at some point.

2.He has physical therapy, may help with his ability to transfer and to mobilize likely with a wheel
chair.  If possible to have him stand and assist with his transfers.  Also, with his occupational the
rapy, to improve ability to dress upper and lower body to perform toileting and showering tasks and t
o do his eating properly.  Next, with his speech therapy, will work on his ability to communicate.  HUGO rowe does have inability to speak words, but has sign language and will work on helping to improve his a
bility to communicate with his left hand through .

3.He will have skilled nursing as noted and physician evaluation on a daily basis.

4.Mr. Tietz has a good understanding of the process of his inpatient rehabilitation where he will re
ceive physical, occupational, and speech therapy.  If need be, nutrition services, respiratory servic
e, psychiatric service, cardiology and pulmonary services will be consulted.  Given his complex medic
al condition and risk of further complications, rehabilitation cannot be safely or effectively perfor
med at a lower level facility such as skilled nursing.



Barriers To Discharge:  Several barriers:

1.PEG tube placement with risk of aspiration pneumonia.

2.Risk of seizures.

3.Risk of further hemorrhagic stroke deficits.

4.High fall risk.

5.Risk of dependent skin breakdown given the patient has limited ability to move the right side and 
that will be offloaded and including his heel and elbows as appropriate.  No IVs will be put in the r
ight upper extremity, which is densely paretic.



Length Of Stay:  Perhaps 3 weeks.



Disposition:  May be home with family if they can be trained to assist him.



Prognosis:  Fair .



Rehab Goals:  

1.To be able to roll in bed with min assist.

2.Be able to transfer with min assist.

3.Able to mobilize a wheelchair with min assist.

4.To get to a toilet and shower with minimal assist.

5.Perform toilet and shower only with minimum assistance.

6.Be able to have a consistency that he can swallow with minimal assistance using the left hand.

7.Perform cognitive activities with supervision.

8.The goals were reviewed with the patient and he is in agreement. 



I acknowledge I have personally performed a full physical examination on Mr. Robert Tietz no later th
an 24 hours after his admission to the rehabilitation facility and determined he is able to tolerate 
the above course of treatment at an intensive level for reasonable period of time.  A detailed indivi
dualized plan of care for him will be completed by hospital day 4 based on the preadmission screen, h
istory and physical and therapy evaluations.





WILIAM

DD:  07/26/2023 20:28:36Voice ID:  [id number]

## 2023-07-27 NOTE — PN
Date of Progress Note:  07/27/2023



Time Of Service:  1 p.m.



Subjective:  Mr. Tietz can communicate via sign language and through the sign language interpretation
 system, says he is doing fairly okay, given that he is not able to move his entire right side.  Unab
le to communicate much more, except he says no significant pain.  Able to sleep fairly okay.



Review of Systems:

Difficult to get a full review of systems, but no muscle spasms or cramps on his paretic side and no 
abdominal pain.  No nausea.  He does have to do suctioning on a regular basis.  He is able to do that
 with the left hand.



Physical Examination:

Vital Signs:  Blood pressure 157/92, pulse of 114, respiratory rate 19, temperature 98.2, oxygen satu
ration 95% on 2 L.  Mr. Tietz is resting in bed.  Head slightly turned towards the right.  His right 
arm is flexed towards his head over the abdomen region.  Right leg is extended.  He does have some se
cretions and is able to get the ongoing suction and help to clear that.  His exam shows dense paresis
 in right upper extremity.  However, he is able to show at least 2 to 3 strength in the right hand wi
th movement opening and closing, very subtle biceps strength around 1, and right shoulder around 1/5,
 in the right lower extremity 0/5 proximally and distally.  Some decreased sensation right versus lef
t upper in the upper and lower extremity.  He is non verbal, but communicates via sign language and h
e is able to comprehend and follow instructions to the sign language interpreting system.



Laboratory Studies:  White blood cell count slightly elevated at 14.4, hemoglobin 12.3, neutrophils 8
4.7.  Chemistries:  Sodium 141, potassium 3.8, chloride 111, carbon dioxide 24, BUN 38, creatinine 1.
39, glucose ranged from 152 to 217, prealbumin 19, albumin 2.7.  X-ray imaging:  X-ray done yesterday
 showed lungs are under inflated, heart is normal, and lungs are grossly clear.  No displaced fractur
es.



Medications:  He is receiving ampicillin/sulbactam 3 g IV every 6 hours for his pneumonia, receiving 
Cymbalta 30 mg at bedtime for depression, albuterol nebulizer for his pulmonary insufficiency, Loveno
x 40 mg subcutaneously daily for DVT prophylaxis, folic acid 1 mg daily for stroke risk reduction, me
latonin 3 mg at bedtime for insomnia, also on Atrovent nebulizer, has modafinil for boosting energy, 
he has Theravite multivitamin for his nutritional status, Protonix for GE reflux, and terazosin for u
rinary retention.



Current Functional Status:  Currently supine to sit transfers done with maximum assistance, stand piv
ot transfers done with total assistance and 2 people required to assist with IV lines and feeding tub
e.  He did perform bilateral supine lower extremity exercises, 20 repetitions.  Regarding his speech,
 did work on improving his labial seal and lingual protrusion and lateral movements.  He requires mod
erate-to-maximum assistance with those exercises.  He is able to suction himself to help decrease sal
serge in the mouth, but requires moderate assistance for oral care.  While he does appear lethargic, he
 can be aroused and participates in therapy.  Oral motor exercises done 10 times at 50% accuracy.  He
 has severe __________ weakness.  He demonstrated 4 to 5 vocal swallows within 1 to 2 seconds during 
the session.  Unable to safely tolerate ice chips.  With occupational therapy, sit-to-stand with mode
rate assistance to move the right lower extremity off the bed and pull up to a sitting position.  He 
continues to be on a continuous PEG tube feeding.  The patient did mention to the therapist that he w
ould like to get out of bed.



Progress Towards Rehabilitation Goals:  Mr. Tietz is making very slow progress so far with goals of b
eing able to sit in bed with min assist, do a transfer with min assist, to get to the wheelchair and 
mobilize with min assist, to be able to stand with min assist, and potentially begin to ambulate shor
t distances with min to mod assist.  In addition slow progress with beginning to take oral feeding, a
s he is unable to tolerate even ice chips at this point.  Feeding tube feeding is ongoing.



Assessment:  Mr. Tietz is a 57-year-old patient in the rehabilitation unit with a left pontine hemorr
hagic stroke producing dense right paresis of the lower greater than right upper extremity along with
 difficulty with his expression.  He has comorbid pneumonia.  He only communicates via sign language.
  Has PEG tube for feeding, hypertension, leukocytosis, tachycardia, seizures, and hemifacial spasms 
along with obstructive nephropathy, hypercalcemia.



Plan:  

1.Continue with physical, occupational, speech therapy for 3.5 hours, x7 days.

2.He will continue with his IV antibiotics.

3.Continue with duloxetine for depression.

4.Lovenox for DVT prophylaxis.

5.Albuterol and Ventolin nebulizers.

6.Melatonin for insomnia.

7.Provigil for maintaining energy.

8.Hytrin for urinary retention.

9.Protonix for GE reflux.

10.Continue with multivitamin and protein supplementation through the PEG tube for his anemia and ma
lnutrition.



Impact Of Comorbidities On Rehabilitation Process:  His ongoing need for IV antibiotics is moderately
 impacting his ability to be able to transfer, as he needs to __________.  In addition, his PEG is mi
ldly impacting his rehabilitation.  His ability to communicate only by sign language has required a t
ranslator.  All these can be overcome just with slight effort.  At this point, he has dense paresis o
f the right upper and lower extremity and has had a moderate amount of max assist for most activities
 of ADL and mobilization and communication.





PJ/CHITO

DD:  07/27/2023 17:58:35Voice ID:  [id number]

DT:  07/27/2023 22:06:13Report ID:  [id number]

## 2023-07-28 ENCOUNTER — HOSPITAL ENCOUNTER (INPATIENT)
Dept: HOSPITAL 97 - 4TH | Age: 58
LOS: 6 days | Discharge: NURSING, REHAB OR LONG-TERM CARE | DRG: 871 | End: 2023-08-03
Attending: INTERNAL MEDICINE | Admitting: HOSPITALIST
Payer: COMMERCIAL

## 2023-07-28 VITALS — SYSTOLIC BLOOD PRESSURE: 137 MMHG | TEMPERATURE: 97.1 F | DIASTOLIC BLOOD PRESSURE: 79 MMHG

## 2023-07-28 VITALS — BODY MASS INDEX: 25 KG/M2

## 2023-07-28 VITALS — OXYGEN SATURATION: 97 %

## 2023-07-28 DIAGNOSIS — J45.909: ICD-10-CM

## 2023-07-28 DIAGNOSIS — Z79.899: ICD-10-CM

## 2023-07-28 DIAGNOSIS — R29.810: ICD-10-CM

## 2023-07-28 DIAGNOSIS — J14: ICD-10-CM

## 2023-07-28 DIAGNOSIS — I69.351: ICD-10-CM

## 2023-07-28 DIAGNOSIS — I10: ICD-10-CM

## 2023-07-28 DIAGNOSIS — N39.0: ICD-10-CM

## 2023-07-28 DIAGNOSIS — H91.3: ICD-10-CM

## 2023-07-28 DIAGNOSIS — N40.1: ICD-10-CM

## 2023-07-28 DIAGNOSIS — R31.0: ICD-10-CM

## 2023-07-28 DIAGNOSIS — K94.23: ICD-10-CM

## 2023-07-28 DIAGNOSIS — K21.9: ICD-10-CM

## 2023-07-28 DIAGNOSIS — A41.9: Primary | ICD-10-CM

## 2023-07-28 DIAGNOSIS — Z53.1: ICD-10-CM
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
## 2023-07-28 DIAGNOSIS — R79.89: ICD-10-CM

## 2023-07-28 DIAGNOSIS — H91.90: ICD-10-CM

## 2023-07-28 LAB
ALBUMIN SERPL BCP-MCNC: 2.4 G/DL (ref 3.4–5)
ALP SERPL-CCNC: 155 U/L (ref 45–117)
ALT SERPL W P-5'-P-CCNC: 76 U/L (ref 16–61)
AST SERPL W P-5'-P-CCNC: 31 U/L (ref 15–37)
BLD SMEAR INTERP: (no result)
BUN BLD-MCNC: 58 MG/DL (ref 7–18)
BUN BLD-MCNC: 64 MG/DL (ref 7–18)
GLUCOSE SERPLBLD-MCNC: 132 MG/DL (ref 74–106)
GLUCOSE SERPLBLD-MCNC: 149 MG/DL (ref 74–106)
HCT VFR BLD CALC: 34.6 % (ref 39.6–49)
HCT VFR BLD CALC: 36.9 % (ref 39.6–49)
LYMPHOCYTES # SPEC AUTO: 1 K/UL (ref 0.7–4.9)
LYMPHOCYTES # SPEC AUTO: 1.4 K/UL (ref 0.7–4.9)
MAGNESIUM SERPL-MCNC: 2.5 MG/DL (ref 1.6–2.4)
MCV RBC: 79.3 FL (ref 80–100)
MCV RBC: 79.5 FL (ref 80–100)
MORPHOLOGY BLD-IMP: (no result)
PMV BLD: 8.1 FL (ref 7.6–11.3)
PMV BLD: 8.4 FL (ref 7.6–11.3)
POTASSIUM SERPL-SCNC: 3.9 MEQ/L (ref 3.5–5.1)
POTASSIUM SERPL-SCNC: 4 MEQ/L (ref 3.5–5.1)
RBC # BLD: 4.37 M/UL (ref 4.33–5.43)
RBC # BLD: 4.65 M/UL (ref 4.33–5.43)
WBC # BLD AUTO: 19.4 THOU/UL (ref 4.3–10.9)

## 2023-07-28 PROCEDURE — 84484 ASSAY OF TROPONIN QUANT: CPT

## 2023-07-28 PROCEDURE — 92610 EVALUATE SWALLOWING FUNCTION: CPT

## 2023-07-28 PROCEDURE — 82607 VITAMIN B-12: CPT

## 2023-07-28 PROCEDURE — 97530 THERAPEUTIC ACTIVITIES: CPT

## 2023-07-28 PROCEDURE — 86900 BLOOD TYPING SEROLOGIC ABO: CPT

## 2023-07-28 PROCEDURE — 83540 ASSAY OF IRON: CPT

## 2023-07-28 PROCEDURE — 85730 THROMBOPLASTIN TIME PARTIAL: CPT

## 2023-07-28 PROCEDURE — 87086 URINE CULTURE/COLONY COUNT: CPT

## 2023-07-28 PROCEDURE — 87040 BLOOD CULTURE FOR BACTERIA: CPT

## 2023-07-28 PROCEDURE — 85044 MANUAL RETICULOCYTE COUNT: CPT

## 2023-07-28 PROCEDURE — 85018 HEMOGLOBIN: CPT

## 2023-07-28 PROCEDURE — 85014 HEMATOCRIT: CPT

## 2023-07-28 PROCEDURE — 87088 URINE BACTERIA CULTURE: CPT

## 2023-07-28 PROCEDURE — 97116 GAIT TRAINING THERAPY: CPT

## 2023-07-28 PROCEDURE — 36415 COLL VENOUS BLD VENIPUNCTURE: CPT

## 2023-07-28 PROCEDURE — 71045 X-RAY EXAM CHEST 1 VIEW: CPT

## 2023-07-28 PROCEDURE — 76770 US EXAM ABDO BACK WALL COMP: CPT

## 2023-07-28 PROCEDURE — 80053 COMPREHEN METABOLIC PANEL: CPT

## 2023-07-28 PROCEDURE — 97163 PT EVAL HIGH COMPLEX 45 MIN: CPT

## 2023-07-28 PROCEDURE — 82947 ASSAY GLUCOSE BLOOD QUANT: CPT

## 2023-07-28 PROCEDURE — 86901 BLOOD TYPING SEROLOGIC RH(D): CPT

## 2023-07-28 PROCEDURE — 85025 COMPLETE CBC W/AUTO DIFF WBC: CPT

## 2023-07-28 PROCEDURE — 86850 RBC ANTIBODY SCREEN: CPT

## 2023-07-28 PROCEDURE — 83605 ASSAY OF LACTIC ACID: CPT

## 2023-07-28 PROCEDURE — 92526 ORAL FUNCTION THERAPY: CPT

## 2023-07-28 PROCEDURE — 84145 PROCALCITONIN (PCT): CPT

## 2023-07-28 PROCEDURE — 81001 URINALYSIS AUTO W/SCOPE: CPT

## 2023-07-28 PROCEDURE — 85610 PROTHROMBIN TIME: CPT

## 2023-07-28 PROCEDURE — 83735 ASSAY OF MAGNESIUM: CPT

## 2023-07-28 RX ADMIN — ALBUTEROL SULFATE SCH MG: 2.5 SOLUTION RESPIRATORY (INHALATION) at 19:15

## 2023-07-28 RX ADMIN — SODIUM CHLORIDE SCH MLS: 9 INJECTION, SOLUTION INTRAVENOUS at 03:32

## 2023-07-28 RX ADMIN — SODIUM CHLORIDE SCH MLS: 9 INJECTION, SOLUTION INTRAVENOUS at 09:12

## 2023-07-28 RX ADMIN — IPRATROPIUM BROMIDE SCH MG: 0.5 SOLUTION RESPIRATORY (INHALATION) at 19:15

## 2023-07-28 RX ADMIN — Medication SCH DROP: at 08:14

## 2023-07-28 RX ADMIN — IPRATROPIUM BROMIDE SCH MG: 0.5 SOLUTION RESPIRATORY (INHALATION) at 14:08

## 2023-07-28 RX ADMIN — IPRATROPIUM BROMIDE SCH MG: 0.5 SOLUTION RESPIRATORY (INHALATION) at 01:15

## 2023-07-28 RX ADMIN — Medication SCH ML: at 20:33

## 2023-07-28 RX ADMIN — SODIUM CHLORIDE SCH MLS: 9 INJECTION, SOLUTION INTRAVENOUS at 16:58

## 2023-07-28 RX ADMIN — Medication SCH DROP: at 00:50

## 2023-07-28 RX ADMIN — ALBUTEROL SULFATE SCH MG: 2.5 SOLUTION RESPIRATORY (INHALATION) at 08:00

## 2023-07-28 RX ADMIN — ALBUTEROL SULFATE SCH MG: 2.5 SOLUTION RESPIRATORY (INHALATION) at 01:15

## 2023-07-28 RX ADMIN — PANTOPRAZOLE SODIUM SCH MG: 40 GRANULE, DELAYED RELEASE ORAL at 07:12

## 2023-07-28 RX ADMIN — SODIUM CHLORIDE SCH MLS: 9 INJECTION, SOLUTION INTRAVENOUS at 11:48

## 2023-07-28 RX ADMIN — ACETYLCYSTEINE SCH ML: 200 INHALANT RESPIRATORY (INHALATION) at 08:00

## 2023-07-28 RX ADMIN — IPRATROPIUM BROMIDE SCH MG: 0.5 SOLUTION RESPIRATORY (INHALATION) at 08:00

## 2023-07-28 RX ADMIN — Medication SCH ML: at 07:28

## 2023-07-28 RX ADMIN — SODIUM CHLORIDE SCH MLS: 0.9 INJECTION, SOLUTION INTRAVENOUS at 11:48

## 2023-07-28 RX ADMIN — ALBUTEROL SULFATE SCH MG: 2.5 SOLUTION RESPIRATORY (INHALATION) at 14:08

## 2023-07-28 RX ADMIN — HUMAN INSULIN SCH: 100 INJECTION, SOLUTION SUBCUTANEOUS at 06:00

## 2023-07-28 RX ADMIN — POLYVINYL ALCOHOL SCH DROPS: 14 SOLUTION/ DROPS OPHTHALMIC at 09:19

## 2023-07-28 RX ADMIN — FOLIC ACID SCH MG: 1 TABLET ORAL at 07:12

## 2023-07-28 RX ADMIN — HUMAN INSULIN SCH: 100 INJECTION, SOLUTION SUBCUTANEOUS at 00:00

## 2023-07-28 RX ADMIN — Medication SCH DROP: at 05:14

## 2023-07-28 NOTE — RAD REPORT
EXAM DESCRIPTION:  CT - Abdomen   Pelvis Wo Contrast - 7/28/2023 7:16 am

 

 

CLINICAL HISTORY:  The patient is 57 years old and is Male; Bloody Urine

 

TECHNIQUE:  Axial computed tomography images of the abdomen and pelvis without intravenous contrast. 
  Sagittal and coronal reformatted images were created and reviewed.   This CT exam was performed usi
ng one or more of the following dose reduction techniques:   automated exposure control, adjustment o
f the mA and/or kV according to patient size, and/or use of iterative reconstruction technique.   Ora
l contrast was administered.

 

COMPARISON:  No relevant prior studies available.

 

FINDINGS:  Lung bases:   Bilateral lower lobe atelectasis, left greater than right.

  Pleural space:   Trace left pleural fluid.

ABDOMEN:

  Liver:   Unremarkable.

  Gallbladder and bile ducts:   Unremarkable.   No calcified stones.   No ductal dilation.

  Pancreas:   Unremarkable.   No ductal dilation.

  Spleen:   Unremarkable.   No splenomegaly.

  Adrenals:   Unremarkable.   No mass.

  Kidneys and ureters:   No nephrolithiasis, hydronephrosis or ureter stone.

  Stomach and bowel:   Colonic diverticulosis. Air-fluid level in the rectal lumen, suggesting diarrh
eal symptoms. No definite findings of colitis.

        Air-fluid level in the cecum and proximal right colon. Hyperdense stool.

        No obstruction.

PELVIS:

  Appendix:   The visualized appendix is normal. No pericecal inflammation to suggest acute appendici
tis.

  Bladder:   Lopez catheter in the bladder. There is clot and gross hematuria in the bladder lumen.

        No stones.

  Reproductive:   Enlarged prostate gland.

ABDOMEN and PELVIS:

  Intraperitoneal space:   No pneumoperitoneum.

        Presacral free fluid.

  Bones/joints:   Partial ankylosis bilateral SI joints.

        No acute fracture in the hips and proximal femora. No hip dislocation.

  Soft tissues:   Unremarkable.

  Vasculature:   Unremarkable.   No abdominal aortic aneurysm.

  Lymph nodes:   No pathologically enlarged lymph nodes.

  Tubes, lines and devices:   PEG tube in the stomach.

 

IMPRESSION:  1.   Lopez catheter in the bladder. There is clot and gross hematuria in the bladder lum
en.

2.   Enlarged prostate gland.

3.   Colonic diverticulosis. Air-fluid level in the rectal lumen, suggesting diarrheal symptoms. No d
efinite findings of colitis.

4.   PEG tube in the stomach.

5.   Additional non-emergent findings as above.

 

Electronically signed by:   Mariana Claros MD   7/28/2023 7:08 AM CDT Workstation: [id number]

 

 

Due to temporary technical issues with the PACS/Fluency reporting system, reports are being signed by
 the in house radiologists without review as a courtesy to insure prompt reporting. The interpreting 
radiologist is fully responsible for the content of the report.

## 2023-07-28 NOTE — XMS REPORT
Continuity of Care Document

                            Created on:2023



Patient:TIETZ, ROBERT EDWARD

Sex:Male

:1965

External Reference #:[id number]





Demographics







                          Address                   [address]



                                                    [place] [address]

 

                          Home Phone                [phone]

 

                          Work Phone                [phone]

 

                          Email Address             [email or web address]

 

                          Preferred Language        English

 

                          Marital Status            Unknown

 

                          Rastafari Affiliation     Unknown

 

                          Race                      Unknown

 

                          Additional Race(s)        Unavailable

 

                          Ethnic Group              Unknown









Author







                          Organization              [facility]

t

 

                          Address                   [address]



                                                    [place] [address]

 

                          Phone                     [phone]









Support







                Name            Relationship    Address         Phone

 

                SHEREE MERINO Sister          [address]    [phone]

1



                                                [place] [address] 

 

                TIETZ, HEIDI M               Unavailable     [phone]









Care Team Providers







                    Name                Role                Phone

 

                    NISHANT MUELLER Attending Clinician Unavailable

 

                    CURLY WRIGHT Attending Clinician Unavailable

 

                    ARNOL IYER Attending Clinician Unavailable

 

                    VIC RIVERA Attending Clinician Unavailable

 

                    GUILLERMO CONNELL Attending Clinician Unavailable

 

                    JERICHO LOWERY      Attending Clinician Unavailable

 

                    VAZQUEZ PAYNE     Attending Clinician Unavailable

 

                    Gio Williamson Attending Clinician [phone]

 

                    VIC RIVERA Admitting Clinician Unavailable

 

                    GUILLERMO CONNELL Admitting Clinician Unavailable









Payers







           Payer Name Policy Type Policy Number Effective Date Expiration Date S

ource

 

           [employer]               [id number] 2023 00:00:00            



           POS/PPO/EPO                                             

 

           [place]            [id number] 2022 00:00:00        

    



           OUT OF STATE                                             







Problems







       Condition Condition Condition Status Onset  Resolution Last   Treating Co

mments 

Source



       Name   Details Category        Date   Date   Treatment Clinician        



                                                 Date                 

 

       Blepharosp  Blepharos Problem Active               2022            

   [facility]



       asm    pasm                               22:01:28               l



       (disorder) (disorder)                                                  Charan baileyann



              Active                                                  



              Problem                                                  



              2022                                                  



              [facility]                                                  



              Neuro                                                   

 

       Hemifacial  Hemifacia Problem Active               2022            

   [facility]



       spasm  l spasm                             22:01:28               l



       (finding) (finding)                                                  Mary

jazmine



              Active                                                  



              Problem                                                  



              2022                                                  



               [facility]                                                  



              Neuro                                                   

 

       Cerebral  Cerebral Problem Active               2022               

[facility]



       hemorrhage hemorrhage                             22:01:28               

l



       (disorder) (disorder)                                                  He

rmann



               Active                                                  



              Problem                                                  



              2022                                                  



              [facility]                                                  



              Neuro                                                   

 

       Hypertensi  Hypertens Problem Active               2022            

   [facility]



       ve     dakota                                22:01:28               l



       disorder, disorder,                                                  Herm

jazmine



       systemic systemic                                                  



       arterial arterial                                                  



       (disorder) (disorder)                                                  



               Active                                                  



              Problem                                                  



              2022                                                  



              [facility]                                                  



              Neuro                                                   

 

       Hypertensi  Hypertens Problem Active               2022            

   [facility]



       ve     d[place]ota                                22:01:28               l



       emergency emergency                                                  Herm

jazmine



       (disorder) (disorder)                                                  



              Active                                                  



              Problem                                                  



              2022                                                  



              [facility]                                                  



              Neuro                                                   







Allergies, Adverse Reactions, Alerts







       Allergy Allergy Status Severity Reaction(s) Onset  Inactive Treating Comm

ents 

Source



       Name   Type                        Date   Date   Clinician        

 

       NO KNOWN Allergy Active                                           [facility]







Social History







           Social Habit Start Date Stop Date  Quantity   Comments   Source

 

           Social History 2022                       [facility]



                      05:05:44   05:05:44                         

 

           Sex Assigned At [birth year]                       [facility]



           Birth      00:00:00   00:00:00                         [facility]







Medications







       Ordered Filled Start  Stop   Current Ordering Indication Dosage Frequency

 Signature

                    Comments            Components          Source



     Medication Medication Date Date Medication? Clinician                (SIG) 

          



     Name Name                                                   

 

     baclofen 10      2022-0      Yes                      10 mg = 1           M

emoria



     mg oral      4-27                               tab, PO,           l



     tablet      18:43:                               Bedtime, #           Lib[facility]

nn



               00                                 30 tab, 3           



                                                  Refill(s),           



                                                  Pharmacy:           



                                                  [facility]           



                                                  Pharmacy           



                                                  808,           



                                                  162.56,           



                                                  cm,            



                                                  22           



                                                  13:19:00           



                                                  CDT,           



                                                  Height,           



                                                  75.455,           



                                                  kg,            



                                                  22           



                                                  13:19:00           



                                                  CDT,           



                                                  Weight           

 

     baclofen 10      2022-0      Yes                      10 mg = 1           M

emoria



     mg oral      4-27                               tab, PO,           l



     tablet      18:43:                               Bedtime, #           [facility]

nn



               00                                 30 tab, 3           



                                                  Refill(s),           



                                                  Pharmacy:           



                                                  [facility]           



                                                  Pharmacy           



                                                  808,           



                                                  162.56,           



                                                  cm,            



                                                  22           



                                                  13:19:00           



                                                  CDT,           



                                                  Height,           



                                                  75.455,           



                                                  kg,            



                                                  22           



                                                  13:19:00           



                                                  CDT,           



                                                  Weight           

 

     baclofen 10      2022-0      Yes                      10 mg = 1           M

emoria



     mg oral      4-27                               tab, PO,           l



     tablet      18:43:                               Bedtime, #           [facility]

nn



               00                                 30 tab, 3           



                                                  Refill(s),           



                                                  Pharmacy:           



                                                  [facility]           



                                                  Pharmacy           



                                                  808,           



                                                  162.56,           



                                                  cm,            



                                                  22           



                                                  13:19:00           



                                                  CDT,           



                                                  Height,           



                                                  75.455,           



                                                  kg,            



                                                  22           



                                                  13:19:00           



                                                  CDT,           



                                                  Weight           

 

     baclofen 10      -0      Yes                      10 mg = 1           M

emoria



     mg oral      4-27                               tab, PO,           l



     tablet      18:43:                               Bedtime, #           [facility]

nn



               00                                 30 tab, 3           



                                                  Refill(s),           



                                                  Pharmacy:           



                                                  [facility]           



                                                  Pharmacy           



                                                  808,           



                                                  162.56,           



                                                  cm,            



                                                  22           



                                                  13:19:00           



                                                  CDT,           



                                                  Height,           



                                                  75.455,           



                                                  kg,            



                                                  22           



                                                  13:19:00           



                                                  CDT,           



                                                  Weight           

 

     baclofen 10      -0      Yes                      10 mg = 1           M

emoria



     mg oral      4-27                               tab, PO,           l



     tablet      18:43:                               Bedtime, #           [facility]

nn



               00                                 30 tab, 3           



                                                  Refill(s),           



                                                  Pharmacy:           



                                                  [facility]           



                                                  Pharmacy           



                                                  808,           



                                                  162.56,           



                                                  cm,            



                                                  22           



                                                  13:19:00           



                                                  CDT,           



                                                  Height,           



                                                  75.455,           



                                                  kg,            



                                                  22           



                                                  13:19:00           



                                                  CDT,           



                                                  Weight           

 

     baclofen 10      -0      Yes                      10 mg = 1           M

emoria



     mg oral      4-27                               tab, PO,           l



     tablet      18:43:                               Bedtime, #           [facility]

nn



               00                                 30 tab, 3           



                                                  Refill(s),           



                                                  Pharmacy:           



                                                  [facility]           



                                                  Pharmacy           



                                                  808,           



                                                  162.56,           



                                                  cm,            



                                                  22           



                                                  13:19:00           



                                                  CDT,           



                                                  Height,           



                                                  75.455,           



                                                  kg,            



                                                  22           



                                                  13:19:00           



                                                  CDT,           



                                                  Weight           

 

     baclofen 10      -0      Yes                      10 mg = 1           M

emoria



     mg oral      4-27                               tab, PO,           l



     tablet      18:43:                               Bedtime, #           [facility]

nn



               00                                 30 tab, 3           



                                                  Refill(s),           



                                                  Pharmacy:           



                                                  [facility]           



                                                  Pharmacy           



                                                  808,           



                                                  162.56,           



                                                  cm,            



                                                  22           



                                                  13:19:00           



                                                  CDT,           



                                                  Height,           



                                                  75.455,           



                                                  kg,            



                                                  22           



                                                  13:19:00           



                                                  CDT,           



                                                  Weight           

 

     baclofen 10      -0      Yes                      10 mg = 1           M

emoria



     mg oral      4-27                               tab, PO,           l



     tablet      18:43:                               Bedtime, #           [facility]

nn



               00                                 30 tab, 3           



                                                  Refill(s),           



                                                  Pharmacy:           



                                                  [facility]           



                                                  Pharmacy           



                                                  808,           



                                                  162.56,           



                                                  cm,            



                                                  22           



                                                  13:19:00           



                                                  CDT,           



                                                  Height,           



                                                  75.455,           



                                                  kg,            



                                                  22           



                                                  13:19:00           



                                                  CDT,           



                                                  Weight           

 

     baclofen 10      -0      Yes                      10 mg = 1           M

emoria



     mg oral      4-27                               tab, PO,           l



     tablet      18:43:                               Bedtime, #           [facility]

nn



               00                                 30 tab, 3           



                                                  Refill(s),           



                                                  Pharmacy:           



                                                  [facility]           



                                                  Pharmacy           



                                                  808,           



                                                  162.56,           



                                                  cm,            



                                                  22           



                                                  13:19:00           



                                                  CDT,           



                                                  Height,           



                                                  75.455,           



                                                  kg,            



                                                  22           



                                                  13:19:00           



                                                  CDT,           



                                                  Weight           

 

     baclofen 10      -0      Yes                      10 mg = 1           M

emoria



     mg oral      4-27                               tab, PO,           l



     tablet      18:43:                               Bedtime, #           [facility]

nn



               00                                 30 tab, 3           



                                                  Refill(s),           



                                                  Pharmacy:           



                                                  [facility]           



                                                  808,           



                                                  162.56,           



                                                  cm,            



                                                  22           



                                                  13:19:00           



                                                  CDT,           



                                                  Height,           



                                                  75.455,           



                                                  kg,            



                                                  22           



                                                  13:19:00           



                                                  CDT,           



                                                  Weight           

 

     baclofen 10      -0      Yes                      10 mg = 1           M

emoria



     mg oral      4-27                               tab, PO,           l



     tablet      18:43:                               Bedtime, #           [facility]

nn



               00                                 30 tab, 3           



                                                  Refill(s),           



                                                  Pharmacy:           



                                                  [facility]           



                                                  808,           



                                                  162.56,           



                                                  cm,            



                                                  22           



                                                  13:19:00           



                                                  CDT,           



                                                  Height,           



                                                  75.455,           



                                                  kg,            



                                                  22           



                                                  13:19:00           



                                                  CDT,           



                                                  Weight           

 

     baclofen 10      -0      Yes                      10 mg = 1           M

emoria



     mg oral      4-27                               tab, PO,           l



     tablet      18:43:                               Bedtime, #           Lib[facility]

nn



               00                                 30 tab, 3           



                                                  Refill(s),           



                                                  Pharmacy:           



                                                  [facility]           



                                                  Pharmacy           



                                                  808,           



                                                  162.56,           



                                                  cm,            



                                                  22           



                                                  13:19:00           



                                                  CDT,           



                                                  Height,           



                                                  75.455,           



                                                  kg,            



                                                  22           



                                                  13:19:00           



                                                  CDT,           



                                                  Weight           

 

     baclofen 10      -0      Yes                      10 mg = 1           M

em[facility]



     mg oral      4-27                               tab, PO,           l



     tablet      18:43:                               Bedtime, #           [facility]

nn



               00                                 30 tab, 3           



                                                  Refill(s),           



                                                  Pharmacy:           



                                                  [facility]           



                                                  Pharmacy           



                                                  808,           



                                                  162.56,           



                                                  cm,            



                                                  22           



                                                  13:19:00           



                                                  CDT,           



                                                  Height,           



                                                  75.455,           



                                                  kg,            



                                                  22           



                                                  13:19:00           



                                                  CDT,           



                                                  Weight           

 

     baclofen 10      -0      Yes                      10 mg = 1           M

em[facility]



     mg oral      4-27                               tab, PO,           l



     tablet      18:43:                               Bedtime, #           [facility]

nn



               00                                 30 tab, 3           



                                                  Refill(s),           



                                                  Pharmacy:           



                                                  [facility]           



                                                  Pharmacy           



                                                  808,           



                                                  162.56,           



                                                  cm,            



                                                  22           



                                                  13:19:00           



                                                  CDT,           



                                                  Height,           



                                                  75.455,           



                                                  kg,            



                                                  22           



                                                  13:19:00           



                                                  CDT,           



                                                  Weight           

 

     baclofen 10      -0      Yes                      10 mg = 1           M

[facility]



     mg oral      4-27                               tab, PO,           l



     tablet      18:43:                               Bedtime, #           [facility]

nn



               00                                 30 tab, 3           



                                                  Refill(s),           



                                                  Pharmacy:           



                                                  [facility]           



                                                  Pharmacy           



                                                  808,           



                                                  162.56,           



                                                  cm,            



                                                  22           



                                                  13:19:00           



                                                  CDT,           



                                                  Height,           



                                                  75.455,           



                                                  kg,            



                                                  22           



                                                  13:19:00           



                                                  CDT,           



                                                  Weight           

 

     baclofen 10      -0      Yes                      10 mg = 1           M

em[facility]



     mg oral      4-27                               tab, PO,           l



     tablet      18:43:                               Bedtime, #           [facility]

nn



               00                                 30 tab, 3           



                                                  Refill(s),           



                                                  Pharmacy:           



                                                  [facility]           



                                                  Pharmacy           



                                                  808,           



                                                  162.56,           



                                                  cm,            



                                                  22           



                                                  13:19:00           



                                                  CDT,           



                                                  Height,           



                                                  75.455,           



                                                  kg,            



                                                  22           



                                                  13:19:00           



                                                  CDT,           



                                                  Weight           







Vital Signs







             Vital Name   Observation Time Observation Value Comments     Source

 

             WEIGHT       2023 05:26:00 68.675 kg                 

 

             WEIGHT       2023-07-15 06:00:00 68 kg                     

 

             WEIGHT       2023 06:00:00 68 kg                     

 

             WEIGHT       2023 00:00:00 68.5 kg                   

 

             WEIGHT       2023 06:00:00 68.3 kg                   

 

             WEIGHT       2023 06:00:00 68.2 kg                   

 

             HEIGHT       2023 07:00:00 162.6 cm                  

 

             WEIGHT       2023 00:00:00 68 kg                     

 

             WEIGHT       2023 05:26:00 68.675 kg                 

 

             WEIGHT       2023-07-15 06:00:00 68 kg                     

 

             WEIGHT       2023 06:00:00 68 kg                     

 

             WEIGHT       2023 00:00:00 68.5 kg                   

 

             WEIGHT       2023 06:00:00 68.3 kg                   

 

             WEIGHT       2023 06:00:00 68.2 kg                   

 

             HEIGHT       2023 07:00:00 162.6 cm                  

 

             WEIGHT       2023 00:00:00 68 kg                     

 

             Systolic (mm Hg) 2022 18:10:00                           [facility]

[facility]

 

             Diastolic (mm Hg) 2022 18:10:00                           [facility] [place]

 

             Heart Rate   2022 18:10:00                           [facility]

 [facility]

 

             Respitory Rate 2022 18:10:00                           [facility]

[facility] [place]

 

             Height       2022 18:10:00 162.56 cm                 [facility]

 

             Weight       2022 18:10:00                           [facility]

 

             BMI Calculated 2022 18:10:00                           [facility]

[facility] [place]

 

             Systolic (mm Hg) 2020 20:22:00                           [facility]

[facility]

 

             Diastolic (mm Hg) 2020 20:22:00                           [facility] [place]

 

             Heart Rate   2020 20:22:00                           [facility]

 [facility]

 

             Respitory Rate 2020 20:22:00                           [facility]

[facility]

 

             Height       2020 20:22:00 162.56 cm                 [facility]

 

             Weight       2020 20:22:00                           [facility]

 

             BMI Calculated 2020 20:22:00                           [facility]

[facility]







Procedures

This patient has no known procedures.



Encounters







        Start   End     Encounter Admission Attending Care    Care    Encounter 

Source



        Date/Time Date/Time Type    Type    Clinicians Facility Department ID   

   

 

        2023         Inpatient ER      ERVIN [facility]    [id number]

8 [facility]



        [facility]                         

 

        [facility]23         Inpatient ER      KYLE [facility]    [id number]

4 [facility]



        00:00:00                         [facility]                          

 

        2023         Inpatient ER      ERVIN, [facility]    [id number]

5 [facility]



        13:43:58                         [facility]                         

 

        2023         Inpatient ER      ERVIN [facility]    [id number]

7 [facility]



        12:07:38                         [facility]                         

 

        2023         Inpatient ER      ERVIN, [facility]    [facility]    [id number]

9 [facility]



        18:57:40                         [facility]                         

 

        2023         Inpatient ER      ERVIN, [facility]    [facility]    [id number]

7 [facility]



        13:21:32                         [facility]                         

 

        2023         Inpatient ER      ERVIN, [facility]    [facility]    [id number]

9 [facility]



        11:12:44                         [facility]                         

 

        2023-07-15         Inpatient ER      [facility]IN, [facility]    [facility]    [id number]

5 [facility]



        18:09:15                         [facility]                         

 

        2023         Inpatient ER      RAMACHANDRA [facility]    [facility]    [id number]

091 [facility]



        10:47:24                         N, ELMERTKIANNA                         

 

        2023         Inpatient ER      BERSHAD, [facility]    [facility]    [id number]

 [facility]



        08:14:58                         V[facility]                            

 

        2023         Inpatient ER      BERSHAD, [facility]    [facility]    [id number]

 [facility]



        08:14:51                         VIC                            

 

        2023         Inpatient ER      WRIGHT, [facility]    [facility]    [id number]

0 [facility]



        04:37:56                         [facility]                          

 

        2023         Inpatient ER      WRIGHT, [facility]    [facility]    [id number]

8 [facility]



        04:20:00                         [facility]                          

 

        2023         Inpatient ER      BERSHAD, [facility]    [facility]    [id number]

 [facility]



        00:25:30                         [facility]                            

 

        2023-07-10         Inpatient ER      BERSHAD, [facility]    [facility]    [id number]

 [facility]



        00:05:49                         [facility]                            

 

        2023         Inpatient ER      BERSHAD, [facility]    [facility]    [id number]

 [facility]



        00:27:15                         [facility]                            

 

        2023         Inpatient ER              [facility]    [facility]    [id number] 

[facility]



        00:26:38                                                         

 

        2023         Inpatient ER              [facility]    [facility]    [id number] 

[facility]



        15:12:58                                                         

 

        2023         Inpatient ER      BERSHAD, [facility]    [facility]    [id number]

 [facility]



        14:35:00                         [facility]                            

 

        2023         Inpatient ER              [facility]    [facility]    [id number] 

[facility]



        13:27:25                                                         

 

        2023         Inpatient ER      RAMACHANDRA [facility]    [facility]    [id number]

018 [facility]



        08:29:48                         N, [facility]                         

 

        2023         Inpatient ER      RAMACHANDRA [facility]    [facility]    [id number]

127 [facility]



        07:58:55                         N, [facility]                         

 

        2023         Inpatient ER              [facility]    [facility]    [id number] 

[facility]



        00:07:44                                                         

 

        2023         Inpatient ER      BERSHAD, [facility]    [facility]    [id number]

 [facility]



        20:29:11                         [facility]                            

 

        2023         Inpatient ER              [facility]    [facility]    [id number] 

[facility]



        17:15:27                                                         

 

        2023         Inpatient ER              [facility]    [facility]    [id number] 

[facility]



        17:15:20                                                         

 

        2023         Inpatient ER      RAMACHANDRA [facility]    [facility]    [id number]

918 [facility]



        16:03:43                         N, [facility]                         

 

        2023         Inpatient ER      RAMACHANDRA [facility]    [facility]    [id number]

911 [facility]



        16:03:37                         N, [facility]                         

 

        2023         Inpatient ER      BERSHAD, [facility]    [facility]    [id number]

 [facility]



        13:19:28                         [facility]                            

 

        2023         Inpatient ER      BERSHAD, [facility]    [facility]    [id number]

 [facility]



        00:25:37                         [facility]                            

 

        2023         Inpatient ER      BERSHAD, [facility]    [facility]    [id number]

 [facility]



        09:20:39                         [facility]                            

 

        2023         Inpatient ER      BERSHAD, [facility]    [facility]    [id number]

 [facility]



        14:30:51                         [facility]                            

 

        2023         Inpatient ER      RAMMISANDRA [facility]    [facility]    [id number]

039 [facility]



        10:05:39                         N, [facility]                         

 

        2023         Inpatient ER      KO, [facility]    [facility]    [id number] 

[facility]



        06:27:46                         [facility]                           

 

        2023         Inpatient ER      KO, [facility]    [facility]    [id number] 

[facility]



        06:27:39                         [facility]                           

 

        2023         Inpatient ER      KO, [facility]    [facility]    [id number] 

[facility]



        06:27:32                         [facility]                           

 

        2023         Inpatient ER      KO, [facility]    [facility]    [id number] 

[facility]



        00:28:35                         [facility]                           

 

        2023         Outpatient                 UTH     UTH     [id number]

 [facility]



        23:37:31                                                 [id number] [facility]

 

        2023         Inpatient ER      KO [facility]    [id number] 

[facility]



        23:02:31                         [facility]                           

 

        2022         Outpatient                 UTH     UTH     [id number]

 [facility]



        12:58:22                                                 [id number] [facility]

 

        2022         Outpatient         BEVERLEY, [facility]     [id number]

 [facility]



        17:00:59                         JERICHO                           [facility]

 

        2022         Outpatient         KERRY, [facility]     [id number]

 [facility]



        10:22:46                         [facility]

 

        2022         Inpatient ER      KO, [facility]   Neurosurger [id number]

[address]



        18:28:49                         [facility]

 

        2023 Inpatient ER      ERVIN, [facility]    Neuro ICU 

[id number] [facility]



        22:50:00 10:56:00                 [facility]                         

 

        2023 Inpatient ER      RAMACHANDRA [facility]    0

[id number] [facility]



        12:12:14 00:00:00                 NELMERTU                         

 

        2023 Inpatient ER      KO [facility]    [id number]

85 [facility]



        00:38:59 00:00:00                 [facility]                           

 

        2022 Ambulatory                 nullFlavo [facility]     [id number]

[id number] [facility]



        16:00:00 16:00:00 Pre-Reg                 r       Neurology 02      l



                                                        Sari         [facility]

 

        2022 Ambulatory                 nullFlavo [facility]     [id number]

[id number] [facility]



        16:00:00 16:00:00 Pre-Reg                 r       Neurology 02      l



                                                        Sari         [facility]

 

        2022 Outpatient         WHITNEY Williamson MISCHER 554

[id number] 



        11:00:00 11:00:00                 Gio Nuñez                         

 

        2022 Outpatient                 [facility]    [facility]    [id number]

365 [facility]



        13:45:00 13:45:00                                         02      cirilo Castillo

 

        2022 Outpatient                 nullFlavo [facility]NA     [id number]

[id number] [facility]



        18:00:00 04:59:59                         r       Neurology 01      l



                                                        [place]

 

        2022 Outpatient                 nullFlavo MNA     [id number]

[id number] [facility]



        18:00:00 04:59:59                         r       Neurology 01      l



                                                        Sari Hernandezann

 

        2022 Outpatient         WHITNEY Williamson [place] 554

[id number] 



        13:00:00 23:59:59                 Gio                         



                                        Joaquin                         

 

        2022 Outpatient                 SLICK    SLICK    [id number]

365 [facility]



        13:00:00 13:00:00                                         01      cirilo Hernandezann

 

        2020 Outpatient                 nullFlavo MNA     [id number]

[id number] [facility]



        20:15:00 05:59:59                         r       Neurology 00      l



                                                        Sari Hernandezann

 

        2020 Outpatient                 nullFlavo MNA     [id number]

[id number] [facility]



        20:15:00 05:59:59                         r       Neurology 00      l



                                                        [place]         [facility]

 

        2020 [facility]

[id number] 



        14:15:00 23:59:59                 Gio                   00      



                                        Joaquin                         

 

        2020 Outpatient                 SIDDHARTH MESSINA    [id number]

365 [facility]



        14:15:00 14:15:00                                         00      cirilo



                                                                        Jonathan







Results







           Test Description Test Time  Test Comments Results    Result Comments 

Source









                    POCT-GLUCOSE METER  2023 07:10:15 









                      Test Item  Value      Reference Range Interpretation Comme

nts









             POC-GLUCOSE METER (BEAKER) 113 mg/dL           H            :

 TESTED AT [address]



             (test code = 1538)                                        [place] [id number]:



                                                                 /Techni

deejay ID = [id number] for



                                                                 PRECIOUS SMITH

FRIEDMAN



[id number] 06:34:45





             Test Item    Value        Reference Range Interpretation Comments

 

             MAGNESIUM (BEAKER) (test code = 1.8 mg/dL    1.6-2.6               

    



             627)                                                



 ID - [id number] 06:34:45





             Test Item    Value        Reference Range Interpretation Comments

 

             PHOSPHORUS (BEAKER) (test code = 2.8 mg/dL    2.3-4.7              

     



             604)                                                



 ID - ADMINPOCT-GLUCOSE [id number] 01:10:15





             Test Item    Value        Reference Range Interpretation Comments

 

             POC-GLUCOSE METER 100 mg/dL                        : TESTED A

T [facility] 6720



             (BEAKER) (test code =                                        [facility],



             1538)                                               [address]:



                                                                 /Techni

deejay ID



                                                                 = [id number] for FABBY MARTIN



POCT-GLUCOSE [id number] 17:12:52





             Test Item    Value        Reference Range Interpretation Comments

 

             POC-GLUCOSE METER 104 mg/dL                        : TESTED A

T [facility] 6720



             (BEAKER) (test code =                                        [facility],



             1538)                                               [address]:



                                                                 /Techni

deejay ID



                                                                 = [id number] for Vannesa Griffiths



POCT-GLUCOSE [id number] 12:42:29





             Test Item    Value        Reference Range Interpretation Comments

 

             POC-GLUCOSE METER 109 mg/dL                        : TESTED A

T [facility] 6720



             (BEAKER) (test code =                                        [facility],



             1538)                                               [address]38:



                                                                 /Techni

deejay ID



                                                                 = [id number] for Vannesa Griffiths



[id number] 05:29:16





             Test Item    Value        Reference Range Interpretation Comments

 

             PHOSPHORUS (BEAKER) (test code = 2.7 mg/dL    2.3-4.7              

     



             604)                                                



 ID - CHIKIS WBASIC METABOLIC [id number] 05:29:15





             Test Item    Value        Reference Range Interpretation Comments

 

             SODIUM (BEAKER) 142 meq/L    136-145                   



             (test code = 381)                                        

 

             POTASSIUM    3.9 meq/L    3.5-5.1                   



             (BEAKER) (test                                        



             code = 379)                                         

 

             CHLORIDE (BEAKER) 109 meq/L           H            



             (test code = 382)                                        

 

             CO2 (BEAKER) 24 meq/L     22-29                     



             (test code = 355)                                        

 

             BLOOD UREA   28 mg/dL     7-21         H            



             NITROGEN (BEAKER)                                        



             (test code = 354)                                        

 

             CREATININE   0.78 mg/dL   0.57-1.25                 



             (BEAKER) (test                                        



             code = 358)                                         

 

             GLUCOSE RANDOM 112 mg/dL           H            



             (BEAKER) (test                                        



             code = 652)                                         

 

             CALCIUM (BEAKER) 8.9 mg/dL    8.4-10.2                  



             (test code = 697)                                        

 

             EGFR (BEAKER) 105                                      Interpretati

on of eGFR



             (test code = mL/min/1.73                            values Stage De

scription



             1092)        sq m                                   Result G1 Althea

l or high



                                                                 >=90 G2 Mildly 

decreased



                                                                 60-89 G3a Mildl

y to



                                                                 moderately 45-5

9 G3b



                                                                 Moderately to s

everely



                                                                 30-44 G4 Severl

y decreased



                                                                 15-29 G5 Kidney

 failure



                                                                 <15Reported eGF

R is based



                                                                 on the CKD-EPI 





                                                                 equation that d

oes not use



                                                                 a race



                                                                 coefficientEsti

mated GFR



                                                                 is not as accur

ate as



                                                                 Creatinine Porsha

ekaterina in



                                                                 predicting glom

erular



                                                                 filtration rate

. Estimated



                                                                 GFR is not appl

icable for



                                                                 dialysis patien

ts



 ID - CHIKIS [id number] 05:29:15





             Test Item    Value        Reference Range Interpretation Comments

 

             MAGNESIUM (BEAKER) (test code = 2.0 mg/dL    1.6-2.6               

    



             627)                                                



 ID - CHIKIS WPOCT-GLUCOSE [id number] 01:39:19





             Test Item    Value        Reference Range Interpretation Comments

 

             POC-GLUCOSE METER 129 mg/dL           H            : TESTED A

T [facility] [address]



             (BEAKER) (test code =                                        [place],



             [address]38)                                               [id number]:



                                                                 /Techni

deejay ID



                                                                 = [id number] for FABBY MARTIN



CBC W/PLT COUNT &amp; AUTO [id number] 10:38:05





             Test Item    Value        Reference Range Interpretation Comments

 

             WHITE BLOOD CELL COUNT (BEAKER) 17.7 K/ L    3.5-10.5     H        

    



             (test code = 775)                                        

 

             RED BLOOD CELL COUNT (BEAKER) 4.70 M/ L    4.63-6.08               

  



             (test code = 761)                                        

 

             HEMOGLOBIN (BEAKER) (test code = 12.2 GM/DL   13.7-17.5    L       

     



             410)                                                

 

             HEMATOCRIT (BEAKER) (test code = 38.1 %       40.1-51.0    L       

     



             411)                                                

 

             MEAN CORPUSCULAR VOLUME (BEAKER) 81 fL        79-92                

     



             (test code = 753)                                        

 

             MEAN CORPUSCULAR HEMOGLOBIN 26.0 pg      25.7-32.2                 



             (BEAKER) (test code = 751)                                        

 

             MEAN CORPUSCULAR HEMOGLOBIN CONC 32.0 GM/DL   32.3-36.5    L       

     



             (BEAKER) (test code = 752)                                        

 

             RED CELL DISTRIBUTION WIDTH 14.3 %       11.6-14.4                 



             (BEAKER) (test code = 412)                                        

 

             PLATELET COUNT (BEAKER) (test 382 K/CU MM  150-450                 

  



             code = 756)                                         

 

             MEAN PLATELET VOLUME (BEAKER) 9.5 fL       9.4-12.4                

  



             (test code = 754)                                        

 

             NUCLEATED RED BLOOD CELLS 0 /100 WBC   0-0                       



             (BEAKER) (test code = 413)                                        

 

             NEUTROPHILS RELATIVE PERCENT 86 %                                  

 



             (BEAKER) (test code = 429)                                        

 

             LYMPHOCYTES RELATIVE PERCENT 6 %                                   

 



             (BEAKER) (test code = 430)                                        

 

             MONOCYTES RELATIVE PERCENT 6 %                                    



             (BEAKER) (test code = 431)                                        

 

             EOSINOPHILS RELATIVE PERCENT 1 %                                   

 



             (BEAKER) (test code = 432)                                        

 

             BASOPHILS RELATIVE PERCENT 0 %                                    



             (BEAKER) (test code = 437)                                        

 

             NEUTROPHILS ABSOLUTE COUNT 15.23 K/ L   1.78-5.38    H            



             (BEAKER) (test code = 670)                                        

 

             LYMPHOCYTES ABSOLUTE COUNT 1.01 K/ L    1.32-3.57    L            



             (BEAKER) (test code = 414)                                        

 

             MONOCYTES ABSOLUTE COUNT (BEAKER) 1.13 K/ L    0.30-0.82    H      

      



             (test code = 415)                                        

 

             EOSINOPHILS ABSOLUTE COUNT 0.18 K/ L    0.04-0.54                 



             (BEAKER) (test code = 416)                                        

 

             BASOPHILS ABSOLUTE COUNT (BEAKER) 0.06 K/ L    0.01-0.08           

      



             (test code = 417)                                        

 

             IMMATURE GRANULOCYTES-RELATIVE 0.50 %       0.00-1.00              

   



             PERCENT (BEAKER) (test code =                                      

  



             2801)                                               



POCT-GLUCOSE [id number] 06:03:32





             Test Item    Value        Reference Range Interpretation Comments

 

             POC-GLUCOSE METER 123 mg/dL           H            : TESTED A

T [facility] [address]



             (BEAKER) (test code =                                        [place],



             [address])                                               [id number]:



                                                                 /Techni

deejay ID



                                                                 = [id number] for SON MONTANA



[id number] 05:07:42





             Test Item    Value        Reference Range Interpretation Comments

 

             PHOSPHORUS (BEAKER) (test code = 2.5 mg/dL    2.3-4.7              

     



             604)                                                



 ID - CHIKIS [id number] 05:07:41





             Test Item    Value        Reference Range Interpretation Comments

 

             MAGNESIUM (BEAKER) (test code = 2.0 mg/dL    1.6-2.6               

    



             627)                                                



 ID - CHIKIS WPOCT-GLUCOSE [id number] 00:05:37





             Test Item    Value        Reference Range Interpretation Comments

 

             POC-GLUCOSE METER 122 mg/dL           H            : TESTED A

T [facility] [address]



             (BEAKER) (test code =                                        [place] [facility],



             [address])                                               [id number]:



                                                                 /Techni

deejay ID



                                                                 = [id number] for SON MONTANA



URINALYSIS W/ REFLEX URINE [id number] 18:50:27





             Test Item    Value        Reference Range Interpretation Comments

 

             COLOR (BEAKER) (test code = 470) Yellow                            

     

 

             CLARITY (BEAKER) (test code = 469) Hazy                            

       

 

             SPECIFIC GRAVITY UA (BEAKER) (test 1.029        1.001-1.035        

       



             code = 468)                                         

 

             PH UA (BEAKER) (test code = 467) 7.0          5.0-8.0              

     

 

             PROTEIN UA (BEAKER) (test code = 20 mg/dL     Negative     A       

     



             464)                                                

 

             GLUCOSE UA (BEAKER) (test code = Negative     Negative             

     



             365)                                                

 

             KETONES UA (BEAKER) (test code = Negative     Negative             

     



             371)                                                

 

             BILIRUBIN UA (BEAKER) (test code = Negative     Negative           

       



             462)                                                

 

             BLOOD UA (BEAKER) (test code = 461) Negative     Negative          

        

 

             NITRITE UA (BEAKER) (test code = Negative     Negative             

     



             465)                                                

 

             LEUKOCYTE ESTERASE UA (BEAKER) (test Negative     Negative         

         



             code = 466)                                         

 

             UROBILINOGEN UA (BEAKER) (test code 2            0.2-1.0      H    

        



             = 463)                                              

 

             RBC UA (BEAKER) (test code = 519) 2 /HPF                           

      

 

             WBC UA (BEAKER) (test code = 520) 5 /HPF                           

      

 

             MUCUS (BEAKER) (test code = 1574) Rare                             

      

 

             SOURCE(BEAKER) (test code = 2795)                                  

      



 ID - [auto] ID - techPOCT-GLUCOSE [id number] 17:27:05





             Test Item    Value        Reference Range Interpretation Comments

 

             POC-GLUCOSE METER 144 mg/dL           H            : TESTED A

T [facility] [address]



             (BEAKER) (test code =                                        [place] [facility],



             [address]38)                                               [id number]:



                                                                 /Techni

deejay ID



                                                                 = [id number] for An

Vannesa adams



XR CHEST 1 VIEW PORTABLE / [id number] 17:17:52
************************************************************[facility] : 1965  Sex: 
M************************************************************Chest one
view:HISTORY: follow up PneumoniaComparison: 2023There has been partial 
clearing of the left base. Patchy airspaceopacity in the right lung base is now 
noted. There is no pleuraleffusion identified. Cardiac size is within normal 
limits. The feedingtube noted previously has been removed in the inte
rval.IMPRESSION:Partial clearing of the left lung base with new limitedpatchy 
airspace disease in the right base.Electronically Signed By: Tawanda Balbuena2023 17:19 CDTWorkstation Name: [id number]-GLUCOSE METER
2023 12:28:58





             Test Item    Value        Reference Range Interpretation Comments

 

             POC-GLUCOSE METER 124 mg/dL           H            : TESTED A

T [facility] [address]



             (BEAKER) (test code =                                        [facility],



             [address]38)                                               [id number]:



                                                                 /Techni

deejay ID



                                                                 = [id number] for Vannesa Griffiths



POCT-GLUCOSE [id number] 06:37:26





             Test Item    Value        Reference Range Interpretation Comments

 

             POC-GLUCOSE METER 130 mg/dL           H            : TESTED A

T [facility] [address]



             (BEAKER) (test code =                                        [facility]

[place] [facility],



             [address]38)                                               [address]:



                                                                 /Techni

deejay ID



                                                                 = [id number] for SON MONTANA



[id number] 05:52:45





             Test Item    Value        Reference Range Interpretation Comments

 

             MAGNESIUM (BEAKER) (test code = 2.1 mg/dL    1.6-2.6               

    



             627)                                                



 ID - [id number] 05:52:45





             Test Item    Value        Reference Range Interpretation Comments

 

             PHOSPHORUS (BEAKER) (test code = 2.7 mg/dL    2.3-4.7              

     



             604)                                                



 ID - MMPOCT-GLUCOSE [id number] 00:24:47





             Test Item    Value        Reference Range Interpretation Comments

 

             POC-GLUCOSE METER 105 mg/dL                        : TESTED A

T [facility] [address]



             (BEAKER) (test code =                                        [facility],



             [address]38)                                               [address]:



                                                                 /Techni

deejay ID



                                                                 = [id number] for SON MONTANA



POCT-GLUCOSE [id number] 12:59:07





             Test Item    Value        Reference Range Interpretation Comments

 

             POC-GLUCOSE METER 90 mg/dL                         : TESTED A

T [facility] [address]



             (BEAKER) (test code =                                        [facility],



             [address]38)                                               [address]:



                                                                 /Techni

deejay ID =



                                                                 [id number] for MIMI FOX



POCT-GLUCOSE [id number] 06:53:06





             Test Item    Value        Reference Range Interpretation Comments

 

             POC-GLUCOSE METER 79 mg/dL                         : TESTED A

T [facility] [address]



             (BEAKER) (test code =                                        [facility],



             1538)                                               [address]:



                                                                 /Techni

deejay ID =



                                                                 [id number] for SOPHIA RAY



[id number] 06:00:35





             Test Item    Value        Reference Range Interpretation Comments

 

             MAGNESIUM (BEAKER) (test code = 2.1 mg/dL    1.6-2.6               

    



             627)                                                



 ID - [id number] 06:00:35





             Test Item    Value        Reference Range Interpretation Comments

 

             PHOSPHORUS (BEAKER) (test code = 3.2 mg/dL    2.3-4.7              

     



             604)                                                



 ID - BSBASIC METABOLIC [id number] 06:00:34





             Test Item    Value        Reference Range Interpretation Comments

 

             SODIUM (BEAKER) 144 meq/L    136-145                   



             (test code = 381)                                        

 

             POTASSIUM    4.4 meq/L    3.5-5.1                   



             (BEAKER) (test                                        



             code = 379)                                         

 

             CHLORIDE (BEAKER) 111 meq/L           H            



             (test code = 382)                                        

 

             CO2 (BEAKER) 25 meq/L     22-29                     



             (test code = 355)                                        

 

             BLOOD UREA   35 mg/dL     7-21         H            



             NITROGEN (BEAKER)                                        



             (test code = 354)                                        

 

             CREATININE   0.87 mg/dL   0.57-1.25                 



             (BEAKER) (test                                        



             code = 358)                                         

 

             GLUCOSE RANDOM 81 mg/dL                         



             (BEAKER) (test                                        



             code = 652)                                         

 

             CALCIUM (BEAKER) 8.9 mg/dL    8.4-10.2                  



             (test code = 697)                                        

 

             EGFR (BEAKER) 102                                     Interpretatio

n of eGFR



             (test code = mL/min/1.73                            values Stage De

scription



             1092)        sq m                                   Result G1 Althea

l or high



                                                                 >=90 G2 Mildly 

decreased



                                                                 60-89 G3a Mildl

y to



                                                                 moderately 45-5

9 G3b



                                                                 Moderately to s

everely



                                                                 30-44 G4 Severl

y decreased



                                                                 15-29 G5 Kidney

 failure



                                                                 <15Reported eGF

R is based



                                                                 on the CKD-EPI 





                                                                 equation that d

oes not use



                                                                 a race



                                                                 coefficientEsti

mated GFR



                                                                 is not as accur

ate as



                                                                 Creatinine Porsha

eakterina in



                                                                 predicting glom

erular



                                                                 filtration rate

. Estimated



                                                                 GFR is not appl

icable for



                                                                 dialysis patien

ts



 ID - BSCBC W/PLT COUNT &amp; AUTO [id number] 05:31:38





             Test Item    Value        Reference Range Interpretation Comments

 

             WHITE BLOOD CELL COUNT (BEAKER) 10.1 K/ L    3.5-10.5              

    



             (test code = 775)                                        

 

             RED BLOOD CELL COUNT (BEAKER) 4.79 M/ L    4.63-6.08               

  



             (test code = 761)                                        

 

             HEMOGLOBIN (BEAKER) (test code = 12.4 GM/DL   13.7-17.5    L       

     



             410)                                                

 

             HEMATOCRIT (BEAKER) (test code = 40.1 %       40.1-51.0            

     



             411)                                                

 

             MEAN CORPUSCULAR VOLUME (BEAKER) 84 fL        79-92                

     



             (test code = 753)                                        

 

             MEAN CORPUSCULAR HEMOGLOBIN 25.9 pg      25.7-32.2                 



             (BEAKER) (test code = 751)                                        

 

             MEAN CORPUSCULAR HEMOGLOBIN CONC 30.9 GM/DL   32.3-36.5    L       

     



             (BEAKER) (test code = 752)                                        

 

             RED CELL DISTRIBUTION WIDTH 14.1 %       11.6-14.4                 



             (BEAKER) (test code = 412)                                        

 

             PLATELET COUNT (BEAKER) (test 392 K/CU MM  150-450                 

  



             code = 756)                                         

 

             MEAN PLATELET VOLUME (BEAKER) 9.7 fL       9.4-12.4                

  



             (test code = 754)                                        

 

             NUCLEATED RED BLOOD CELLS 0 /100 WBC   0-0                       



             (BEAKER) (test code = 413)                                        

 

             NEUTROPHILS RELATIVE PERCENT 78 %                                  

 



             (BEAKER) (test code = 429)                                        

 

             LYMPHOCYTES RELATIVE PERCENT 11 %                                  

 



             (BEAKER) (test code = 430)                                        

 

             MONOCYTES RELATIVE PERCENT 8 %                                    



             (BEAKER) (test code = 431)                                        

 

             EOSINOPHILS RELATIVE PERCENT 2 %                                   

 



             (BEAKER) (test code = 432)                                        

 

             BASOPHILS RELATIVE PERCENT 1 %                                    



             (BEAKER) (test code = 437)                                        

 

             NEUTROPHILS ABSOLUTE COUNT 7.85 K/ L    1.78-5.38    H            



             (BEAKER) (test code = 670)                                        

 

             LYMPHOCYTES ABSOLUTE COUNT 1.12 K/ L    1.32-3.57    L            



             (BEAKER) (test code = 414)                                        

 

             MONOCYTES ABSOLUTE COUNT (BEAKER) 0.76 K/ L    0.30-0.82           

      



             (test code = 415)                                        

 

             EOSINOPHILS ABSOLUTE COUNT 0.24 K/ L    0.04-0.54                 



             (BEAKER) (test code = 416)                                        

 

             BASOPHILS ABSOLUTE COUNT (BEAKER) 0.08 K/ L    0.01-0.08           

      



             (test code = 417)                                        

 

             IMMATURE GRANULOCYTES-RELATIVE 0.50 %       0.00-1.00              

   



             PERCENT (BEAKER) (test code =                                      

  



             2801)                                               



POCT-GLUCOSE [id number] 01:00:35





             Test Item    Value        Reference Range Interpretation Comments

 

             POC-GLUCOSE METER 93 mg/dL                         : TESTED A

T [facility] [address]



             (BEAKER) (test code =                                        [facility],



             153)                                               [address]:



                                                                 /Techni

deejay ID =



                                                                 [id number] for SOPHIA RAY



POCT-GLUCOSE [id number] 18:15:53





             Test Item    Value        Reference Range Interpretation Comments

 

             POC-GLUCOSE METER 88 mg/dL                         : TESTED A

T [facility] [address]



             (BEAKER) (test code =                                        [facility],



             1538)                                               [address]:



                                                                 /Techni

deejay ID =



                                                                 [id number] for EDUARDO FINE



POCT-GLUCOSE [id number] 13:34:49





             Test Item    Value        Reference Range Interpretation Comments

 

             POC-GLUCOSE METER 100 mg/dL                        : TESTED A

T [facility] [address]



             (BEAKER) (test code =                                        [facility],



             1538)                                               [address]:



                                                                 /Techni

deejay ID



                                                                 = [id number] for EDUARDO DUTTA



PROTHROMBIN [id number] 06:38:54





             Test Item    Value        Reference Range Interpretation Comments

 

             PROTIME (BEAKER) (test code = 14.7 seconds 11.9-14.2    H          

  



             759)                                                

 

             INR (BEAKER) (test code = 370) 1.23         <=5.90                 

   



RECOMMENDED COUMADIN/WARFARIN INR THERAPY RANGESSTANDARD DOSE: 2.0 - 3.0 
Includes: PROPHYLAXIS for venous thrombosis, systemic embolization; TREATMENT 
for venous thrombosis and/or pulmonary embolus.HIGH RISK: Target INR is 2.5-3.5 
for patients with mechanical heart valves.POCT-GLUCOSE [id number] 06:23:43





             Test Item    Value        Reference Range Interpretation Comments

 

             POC-GLUCOSE METER 108 mg/dL                        : TESTED A

[facility]



             (BEAKER) (test code =                                        [facility],



             1538)                                               [id number]:



                                                                 /Techni

deejay ID



                                                                 = [id number] for VAZQUEZ SIERRA



[id number] 04:40:04





             Test Item    Value        Reference Range Interpretation Comments

 

             MAGNESIUM (BEAKER) (test code = 2.0 mg/dL    1.6-2.6               

    



             627)                                                



 ID - [id number] 04:40:04





             Test Item    Value        Reference Range Interpretation Comments

 

             PHOSPHORUS (BEAKER) (test code = 3.1 mg/dL    2.3-4.7              

     



             604)                                                



 ID - DBBASIC METABOLIC [id number] 04:40:03





             Test Item    Value        Reference Range Interpretation Comments

 

             SODIUM (BEAKER) 143 meq/L    136-145                   



             (test code = 381)                                        

 

             POTASSIUM    3.8 meq/L    3.5-5.1                   



             (BEAKER) (test                                        



             code = 379)                                         

 

             CHLORIDE (BEAKER) 109 meq/L           H            



             (test code = 382)                                        

 

             CO2 (BEAKER) 25 meq/L     22-29                     



             (test code = 355)                                        

 

             BLOOD UREA   36 mg/dL     7-21         H            



             NITROGEN (BEAKER)                                        



             (test code = 354)                                        

 

             CREATININE   0.92 mg/dL   0.57-1.25                 



             (BEAKER) (test                                        



             code = 358)                                         

 

             GLUCOSE RANDOM 110 mg/dL           H            



             (BEAKER) (test                                        



             code = 652)                                         

 

             CALCIUM (BEAKER) 8.7 mg/dL    8.4-10.2                  



             (test code = 697)                                        

 

             EGFR (BEAKER) 99                                      Interpretatio

n of eGFR



             (test code = mL/min/1.73                            values Stage De

scription



             1092)        sq m                                   Result G1 Althea

l or high



                                                                 >=90 G2 Mildly 

decreased



                                                                 60-89 G3a Mildl

y to



                                                                 moderately 45-5

9 G3b



                                                                 Moderately to s

everely



                                                                 30-44 G4 Severl

y decreased



                                                                 15-29 G5 Kidney

 failure



                                                                 <15Reported eGF

R is based



                                                                 on the CKD-EPI 





                                                                 equation that d

oes not use



                                                                 a race



                                                                 coefficientEsti

mated GFR



                                                                 is not as accur

ate as



                                                                 Creatinine Porsha tobar in



                                                                 predicting glom

erular



                                                                 filtration rate

. Estimated



                                                                 GFR is not appl

icable for



                                                                 dialysis patien

ts



 ID - DBCBC W/PLT COUNT &amp; AUTO [id number] 04:38:30





             Test Item    Value        Reference Range Interpretation Comments

 

             WHITE BLOOD CELL COUNT (BEAKER) 11.4 K/ L    3.5-10.5     H        

    



             (test code = 775)                                        

 

             RED BLOOD CELL COUNT (BEAKER) 4.69 M/ L    4.63-6.08               

  



             (test code = 761)                                        

 

             HEMOGLOBIN (BEAKER) (test code = 11.9 GM/DL   13.7-17.5    L       

     



             410)                                                

 

             HEMATOCRIT (BEAKER) (test code = 37.9 %       40.1-51.0    L       

     



             411)                                                

 

             MEAN CORPUSCULAR VOLUME (BEAKER) 81 fL        79-92                

     



             (test code = 753)                                        

 

             MEAN CORPUSCULAR HEMOGLOBIN 25.4 pg      25.7-32.2    L            



             (BEAKER) (test code = 751)                                        

 

             MEAN CORPUSCULAR HEMOGLOBIN CONC 31.4 GM/DL   32.3-36.5    L       

     



             (BEAKER) (test code = 752)                                        

 

             RED CELL DISTRIBUTION WIDTH 14.5 %       11.6-14.4    H            



             (BEAKER) (test code = 412)                                        

 

             PLATELET COUNT (BEAKER) (test 455 K/CU MM  150-450      H          

  



             code = 756)                                         

 

             MEAN PLATELET VOLUME (BEAKER) 9.6 fL       9.4-12.4                

  



             (test code = 754)                                        

 

             NUCLEATED RED BLOOD CELLS 0 /100 WBC   0-0                       



             (BEAKER) (test code = 413)                                        

 

             NEUTROPHILS RELATIVE PERCENT 76 %                                  

 



             (BEAKER) (test code = 429)                                        

 

             LYMPHOCYTES RELATIVE PERCENT 12 %                                  

 



             (BEAKER) (test code = 430)                                        

 

             MONOCYTES RELATIVE PERCENT 9 %                                    



             (BEAKER) (test code = 431)                                        

 

             EOSINOPHILS RELATIVE PERCENT 3 %                                   

 



             (BEAKER) (test code = 432)                                        

 

             BASOPHILS RELATIVE PERCENT 1 %                                    



             (BEAKER) (test code = 437)                                        

 

             NEUTROPHILS ABSOLUTE COUNT 8.59 K/ L    1.78-5.38    H            



             (BEAKER) (test code = 670)                                        

 

             LYMPHOCYTES ABSOLUTE COUNT 1.32 K/ L    1.32-3.57                 



             (BEAKER) (test code = 414)                                        

 

             MONOCYTES ABSOLUTE COUNT (BEAKER) 1.01 K/ L    0.30-0.82    H      

      



             (test code = 415)                                        

 

             EOSINOPHILS ABSOLUTE COUNT 0.29 K/ L    0.04-0.54                 



             (BEAKER) (test code = 416)                                        

 

             BASOPHILS ABSOLUTE COUNT (BEAKER) 0.07 K/ L    0.01-0.08           

      



             (test code = 417)                                        

 

             IMMATURE GRANULOCYTES-RELATIVE 0.60 %       0.00-1.00              

   



             PERCENT (BEAKER) (test code =                                      

  



             2801)                                               



POCT-GLUCOSE [id number] 23:40:37





             Test Item    Value        Reference Range Interpretation Comments

 

             POC-GLUCOSE METER 140 mg/dL           H            : TESTED A

T [facility] [address]



             (BEAKER) (test code =                                        [facility],



             153)                                               [address]:



                                                                 /Techni

deejay ID



                                                                 = [id number] for VAZQUEZ SIERRA



POCT-GLUCOSE [id number] 17:58:13





             Test Item    Value        Reference Range Interpretation Comments

 

             POC-GLUCOSE METER 118 mg/dL           H            : TESTED A

T [facility] [address]



             (BEAKER) (test code =                                        [facility],



             153)                                               [address]:



                                                                 /Techni

deejay ID



                                                                 = [id number] for An

derson,



                                                                 Vannesa



POCT-GLUCOSE [id number] 12:12:53





             Test Item    Value        Reference Range Interpretation Comments

 

             POC-GLUCOSE METER 123 mg/dL           H            : TESTED A

T [facility] [address]



             (BEAKER) (test code =                                        [facility],



             153)                                               [address]:



                                                                 /Techni

deejay ID



                                                                 = [id number] for An

derson,



                                                                 Vannesa



POCT-GLUCOSE [id number] 06:03:50





             Test Item    Value        Reference Range Interpretation Comments

 

             POC-GLUCOSE METER 120 mg/dL           H            : TESTED A

T [facility] [address]



             (BEAKER) (test code =                                        [facility],



             153)                                               [address]:



                                                                 /Techni

deejay ID



                                                                 = [id number] for KATIUSKA

RONALD



                                                                 ZITADAISYLLUVIALANDY



[id number] 06:03:44





             Test Item    Value        Reference Range Interpretation Comments

 

             PHOSPHORUS (BEAKER) (test code = 3.2 mg/dL    2.3-4.7              

     



             604)                                                



 ID - [id number] 06:03:43





             Test Item    Value        Reference Range Interpretation Comments

 

             MAGNESIUM (BEAKER) (test code = 2.1 mg/dL    1.6-2.6               

    



             627)                                                



 ID - BSBASIC METABOLIC [id number] 06:03:42





             Test Item    Value        Reference Range Interpretation Comments

 

             SODIUM (BEAKER) 139 meq/L    136-145                   



             (test code = 381)                                        

 

             POTASSIUM    4.1 meq/L    3.5-5.1                   



             (BEAKER) (test                                        



             code = 379)                                         

 

             CHLORIDE (BEAKER) 107 meq/L                        



             (test code = 382)                                        

 

             CO2 (BEAKER) 25 meq/L     22-29                     



             (test code = 355)                                        

 

             BLOOD UREA   37 mg/dL     7-21         H            



             NITROGEN (BEAKER)                                        



             (test code = 354)                                        

 

             CREATININE   0.93 mg/dL   0.57-1.25                 



             (BEAKER) (test                                        



             code = 358)                                         

 

             GLUCOSE RANDOM 125 mg/dL           H            



             (BEAKER) (test                                        



             code = 652)                                         

 

             CALCIUM (BEAKER) 8.8 mg/dL    8.4-10.2                  



             (test code = 697)                                        

 

             EGFR (BEAKER) 98                                      Interpretatio

n of eGFR



             (test code = mL/min/1.73                            values Stage De

scription



             1092)        sq m                                   Result G1 Althea

l or high



                                                                 >=90 G2  Mildly

 decreased



                                                                 60-89 G3a Mildl

y to



                                                                 moderately 45-5

9 G3b



                                                                 Moderately to s

everely



                                                                 30-44 G4 Severl

y decreased



                                                                 15-29 G5 Kidney

 failure



                                                                 <15Reported eGF

R is based



                                                                 on the CKD-EPI 





                                                                 equation that d

oes not use



                                                                 a race



                                                                 coefficientEsti

mated GFR



                                                                 is not as accur

ate as



                                                                 Creatinine Porsha tobar in



                                                                 predicting glom

erular



                                                                 filtration rate

. Estimated



                                                                 GFR is not appl

icable for



                                                                 dialysis patien

ts



 ID - BSCBC W/PLT COUNT &amp; AUTO [id number] 05:13:39





             Test Item    Value        Reference Range Interpretation Comments

 

             WHITE BLOOD CELL COUNT (BEAKER) 17.6 K/ L    3.5-10.5     H        

    



             (test code = 775)                                        

 

             RED BLOOD CELL COUNT (BEAKER) 4.84 M/ L    4.63-6.08               

  



             (test code = 761)                                        

 

             HEMOGLOBIN (BEAKER) (test code = 12.7 GM/DL   13.7-17.5    L       

     



             410)                                                

 

             HEMATOCRIT (BEAKER) (test code = 38.9 %       40.1-51.0    L       

     



             411)                                                

 

             MEAN CORPUSCULAR VOLUME (BEAKER) 80 fL        79-92                

     



             (test code = 753)                                        

 

             MEAN CORPUSCULAR HEMOGLOBIN 26.2 pg      25.7-32.2                 



             (BEAKER) (test code = 751)                                        

 

             MEAN CORPUSCULAR HEMOGLOBIN CONC 32.6 GM/DL   32.3-36.5            

     



             (BEAKER) (test code = 752)                                        

 

             RED CELL DISTRIBUTION WIDTH 14.5 %       11.6-14.4    H            



             (BEAKER) (test code = 412)                                        

 

             PLATELET COUNT (BEAKER) (test 438 K/CU MM  150-450                 

  



             code = 756)                                         

 

             MEAN PLATELET VOLUME (BEAKER) 9.6 fL       9.4-12.4                

  



             (test code = 754)                                        

 

             NUCLEATED RED BLOOD CELLS 0 /100 WBC   0-0                       



             (BEAKER) (test code = 413)                                        

 

             NEUTROPHILS RELATIVE PERCENT 81 %                                  

 



             (BEAKER) (test code = 429)                                        

 

             LYMPHOCYTES RELATIVE PERCENT 8 %                                   

 



             (BEAKER) (test code = 430)                                        

 

             MONOCYTES RELATIVE PERCENT 8 %                                    



             (BEAKER) (test code = 431)                                        

 

             EOSINOPHILS RELATIVE PERCENT 2 %                                   

 



             (BEAKER) (test code = 432)                                        

 

             BASOPHILS RELATIVE PERCENT 1 %                                    



             (BEAKER) (test code = 437)                                        

 

             NEUTROPHILS ABSOLUTE COUNT 14.33 K/ L   1.78-5.38    H            



             (BEAKER) (test code = 670)                                        

 

             LYMPHOCYTES ABSOLUTE COUNT 1.43 K/ L    1.32-3.57                 



             (BEAKER) (test code = 414)                                        

 

             MONOCYTES ABSOLUTE COUNT (BEAKER) 1.35 K/ L    0.30-0.82    H      

      



             (test code = 415)                                        

 

             EOSINOPHILS ABSOLUTE COUNT 0.26 K/ L    0.04-0.54                 



             (BEAKER) (test code = 416)                                        

 

             BASOPHILS ABSOLUTE COUNT (BEAKER) 0.08 K/ L    0.01-0.08           

      



             (test code = 417)                                        

 

             IMMATURE GRANULOCYTES-RELATIVE 0.90 %       0.00-1.00              

   



             PERCENT (BEAKER) (test code =                                      

  



             2801)                                               



POCT-GLUCOSE [id number] 00:21:43





             Test Item    Value        Reference Range Interpretation Comments

 

             POC-GLUCOSE METER 122 mg/dL           H            : TESTED LANDY GEE [facility] [address]



             (BEAKER) (test code =                                        [place]

[place],



             1538)                                               [address]:



                                                                 /Techni

deejay ID



                                                                 = [id number] for SON MONTANA



POCT-GLUCOSE [id number] 17:44:59





             Test Item    Value        Reference Range Interpretation Comments

 

             POC-GLUCOSE METER 91 mg/dL                         : TESTED A

[facility] [address]



             ([facility]) (test code =                                        [facility],



             1538)                                               [address]:



                                                                 /Techni

deejay ID =



                                                                 [id number] for RODG

ERS,



                                                                 JAMECA



XR ABDOMEN/KUB 1 VIEW [id number] 15:07:20
************************************************************[facility] : 1965 Sex: 
M************************************************************TECHNIQUE:XR 
ABDOMEN/KUB 1 VIEW PORTABLEINDICATION: NG tube placement.COMPARISON: 
2023FINDINGS:Feeding tube tip projects over the second portion of the 
duodenum. Acatheter projects over the pelvis. Nonobstructive bowel gas 
pattern.Supine radiographs are insensitive for detection of free 
intraperitonealair.IMPRESSION:1. Feeding tube tip projects over the second 
portion of the duodenum.Electronically SignedBy: Ravi Fisher2023 
15:09 CDTWorkstation Name: [id number]-GLUCOSE [id number] 11:55:25





             Test Item    Value        Reference Range Interpretation Comments

 

             POC-GLUCOSE METER 85 mg/dL                         : TESTED A

T [facility] [address]



             [facility] (test code =                                        [facility],



             1538)                                               [address]:



                                                                 /Techni

deejay ID =



                                                                 [id number] for RODG

ERS,



                                                                 JAMECA



(CELLAVISION MANUAL DIFF)2023 06:41:39





             Test Item    Value        Reference Range Interpretation Comments

 

             NEUTROPHILS - REL 88 %                                   



             (CELLAVISION)(BEAKER) (test code                                   

     



             = 2816)                                             

 

             LYMPHOCYTES - REL 7 %                                    



             (CELLAVISION)(BEAKER) (test code                                   

     



             = 2817)                                             

 

             MONOCYTES - REL 3 %                                    



             (CELLAVISION)(BEAKER) (test code                                   

     



             = 2818)                                             

 

             EOSINOPHILS - REL 1 %                                    



             (CELLAVISION)(BEAKER) (test code                                   

     



             = 2819)                                             

 

             BASOPHILS - REL 1 %                                    



             (CELLAVISION)(BEAKER) (test code                                   

     



             = 2820)                                             

 

             NEUTROPHILS - ABS 16.10 K/ul   1.78-5.38    H            



             (CELLAVISION)(BEAKER) (test code                                   

     



             = 2830)                                             

 

             LYMPHOCYTES - ABS 1.28 K/ul    1.32-3.57    L            



             (CELLAVISION)(BEAKER) (test code                                   

     



             = 2831)                                             

 

             MONOCYTES - ABS 0.55 K/uL    0.30-0.82                 



             (CELLAVISION)(BEAKER) (test code                                   

     



             = 2832)                                             

 

             EOSINOPHILS - ABS 0.18 K/uL    0.04-0.54                 



             (CELLAVISION)(BEAKER) (test code                                   

     



             = 2834)                                             

 

             BASOPHILS - ABS 0.18 K/uL    0.01-0.08    H            



             (CELLAVISION)(BEAKER) (test code                                   

     



             = 2835)                                             

 

             TOTAL COUNTED (BEAKER) (test code 100                              

      



             = 1351)                                             

 

             WBC MORPHOLOGY (BEAKER) (test Normal                               

  



             code = 487)                                         

 

             PLT MORPHOLOGY (BEAKER) (test Normal                               

  



             code = 486)                                         

 

             ANISOCYTOSIS (BEAKER) (test code 2+ moderate                       

     



             = 961)                                              

 

             MICROCYTES (BEAKER) (test code = 2+ moderate                       

     



             965)                                                

 

             POIKILOCYTES (BEAKER) (test code 1+ few                            

     



             = 966)                                              

 

             OVALOCYTES (BEAKER) (test code = 1+ few                            

     



             477)                                                

 

             ARTIFACT (CELLAVISION)(BEAKER) Present                             

   



             (test code = 3432)                                        

 

             PLATELET CONCENTRATION Adequate                               



             (CELLAVISION)(BEAKER) (test code                                   

     



             = 3438)                                             



 ID - Laurie Bashir comments: Slide comments:CBC W/PLT COUNT 
&amp; AUTO [id number] 06:41:38





             Test Item    Value        Reference Range Interpretation Comments

 

             WHITE BLOOD CELL COUNT (BEAKER) 18.3 K/ L    3.5-10.5     H        

    



             (test code = 775)                                        

 

             RED BLOOD CELL COUNT (BEAKER) 5.21 M/ L    4.63-6.08               

  



             (test code = 761)                                        

 

             HEMOGLOBIN (BEAKER) (test code = 13.5 GM/DL   13.7-17.5    L       

     



             410)                                                

 

             HEMATOCRIT (BEAKER) (test code = 41.8 %       40.1-51.0            

     



             411)                                                

 

             MEAN CORPUSCULAR VOLUME (BEAKER) 80 fL        79-92                

     



             (test code = 753)                                        

 

             MEAN CORPUSCULAR HEMOGLOBIN 25.9 pg      25.7-32.2                 



             (BEAKER) (test code = 751)                                        

 

             MEAN CORPUSCULAR HEMOGLOBIN CONC 32.3 GM/DL   32.3-36.5            

     



             (BEAKER) (test code = 752)                                        

 

             RED CELL DISTRIBUTION WIDTH 14.6 %       11.6-14.4    H            



             (BEAKER) (test code = 412)                                        

 

             PLATELET COUNT (BEAKER) (test 410 K/CU MM  150-450                 

  



             code = 756)                                         

 

             MEAN PLATELET VOLUME (BEAKER) 9.4 fL       9.4-12.4                

  



             (test code = 754)                                        

 

             NUCLEATED RED BLOOD CELLS 0 /100 WBC   0-0                       



             (BEAKER) (test code = 413)                                        



POCT-GLUCOSE [id number] 06:12:53





             Test Item    Value        Reference Range Interpretation Comments

 

             POC-GLUCOSE METER 98 mg/dL                         : TESTED A

T [facility] [address]



             (BEAKER) (test code =                                        [place],



             1538)                                               [id number]:



                                                                 /Techni

deejay ID =



                                                                 [id number] for SON GLYNN



[id number] 04:43:04





             Test Item    Value        Reference Range Interpretation Comments

 

             PHOSPHORUS (BEAKER) (test code = 3.5 mg/dL    2.3-4.7              

     



             604)                                                



 ID - EOOBASIC METABOLIC [id number] 04:43:03





             Test Item    Value        Reference Range Interpretation Comments

 

             SODIUM (BEAKER) 139 meq/L    136-145                   



             (test code = 381)                                        

 

             POTASSIUM    4.2 meq/L    3.5-5.1                   



             (BEAKER) (test                                        



             code = 379)                                         

 

             CHLORIDE (BEAKER) 107 meq/L                        



             (test code = 382)                                        

 

             CO2 (BEAKER) 23 meq/L     22-29                     



             (test code = 355)                                        

 

             BLOOD UREA   34 mg/dL     7-21         H            



             NITROGEN (BEAKER)                                        



             (test code = 354)                                        

 

             CREATININE   0.99 mg/dL   0.57-1.25                 



             (BEAKER) (test                                        



             code = 358)                                         

 

             GLUCOSE RANDOM 96 mg/dL                         



             (BEAKER) (test                                        



             code = 652)                                         

 

             CALCIUM (BEAKER) 9.0 mg/dL    8.4-10.2                  



             (test code = 697)                                        

 

             EGFR (BEAKER) 90                                      Interpretatio

n of eGFR



             (test code = mL/min/1.73                            values Stage De

scription



             1092)        sq m                                   Result G1 Althea

l or high



                                                                 >=90 G2 Mildly 

decreased



                                                                 60-89 G3a Mildl

y to



                                                                 moderately 45-5

9 G3b



                                                                 Moderately to s

everely



                                                                 30-44 G4 Severl

y decreased



                                                                 15-29 G5 Kidney

 failure



                                                                 <15Reported eGF

R is based



                                                                 on the CKD-EPI 





                                                                 equation that d

oes not use



                                                                 a race



                                                                 coefficientEsti

mated GFR



                                                                 is not as accur

ate as



                                                                 Creatinine Porsha

ekaterina in



                                                                 predicting glom

erular



                                                                 filtration rate

. Estimated



                                                                 GFR is not appl

icable for



                                                                 dialysis patien

ts



 ID - [id number] 04:43:03





             Test Item    Value        Reference Range Interpretation Comments

 

             MAGNESIUM (BEAKER) (test code = 2.1 mg/dL    1.6-2.6               

    



             627)                                                



 ID - EOOPOCT-GLUCOSE [id number] 23:57:38





             Test Item    Value        Reference Range Interpretation Comments

 

             POC-GLUCOSE METER 93 mg/dL                         : TESTED A

T [facility] [address]



             (BEAKER) (test code =                                        [facility],



             153)                                               [id number]:



                                                                 /Techni

deejay ID =



                                                                 [id number] for SON GLYNN



POCT-GLUCOSE [id number] 19:00:28





             Test Item    Value        Reference Range Interpretation Comments

 

             POC-GLUCOSE METER 125 mg/dL           H            : TESTED A

T [facility] 6720



             (BEAKER) (test code =                                        [facility]

[facility],



             1538)                                               [address]:



                                                                 /Techni

deejay ID



                                                                 = [id number] for Danielito Montana



POCT-GLUCOSE [id number] 12:41:10





             Test Item    Value        Reference Range Interpretation Comments

 

             POC-GLUCOSE METER 116 mg/dL           H            : TESTED A

T [facility] [address]



             (BEAKER) (test code =                                        [facility],



             1538)                                               [address]:



                                                                 /Techni

deejay ID



                                                                 = [id number] for Danielito Montana



(CELLAVISION MANUAL DIFF)2023 07:54:05





             Test Item    Value        Reference Range Interpretation Comments

 

             NEUTROPHILS - REL 90 %                                   



             (CELLAVISION)(BEAKER) (test code =                                 

       



             2816)                                               

 

             LYMPHOCYTES - REL 5 %                                    



             (CELLAVISION)(BEAKER) (test code =                                 

       



             2817)                                               

 

             MONOCYTES - REL 3 %                                    



             (CELLAVISION)(BEAKER) (test code =                                 

       



             2818)                                               

 

             ATYPICAL LYMPHOCYTES - REL 2 %          0-0          H            



             (CELLAVISION)(BEAKER) (test code =                                 

       



             2829)                                               

 

             NEUTROPHILS - ABS 23.76 K/ul   1.78-5.38    H            



             (CELLAVISION)(BEAKER) (test code =                                 

       



             2830)                                               

 

             LYMPHOCYTES - ABS 1.32 K/ul    1.32-3.57                 



             (CELLAVISION)(BEAKER) (test code =                                 

       



             2831)                                               

 

             MONOCYTES - ABS 0.79 K/uL    0.30-0.82                 



             (CELLAVISION)(BEAKER) (test code =                                 

       



             2832)                                               

 

             ATYPICAL LYMPHOCYTES - ABS 0.53 K/uL    0.00-0.00    H            



             (CELLAVISION)(BEAKER) (test code =                                 

       



             2858)                                               

 

             TOTAL COUNTED (BEAKER) (test code 100                              

      



             = 1351)                                             

 

             WBC MORPHOLOGY (BEAKER) (test code Normal                          

       



             = 487)                                              

 

             GIANT PLATELETS (BEAKER) (test Present                             

   



             code = 313)                                         

 

             ANISOCYTOSIS (BEAKER) (test code = 1+ few                          

       



             961)                                                

 

             MICROCYTES (BEAKER) (test code = 1+ few                            

     



             965)                                                

 

             POIKILOCYTES (BEAKER) (test code = 1+ few                          

       



             966)                                                

 

             ELLIPTOCYTES (BEAKER) (test code = 1+ few                          

       



             962)                                                

 

             ARTIFACT (CELLAVISION)(BEAKER) Present                             

   



             (test code = 3432)                                        

 

             PLATELET CONCENTRATION Adequate                               



             (CELLAVISION)(BEAKER) (test code =                                 

       



             3438)                                               



 ID - rhoda Robertson comments: Slide comments:CBC W/PLT COUNT &amp; 
AUTO [id number] 07:54:04





             Test Item    Value        Reference Range Interpretation Comments

 

             WHITE BLOOD CELL COUNT (BEAKER) 26.4 K/ L    3.5-10.5     H        

    



             (test code = 775)                                        

 

             RED BLOOD CELL COUNT (BEAKER) 5.51 M/ L    4.63-6.08               

  



             (test code = 761)                                        

 

             HEMOGLOBIN (BEAKER) (test code = 14.1 GM/DL   13.7-17.5            

     



             410)                                                

 

             HEMATOCRIT (BEAKER) (test code = 44.0 %       40.1-51.0            

     



             411)                                                

 

             MEAN CORPUSCULAR VOLUME (BEAKER) 80 fL        79-92                

     



             (test code = 753)                                        

 

             MEAN CORPUSCULAR HEMOGLOBIN 25.6 pg      25.7-32.2    L            



             (BEAKER) (test code = 751)                                        

 

             MEAN CORPUSCULAR HEMOGLOBIN CONC 32.0 GM/DL   32.3-36.5    L       

     



             (BEAKER) (test code = 752)                                        

 

             RED CELL DISTRIBUTION WIDTH 14.7 %       11.6-14.4    H            



             (BEAKER) (test code = 412)                                        

 

             PLATELET COUNT (BEAKER) (test 426 K/CU MM  150-450                 

  



             code = 756)                                         

 

             MEAN PLATELET VOLUME (BEAKER) 9.8 fL       9.4-12.4                

  



             (test code = 754)                                        

 

             NUCLEATED RED BLOOD CELLS 0 /100 WBC   0-0                       



             (BEAKER) (test code = 413)                                        



POCT-GLUCOSE [id number] 07:17:47





             Test Item    Value        Reference Range Interpretation Comments

 

             POC-GLUCOSE METER 128 mg/dL           H            : TESTED A

T [facility] [address]



             (BEAKER) (test code =                                        [place],



             1538)                                               [address]:



                                                                 /Techni

deejay ID



                                                                 = [id number] for Ch

Chelle goldstein



[id number] 06:22:58





             Test Item    Value        Reference Range Interpretation Comments

 

             MAGNESIUM (BEAKER) 2.1 mg/dL    1.6-2.6                   Specimen 

slightly



             (test code = 627)                                        hemolyzed



 ID - CHIKIS [id number] 06:22:58





             Test Item    Value        Reference Range Interpretation Comments

 

             PHOSPHORUS (BEAKER) 3.6 mg/dL    2.3-4.7                   Specimen

 slightly



             (test code = 604)                                        hemolyzed



 ID - CHIKIS WBASIC METABOLIC [id number] 06:22:58





             Test Item    Value        Reference Range Interpretation Comments

 

             SODIUM (BEAKER) 139 meq/L    136-145                   



             (test code = 381)                                        

 

             POTASSIUM    4.8 meq/L    3.5-5.1                   Specimen slight

ly



             (BEAKER) (test                                        hemolyzed



             code = 379)                                         

 

             CHLORIDE (BEAKER) 106 meq/L                        



             (test code = 382)                                        

 

             CO2 (BEAKER) 25 meq/L     22-29                     



             (test code = 355)                                        

 

             BLOOD UREA   29 mg/dL     7-21         H            



             NITROGEN (BEAKER)                                        



             (test code = 354)                                        

 

             CREATININE   0.95 mg/dL   0.57-1.25                 Specimen slight

ly



             (BEAKER) (test                                        hemolyzed



             code = 358)                                         

 

             GLUCOSE RANDOM 112 mg/dL           H            



             (BEAKER) (test                                        



             code = 652)                                         

 

             CALCIUM (BEAKER) 9.4 mg/dL    8.4-10.2                  



             (test code = 697)                                        

 

             EGFR (BEAKER) 95                                      Interpretatio

n of eGFR



             (test code = mL/min/1.73                            values Stage De

scription



             1092)        sq m                                   Result G1 Althea

l or high



                                                                 >=90 G2 Mildly 

decreased



                                                                 60-89 G3a Mildl

y to



                                                                 moderately 45-5

9 G3b



                                                                 Moderately to s

everely



                                                                 30-44 G4 Severl

y decreased



                                                                  15-29 G5 Kidne

y failure



                                                                 <15Reported eGF

R is based



                                                                 on the CKD-EPI 





                                                                 equation that d

oes not use



                                                                 a race



                                                                 coefficientEsti

mated GFR



                                                                 is not as accur

ate as



                                                                 Creatinine Porsha

ekaterina in



                                                                 predicting glom

erular



                                                                 filtration rate

. Estimated



                                                                 GFR is not appl

icable for



                                                                 dialysis patien

ts



 ID - CHIKIS WPOCT-GLUCOSE [id number] 23:56:34





             Test Item    Value        Reference Range Interpretation Comments

 

             POC-GLUCOSE METER 106 mg/dL                        : TESTED A

T [facility] [address]



             (BEAKER) (test code =                                        [place],



             1538)                                               [id number]:



                                                                 /Techni

deejay ID



                                                                 = [id number] for Ch

Chelle goldstein



XR ABDOMEN/KUB 1 VIEW [id number] 20:53:28
************************************************************[facility] : 1965 Sex: 
M************************************************************EXAMINATION: XR 
ABDOMEN/KUB 1 VIEW PORTABLE INDICATION: NG tube placementCOMPARISON: KUB of 
23 FINDINGS/IMPRESSION:Enteric tube tip terminates in the proximal 
duodenum. No bowelobstruction or free air. No acute osseous 
injury.Electronically Signed By: Laura Treviño2023 20:55 CDTWorkstation Name:
 [id number] [id number] 18:09:40





             Test Item    Value        Reference Range Interpretation Comments

 

             POC-GLUCOSE METER 113 mg/dL           H            : TESTED A

T [facility] [address]



             [facility] ([facility] code =                                        [place],



             1538)                                               [address]:



                                                                 /Techni

deejay ID



                                                                 = [id number] for EDUARDO DUTTA



XR CHEST 1 VIEW PORTABLE / [id number] 15:35:17
********************************************************[facility] : 1965 Sex: 
M************************************************************AP view ofthe chest
 dated 3COMPARISON: LINICAL INFORMATION: follow up 
infiltartesComment: Heart is normal in size. Pulmonary vasculature 
isunremarkable. Opacity is seen in the left lower lobe suggestivepneumonia 
unchanged from prior study. The rest of lungs are clear. Nopleural effusion or
pneumothorax is seen. Feeding tube remains in place.IMPRESSION:Left lower lobe 
pneumonia unchanged from prior study.Electronically Signed By: Maciej Almonte2023 15:37 CDTWorkstation Name: [id number] [id number] 
12:18:49





             Test Item    Value        Reference Range Interpretation Comments

 

             POC-GLUCOSE METER 120 mg/dL           H            : TESTED A

T [facility] [address]



             (BEAKER) (test code =                                        [place],



             1538)                                               [id number]:



                                                                 /Techni

deejay ID



                                                                 = [id number] for EDUARDO DUTTA



BLOOD [id number] 06:00:10





             Test Item    Value        Reference Range Interpretation Comments

 

             CULTURE (BEAKER) (test No growth in 5 days                         

  



             code = 1095)                                        



BLOOD [id number] 06:00:09





             Test Item    Value        Reference Range Interpretation Comments

 

             CULTURE (BEAKER) (test No growth in 5 days                         

  



             code = 1095)                                        



[id number] 04:53:04





             Test Item    Value        Reference Range Interpretation Comments

 

             MAGNESIUM (BEAKER) (test code = 2.1 mg/dL    1.6-2.6               

    



             627)                                                



[id number] 04:53:04





             Test Item    Value        Reference Range Interpretation Comments

 

             PHOSPHORUS (BEAKER) (test code = 3.1 mg/dL    2.3-4.7              

     



             604)                                                



BASIC METABOLIC [id number] 04:53:03





             Test Item    Value        Reference Range Interpretation Comments

 

             SODIUM (BEAKER) 137 meq/L    136-145                   



             (test code = 381)                                        

 

             POTASSIUM    4.4 meq/L    3.5-5.1                   



             (BEAKER) (test                                        



             code = 379)                                         

 

             CHLORIDE (BEAKER) 103 meq/L                        



             (test code = 382)                                        

 

             CO2 (BEAKER) 25 meq/L     22-29                     



             (test code = 355)                                        

 

             BLOOD UREA   34 mg/dL     7-21         H            



             NITROGEN (BEAKER)                                        



             (test code = 354)                                        

 

             CREATININE   0.87 mg/dL   0.57-1.25                 



             (BEAKER) (test                                        



             code = 358)                                         

 

             GLUCOSE RANDOM 122 mg/dL           H            



             (BEAKER) (test                                        



             code = 652)                                         

 

             CALCIUM (BEAKER) 8.7 mg/dL    8.4-10.2                  



             (test code = 697)                                        

 

             EGFR (BEAKER) 102                                     Interpretatio

n of eGFR



             (test code = mL/min/1.73                            values Stage De

scription



             1092)        sq m                                   Result G1  Norm

al or high



                                                                 >=90 G2 Mildly 

decreased



                                                                 60-89 G3a Mildl

y to



                                                                 moderately 45-5

9 G3b



                                                                 Moderately to s

everely



                                                                 30-44 G4 Severl

y decreased



                                                                  15-29 G5 Kidne

y failure



                                                                 <15Reported eGF

R is based



                                                                 on the CKD-EPI 





                                                                 equation that d

oes not use



                                                                 a race



                                                                 coefficientEsti

mated GFR



                                                                 is not as accur

ate as



                                                                 Creatinine Porsha tobar in



                                                                 predicting glom

erular



                                                                 filtration rate

. Estimated



                                                                 GFR is not appl

icable for



                                                                 dialysis patien

ts



CBC W/PLT COUNT &amp; AUTO [id number] 04:36:30





             Test Item    Value        Reference Range Interpretation Comments

 

             WHITE BLOOD CELL COUNT (BEAKER) 12.1 K/ L    3.5-10.5     H        

    



             (test code = 775)                                        

 

             RED BLOOD CELL COUNT (BEAKER) 5.10 M/ L    4.63-6.08               

  



             (test code = 761)                                        

 

             HEMOGLOBIN (BEAKER) (test code = 13.0 GM/DL   13.7-17.5    L       

     



             410)                                                

 

             HEMATOCRIT (BEAKER) (test code = 41.1 %       40.1-51.0            

     



             411)                                                

 

             MEAN CORPUSCULAR VOLUME (BEAKER) 81 fL        79-92                

     



             (test code = 753)                                        

 

             MEAN CORPUSCULAR HEMOGLOBIN 25.5 pg      25.7-32.2    L            



             (BEAKER) (test code = 751)                                        

 

             MEAN CORPUSCULAR HEMOGLOBIN CONC 31.6 GM/DL   32.3-36.5    L       

     



             (BEAKER) (test code = 752)                                        

 

             RED CELL DISTRIBUTION WIDTH 14.1 %       11.6-14.4                 



             (BEAKER) (test code = 412)                                        

 

             PLATELET COUNT (BEAKER) (test 350 K/CU MM  150-450                 

  



             code = 756)                                         

 

             MEAN PLATELET VOLUME (BEAKER) 9.7 fL       9.4-12.4                

  



             (test code = 754)                                        

 

             NUCLEATED RED BLOOD CELLS 0 /100 WBC   0-0                       



             (BEAKER) (test code = 413)                                        

 

             NEUTROPHILS RELATIVE PERCENT 76 %                                  

 



             (BEAKER) (test code = 429)                                        

 

             LYMPHOCYTES RELATIVE PERCENT 11 %                                  

 



             (BEAKER) (test code = 430)                                        

 

             MONOCYTES RELATIVE PERCENT 9 %                                    



             (BEAKER) (test code = 431)                                        

 

             EOSINOPHILS RELATIVE PERCENT 2 %                                   

 



             (BEAKER) (test code = 432)                                        

 

             BASOPHILS RELATIVE PERCENT 1 %                                    



             (BEAKER) (test code = 437)                                        

 

             NEUTROPHILS ABSOLUTE COUNT 9.23 K/ L    1.78-5.38    H            



             (BEAKER) (test code = 670)                                        

 

             LYMPHOCYTES ABSOLUTE COUNT 1.28 K/ L    1.32-3.57    L            



             (BEAKER) (test code = 414)                                        

 

             MONOCYTES ABSOLUTE COUNT (BEAKER) 1.08 K/ L    0.30-0.82    H      

      



             (test code = 415)                                        

 

             EOSINOPHILS ABSOLUTE COUNT 0.28 K/ L    0.04-0.54                 



             (BEAKER) (test code = 416)                                        

 

             BASOPHILS ABSOLUTE COUNT (BEAKER) 0.08 K/ L    0.01-0.08           

      



             (test code = 417)                                        

 

             IMMATURE GRANULOCYTES-RELATIVE 1.30 %       0.00-1.00    H         

   



             PERCENT (BEAKER) (test code =                                      

  



             2801)                                               



POCT-GLUCOSE [id number] 21:57:20





             Test Item    Value        Reference Range Interpretation Comments

 

             POC-GLUCOSE METER 121 mg/dL           H            : TESTED A

T [facility] [address]



             (BEAKER) (test code =                                        [facility],



             1538)                                               [address]:



                                                                 /Techni

deejay ID



                                                                 = [id number] for Lauren Wade



POCT-GLUCOSE [id number] 17:46:20





             Test Item    Value        Reference Range Interpretation Comments

 

             POC-GLUCOSE METER 109 mg/dL                        : TESTED A

T [facility] [address]



             (BEAKER) (test code =                                        [facility],



             1538)                                               [address]:



                                                                 /Techni

deejay ID



                                                                 = [id number] for Marco A panchalmable



                                                                 Winnie



POCT-GLUCOSE [id number] 12:37:23





             Test Item    Value        Reference Range Interpretation Comments

 

             POC-GLUCOSE METER 94 mg/dL                         : TESTED A

T [facility] [address]



             (BEAKER) (test code =                                        [facility],



             1538)                                               [address]:



                                                                 /Techni

deejay ID =



                                                                 [id number] for Mackenzie

zaidWinnie



XR ABDOMEN/KUB 1 VIEW [id number] 12:18:19
************************************************************[facility] CENTERName: [facility], ROBERT : 1965 Sex: 
M************************************************************ONE VIEW AB
DOMENHISTORY: Feeding tube placementCOMPARISON:7/15/2023FINDINGS:2 supine AP 
images of the abdomen were obtained.The feeding tube has been advanced, with its
 tip now at the junction ofthe second and third portions of the duodenum.No 
dilated bowel loops are visualized.Electronically Signed By: Reese Núñez
2023 12:20 CDTWorkstation Name: [id number]-GLUCOSE [id number] 
08:28:32





             Test Item    Value        Reference Range Interpretation Comments

 

             POC-GLUCOSE METER 91 mg/dL                         : TESTED A

T [facility] [address]



             (BEAKER) (test code =                                        [place],



             1538)                                               [address]:



                                                                 /Techni

deejay ID =



                                                                 [id number] for Winnie De Los Santos



CBC W/PLT COUNT &amp; AUTO [id number] 04:56:54





             Test Item    Value        Reference Range Interpretation Comments

 

             WHITE BLOOD CELL COUNT (BEAKER) 14.6 K/ L    3.5-10.5     H        

    



             (test code = 775)                                        

 

             RED BLOOD CELL COUNT (BEAKER) 5.16 M/ L    4.63-6.08               

  



             (test code = 761)                                        

 

             HEMOGLOBIN (BEAKER) (test code = 13.0 GM/DL   13.7-17.5    L       

     



             410)                                                

 

             HEMATOCRIT (BEAKER) (test code = 41.4 %       40.1-51.0            

     



             411)                                                

 

             MEAN CORPUSCULAR VOLUME (BEAKER) 80 fL        79-92                

     



             (test code = 753)                                        

 

             MEAN CORPUSCULAR HEMOGLOBIN 25.2 pg      25.7-32.2    L            



             (BEAKER) (test code = 751)                                        

 

             MEAN CORPUSCULAR HEMOGLOBIN CONC 31.4 GM/DL   32.3-36.5    L       

     



             (BEAKER) (test code = 752)                                        

 

             RED CELL DISTRIBUTION WIDTH 14.6 %       11.6-14.4    H            



             (BEAKER) (test code = 412)                                        

 

             PLATELET COUNT (BEAKER) (test 341 K/CU MM  150-450                 

  



             code = 756)                                         

 

             MEAN PLATELET VOLUME (BEAKER) 9.9 fL       9.4-12.4                

  



             (test code = 754)                                        

 

             NUCLEATED RED BLOOD CELLS 0 /100 WBC   0-0                       



             (BEAKER) (test code = 413)                                        

 

             NEUTROPHILS RELATIVE PERCENT 81 %                                  

 



             (BEAKER) (test code = 429)                                        

 

             LYMPHOCYTES RELATIVE PERCENT 7 %                                   

 



             (BEAKER) (test code = 430)                                        

 

             MONOCYTES RELATIVE PERCENT 8 %                                    



             (BEAKER) (test code = 431)                                        

 

             EOSINOPHILS RELATIVE PERCENT 2 %                                   

 



             (BEAKER) (test code = 432)                                        

 

             BASOPHILS RELATIVE PERCENT 1 %                                    



             (BEAKER) (test code = 437)                                        

 

             NEUTROPHILS ABSOLUTE COUNT 11.86 K/ L   1.78-5.38    H            



             (BEAKER) (test code = 670)                                        

 

             LYMPHOCYTES ABSOLUTE COUNT 1.08 K/ L    1.32-3.57    L            



             (BEAKER) (test code = 414)                                        

 

             MONOCYTES ABSOLUTE COUNT (BEAKER) 1.13 K/ L    0.30-0.82    H      

      



             (test code = 415)                                        

 

             EOSINOPHILS ABSOLUTE COUNT 0.26 K/ L    0.04-0.54                 



             (BEAKER) (test code = 416)                                        

 

             BASOPHILS ABSOLUTE COUNT (BEAKER) 0.07 K/ L    0.01-0.08           

      



             (test code = 417)                                        

 

             IMMATURE GRANULOCYTES-RELATIVE 1.40 %       0.00-1.00    H         

   



             PERCENT (BEAKER) (test code =                                      

  



             2801)                                               



[id number] 04:52:25





             Test Item    Value        Reference Range Interpretation Comments

 

             MAGNESIUM (BEAKER) (test code = 2.0 mg/dL    1.6-2.6               

    



             627)                                                



 ID - [id number] 04:52:25





             Test Item    Value        Reference Range Interpretation Comments

 

             PHOSPHORUS (BEAKER) (test code = 3.0 mg/dL    2.3-4.7              

     



             604)                                                



 ID - MARCOBASIC METABOLIC [id number] 04:52:24





             Test Item    Value        Reference Range Interpretation Comments

 

             SODIUM (BEAKER) 140 meq/L    136-145                   



             (test code = 381)                                        

 

             POTASSIUM    4.5 meq/L    3.5-5.1                   



             (BEAKER) (test                                        



             code = 379)                                         

 

             CHLORIDE (BEAKER) 107 meq/L                        



             (test code = 382)                                        

 

             CO2 (BEAKER) 22 meq/L     22-29                     



             (test code = 355)                                        

 

             BLOOD UREA   24 mg/dL     7-21         H            



             NITROGEN (BEAKER)                                        



             (test code = 354)                                        

 

             CREATININE   0.77 mg/dL   0.57-1.25                 



             (BEAKER) (test                                        



             code = 358)                                         

 

             GLUCOSE RANDOM 94 mg/dL                         



             (BEAKER) (test                                        



             code = 652)                                         

 

             CALCIUM (TONIO) 9.2 mg/dL    8.4-10.2                  



             (test code = 697)                                        

 

             EGFR (TONIO) 105                                     Interpretatio

n of eGFR



             (test code = mL/min/1.73                            values Stage De

scription



             1092)        sq m                                   Result G1 Althea

l or high



                                                                 >=90 G2 Mildly 

decreased



                                                                 60-89 G3a Mildl

y to



                                                                 moderately 45-5

9 G3b



                                                                 Moderately to s

everely



                                                                 30-44 G4 Severl

y decreased



                                                                 15-29 G5 Kidney

 failure



                                                                 <15Reported eGF

R is based



                                                                 on the CKD-EPI 





                                                                 equation that d

oes not use



                                                                 a race



                                                                 coefficientEsti

mated GFR



                                                                 is not as accur

ate as



                                                                 Creatinine Porsha

ekaterina in



                                                                 predicting glom

erular



                                                                 filtration rate

. Estimated



                                                                 GFR is not appl

icable for



                                                                 dialysis patien

ts



 ID - MARCOPOCT-GLUCOSE METER2023-07-15 22:19:57





             Test Item    Value        Reference Range Interpretation Comments

 

             POC-GLUCOSE METER 85 mg/dL                         : TESTED A

T [facility] [address]



             (TONIO) (test code =                                        [place] [facility],



             1538)                                               [address]:



                                                                 /Techni

deejay ID =



                                                                 [id number] for Lauren Smith



XR ABDOMEN/KUB 1 VIEW PORTABLE2023-07-15 21:30:05
************************************************************[facility] CENTERName: TIETZ, ROBERT : 1965 Sex: 
M************************************************************EXAM/TECHNIQUE: XR 
ABDOMEN/KUB 1 VIEW PORTABLEINDICATION: cortrak placementCOMPARISON: 
2023.FINDINGS: Feeding tube terminates in the stomach. Nondilated loops of 
large smallbowel. No acute osseous process.IMPRESSION:Impression:Feeding tube 
terminates in the stomach.Electronically Signed By: Cristobal Fan/15/2023 
21:32 CDTWorkstation Name: [id number]-GLUCOSE METER2023-07-15 18:11:10





             Test Item    Value        Reference Range Interpretation Comments

 

             POC-GLUCOSE METER 88 mg/dL                         : TESTED A

T [facility] [address]



             (BEAKER) (test code =                                        [facility],



             1538)                                               [address]:



                                                                 /Techni

deejay ID =



                                                                 [id number] for Danielito Glynn



LIPID PANEL2023-07-15 13:11:54





             Test Item    Value        Reference Range Interpretation Comments

 

             TRIGLYCERIDES (BEAKER) (test code = 77 mg/dL                       

        



             540)                                                

 

             CHOLESTEROL (BEAKER) (test code = 123 mg/dL                        

      



             631)                                                

 

             HDL CHOLESTEROL (BEAKER) (test code 24 mg/dL                       

        



             = 976)                                              

 

             LDL CHOLESTEROL CALCULATED ([facility]) 84 mg/dL                       

        



             (test code = 633)                                        



Triglyceride Reference Range: Low Risk &lt;150 Borderline 150-199 High Risk 200-
499 Very High Risk &gt;=500Cholesterol Reference Range: Low Risk &lt;200 
Borderline 200-239 High Risk &gt;240HDL Cholesterol Reference Range: Low Risk 
&gt;=60 High Risk &lt;40LDL Cholesterol Reference Range: Optimal &lt;100 Near 
Optimal 100-129 Borderline 130-159 High 160-189 Very High &gt;=190  ID -
 ADMINPOCT-GLUCOSE [id number] 12:44:17





             Test Item    Value        Reference Range Interpretation Comments

 

             POC-GLUCOSE METER 113 mg/dL           H            : TESTED A

T [facility]



             (BEAKER) (test code =                                        [facility],



             1538)                                               [id number]:



                                                                 /Techni

deejay ID



                                                                 = [id number] for Katiuska

hnson,



                                                                 Triwanda



POCT-GLUCOSE METER2023-07-15 08:51:18





             Test Item    Value        Reference Range Interpretation Comments

 

             POC-GLUCOSE METER 108 mg/dL                        : TESTED A

T [facility]



             (BEAKER) (test code =                                        [facility],



             153)                                               [address]:



                                                                 /Techni

deejay ID



                                                                 = [id number] for Katiuska

hnson,



                                                                 Triwanda



POCT-GLUCOSE METER2023-07-15 06:40:10





             Test Item    Value        Reference Range Interpretation Comments

 

             POC-GLUCOSE METER 117 mg/dL           H            : TESTED A

T [facility]



             (BEAKER) (test code =                                        [place],



             1538)                                               [address]:



                                                                 /Techni

deejay ID



                                                                 = [id number] for Lauren Wade



[id number] 06:06:26





             Test Item    Value        Reference Range Interpretation Comments

 

             PHOSPHORUS (BEAKER) (test code = 3.0 mg/dL    2.3-4.7              

     



             604)                                                



BASIC METABOLIC PANEL2023-07-15 06:06:25





             Test Item    Value        Reference Range Interpretation Comments

 

             SODIUM (BEAKER) 139 meq/L    136-145                   



             (test code = 381)                                        

 

             POTASSIUM    4.4 meq/L    3.5-5.1                   



             (BEAKER) (test                                        



             code = 379)                                         

 

             CHLORIDE (BEAKER) 108 meq/L           H            



             (test code = 382)                                        

 

             CO2 (BEAKER) 24 meq/L     22-29                     



             (test code = 355)                                        

 

             BLOOD UREA   28 mg/dL     7-21         H            



             NITROGEN (BEAKER)                                        



             (test code = 354)                                        

 

             CREATININE   0.81 mg/dL   0.57-1.25                 



             (BEAKER) (test                                        



             code = 358)                                         

 

             GLUCOSE RANDOM 111 mg/dL           H            



             (BEAKER) (test                                        



             code = 652)                                         

 

             CALCIUM (BEAKER) 8.6 mg/dL    8.4-10.2                  



             (test code = 697)                                        

 

             EGFR (BEAKER) 104                                     Interpretatio

n of eGFR



             (test code = mL/min/1.73                            values Stage De

scription



             1092)        sq m                                   Result G1 Althea

l or high



                                                                 >=90 G2 Mildly 

decreased



                                                                 60-89 G3a Mildl

y to



                                                                 moderately 45-5

9 G3b



                                                                 Moderately to s

everely



                                                                 30-44 G4 Severl

y decreased



                                                                 15-29 G5 Kidney

 failure



                                                                 <15Reported eGF

R is based



                                                                 on the CKD-EPI 

2021



                                                                 equation that d

oes not use



                                                                 a race



                                                                 coefficientEsti

mated GFR



                                                                 is not as accur

ate as



                                                                 Creatinine Porsha tobar in



                                                                 predicting glom

erular



                                                                 filtration rate

. Estimated



                                                                 GFR is not appl

icable for



                                                                 dialysis patien

ts



[id number] 06:06:25





             Test Item    Value        Reference Range Interpretation Comments

 

             MAGNESIUM (BEAKER) (test code = 1.9 mg/dL    1.6-2.6               

    



             627)                                                



CBC W/PLT COUNT &amp; AUTO DIFFERENTIAL2023-07-15 05:31:57





             Test Item    Value        Reference Range Interpretation Comments

 

             WHITE BLOOD CELL COUNT (BEAKER) 11.3 K/ L    3.5-10.5     H        

    



             (test code = 775)                                        

 

             RED BLOOD CELL COUNT (BEAKER) 4.68 M/ L    4.63-6.08               

  



             (test code = 761)                                        

 

             HEMOGLOBIN (BEAKER) (test code = 11.9 GM/DL   13.7-17.5    L       

     



             410)                                                

 

             HEMATOCRIT (BEAKER) (test code = 37.9 %       40.1-51.0    L       

     



             411)                                                

 

             MEAN CORPUSCULAR VOLUME (BEAKER) 81 fL        79-92                

     



             (test code = 753)                                        

 

             MEAN CORPUSCULAR HEMOGLOBIN 25.4 pg      25.7-32.2    L            



             (BEAKER) (test code = 751)                                        

 

             MEAN CORPUSCULAR HEMOGLOBIN CONC 31.4 GM/DL   32.3-36.5    L       

     



             (BEAKER) (test code = 752)                                        

 

             RED CELL DISTRIBUTION WIDTH 14.7 %       11.6-14.4    H            



             (BEAKER) (test code = 412)                                        

 

             PLATELET COUNT (BEAKER) (test 327 K/CU MM  150-450                 

  



             code = 756)                                         

 

             MEAN PLATELET VOLUME (BEAKER) 9.9 fL       9.4-12.4                

  



             (test code = 754)                                        

 

             NUCLEATED RED BLOOD CELLS 0 /100 WBC   0-0                       



             (BEAKER) (test code = 413)                                        

 

             NEUTROPHILS RELATIVE PERCENT 77 %                                  

 



             (BEAKER) (test code = 429)                                        

 

             LYMPHOCYTES RELATIVE PERCENT 11 %                                  

 



             (BEAKER) (test code = 430)                                        

 

             MONOCYTES RELATIVE PERCENT 8 %                                    



             (BEAKER) (test code = 431)                                        

 

             EOSINOPHILS RELATIVE PERCENT 2 %                                   

 



             (BEAKER) (test code = 432)                                        

 

             BASOPHILS RELATIVE PERCENT 0 %                                    



             (BEAKER) (test code = 437)                                        

 

             NEUTROPHILS ABSOLUTE COUNT 8.67 K/ L    1.78-5.38    H            



             (BEAKER) (test code = 670)                                        

 

             LYMPHOCYTES ABSOLUTE COUNT 1.26 K/ L    1.32-3.57    L            



             (BEAKER) (test code = 414)                                        

 

             MONOCYTES ABSOLUTE COUNT (BEAKER) 0.93 K/ L    0.30-0.82    H      

      



             (test code = 415)                                        

 

             EOSINOPHILS ABSOLUTE COUNT 0.27 K/ L    0.04-0.54                 



             (BEAKER) (test code = 416)                                        

 

             BASOPHILS ABSOLUTE COUNT (BEAKER) 0.03 K/ L    0.01-0.08           

      



             (test code = 417)                                        

 

             IMMATURE GRANULOCYTES-RELATIVE 1.20 %       0.00-1.00    H         

   



             PERCENT (BEAKER) (test code =                                      

  



             2801)                                               



POCT-GLUCOSE [id number] 23:34:04





             Test Item    Value        Reference Range Interpretation Comments

 

             POC-GLUCOSE METER 109 mg/dL                        : TESTED A

T [facility]



             (BEAKER) (test code =                                        [facility],



             1538)                                               [address]:



                                                                 /Techni

deejay ID



                                                                 = [id number] for Lauren Wade



POCT-GLUCOSE [id number] 18:31:14





             Test Item    Value        Reference Range Interpretation Comments

 

             POC-GLUCOSE METER 112 mg/dL           H            : TESTED A

T [facility]



             (BEAKER) (test code =                                        [facility],



             1538)                                               [address]:



                                                                 /Techni

deejay ID



                                                                 = [id number] for FANTASMA BANGURA



SPUTUM CULTURE + GRAM [id number] 11:17:03





             Test Item    Value        Reference Range Interpretation Comments

 

             CULTURE (BEAKER) 4+ Normal respiratory                           



             (test code = 1095) clemente present                           

 

             GRAM STAIN RESULT 1+ WBCs                                



             (BEAKER) (test code =                                        



             1123)                                               

 

             GRAM STAIN RESULT 1+ budding yeast                           



             (BEAKER) (test code =                                        



             [address])                                              

 

             GRAM STAIN RESULT <1+ gram variable rods                           



             (BEAKER) (test code =                                        



             26537)                                              



[id number] 07:15:02





             Test Item    Value        Reference Range Interpretation Comments

 

             MAGNESIUM (BEAKER) (test code = 1.8 mg/dL    1.6-2.6               

    



             627)                                                



 ID - [id number] 07:15:02





             Test Item    Value        Reference Range Interpretation Comments

 

             PHOSPHORUS (BEAKER) (test code = 2.6 mg/dL    2.3-4.7              

     



             604)                                                



 ID - MMBASIC METABOLIC [id number] 07:15:01





             Test Item    Value        Reference Range Interpretation Comments

 

             SODIUM (BEAKER) 141 meq/L    136-145                   



             (test code = 381)                                        

 

             POTASSIUM    4.3 meq/L    3.5-5.1                   



             (BEAKER) (test                                        



             code = 379)                                         

 

             CHLORIDE (BEAKER) 112 meq/L           H            



             (test code = 382)                                        

 

             CO2 (BEAKER) 22 meq/L     22-29                     



             (test code = 355)                                        

 

             BLOOD UREA   26 mg/dL     7-21         H            



             NITROGEN (BEAKER)                                        



             (test code = 354)                                        

 

             CREATININE   0.76 mg/dL   0.57-1.25                 



             (BEAKER) (test                                        



             code = 358)                                         

 

             GLUCOSE RANDOM 104 mg/dL                        



             (BEAKER) (test                                        



             code = 652)                                         

 

             CALCIUM (BEAKER) 8.2 mg/dL    8.4-10.2     L            



             (test code = 697)                                        

 

             EGFR (BEAKER) 106                                     Interpretatio

n of eGFR



             (test code = mL/min/1.73                            values Stage De

scription



             1092)        sq m                                   Result G1 Althea

l or high



                                                                 >=90 G2 Mildly 

decreased



                                                                 60-89 G3a Mildl

y to



                                                                 moderately 45-5

9 G3b



                                                                 Moderately to s

everely



                                                                 30-44 G4 Severl

y decreased



                                                                 15-29 G5 Kidney

 failure



                                                                 <15Reported eGF

R is based



                                                                 on the CKD-EPI 





                                                                 equation that d

oes not use



                                                                 a race



                                                                 coefficientEsti

mated GFR



                                                                 is not as accur

ate as



                                                                 Creatinine Porsha

ekaterina in



                                                                 predicting glom

erular



                                                                 filtration rate

. Estimated



                                                                 GFR is not appl

icable for



                                                                 dialysis patien

ts



 ID - MMCBC W/PLT COUNT &amp; AUTO [id number] 06:11:39





             Test Item    Value        Reference Range Interpretation Comments

 

             WHITE BLOOD CELL COUNT (BEAKER) 13.3 K/ L    3.5-10.5     H        

    



             (test code = 775)                                        

 

             RED BLOOD CELL COUNT (BEAKER) 4.46 M/ L    4.63-6.08    L          

  



             (test code = 761)                                        

 

             HEMOGLOBIN (BEAKER) (test code = 11.4 GM/DL   13.7-17.5    L       

     



             410)                                                

 

             HEMATOCRIT (BEAKER) (test code = 36.6 %       40.1-51.0    L       

     



             411)                                                

 

             MEAN CORPUSCULAR VOLUME (BEAKER) 82 fL        79-92                

     



             (test code = 753)                                        

 

             MEAN CORPUSCULAR HEMOGLOBIN 25.6 pg      25.7-32.2    L            



             (BEAKER) (test code = 751)                                        

 

             MEAN CORPUSCULAR HEMOGLOBIN CONC 31.1 GM/DL   32.3-36.5    L       

     



             (BEAKER) (test code = 752)                                        

 

             RED CELL DISTRIBUTION WIDTH 14.8 %       11.6-14.4    H            



             (BEAKER) (test code = 412)                                        

 

             PLATELET COUNT (BEAKER) (test 277 K/CU MM  150-450                 

  



             code = 756)                                         

 

             MEAN PLATELET VOLUME (BEAKER) 10.1 fL      9.4-12.4                

  



             (test code = 754)                                        

 

             NUCLEATED RED BLOOD CELLS 0 /100 WBC   0-0                       



             (BEAKER) (test code = 413)                                        

 

             NEUTROPHILS RELATIVE PERCENT 78 %                                  

 



             (BEAKER) (test code = 429)                                        

 

             LYMPHOCYTES RELATIVE PERCENT 10 %                                  

 



             (BEAKER) (test code = 430)                                        

 

             MONOCYTES RELATIVE PERCENT 8 %                                    



             (BEAKER) (test code = 431)                                        

 

             EOSINOPHILS RELATIVE PERCENT 3 %                                   

 



             (BEAKER) (test code = 432)                                        

 

             BASOPHILS RELATIVE PERCENT 0 %                                    



             (BEAKER) (test code = 437)                                        

 

             NEUTROPHILS ABSOLUTE COUNT 10.42 K/ L   1.78-5.38    H            



             (BEAKER) (test code = 670)                                        

 

             LYMPHOCYTES ABSOLUTE COUNT 1.34 K/ L    1.32-3.57                 



             (BEAKER) (test code = 414)                                        

 

             MONOCYTES ABSOLUTE COUNT (BEAKER) 1.02 K/ L    0.30-0.82    H      

      



             (test code = 415)                                        

 

             EOSINOPHILS ABSOLUTE COUNT 0.34 K/ L    0.04-0.54                 



             (BEAKER) (test code = 416)                                        

 

             BASOPHILS ABSOLUTE COUNT (BEAKER) 0.04 K/ L    0.01-0.08           

      



             (test code = 417)                                        

 

             IMMATURE GRANULOCYTES-RELATIVE 1.30 %       0.00-1.00    H         

   



             PERCENT (BEAKER) (test code =                                      

  



             2801)                                               



POCT-GLUCOSE [id number] 06:09:35





             Test Item    Value        Reference Range Interpretation Comments

 

             POC-GLUCOSE METER 111 mg/dL           H            : TESTED A

T [facility]



             (BEAKER) (test code =                                        [facility],



             153)                                               [address]:



                                                                 /Techni

deejay ID



                                                                 = [id number] for Debora Lujan



POCT-GLUCOSE [id number] 01:00:38





             Test Item    Value        Reference Range Interpretation Comments

 

             POC-GLUCOSE METER 105 mg/dL                        : TESTED A

T [facility]



             (BEAKER) (test code                                        [facility],



             = 1538)                                             [address]:



                                                                 /Techni

deejay ID =



                                                                 [id number] for Tanja Gao



POCT-GLUCOSE [id number] 00:22:13





             Test Item    Value        Reference Range Interpretation Comments

 

             POC-GLUCOSE METER 101 mg/dL                        : TESTED A

T [facility]



             (BEAKER) (test code =                                        [facility],



             153)                                               [address]:



                                                                 /Techni

deejay ID



                                                                 = [id number] for JULIO SERRANO



VANCOMYCIN LEVEL, [id number] 18:01:21





             Test Item    Value        Reference Range Interpretation Comments

 

             VANCOMYCIN TROUGH (BEAKER) (test 8.2 ug/mL    10.0-20.0    L       

     



             code = 522)                                         



 ID - BSPOCT-GLUCOSE [id number] 16:47:25





             Test Item    Value        Reference Range Interpretation Comments

 

             POC-GLUCOSE METER 106 mg/dL                        : TESTED A

T [facility]20



             (BEAKER) (test code =                                        [facility],



             1538)                                               [address]:



                                                                 /Techni

deejay ID



                                                                 = [id number] for Susan Bills



POCT-GLUCOSE [id number] 11:49:02





             Test Item    Value        Reference Range Interpretation Comments

 

             POC-GLUCOSE METER 121 mg/dL           H            : TESTED A

T [facility]20



             (BEAKER) (test code =                                        [facility],



             1538)                                               [address]:



                                                                 /Techni

deejay ID



                                                                 = [id number] for Susan Bills



POCT-GLUCOSE [id number] 06:31:39





             Test Item    Value        Reference Range Interpretation Comments

 

             POC-GLUCOSE METER 109 mg/dL                        : TESTED A

T [facility]20



             (BEAKER) (test code =                                        [facility],



             1538)                                               [address]:



                                                                 /Techni

deejay ID



                                                                 = [id number] for RAMYA KAPLAN



CBC W/PLT COUNT &amp; AUTO [id number] 04:20:15





             Test Item    Value        Reference Range Interpretation Comments

 

             WHITE BLOOD CELL COUNT (BEAKER) 15.2 K/ L    3.5-10.5     H        

    



             (test code = 775)                                        

 

             RED BLOOD CELL COUNT (BEAKER) 4.62 M/ L    4.63-6.08    L          

  



             (test code = 761)                                        

 

             HEMOGLOBIN (BEAKER) (test code = 11.9 GM/DL   13.7-17.5    L       

     



             410)                                                

 

             HEMATOCRIT (BEAKER) (test code = 37.6 %       40.1-51.0    L       

     



             411)                                                

 

             MEAN CORPUSCULAR VOLUME (BEAKER) 81 fL        79-92                

     



             (test code = 753)                                        

 

             MEAN CORPUSCULAR HEMOGLOBIN 25.8 pg      25.7-32.2                 



             (BEAKER) (test code = 751)                                        

 

             MEAN CORPUSCULAR HEMOGLOBIN CONC 31.6 GM/DL   32.3-36.5    L       

     



             (BEAKER) (test code = 752)                                        

 

             RED CELL DISTRIBUTION WIDTH 14.9 %       11.6-14.4    H            



             (BEAKER) (test code = 412)                                        

 

             PLATELET COUNT (BEAKER) (test 253 K/CU MM  150-450                 

  



             code = 756)                                         

 

             MEAN PLATELET VOLUME (BEAKER) 10.1 fL      9.4-12.4                

  



             (test code = 754)                                        

 

             NUCLEATED RED BLOOD CELLS 0 /100 WBC   0-0                       



             (BEAKER) (test code = 413)                                        

 

             NEUTROPHILS RELATIVE PERCENT 80 %                                  

 



             (BEAKER) (test code = 429)                                        

 

             LYMPHOCYTES RELATIVE PERCENT 9 %                                   

 



             (BEAKER) (test code = 430)                                        

 

             MONOCYTES RELATIVE PERCENT 7 %                                    



             (BEAKER) (test code = 431)                                        

 

             EOSINOPHILS RELATIVE PERCENT 2 %                                   

 



             (BEAKER) (test code = 432)                                        

 

             BASOPHILS RELATIVE PERCENT 0 %                                    



             (BEAKER) (test code = 437)                                        

 

             NEUTROPHILS ABSOLUTE COUNT 12.16 K/ L   1.78-5.38    H            



             (BEAKER) (test code = 670)                                        

 

             LYMPHOCYTES ABSOLUTE COUNT 1.43 K/ L    1.32-3.57                 



             (BEAKER) (test code = 414)                                        

 

             MONOCYTES ABSOLUTE COUNT (BEAKER) 1.07 K/ L    0.30-0.82    H      

      



             (test code = 415)                                        

 

             EOSINOPHILS ABSOLUTE COUNT 0.31 K/ L    0.04-0.54                 



             (BEAKER) (test code = 416)                                        

 

             BASOPHILS ABSOLUTE COUNT (BEAKER) 0.05 K/ L    0.01-0.08           

      



             (test code = 417)                                        

 

             IMMATURE GRANULOCYTES-RELATIVE 1.10 %       0.00-1.00    H         

   



             PERCENT (BEAKER) (test code =                                      

  



             2801)                                               



BASIC METABOLIC [id number] 04:10:46





             Test Item    Value        Reference Range Interpretation Comments

 

             SODIUM (BEAKER) 142 meq/L    136-145                   



             (test code = 381)                                        

 

             POTASSIUM    4.1 meq/L    3.5-5.1                   



             (BEAKER) (test                                        



             code = 379)                                         

 

             CHLORIDE (BEAKER) 111 meq/L           H            



             (test code = 382)                                        

 

             CO2 (BEAKER) 25 meq/L     22-29                     



             (test code = 355)                                        

 

             BLOOD UREA   31 mg/dL     7-21         H            



             NITROGEN (BEAKER)                                        



             (test code = 354)                                        

 

             CREATININE   0.81 mg/dL   0.57-1.25                 



             (BEAKER) (test                                        



             code = 358)                                         

 

             GLUCOSE RANDOM 110 mg/dL           H            



             (BEAKER) (test                                        



             code = 652)                                         

 

             CALCIUM (BEAKER) 8.2 mg/dL    8.4-10.2     L            



             (test code = 697)                                        

 

             EGFR (BEAKER) 104                                     Interpretatio

n of eGFR



             (test code = mL/min/1.73                            values Stage De

scription



             1092)        sq m                                   Result G1 Althea

l or high



                                                                 >=90 G2 Mildly 

decreased



                                                                 60-89  G3a Mild

ly to



                                                                 moderately 45-5

9 G3b



                                                                 Moderately to s

everely



                                                                 30-44 G4 Severl

y decreased



                                                                 15-29 G5 Kidney

 failure



                                                                 <15Reported eGF

R is based



                                                                 on the CKD-EPI 





                                                                 equation that d

oes not use



                                                                 a race



                                                                 coefficientEsti

mated GFR



                                                                 is not as accur

ate as



                                                                 Creatinine Porsha

ekaterina in



                                                                 predicting glom

erular



                                                                 filtration rate

. Estimated



                                                                 GFR is not appl

icable for



                                                                 dialysis patien

ts



 ID - [id number] 04:10:46





             Test Item    Value        Reference Range Interpretation Comments

 

             MAGNESIUM (BEAKER) (test code = 1.9 mg/dL    1.6-2.6               

    



             627)                                                



 ID - [id number] 04:10:46





             Test Item    Value        Reference Range Interpretation Comments

 

             PHOSPHORUS (BEAKER) (test code = 2.6 mg/dL    2.3-4.7              

     



             604)                                                



 ID - ADMINPOCT-GLUCOSE [id number] 00:36:00





             Test Item    Value        Reference Range Interpretation Comments

 

             POC-GLUCOSE METER 115 mg/dL           H            : TESTED A

T [facility] [address]



             (BEAKER) (test code =                                        [facility]

[place] [facility],



             1538)                                               [id number]:



                                                                 /Techni

deejay ID



                                                                 = [id number] for LE

MARYSE GOODENA



POCT-GLUCOSE [id number] 17:40:35





             Test Item    Value        Reference Range Interpretation Comments

 

             POC-GLUCOSE METER 107 mg/dL                        : TESTED A

T [facility] [address]



             (BEAKER) (test code =                                        [facility]

[place] [facility],



             1538)                                               [id number]:



                                                                 /Techni

deejay ID



                                                                 = [id number] for Ca

Susan houston



XR ABDOMEN/KUB 1 VIEW [id number] 13:44:03
************************************************************[facility] : 1965 Sex: 
M************************************************************EXAMINATION: XR 
ABDOMEN/KUB 1 VIEW PORTABLE INDICATION: Diarreha, concern for colitisCOMPARISON:
 2023 FINDINGS/IMPRESSION:No bowel obstruction or free air. No 
radiographically apparent urinarycalculi. No acute osseous injury.The tip of the
 Dobbhoff feeding tube overlies the mid duodenum.Electronically Signed By: Jennifer Drew2023 13:46 CDTWorkstation Name: [id number] METER
2023 11:58:32





             Test Item    Value        Reference Range Interpretation Comments

 

             POC-GLUCOSE METER 124 mg/dL           H            : TESTED A

T [facility] [address]



             (BEAKER) (test code =                                        [place] [facility],



             1538)                                               [id number]:



                                                                 /Techni

deejay ID



                                                                 = [id number] for BARBARA NEWTON



LACTIC ACID, [id number] 08:57:10





             Test Item    Value        Reference Range Interpretation Comments

 

             LACTATE BLOOD VENOUS (2) (BEAKER) 0.78 mmol/L  0.50-2.00           

      



             (test code = 2872)                                        



(CELLAVISION MANUAL DIFF)2023 07:51:51





             Test Item    Value        Reference Range Interpretation Comments

 

             NEUTROPHILS - REL 93 %                                   



             (CELLAVISION)(BEAKER) (test code                                   

     



             = 2816)                                             

 

             LYMPHOCYTES - REL 2 %                                    



             (CELLAVISION)(BEAKER) (test code                                   

     



             = 2817)                                             

 

             MONOCYTES - REL 3 %                                    



             (CELLAVISION)(BEAKER) (test code                                   

     



             = 2818)                                             

 

             EOSINOPHILS - REL 2 %                                    



             (CELLAVISION)(BEAKER) (test code                                   

     



             = 2819)                                             

 

             NEUTROPHILS - ABS 21.76 K/ul   1.78-5.38    H            



             (CELLAVISION)(BEAKER) (test code                                   

     



             = 2830)                                             

 

             LYMPHOCYTES - ABS 0.47 K/ul    1.32-3.57    L            



             (CELLAVISION)(BEAKER) (test code                                   

     



             = 2831)                                             

 

             MONOCYTES - ABS 0.70 K/uL    0.30-0.82                 



             (CELLAVISION)(BEAKER) (test code                                   

     



             = 2832)                                             

 

             EOSINOPHILS - ABS 0.47 K/uL    0.04-0.54                 



             (CELLAVISION)(BEAKER) (test code                                   

     



             = 2834)                                             

 

             TOTAL COUNTED (BEAKER) (test code 100                              

      



             = 1351)                                             

 

             WBC MORPHOLOGY (BEAKER) (test Normal                               

  



             code = 487)                                         

 

             PLT MORPHOLOGY (BEAKER) (test Normal                               

  



             code = 486)                                         

 

             POLYCHROMATOPHILLIC RBCS(BEAKER) 2+ moderate                       

     



             (test code = 478)                                        

 

             ANISOCYTOSIS (BEAKER) (test code 1+ few                            

     



             = 961)                                              

 

             MICROCYTES (BEAKER) (test code = 1+ few                            

     



             965)                                                

 

             POIKILOCYTES (BEAKER) (test code 1+ few                            

     



             = 966)                                              

 

             OVALOCYTES (BEAKER) (test code = 1+ few                            

     



             477)                                                

 

             ARTIFACT (CELLAVISION)(BEAKER) Present                             

   



             (test code = 3432)                                        

 

             PLATELET CONCENTRATION Adequate                               



             (CELLAVISION)(BEAKER) (test code                                   

     



             = 3438)                                             



 ID - Laurie Bashir comments: Slide comments:CBC W/PLT COUNT 
&amp; AUTO [id number] 07:51:37





             Test Item    Value        Reference Range Interpretation Comments

 

             WHITE BLOOD CELL COUNT (BEAKER) 23.4 K/ L    3.5-10.5     H        

    



             (test code = 775)                                        

 

             RED BLOOD CELL COUNT (BEAKER) 5.22 M/ L    4.63-6.08               

  



             (test code = 761)                                        

 

             HEMOGLOBIN (BEAKER) (test code = 13.4 GM/DL   13.7-17.5    L       

     



             410)                                                

 

             HEMATOCRIT (BEAKER) (test code = 42.0 %       40.1-51.0            

     



             411)                                                

 

             MEAN CORPUSCULAR VOLUME (BEAKER) 81 fL        79-92                

     



             (test code = 753)                                        

 

             MEAN CORPUSCULAR HEMOGLOBIN 25.7 pg      25.7-32.2                 



             (BEAKER) (test code = 751)                                        

 

             MEAN CORPUSCULAR HEMOGLOBIN CONC 31.9 GM/DL   32.3-36.5    L       

     



             (BEAKER) (test code = 752)                                        

 

             RED CELL DISTRIBUTION WIDTH 14.6 %       11.6-14.4    H            



             (BEAKER) (test code = 412)                                        

 

             PLATELET COUNT (BEAKER) (test 262 K/CU MM  150-450                 

  



             code = 756)                                         

 

             MEAN PLATELET VOLUME (BEAKER) 9.8 fL       9.4-12.4                

  



             (test code = 754)                                        

 

             NUCLEATED RED BLOOD CELLS 0 /100 WBC   0-0                       



             (BEAKER) (test code = 413)                                        



URINALYSIS [id number] 06:44:11





             Test Item    Value        Reference Range Interpretation Comments

 

             RBC UA (BEAKER) (test code = 519) 40 /HPF                          

      

 

             WBC UA (BEAKER) (test code = 520) 3 /HPF                           

      

 

             MUCUS (BEAKER) (test code = 1574) Rare                             

      

 

             SQUAMOUS EPITHELIAL (BEAKER) (test 4 /HPF                          

       



             code = 516)                                         



 ID - techURINALYSIS WITH MICROSCOPIC IF [id number] 06:26:58





             Test Item    Value        Reference Range Interpretation Comments

 

             COLOR (BEAKER) (test code = 470) Yellow                            

     

 

             CLARITY (BEAKER) (test code = 469) Clear                           

       

 

             SPECIFIC GRAVITY UA (BEAKER) (test 1.026        1.001-1.035        

       



             code = 468)                                         

 

             PH UA (BEAKER) (test code = 467) 6.0          5.0-8.0              

     

 

             PROTEIN UA (BEAKER) (test code = 30 mg/dL     Negative     A       

     



             464)                                                

 

             GLUCOSE UA (BEAKER) (test code = Negative     Negative             

     



             365)                                                

 

             KETONES UA (BEAKER) (test code = 10 mg/dL     Negative     A       

     



             371)                                                

 

             BILIRUBIN UA (BEAKER) (test code = Negative     Negative           

       



             462)                                                

 

             BLOOD UA (BEAKER) (test code = 461) Small        Negative     A    

        

 

             NITRITE UA (BEAKER) (test code = Negative     Negative             

     



             465)                                                

 

             LEUKOCYTE ESTERASE UA (BEAKER) (test Negative     Negative         

         



             code = 466)                                         

 

             UROBILINOGEN UA (BEAKER) (test code 4            0.2-1.0      H    

        



             = 463)                                              

 

             SOURCE(BEAKER) (test code = 2795)                                  

      



 ID - [auto]XR CHEST 1 VIEW PORTABLE / [id number] 05:32:07
************************************************************[facility] YOB: 1965 Sex: 
M************************************************************CLINICAL IN
DICATION: c/f pneumoniaComparison: 2023The cardiomediastinal contours are 
stable.The lung volumes remain low. Central pulmonary vascular prominence 
andbilateral parenchymal opacities are unchanged.There is no pneumothorax.A 
feeding tube remains in place.Electronically Signed By: Sunny Cárdenas2023 
05:34 CDTWorkstation Name: [id number] 04:01:45





             Test Item    Value        Reference Range Interpretation Comments

 

             PROCALCITONIN (BEAKER) (test code 0.26 ng/mL   <0.05        H      

      



             = 3036)                                             



SEPSIS RISK (ng/mL)Low: 0.05-0.50Intermediate: 0.51-2.00High: &gt;=2.01BASIC 
METABOLIC [id number] 03:34:39





             Test Item    Value        Reference Range Interpretation Comments

 

             SODIUM (BEAKER) 143 meq/L    136-145                   



             (test code = 381)                                        

 

             POTASSIUM    4.2 meq/L    3.5-5.1                   



             (BEAKER) (test                                        



             code = 379)                                         

 

             CHLORIDE (BEAKER) 110 meq/L           H            



             (test code = 382)                                        

 

             CO2 (BEAKER) 25 meq/L     22-29                     



             (test code = 355)                                        

 

             BLOOD UREA   35 mg/dL     7-21         H            



             NITROGEN (BEAKER)                                        



             (test code = 354)                                        

 

             CREATININE   0.91 mg/dL   0.57-1.25                 



             (BEAKER) (test                                        



             code = 358)                                         

 

             GLUCOSE RANDOM 142 mg/dL           H            



             (BEAKER) (test                                        



             code = 652)                                         

 

             CALCIUM (BEAKER) 8.2 mg/dL    8.4-10.2     L            



             (test code = 697)                                        

 

             EGFR (BEAKER) 100                                     Interpretatio

n of eGFR



             (test code = mL/min/1.73                            values Stage De

scription



             1092)        sq m                                   Result G1 Althea

l or high



                                                                 >=90 G2 Mildly 

decreased



                                                                 60-89 G3a Mildl

y to



                                                                 moderately 45-5

9 G3b



                                                                 Moderately to s

everely



                                                                 30-44 G4 Severl

y decreased



                                                                 15-29 G5 Kidney

 failure



                                                                 <15Reported eGF

R is based



                                                                 on the CKD-EPI 





                                                                 equation that d

oes not use



                                                                 a race



                                                                 coefficientEsti

mated GFR



                                                                 is not as accur

ate as



                                                                 Creatinine Porsha

ekaterina in



                                                                 predicting glom

erular



                                                                 filtration rate

. Estimated



                                                                 GFR is not appl

icable for



                                                                 dialysis patien

ts



 ID - CHIKIS [id number] 03:34:39





             Test Item    Value        Reference Range Interpretation Comments

 

             MAGNESIUM (BEAKER) (test code = 2.0 mg/dL    1.6-2.6               

    



             627)                                                



 ID - CHIKIS [id number] 03:34:39





             Test Item    Value        Reference Range Interpretation Comments

 

             PHOSPHORUS (BEAKER) (test code = 2.4 mg/dL    2.3-4.7              

     



             604)                                                



 ID - CHIKIS WPOCT-GLUCOSE [id number] 03:21:18





             Test Item    Value        Reference Range Interpretation Comments

 

             POC-GLUCOSE METER 140 mg/dL           H            : TESTED A

T [facility] [address]



             (BEAKER) (test code =                                        [facility],



             1538)                                               [id number]:



                                                                 /Techni

deejay ID



                                                                 = [id number] for YE

 (V),



                                                                 [place]



POCT-GLUCOSE [id number] 21:27:28





             Test Item    Value        Reference Range Interpretation Comments

 

             POC-GLUCOSE METER 129 mg/dL           H            : TESTED A

T [facility] [address]



             (BEAKER) (test code =                                        [facility],



             1538)                                               [id number]:



                                                                 /Techni

deejay ID



                                                                 = [id number] for YE

 (V),



                                                                 [place]



POCT-GLUCOSE [id number] 12:21:08





             Test Item    Value        Reference Range Interpretation Comments

 

             POC-GLUCOSE METER 142 mg/dL           H            : TESTED A

T [facility] [address]



             (BEAKER) (test code =                                        [facility],



             1538)                                               [id number]:



                                                                 /Techni

deejay ID



                                                                 = [id number] for Susan Bills



XR CHEST 1 VIEW PORTABLE / [id number] 08:50:01
************************************************************[facility] : 1965 Sex: 
M************************************************************XR CHEST 1VIEW 
PORTABLE / BEDSIDEINDICATION: IntubationCOMPARISON: Prior day's examFINDINGS: 
Portable frontal view of the chest. IMPRESSION:Support Lines: Endotracheal tube 
removed. Stable enteric tube.Lungs andpleura: Low lung volumes. Increased right 
lung baseatelectasis. Stable left lower lobe streaky opacities. No 
pneumothorax.Heart and mediastinum: Stable contours. Additional findings: 
None.Electronically Signed By: Ritchie Gómez2023 08:52 CDTWorkstation 
Name: [id number] W/PLT COUNT &amp; AUTO [id number] 08:44:26





             Test Item    Value        Reference Range Interpretation Comments

 

             WHITE BLOOD CELL COUNT 13.6 K/ L    3.5-10.5     H            



             (BEAKER) (test code =                                        



             775)                                                

 

             RED BLOOD CELL COUNT 5.82 M/ L    4.63-6.08                 



             (BEAKER) (test code =                                        



             761)                                                

 

             HEMOGLOBIN (BEAKER) 15.0 GM/DL   13.7-17.5                 DISCORDA

NT HGB



             (test code = 410)                                        RESULT COM

PARED TO



                                                                 PREVIOUS RESULT

;



                                                                 CLINICAL CORREL

ATION



                                                                 REQUIRED.

 

             HEMATOCRIT (BEAKER) 46.3 %       40.1-51.0                 



             (test code = 411)                                        

 

             MEAN CORPUSCULAR 80 fL        79-92                     



             VOLUME (BEAKER) (test                                        



             code = 753)                                         

 

             MEAN CORPUSCULAR 25.8 pg      25.7-32.2                 



             HEMOGLOBIN (BEAKER)                                        



             (test code = 751)                                        

 

             MEAN CORPUSCULAR 32.4 GM/DL   32.3-36.5                 



             HEMOGLOBIN CONC                                        



             (BEAKER) (test code =                                        



             752)                                                

 

             RED CELL DISTRIBUTION 14.7 %       11.6-14.4    H            



             WIDTH (BEAKER) (test                                        



             code = 412)                                         

 

             PLATELET COUNT 256 K/CU MM  150-450                   



             (BEAKER) (test code =                                        



             756)                                                

 

             MEAN PLATELET VOLUME 10.0 fL      9.4-12.4                  



             (BEAKER) (test code =                                        



             754)                                                

 

             NUCLEATED RED BLOOD 0 /100 WBC   0-0                       



             CELLS (BEAKER) (test                                        



             code = 413)                                         

 

             NEUTROPHILS RELATIVE 82 %                                   



             PERCENT (BEAKER) (test                                        



             code = 429)                                         

 

             LYMPHOCYTES RELATIVE 7 %                                    



             PERCENT (BEAKER) (test                                        



             code = 430)                                         

 

             MONOCYTES RELATIVE 8 %                                    



             PERCENT (BEAKER) (test                                        



             code = 431)                                         

 

             EOSINOPHILS RELATIVE 2 %                                    



             PERCENT (BEAKER) (test                                        



             code = 432)                                         

 

             BASOPHILS RELATIVE 0 %                                    



             PERCENT (BEAKER) (test                                        



             code = 437)                                         

 

             NEUTROPHILS ABSOLUTE 11.18 K/ L   1.78-5.38    H            



             COUNT (BEAKER) (test                                        



             code = 670)                                         

 

             LYMPHOCYTES ABSOLUTE 0.93 K/ L    1.32-3.57    L            



             COUNT (BEAKER) (test                                        



             code = 414)                                         

 

             MONOCYTES ABSOLUTE 1.07 K/ L    0.30-0.82    H            



             COUNT (BEAKER) (test                                        



             code = 415)                                         

 

             EOSINOPHILS ABSOLUTE 0.27 K/ L    0.04-0.54                 



             COUNT (BEAKER) (test                                        



             code = 416)                                         

 

             BASOPHILS ABSOLUTE 0.05 K/ L    0.01-0.08                 



             COUNT (BEAKER) (test                                        



             code = 417)                                         

 

             IMMATURE     0.50 %       0.00-1.00                 



             GRANULOCYTES-RELATIVE                                        



             PERCENT (BEAKER) (test                                        



             code = 2801)                                        



POCT-GLUCOSE [id number] 06:38:49





             Test Item    Value        Reference Range Interpretation Comments

 

             POC-GLUCOSE METER 91 mg/dL                         : TESTED A

T [facility] [address]



             (BEAKER) (test code =                                        [facility] [facility],



             1538)                                               [id number]:



                                                                 /Techni

deejay ID =



                                                                 [id number] for Kendra Tate



[id number] 05:32:17





             Test Item    Value        Reference Range Interpretation Comments

 

             PHOSPHORUS (BEAKER) 2.6 mg/dL    2.3-4.7                   Specimen

 slightly



             (test code = 604)                                        hemolyzed



 ID - CHIKIS WBASIC METABOLIC [id number] 05:32:17





             Test Item    Value        Reference Range Interpretation Comments

 

             SODIUM (BEAKER) 141 meq/L    136-145                   



             (test code = 381)                                        

 

             POTASSIUM    4.4 meq/L    3.5-5.1                   Specimen slight

ly



             (BEAKER) (test                                        hemolyzed



             code = 379)                                         

 

             CHLORIDE (BEAKER) 109 meq/L           H            



             (test code = 382)                                        

 

             CO2 (BEAKER) 21 meq/L     22-29        L            



             (test code = 355)                                        

 

             BLOOD UREA   28 mg/dL     7-21         H            



             NITROGEN (BEAKER)                                        



             (test code = 354)                                        

 

             CREATININE   0.81 mg/dL   0.57-1.25                 Specimen slight

ly



             (BEAKER) (test                                        hemolyzed



             code = 358)                                         

 

             GLUCOSE RANDOM 103 mg/dL                        



             (BEAKER) (test                                        



             code = 652)                                         

 

             CALCIUM (BEAKER) 8.8 mg/dL    8.4-10.2                  



             (test code = 697)                                        

 

             EGFR (BEAKER) 104                                     Interpretatio

n of eGFR



             (test code = mL/min/1.73                            values Stage De

scription



             1092)        sq m                                   Result G1 Althea

l or high



                                                                 >=90 G2 Mildly 

decreased



                                                                 60-89 G3a Mildl

y to



                                                                 moderately 45-5

9 G3b



                                                                 Moderately to s

everely



                                                                 30-44 G4 Severl

y decreased



                                                                 15-29 G5 Kidney

 failure



                                                                 <15Reported eGF

R is based



                                                                 on the CKD-EPI 





                                                                 equation that d

oes not use



                                                                 a race



                                                                 coefficientEsti

mated GFR



                                                                 is not as accur

ate as



                                                                 Creatinine Porsha

ekaterina in



                                                                 predicting glom

erular



                                                                 filtration rate

. Estimated



                                                                 GFR is not appl

icable for



                                                                 dialysis patien

ts



 ID - CHIKIS [id number] 05:32:16





             Test Item    Value        Reference Range Interpretation Comments

 

             MAGNESIUM (BEAKER) 2.1 mg/dL    1.6-2.6                   Specimen 

slightly



             (test code = 627)                                        hemolyzed



 ID Yo DIOP WPOCT-GLUCOSE [id number] 23:53:55





             Test Item    Value        Reference Range Interpretation Comments

 

             POC-GLUCOSE METER 87 mg/dL                         : TESTED A

T [facility] [address]



             (BEAKER) (test code =                                        [place]

[facility],



             1538)                                               [id number]:



                                                                 /Techni

deejay ID =



                                                                 [id number] for Kendra Tate



BLOOD [id number] 15:01:19





             Test Item    Value        Reference Range Interpretation Comments

 

             CULTURE (BEAKER) (test No growth in 5 days                         

  



             code = 1095)                                        



The specimen volume collected for this blood culture was below the optimum (10 
mL per bottle or 20 mL total). Use of lower volumes may adversely affect 
recovery and/or detection times of some organisms.BLOOD CULTURE2023-07-10 
15:01:19





             Test Item    Value        Reference Range Interpretation Comments

 

             CULTURE (BEAKER) (test No growth in 5 days                         

  



             code = 1095)                                        



The specimen volume collected for this blood culture was below the optimum (10 
mL per bottle or 20 mL total). Use of lower volumes may adversely affect 
recovery and/or detection times of some organisms.POCT-GLUCOSE METER2023-07-10 
14:36:04





             Test Item    Value        Reference Range Interpretation Comments

 

             POC-GLUCOSE METER 115 mg/dL           H            : TESTED A

T [facility] [address]



             (BEAKER) (test code                                        [facility] 

[facility],



             = 1538)                                             [id number]:



                                                                 /Techni

deejay ID =



                                                                 [id number] for Whit reynaga



                                                                 ([facility]), [place]



BASIC METABOLIC [id number] 14:32:42





             Test Item    Value        Reference Range Interpretation Comments

 

             SODIUM (BEAKER) 142 meq/L    136-145                   



             (test code = 381)                                        

 

             POTASSIUM    4.4 meq/L    3.5-5.1                   



             (BEAKER) (test                                        



             code = 379)                                         

 

             CHLORIDE (BEAKER) 109 meq/L           H            



             (test code = 382)                                        

 

             CO2 (BEAKER) 25 meq/L     22-29                     



             (test code = 355)                                        

 

             BLOOD UREA   24 mg/dL     7-21         H            



             NITROGEN (BEAKER)                                        



             (test code = 354)                                        

 

             CREATININE   0.86 mg/dL   0.57-1.25                 



             (BEAKER) (test                                        



             code = 358)                                         

 

             GLUCOSE RANDOM 107 mg/dL           H            



             (BEAKER) (test                                        



             code = 652)                                         

 

             CALCIUM (BEAKER) 8.9 mg/dL    8.4-10.2                  



             (test code = 697)                                        

 

             EGFR (BEAKER) 103                                     Interpretatio

n of eGFR



             (test code = mL/min/1.73                            values Stage De

scription



             1092)        sq m                                   Result G1 Althea

l or high



                                                                 >=90 G2 Mildly 

decreased



                                                                 60-89 G3a Mildl

y to



                                                                 moderately 45-5

9 G3b



                                                                 Moderately to s

everely



                                                                 30-44 G4 Severl

y decreased



                                                                 15-29 G5 Kidney

 failure



                                                                 <15Reported eGF

R is based



                                                                 on the CKD-EPI 

2021



                                                                 equation that d

oes not use



                                                                 a race



                                                                 coefficientEsti

mated GFR



                                                                 is not as accur

ate as



                                                                 Creatinine Porsha

ekaterina in



                                                                 predicting glom

erular



                                                                 filtration rate

. Estimated



                                                                 GFR is not appl

icable for



                                                                 dialysis patien

ts



 ID - EOOMAGNESIUM2023-07-10 14:32:42





             Test Item    Value        Reference Range Interpretation Comments

 

             MAGNESIUM (BEAKER) (test code = 2.3 mg/dL    1.6-2.6               

    



             627)                                                



 ID - EOOPOCT-GLUCOSE METER2023-07-10 11:22:50





             Test Item    Value        Reference Range Interpretation Comments

 

             POC-GLUCOSE METER 114 mg/dL           H            : [facility]



             (BEAKER) (test code =                                        [place],



             1538)                                               [id number]:



                                                                 /Techni

deejay ID



                                                                 = [id number] for An

Chastity daniels



XR CHEST 1 VIEW PORTABLE / BEDSIDE2023-07-10 06:21:45
************************************************************[facility] : 1965 Sex: 
M************************************************************XR CHEST 1VIEW 
PORTABLE / BEDSIDEINDICATION: IntubationCOMPARISON: Prior day's examFINDINGS: 
Portable frontal view of the chest. IMPRESSION:Support Lines: ET tube terminates
 4 cm above the karen. Otherwisestable support apparatus. Lungs and pleura: Low
 lung volumes with stable small patchy opacitieswithout newlung consolidation. 
Small effusions not excluded. Nopneumothorax.Heart and mediastinum: Stable 
contours. Additional findings: None.Electronically Signed By: Ritchie Gómez07/10/2023 06:23 CDTWorkstation Name: [id number] METABOLIC PANEL
2023-07-10 04:58:19





             Test Item    Value        Reference Range Interpretation Comments

 

             SODIUM (BEAKER) 139 meq/L    136-145                   Verified by 

clinical



             (test code = 381)                                        history

 

             POTASSIUM    4.0 meq/L    3.5-5.1                   



             (BEAKER) (test                                        



             code = 379)                                         

 

             CHLORIDE (BEAKER) 110 meq/L           H            



             (test code = 382)                                        

 

             CO2 (BEAKER) 24 meq/L     22-29                     



             (test code = 355)                                        

 

             BLOOD UREA   29 mg/dL     7-21         H            



             NITROGEN (BEAKER)                                        



             (test code = 354)                                        

 

             CREATININE   0.83 mg/dL   0.57-1.25                 



             (BEAKER) (test                                        



             code = 358)                                         

 

             GLUCOSE RANDOM 137 mg/dL           H            



             (BEAKER) (test                                        



             code = 652)                                         

 

             CALCIUM (BEAKER) 8.1 mg/dL    8.4-10.2     L            



             (test code = 697)                                        

 

             EGFR (BEAKER) 103                                     Interpretatio

n of eGFR



             (test code = mL/min/1.73                            values Stage De

scription



             1092)        sq m                                   Result G1 Althea

l or high



                                                                 >=90 G2 Mildly 

decreased



                                                                 60-89 G3a Mildl

y to



                                                                 moderately 45-5

9 G3b



                                                                 Moderately to s

everely



                                                                 30-44 G4 Severl

y decreased



                                                                 15-29 G5 Kidney

 failure



                                                                 <15Reported eGF

R is based



                                                                 on the CKD-EPI 





                                                                 equation that d

oes not use



                                                                 a race



                                                                 coefficientEsti

mated GFR



                                                                 is not as accur

ate as



                                                                 Creatinine Porsha

ekaterina in



                                                                 predicting glom

erular



                                                                 filtration rate

. Estimated



                                                                 GFR is not appl

icable for



                                                                 dialysis patien

ts



 ID - BVOperator ID - EOOPHOSPHORUS2023-07-10 04:14:08





             Test Item    Value        Reference Range Interpretation Comments

 

             PHOSPHORUS (BEAKER) (test code = 3.3 mg/dL    2.3-4.7              

     



             604)                                                



 ID - BVMAGNESIUM2023-07-10 04:14:07





             Test Item    Value        Reference Range Interpretation Comments

 

             MAGNESIUM (BEAKER) (test code = 1.9 mg/dL    1.6-2.6               

    



             627)                                                



 ID - BVPOCT-GLUCOSE METER2023-07-10 03:53:24





             Test Item    Value        Reference Range Interpretation Comments

 

             POC-GLUCOSE METER 105 mg/dL                        : TESTED A

T [facility] [address]



             (BEAKER) (test code =                                        [place],



             1538)                                               [id number]:



                                                                 /Techni

deejay ID



                                                                 = [id number] for



                                                                 STEPHANIE KEENE



CBC W/PLT COUNT &amp; AUTO DIFFERENTIAL2023-07-10 03:46:39





             Test Item    Value        Reference Range Interpretation Comments

 

             WHITE BLOOD CELL COUNT (BEAKER) 8.3 K/ L     3.5-10.5              

    



             (test code = 775)                                        

 

             RED BLOOD CELL COUNT (BEAKER) 4.57 M/ L    4.63-6.08    L          

  



             (test code = 761)                                        

 

             HEMOGLOBIN (BEAKER) (test code = 11.6 GM/DL   13.7-17.5    L       

     



             410)                                                

 

             HEMATOCRIT (BEAKER) (test code = 37.9 %       40.1-51.0    L       

     



             411)                                                

 

             MEAN CORPUSCULAR VOLUME (BEAKER) 83 fL        79-92                

     



             (test code = 753)                                        

 

             MEAN CORPUSCULAR HEMOGLOBIN 25.4 pg      25.7-32.2    L            



             (BEAKER) (test code = 751)                                        

 

             MEAN CORPUSCULAR HEMOGLOBIN CONC 30.6 GM/DL   32.3-36.5    L       

     



             (BEAKER) (test code = 752)                                        

 

             RED CELL DISTRIBUTION WIDTH 14.8 %       11.6-14.4    H            



             (BEAKER) (test code = 412)                                        

 

             PLATELET COUNT (BEAKER) (test 182 K/CU MM  150-450                 

  



             code = 756)                                         

 

             MEAN PLATELET VOLUME (BEAKER) 10.5 fL      9.4-12.4                

  



             (test code = 754)                                        

 

             NUCLEATED RED BLOOD CELLS 0 /100 WBC   0-0                       



             (BEAKER) (test code = 413)                                        

 

             NEUTROPHILS RELATIVE PERCENT 68 %                                  

 



             (BEAKER) (test code = 429)                                        

 

             LYMPHOCYTES RELATIVE PERCENT 16 %                                  

 



             (BEAKER) (test code = 430)                                        

 

             MONOCYTES RELATIVE PERCENT 12 %                                   



             (BEAKER) (test code = 431)                                        

 

             EOSINOPHILS RELATIVE PERCENT 4 %                                   

 



             (BEAKER) (test code = 432)                                        

 

             BASOPHILS RELATIVE PERCENT 0 %                                    



             (BEAKER) (test code = 437)                                        

 

             NEUTROPHILS ABSOLUTE COUNT 5.62 K/ L    1.78-5.38    H            



             (BEAKER) (test code = 670)                                        

 

             LYMPHOCYTES ABSOLUTE COUNT 1.30 K/ L    1.32-3.57    L            



             (BEAKER) (test code = 414)                                        

 

             MONOCYTES ABSOLUTE COUNT (BEAKER) 0.96 K/ L    0.30-0.82    H      

      



             (test code = 415)                                        

 

             EOSINOPHILS ABSOLUTE COUNT 0.33 K/ L    0.04-0.54                 



             (BEAKER) (test code = 416)                                        

 

             BASOPHILS ABSOLUTE COUNT (BEAKER) 0.03 K/ L    0.01-0.08           

      



             (test code = 417)                                        

 

             IMMATURE GRANULOCYTES-RELATIVE 0.60 %       0.00-1.00              

   



             PERCENT (BEAKER) (test code =                                      

  



             2801)                                               



BLOOD GAS, [id number] 03:43:46





             Test Item    Value        Reference Range Interpretation Comments

 

             PH ARTERIAL (BEAKER) (test code = 7.45         7.35-7.45           

      



             383)                                                

 

             PCO2 ARTERIAL (BEAKER) (test code 37 mm Hg     35-45               

      



             = 384)                                              

 

             PO2 ARTERIAL (BEAKER) (test code = 225 mm Hg    80-90        H     

       



             385)                                                

 

             O2 SATURATION ARTERIAL (BEAKER) 99.5 %       96.0-97.0    H        

    



             (test code = 386)                                        

 

             HCO3 ARTERIAL (BEAKER) (test code 25 mmol/L    21-29               

      



             = 388)                                              

 

             BASE EXCESS ARTERIAL (BEAKER) 1.1 mmol/L   -2.0-3.0                

  



             (test code = 387)                                        

 

             PATIENT TEMPERATURE (BEAKER) (test 35.8                            

       



             code = 1818)                                        

 

             FIO2 (BEAKER) (test code = 1819) 32.0                              

     



POCT-GLUCOSE [id number] 22:29:45





             Test Item    Value        Reference Range Interpretation Comments

 

             POC-GLUCOSE METER 149 mg/dL           H            : TESTED A

T [facility] [address]



             (BEAKER) (test code =                                        [facility],



             1538)                                               [id number]:



                                                                 /Techni

deejay ID



                                                                 = [id number] for



                                                                 STEPHANIE KEENE



POCT-GLUCOSE [id number] 14:53:25





             Test Item    Value        Reference Range Interpretation Comments

 

             POC-GLUCOSE METER 131 mg/dL           H            : TESTED A

T [facility] [address]



             (BEAKER) (test code =                                        [facility],



             1538)                                               [address]:



                                                                 /Techni

deejay ID



                                                                 = [id number] for An

akGypsy velasquezia



POCT-GLUCOSE [id number] 11:25:13





             Test Item    Value        Reference Range Interpretation Comments

 

             POC-GLUCOSE METER 125 mg/dL           H            : TESTED A

T [facility] [address]



             (BEAKER) (test code =                                        [facility],



             1538)                                               [id number]:



                                                                 /Techni

deejay ID



                                                                 = [id number] for An

akani,



                                                                 Chastity



XR CHEST 1 VIEW PORTABLE / [id number] 09:08:16
************************************************************[facility] YOB: 1965  Sex: 
M************************************************************EXAMINATION: XR 
CHEST 1 VIEW PORTABLE / BEDSIDE INDICATION: IntubationCOMPARISON: CXR of the 
prior day FINDINGS:LINES/TUBES: Endotracheal tube terminates 2.4 cm above the 
karen.Interval removal of previously seen feeding tube and placement of an 
NGtube which courses below the diaphragm and out of view. LUNGS: Stable lung 
volumes. No new airspace consolidation.PLEURA: No pleural effusion or 
pneumothorax.MEDIASTINUM: The cardiomediastinal silhouette is stable in size 
andshape.BONES/SOFT TISSUES: No acute osseous injury.ABDOMEN: No free air under 
the diaphragm.IMPRESSION:No significant interval change.Electronically Signed 
By: Laura Treviño2023 09:10 CDTWorkstation Name: [id number]
 03:52:49





             Test Item    Value        Reference Range Interpretation Comments

 

             MAGNESIUM (BEAKER) (test code = 2.3 mg/dL    1.6-2.6               

    



             627)                                                



 ID - [id number] 03:52:49





             Test Item    Value        Reference Range Interpretation Comments

 

             PHOSPHORUS (BEAKER) (test code = 2.4 mg/dL    2.3-4.7              

     



             604)                                                



 ID - MARCOBASIC METABOLIC [id number] 03:52:48





             Test Item    Value        Reference Range Interpretation Comments

 

             SODIUM (BEAKER) 148 meq/L    136-145      H            



             (test code = 381)                                        

 

             POTASSIUM    3.9 meq/L    3.5-5.1                   



             (BEAKER) (test                                        



             code = 379)                                         

 

             CHLORIDE (BEAKER) 113 meq/L           H            



             (test code = 382)                                        

 

             CO2 (BEAKER) 27 meq/L     22-29                     



             (test code = 355)                                        

 

             BLOOD UREA   38 mg/dL     7-21         H            



             NITROGEN (BEAKER)                                        



             (test code = 354)                                        

 

             CREATININE   1.44 mg/dL   0.57-1.25    H            



             (BEAKER) (test                                        



             code = 358)                                         

 

             GLUCOSE RANDOM 111 mg/dL           H            



             (BEAKER) (test                                        



             code = 652)                                         

 

             CALCIUM (BEAKER) 8.1 mg/dL    8.4-10.2     L            



             (test code = 697)                                        

 

             EGFR (BEAKER) 58                                      Interpretatio

n of eGFR



             (test code = mL/min/1.73                            values Stage De

scription



             1092)        sq m                                   Result G1 Althea

l or high



                                                                 >=90 G2 Mildly 

decreased



                                                                 60-89 G3a Mildl

y to



                                                                 moderately 45-5

9  G3b



                                                                 Moderately to s

everely



                                                                 30-44 G4 Severl

y decreased



                                                                 15-29 G5 Kidney

 failure



                                                                 <15Reported eGF

R is based



                                                                 on the CKD-EPI 





                                                                 equation that d

oes not use



                                                                 a race



                                                                 coefficientEsti

mated GFR



                                                                 is not as accur

ate as



                                                                 Creatinine Porsha

ekaterina in



                                                                 predicting glom

erular



                                                                 filtration rate

. Estimated



                                                                 GFR is not appl

icable for



                                                                 dialysis patien

ts



 ID - MARCOCBC W/PLT COUNT &amp; AUTO [id number] 03:35:35





             Test Item    Value        Reference Range Interpretation Comments

 

             WHITE BLOOD CELL COUNT (BEAKER) 11.7 K/ L    3.5-10.5     H        

    



             (test code = 775)                                        

 

             RED BLOOD CELL COUNT (BEAKER) 4.41 M/ L    4.63-6.08    L          

  



             (test code = 761)                                        

 

             HEMOGLOBIN (BEAKER) (test code = 11.3 GM/DL   13.7-17.5    L       

     



             410)                                                

 

             HEMATOCRIT (BEAKER) (test code = 35.5 %       40.1-51.0    L       

     



             411)                                                

 

             MEAN CORPUSCULAR VOLUME (BEAKER) 81 fL        79-92                

     



             (test code = 753)                                        

 

             MEAN CORPUSCULAR HEMOGLOBIN 25.6 pg      25.7-32.2    L            



             (BEAKER) (test code = 751)                                        

 

             MEAN CORPUSCULAR HEMOGLOBIN CONC 31.8 GM/DL   32.3-36.5    L       

     



             (BEAKER) (test code = 752)                                        

 

             RED CELL DISTRIBUTION WIDTH 14.6 %       11.6-14.4    H            



             (BEAKER) (test code = 412)                                        

 

             PLATELET COUNT (BEAKER) (test 197 K/CU MM  150-450                 

  



             code = 756)                                         

 

             MEAN PLATELET VOLUME (BEAKER) 10.3 fL      9.4-12.4                

  



             (test code = 754)                                        

 

             NUCLEATED RED BLOOD CELLS 0 /100 WBC   0-0                       



             (BEAKER) (test code = 413)                                        

 

             NEUTROPHILS RELATIVE PERCENT 79 %                                  

 



             (BEAKER) (test code = 429)                                        

 

             LYMPHOCYTES RELATIVE PERCENT 8 %                                   

 



             (BEAKER) (test code = 430)                                        

 

             MONOCYTES RELATIVE PERCENT 11 %                                   



             (BEAKER) (test code = 431)                                        

 

             EOSINOPHILS RELATIVE PERCENT 1 %                                   

 



             (BEAKER) (test code = 432)                                        

 

             BASOPHILS RELATIVE PERCENT 0 %                                    



             (BEAKER) (test code = 437)                                        

 

             NEUTROPHILS ABSOLUTE COUNT 9.28 K/ L    1.78-5.38    H            



             (BEAKER) (test code = 670)                                        

 

             LYMPHOCYTES ABSOLUTE COUNT 0.97 K/ L    1.32-3.57    L            



             (BEAKER) (test code = 414)                                        

 

             MONOCYTES ABSOLUTE COUNT (BEAKER) 1.29 K/ L    0.30-0.82    H      

      



             (test code = 415)                                        

 

             EOSINOPHILS ABSOLUTE COUNT 0.08 K/ L    0.04-0.54                 



             (BEAKER) (test code = 416)                                        

 

             BASOPHILS ABSOLUTE COUNT (BEAKER) 0.02 K/ L    0.01-0.08           

      



             (test code = 417)                                        

 

             IMMATURE GRANULOCYTES-RELATIVE 0.50 %       0.00-1.00              

   



             PERCENT (BEAKER) (test code =                                      

  



             2801)                                               



BLOOD GAS, [id number] 03:29:39





             Test Item    Value        Reference Range Interpretation Comments

 

             PH ARTERIAL (BEAKER) (test code = 7.47         7.35-7.45    H      

      



             383)                                                

 

             PCO2 ARTERIAL (BEAKER) (test code 43 mm Hg     35-45               

      



             = 384)                                              

 

             PO2 ARTERIAL (BEAKER) (test code = 219 mm Hg    80-90        H     

       



             385)                                                

 

             O2 SATURATION ARTERIAL (BEAKER) 99.5 %       96.0-97.0    H        

    



             (test code = 386)                                        

 

             HCO3 ARTERIAL (BEAKER) (test code 30 mmol/L    21-29        H      

      



             = 388)                                              

 

             BASE EXCESS ARTERIAL (BEAKER) 5.8 mmol/L   -2.0-3.0     H          

  



             (test code = 387)                                        

 

             PATIENT TEMPERATURE (BEAKER) (test 38.0                            

       



             code = 1818)                                        

 

             FIO2 (BEAKER) (test code = 1819) 24.0                              

     



POCT-GLUCOSE [id number] 02:52:45





             Test Item    Value        Reference Range Interpretation Comments

 

             POC-GLUCOSE METER 110 mg/dL                        : TESTED A

T [facility] [address]



             (BEAKER) (test code =                                        [place],



             1538)                                               [id number]:



                                                                 /Techni

deejay ID



                                                                 = [id number] for



                                                                 STEPHANIE KEENE





POCT-GLUCOSE [id number] 02:49:54





             Test Item    Value        Reference Range Interpretation Comments

 

             POC-GLUCOSE METER 167 mg/dL           H            : TESTED A

T [facility] [address]



             (BEAKER) (test code =                                        [place],



             1538)                                               [address]:



                                                                 /Techni

deejay ID



                                                                 = [id number] for



                                                                 STEPHANIE KEENE



BASIC METABOLIC [id number] 16:07:43





             Test Item    Value        Reference Range Interpretation Comments

 

             SODIUM (BEAKER) 148 meq/L    136-145      H            



             (test code = 381)                                        

 

             POTASSIUM    3.9 meq/L    3.5-5.1                   



             (BEAKER) (test                                        



             code = 379)                                         

 

             CHLORIDE (BEAKER) 112 meq/L           H            



             (test code = 382)                                        

 

             CO2 (BEAKER) 30 meq/L     22-29        H            



             (test code = 355)                                        

 

             BLOOD UREA   39 mg/dL     7-21         H            



             NITROGEN (BEAKER)                                        



             (test code = 354)                                        

 

             CREATININE   1.77 mg/dL   0.57-1.25    H            



             (BEAKER) (test                                        



             code = 358)                                         

 

             GLUCOSE RANDOM 152 mg/dL           H            



             (BEAKER) (test                                        



             code = 652)                                         

 

             CALCIUM (BEAKER) 8.3 mg/dL    8.4-10.2     L            



             (test code = 697)                                        

 

             EGFR (BEAKER) 45                                      Interpretatio

n of eGFR



             (test code = mL/min/1.73                            values Stage De

scription



             1092)        sq m                                   Result G1 Althea

l or high



                                                                 >=90 G2 Mildly 

decreased



                                                                 60-89 G3a Mildl

y to



                                                                 moderately 45-5

9 G3b



                                                                 Moderately to s

everely



                                                                 30-44 G4 Severl

y decreased



                                                                 15-29 G5 Kidney

 failure



                                                                 <15Reported eGF

R is based



                                                                 on the CKD-EPI 

1



                                                                 equation that d

oes not use



                                                                 a race



                                                                 coefficientEsti

mated GFR



                                                                 is not as accur

ate as



                                                                 Creatinine Porsha tobar in



                                                                 predicting glom

erular



                                                                 filtration rate

. Estimated



                                                                 GFR is not appl

icable for



                                                                 dialysis patien

ts



 ID - DBSPUTUM CULTURE + GRAM [id number] 10:16:15





             Test Item    Value        Reference Range Interpretation Comments

 

             CULTURE                                A            1+ Beta-hemolyt

ic



             (BEAKER) (test                                        streptococcus

 group



             code = 1095)                                        G, by serologic

al



                                                                 grouping

 

             GRAM STAIN   4+ WBCs                                



             RESULT (BEAKER)                                        



             (test code =                                        



             1123)                                               

 

             GRAM STAIN   1+ gram positive                           



             RESULT (BEAKER) cocci in chains and                           



             (test code = pairs                                  



             167580)                                             

 

             GRAM STAIN   1+ gram negative                           



             RESULT (BEAKER) coccobacilli                           



             (test code =                                        



             344320)                                             

 

             GRAM STAIN   <1+ gram positive                           



             RESULT (BEAKER) rods                                   



             (test code =                                        



             873998)                                             

 

             GRAM STAIN   <1+ gram positive                           



             RESULT (BEAKER) cocci in clusters                           



             (test code =                                        



             034109)                                             



3+ Normal respiratory clemente presentXR CHEST 1 VIEW PORTABLE / [id number] 
06:31:38************************************************************[facility] : 1965 Sex: 
M************************************************************Chest one v
iew:HISTORY: PneumoniaCompared to 2023. Bibasilar subsegmental atelectasis 
is present and improved on the left.There is a minimal left pleural effusion. 
Cardiac size remains withinnormal limits. Anendotracheal tube and enteric 
feeding tube appearunchanged in position.Electronically Signed By: Tawanda Balbuena2023 06:33 CDTWorkstation Name: [id number] 
03:37:56





             Test Item    Value        Reference Range Interpretation Comments

 

             MAGNESIUM (BEAKER) (test code = 2.3 mg/dL    1.6-2.6               

    



             627)                                                



 ID - [id number] 03:37:56





             Test Item    Value        Reference Range Interpretation Comments

 

             PHOSPHORUS (BEAKER) (test code = 3.2 mg/dL    2.3-4.7              

     



             604)                                                



 ID - ADMINBASIC METABOLIC [id number] 03:37:55





             Test Item    Value        Reference Range Interpretation Comments

 

             SODIUM (BEAKER) 145 meq/L    136-145                   



             (test code = 381)                                        

 

             POTASSIUM    4.0 meq/L    3.5-5.1                   



             (BEAKER) (test                                        



             code = 379)                                         

 

             CHLORIDE (BEAKER) 111 meq/L           H            



             (test code = 382)                                        

 

             CO2 (BEAKER) 25 meq/L     22-29                     



             (test code = 355)                                        

 

             BLOOD UREA   37 mg/dL     7-21         H            



             NITROGEN (BEAKER)                                        



             (test code = 354)                                        

 

             CREATININE   2.17 mg/dL   0.57-1.25    H            



             (BEAKER) (test                                        



             code = 358)                                         

 

             GLUCOSE RANDOM 152 mg/dL           H            



             (BEAKER) (test                                        



             code = 652)                                         

 

             CALCIUM (BEAKER) 8.2 mg/dL    8.4-10.2     L            



             (test code = 697)                                        

 

             EGFR (BEAKER) 35                                      Interpretatio

n of eGFR



             (test code = mL/min/1.73                            values Stage De

scription



             1092)        sq m                                   Result G1 Althea

l or high



                                                                 >=90 G2 Mildly 

decreased



                                                                 60-89 G3a Mildl

y to



                                                                 moderately 45-5

9 G3b



                                                                 Moderately to s

everely



                                                                 30-44 G4 Severl

y decreased



                                                                 15-29 G5 Kidney

 failure



                                                                 <15Reported eGF

R is based



                                                                 on the CKD-EPI 





                                                                 equation that d

oes not use



                                                                 a race



                                                                 coefficientEsti

mated GFR



                                                                 is not as accur

ate as



                                                                 Creatinine Porsha

ekaterina in



                                                                 predicting glom

erular



                                                                 filtration rate

. Estimated



                                                                 GFR is not appl

icable for



                                                                 dialysis patien

ts



 ID - ADMINCBC W/PLT COUNT &amp; AUTO [id number] 03:32:03





             Test Item    Value        Reference Range Interpretation Comments

 

             WHITE BLOOD CELL COUNT (BEAKER) 11.8 K/ L    3.5-10.5     H        

    



             (test code = 775)                                        

 

             RED BLOOD CELL COUNT (BEAKER) 4.93 M/ L    4.63-6.08               

  



             (test code = 761)                                        

 

             HEMOGLOBIN (BEAKER) (test code = 12.8 GM/DL   13.7-17.5    L       

     



             410)                                                

 

             HEMATOCRIT (BEAKER) (test code = 38.7 %       40.1-51.0    L       

     



             411)                                                

 

             MEAN CORPUSCULAR VOLUME (BEAKER) 79 fL        79-92                

     



             (test code = 753)                                        

 

             MEAN CORPUSCULAR HEMOGLOBIN 26.0 pg      25.7-32.2                 



             (BEAKER) (test code = 751)                                        

 

             MEAN CORPUSCULAR HEMOGLOBIN CONC 33.1 GM/DL   32.3-36.5            

     



             (BEAKER) (test code = 752)                                        

 

             RED CELL DISTRIBUTION WIDTH 14.6 %       11.6-14.4    H            



             (BEAKER) (test code = 412)                                        

 

             PLATELET COUNT (BEAKER) (test 190 K/CU MM  150-450                 

  



             code = 756)                                         

 

             MEAN PLATELET VOLUME (BEAKER) 10.1 fL      9.4-12.4                

  



             (test code = 754)                                        

 

             NUCLEATED RED BLOOD CELLS 0 /100 WBC   0-0                       



             (BEAKER) (test code = 413)                                        

 

             NEUTROPHILS RELATIVE PERCENT 95 %                                  

 



             (BEAKER) (test code = 429)                                        

 

             LYMPHOCYTES RELATIVE PERCENT 2 %                                   

 



             (BEAKER) (test code = 430)                                        

 

             MONOCYTES RELATIVE PERCENT 2 %                                    



             (BEAKER) (test code = 431)                                        

 

             EOSINOPHILS RELATIVE PERCENT 0 %                                   

 



             (BEAKER) (test code = 432)                                        

 

             BASOPHILS RELATIVE PERCENT 0 %                                    



             (BEAKER) (test code = 437)                                        

 

             NEUTROPHILS ABSOLUTE COUNT 11.30 K/ L   1.78-5.38    H            



             (BEAKER) (test code = 670)                                        

 

             LYMPHOCYTES ABSOLUTE COUNT 0.25 K/ L    1.32-3.57    L            



             (BEAKER) (test code = 414)                                        

 

             MONOCYTES ABSOLUTE COUNT (BEAKER) 0.21 K/ L    0.30-0.82    L      

      



             (test code = 415)                                        

 

             EOSINOPHILS ABSOLUTE COUNT 0.00 K/ L    0.04-0.54    L            



             (BEAKER) (test code = 416)                                        

 

             BASOPHILS ABSOLUTE COUNT (BEAKER) 0.02 K/ L    0.01-0.08           

      



             (test code = 417)                                        

 

             IMMATURE GRANULOCYTES-RELATIVE 0.50 %       0.00-1.00              

   



             PERCENT (BEAKER) (test code =                                      

  



             2801)                                               



BLOOD GAS, [id number] 03:15:14





             Test Item    Value        Reference Range Interpretation Comments

 

             PH ARTERIAL (BEAKER) (test code = 7.47         7.35-7.45    H      

      



             383)                                                

 

             PCO2 ARTERIAL (BEAKER) (test code 42 mm Hg     35-45               

      



             = 384)                                              

 

             PO2 ARTERIAL (BEAKER) (test code = 100 mm Hg    80-90        H     

       



             385)                                                

 

             O2 SATURATION ARTERIAL (BEAKER) 97.8 %       96.0-97.0    H        

    



             (test code = 386)                                        

 

             HCO3 ARTERIAL (BEAKER) (test code 30 mmol/L    21-29        H      

      



             = 388)                                              

 

             BASE EXCESS ARTERIAL (BEAKER) 5.5 mmol/L   -2.0-3.0     H          

  



             (test code = 387)                                        

 

             PATIENT TEMPERATURE (BEAKER) (test 37.0                            

       



             code = 1818)                                        

 

             FIO2 (BEAKER) (test code = 1819) 24.0                              

     



POCT-GLUCOSE [id number] 23:26:56





             Test Item    Value        Reference Range Interpretation Comments

 

             POC-GLUCOSE METER 133 mg/dL           H            : TESTED A

T [facility] [address]



             (TONIO) (test code =                                        [place] [facility],



             1538)                                               [id number]:



                                                                 /Techni

deejay ID



                                                                 = [id number] for IR

RED REVELES



POCT-GLUCOSE [id number] 19:00:51





             Test Item    Value        Reference Range Interpretation Comments

 

             POC-GLUCOSE METER 125 mg/dL           H            : Notified

 RN/MD:



             (TONIO) (test code =                                        TESTED

 AT [facility] [address]



             1538)                                               [place] [facility],



                                                                 [id number]:



                                                                 /Techni

deejay ID



                                                                 = [id number] for BARBARA NEWTON



XR CHEST 1 VIEW PORTABLE / [id number] 18:19:28
************************************************************[facility]  : 1965 Sex: 
M************************************************************Exam: XR CHEST 1 
VIEW PORTABLE / BEDSIDEDate: 2023 6:18 PMIndication:worsening 
hypoxiaComparison: Chest radiograph from earlier 
today.IMPRESSION:Lines/Tubes:Unchanged support devices.Lungs and Pleura : Low ethan
ng volumes. Mildly increasing perihilaropacities. Trace bilateral pleural 
effusions. No pneumothorax.Heart/Mediastinum:Unchanged.Bones/Soft Tissues: No 
acute osseous abnormality.Upper abdomen: Unremarkable.Electronically Signed By: 
Oneal Welch2023 18:21 CDTWorkstation Name: [id number] METABOLIC PANEL
2023 17:42:19





             Test Item    Value        Reference Range Interpretation Comments

 

             SODIUM (BEAKER) 145 meq/L    136-145                   



             (test code = 381)                                        

 

             POTASSIUM    3.3 meq/L    3.5-5.1      L            



             (BEAKER) (test                                        



             code = 379)                                         

 

             CHLORIDE (BEAKER) 113 meq/L           H            



             (test code = 382)                                        

 

             CO2 (BEAKER) 22 meq/L     22-29                     



             (test code = 355)                                        

 

             BLOOD UREA   46 mg/dL     7-21         H            



             NITROGEN (BEAKER)                                        



             (test code = 354)                                        

 

             CREATININE   3.80 mg/dL   0.57-1.25    H            



             (BEAKER) (test                                        



             code = 358)                                         

 

             GLUCOSE RANDOM 160 mg/dL           H            



             (BEAKER) (test                                        



             code = 652)                                         

 

             CALCIUM (BEAKER) 7.6 mg/dL    8.4-10.2     L            



             (test code = 697)                                        

 

             EGFR (BEAKER) 18                                      Interpretatio

n of eGFR



             (test code = mL/min/1.73                            values Stage De

scription



             1092)        sq m                                   Result G1 Althea

l or high



                                                                 >=90 G2 Mildly 

decreased



                                                                 60-89 G3a Mildl

y to



                                                                 moderately 45-5

9 G3b



                                                                 Moderately to s

everely



                                                                 30-44 G4 Severl

y decreased



                                                                 15-29 G5 Kidney

 failure



                                                                 <15Reported eGF

R is based



                                                                 on the CKD-EPI 





                                                                 equation that d

oes not use



                                                                 a race



                                                                 coefficientEsti

mated GFR



                                                                 is not as accur

ate as



                                                                 Creatinine Porsha tobar in



                                                                 predicting glom

erular



                                                                 filtration rate

. Estimated



                                                                 GFR is not appl

icable for



                                                                 dialysis patien

ts



 ID - ADMINBLOOD GAS, [id number] 17:07:40





             Test Item    Value        Reference Range Interpretation Comments

 

             PH ARTERIAL (BEAKER) (test code = 7.45         7.35-7.45           

      



             383)                                                

 

             PCO2 ARTERIAL (BEAKER) (test code 32 mm Hg     35-45        L      

      



             = 384)                                              

 

             PO2 ARTERIAL (BEAKER) (test code 101 mm Hg    80-90        H       

     



             = 385)                                              

 

             O2 SATURATION ARTERIAL (BEAKER) 97.9 %       96.0-97.0    H        

    



             (test code = 386)                                        

 

             HCO3 ARTERIAL (BEAKER) (test code 22 mmol/L    21-29               

      



             = 388)                                              

 

             BASE EXCESS ARTERIAL (BEAKER) -1.2 mmol/L  -2.0-3.0                

  



             (test code = 387)                                        

 

             PATIENT TEMPERATURE (BEAKER) 37.0                                  

 



             (test code = 1818)                                        

 

             FIO2 (BEAKER) (test code = 1819) 21.0                              

     



BASIC METABOLIC [id number] 16:56:17





             Test Item    Value        Reference Range Interpretation Comments

 

             SODIUM (BEAKER) 141 meq/L    136-145                   



             (test code = 381)                                        

 

             POTASSIUM    4.4 meq/L    3.5-5.1                   Specimen modera

tely



             (BEAKER) (test                                        hemolyzed



             code = 379)                                         

 

             CHLORIDE (BEAKER) 113 meq/L           H            



             (test code = 382)                                        

 

             CO2 (BEAKER) 16 meq/L     22-29        L            



             (test code = 355)                                        

 

             BLOOD UREA   46 mg/dL     7-21         H            



             NITROGEN (BEAKER)                                        



             (test code = 354)                                        

 

             CREATININE   4.66 mg/dL   0.57-1.25    H            Specimen modera

tely



             (BEAKER) (test                                        hemolyzed



             code = 358)                                         

 

             GLUCOSE RANDOM 151 mg/dL           H            



             (BEAKER) (test                                        



             code = 652)                                         

 

             CALCIUM (BEAKER) 7.9 mg/dL    8.4-10.2     L            



             (test code = 697)                                        

 

             EGFR (BEAKER) 14                                      Interpretatio

n of eGFR



             (test code = mL/min/1.73                            values Stage De

scription



             1092)        sq m                                   Result G1 Althea

l or high



                                                                 >=90 G2 Mildly 

decreased



                                                                 60-89 G3a Mildl

y to



                                                                 moderately 45-5

9 G3b



                                                                 Moderately to s

everely



                                                                 30-44 G4 Severl

y decreased



                                                                 15-29 G5 Kidney

 failure



                                                                 <15Reported eGF

R is based



                                                                 on the CKD-EPI 





                                                                 equation that d

oes not use



                                                                 a race



                                                                 coefficientEsti

mated GFR



                                                                 is not as accur

ate as



                                                                 Creatinine Porsha

ekaterina in



                                                                 predicting glom

erular



                                                                 filtration rate

. Estimated



                                                                 GFR is not appl

icable for



                                                                 dialysis patien

ts



 ID - BSUS RENAL [id number] 16:15:21
************************************************************[facility] : 1965 Sex: 
M************************************************************EXAM: Renal 
UltrasoundINDICATION: EWELINA vs. Renal Failure COMPARISON: None TECHNIQUE: 
Transverse and longitudinalimages of the kidneys and bladderwere obtained. 
FINDINGS: Right Kidney: Length: 10.7 cmAppearance: Normal echogenicity. 
Collecting system: No hydronephrosisStones: NoneCyst/Mass: NoneLeft Kidney: 
Length: 10.2 cmAppearance: Normal echogenicity. Collecting system: No 
hydronephrosisStones: NoneCyst/Mass:NoneBladder: Lopez catheter in the 
decompressed bladder.IMPRESSION:No hydronephrosis or renal calclul
i.Electronically Signed By: Laura Treviño2023 16:17 CDTWorkstation Name: 
[id number] NITROGEN, RANDOM [id number] 14:25:38





             Test Item    Value        Reference Range Interpretation Comments

 

             UREA NITROGEN URINE (BEAKER) (test 351 mg/dL                       

       



             code = 538)                                         



Reference Range: No NormalsOperator ID - ADMINCREATININE, RANDOM [id number]
 14:25:37





             Test Item    Value        Reference Range Interpretation Comments

 

             CREATININE URINE (BEAKER) (test 117.9 mg/dL                        

    



             code = 375)                                         



Reference Range: No NormalsOperator ID - ADMINSODIUM, RANDOM [id number] 
14:25:37





             Test Item    Value        Reference Range Interpretation Comments

 

             SODIUM URINE (BEAKER) (test code = 43 meq/L                        

       



             243)                                                



Reference Range: No NormalsOperator ID - ADMINBASIC METABOLIC [id number] 
14:01:34





             Test Item    Value        Reference Range Interpretation Comments

 

             SODIUM (BEAKER) 143 meq/L    136-145                   



             (test code = 381)                                        

 

             POTASSIUM    3.8 meq/L    3.5-5.1                   



             (BEAKER) (test                                        



             code = 379)                                         

 

             CHLORIDE (BEAKER) 112 meq/L           H            



             (test code = 382)                                        

 

             CO2 (BEAKER) 18 meq/L     22-29        L            



             (test code = 355)                                        

 

             BLOOD UREA   48 mg/dL     7-21         H            



             NITROGEN (BEAKER)                                        



             (test code = 354)                                        

 

             CREATININE   4.83 mg/dL   0.57-1.25    H            



             (BEAKER) (test                                        



             code = 358)                                         

 

             GLUCOSE RANDOM 154 mg/dL           H            



             (BEAKER) (test                                        



             code = 652)                                         

 

             CALCIUM (BEAKER) 7.8 mg/dL    8.4-10.2     L            



             (test code = 697)                                        

 

             EGFR (BEAKER) 14                                      Interpretatio

n of eGFR



             (test code = mL/min/1.73                            values Stage De

scription



             1092)        sq m                                   Result G1 Althea

l or high



                                                                 >=90 G2 Mildly 

decreased



                                                                 60-89 G3a Mildl

y to



                                                                 moderately 45-5

9  G3b



                                                                 Moderately to s

everely



                                                                 30-44 G4 Severl

y decreased



                                                                 15-29 G5 Kidney

 failure



                                                                 <15Reported eGF

R is based



                                                                 on the CKD-EPI 





                                                                 equation that d

oes not use



                                                                 a race



                                                                 coefficientEsti

mated GFR



                                                                 is not as accur

ate as



                                                                 Creatinine Porsha

ekaterina in



                                                                 predicting glom

erular



                                                                 filtration rate

. Estimated



                                                                 GFR is not appl

icable for



                                                                 dialysis patien

ts



 ID - MARCOB-TYPE NATRIURETIC FACTOR (BNP)2023 13:54:47





             Test Item    Value        Reference Range Interpretation Comments

 

             B-TYPE NATRIURETIC PEPTIDE (BEAKER) 108 pg/mL    0-100        H    

        



             (test code = 700)                                        



 ID - MARCOBLOOD GAS, [id number] 13:38:48





             Test Item    Value        Reference Range Interpretation Comments

 

             PH ARTERIAL (BEAKER) (test code = 7.47         7.35-7.45    H      

      



             383)                                                

 

             PCO2 ARTERIAL (BEAKER) (test code 25 mm Hg     35-45        L      

      



             = 384)                                              

 

             PO2 ARTERIAL (BEAKER) (test code 208 mm Hg    80-90        H       

     



             = 385)                                              

 

             O2 SATURATION ARTERIAL (BEAKER) 99.5 %       96.0-97.0    H        

    



             (test code = 386)                                        

 

             HCO3 ARTERIAL (BEAKER) (test code 17 mmol/L    21-29        L      

      



             = 388)                                              

 

             BASE EXCESS ARTERIAL (BEAKER) -4.5 mmol/L  -2.0-3.0     L          

  



             (test code = 387)                                        

 

             PATIENT TEMPERATURE (BEAKER) 37.0                                  

 



             (test code = 1818)                                        

 

             FIO2 (BEAKER) (test code = 1819) 21                                

     



OSMOLALITY, [id number] 12:37:47





             Test Item    Value        Reference Range Interpretation Comments

 

             OSMOLALITY, SERUM (BEAKER) (test 302 mOsm/kg  275-295      H       

     



             code = 615)                                         



POCT-GLUCOSE [id number] 12:14:10





             Test Item    Value        Reference Range Interpretation Comments

 

             POC-GLUCOSE METER 144 mg/dL           H            : Notified

 RN/MD:



             (BEAKER) (test code =                                        TESTED

 AT [facility] [address])                                               [place]

 [place],



                                                                 [id number]:



                                                                 /Techni

deejay ID



                                                                 = [id number] for BARBARA NEWTON



CREATINE KINASE (CK)2023 12:06:52





             Test Item    Value        Reference Range Interpretation Comments

 

             CREATINE KINASE TOTAL (BEAKER) (test 393 U/L             H   

         



             code = 380)                                         



 ID - MARCOOSMOLALITY, [id number] 11:47:18





             Test Item    Value        Reference Range Interpretation Comments

 

             OSMOLALITY URINE 355 mOsm/kg  See_Comment                [Automated

 message]



             (BEAKER) (test code =                                        The sy

stem which



             614)                                                generated this 

result



                                                                 transmitted ref

erence



                                                                 range: 50-1,200



                                                                 mOsm/kg. The



                                                                 reference range

 was



                                                                 not used to int

erpret



                                                                 this result as



                                                                 normal/abnormal

.



URINALYSIS WITH MICROSCOPIC IF [id number] 11:46:16





             Test Item    Value        Reference Range Interpretation Comments

 

             COLOR (BEAKER) (test code = 470) Pink                              

     

 

             CLARITY (BEAKER) (test code = 469) Hazy                            

       

 

             SPECIFIC GRAVITY UA (BEAKER) (test 1.020        1.001-1.035        

       



             code = 468)                                         

 

             PH UA (BEAKER) (test code = 467) 6.0          5.0-8.0              

     

 

             PROTEIN UA (BEAKER) (test code = 100 mg/dL    Negative     A       

     



             464)                                                

 

             GLUCOSE UA (BEAKER) (test code = 30 mg/dL     Negative     A       

     



             365)                                                

 

             KETONES UA (BEAKER) (test code = Trace        Negative     A       

     



             371)                                                

 

             BILIRUBIN UA (BEAKER) (test code = Negative     Negative           

       



             462)                                                

 

             BLOOD UA (BEAKER) (test code = 461) Moderate     Negative     A    

        

 

             NITRITE UA (BEAKER) (test code = Negative     Negative             

     



             465)                                                

 

             LEUKOCYTE ESTERASE UA (BEAKER) Moderate     Negative     A         

   



             (test code = 466)                                        

 

             UROBILINOGEN UA (BEAKER) (test code 6            0.2-1.0      H    

        



             = 463)                                              

 

             SOURCE(BEAKER) (test code = 2795)                                  

      



 ID - [auto] ID - techURINALYSIS [id number] 11:46:16





             Test Item    Value        Reference Range Interpretation Comments

 

             RBC UA (BEAKER) (test code = 519) 56 /HPF                          

      

 

             WBC UA (BEAKER) (test code = 520) 14 /HPF                          

      

 

             MUCUS (BEAKER) (test code = 1574) Rare                             

      

 

             SQUAMOUS EPITHELIAL (BEAKER) (test < /HPF                          

       



             code = 516)                                         

 

             CASTS (BEAKER) (test code = 1579) 1 /LPF                           

      

 

             CRYSTALS, URINE (BEAKER) (test Occasional   None Seen    A         

   



             code = 1521)                                        



 ID - techBLOOD GAS, [id number] 09:07:12





             Test Item    Value        Reference Range Interpretation Comments

 

             PH ARTERIAL (BEAKER) (test code 7.46         7.35-7.45    H        

    



             = 383)                                              

 

             PCO2 ARTERIAL (BEAKER) (test 15 mm Hg     35-45        LL          

 



             code = 384)                                         

 

             PO2 ARTERIAL (BEAKER) (test code 202 mm Hg    80-90        H       

     



             = 385)                                              

 

             O2 SATURATION ARTERIAL (BEAKER) 99.5 %       96.0-97.0    H        

    



             (test code = 386)                                        

 

             HCO3 ARTERIAL (BEAKER) (test 10 mmol/L    21-29        LL          

 



             code = 388)                                         

 

             BASE EXCESS ARTERIAL (BEAKER) -12.0 mmol/L -2.0-3.0     L          

  



             (test code = 387)                                        

 

             PATIENT TEMPERATURE (BEAKER) 37.0                                  

 



             (test code = 1818)                                        

 

             FIO2 (BEAKER) (test code = 1819) 30.0                              

     



HEPATIC FUNCTION [id number] 08:17:04





             Test Item    Value        Reference Range Interpretation Comments

 

             TOTAL PROTEIN (BEAKER) (test code = 5.8 gm/dL    6.0-8.3      L    

        



             770)                                                

 

             ALBUMIN (BEAKER) (test code = 1145) 3.1 g/dL     3.5-5.0      L    

        

 

             BILIRUBIN TOTAL (BEAKER) (test code 0.9 mg/dL    0.2-1.2           

        



             = 377)                                              

 

             BILIRUBIN DIRECT (BEAKER) (test 0.4 mg/dL    0.1-0.5               

    



             code = 706)                                         

 

             ALKALINE PHOSPHATASE (BEAKER) (test 84 U/L                   

        



             code = 346)                                         

 

             AST (SGOT) (BEAKER) (test code = 34 U/L       5-34                 

     



             353)                                                

 

             ALT (SGPT) (BEAKER) (test code = 24 U/L       6-55                 

     



             347)                                                



 ID - MARCOXR CHEST 1 VIEW PORTABLE / [id number] 06:56:03
************************************************************[facility] : 1965 Sex: 
M************************************************************CLINICAL HISTORY: 
pneumonia follow-upTECHNIQUE: 1 view of the chest.COMPARISON: 
2023IMPRESSION:ETT terminates 3 cm above the karen. Feeding tube now seen 
in theduodenum. Low lung volumes are again seen with decreased right 
perihilarairspace opacity. There are no significant appearing effusions. 
Thecardiomediastinal silhouette is magnified by technique.Electronically Signed 
By: Abimael Art2023 06:58 CDTWorkstation Name: [id number] METABOLIC 
[id number] 04:01:18





             Test Item    Value        Reference Range Interpretation Comments

 

             SODIUM (BEAKER) 141 meq/L    136-145                   



             (test code = 381)                                        

 

             POTASSIUM    3.8 meq/L    3.5-5.1                   



             (BEAKER) (test                                        



             code = 379)                                         

 

             CHLORIDE (BEAKER) 113 meq/L           H            



             (test code = 382)                                        

 

             CO2 (BEAKER) 16 meq/L     22-29        L            



             (test code = 355)                                        

 

             BLOOD UREA   42 mg/dL     7-21         H            



             NITROGEN (BEAKER)                                        



             (test code = 354)                                        

 

             CREATININE   3.25 mg/dL   0.57-1.25    H            



             (BEAKER) (test                                        



             code = 358)                                         

 

             GLUCOSE RANDOM 159 mg/dL           H            



             (BEAKER) (test                                        



             code = 652)                                         

 

             CALCIUM (BEAKER) 7.9 mg/dL    8.4-10.2     L            



             (test code = 697)                                        

 

             EGFR (BEAKER) 22                                      Interpretatio

n of eGFR



             (test code = mL/min/1.73                            values Stage De

scription



             1092)        sq m                                   Result G1 Althea

l or high



                                                                 >=90 G2 Mildly 

decreased



                                                                 60-89 G3a Mildl

y to



                                                                 moderately 45-5

9 G3b



                                                                 Moderately to s

everely



                                                                 30-44 G4 Severl

y decreased



                                                                 15-29 G5 Kidney

 failure



                                                                 <15Reported eGF

R is based



                                                                 on the CKD-EPI 





                                                                 equation that d

oes not use



                                                                 a race



                                                                 coefficientEsti

mated GFR



                                                                 is not as accur

ate as



                                                                 Creatinine Porsha tobar in



                                                                 predicting glom

erular



                                                                 filtration rate

. Estimated



                                                                 GFR is not appl

icable for



                                                                 dialysis patien

ts



 ID - [id number] 03:53:48





             Test Item    Value        Reference Range Interpretation Comments

 

             MAGNESIUM (BEAKER) (test code = 2.0 mg/dL    1.6-2.6               

    



             627)                                                



 ID - [id number] 03:53:48





             Test Item    Value        Reference Range Interpretation Comments

 

             PHOSPHORUS (BEAKER) (test code = 4.8 mg/dL    2.3-4.7      H       

     



             604)                                                



 ID - ADMINCBC W/PLT COUNT &amp; AUTO [id number] 03:39:24





             Test Item    Value        Reference Range Interpretation Comments

 

             WHITE BLOOD CELL COUNT (BEAKER) 13.3 K/ L    3.5-10.5     H        

    



             (test code = 775)                                        

 

             RED BLOOD CELL COUNT (BEAKER) 4.96 M/ L    4.63-6.08               

  



             (test code = 761)                                        

 

             HEMOGLOBIN (BEAKER) (test code = 12.9 GM/DL   13.7-17.5    L       

     



             410)                                                

 

             HEMATOCRIT (BEAKER) (test code = 39.3 %       40.1-51.0    L       

     



             411)                                                

 

             MEAN CORPUSCULAR VOLUME (BEAKER) 79 fL        79-92                

     



             (test code = 753)                                        

 

             MEAN CORPUSCULAR HEMOGLOBIN 26.0 pg      25.7-32.2                 



             (BEAKER) (test code = 751)                                        

 

             MEAN CORPUSCULAR HEMOGLOBIN CONC 32.8 GM/DL   32.3-36.5            

     



             (BEAKER) (test code = 752)                                        

 

             RED CELL DISTRIBUTION WIDTH 14.6 %       11.6-14.4    H            



             (BEAKER) (test code = 412)                                        

 

             PLATELET COUNT (BEAKER) (test 171 K/CU MM  150-450                 

  



             code = 756)                                         

 

             MEAN PLATELET VOLUME (BEAKER) 10.2 fL      9.4-12.4                

  



             (test code = 754)                                        

 

             NUCLEATED RED BLOOD CELLS 0 /100 WBC   0-0                       



             (BEAKER) (test code = 413)                                        

 

             NEUTROPHILS RELATIVE PERCENT 87 %                                  

 



             (BEAKER) (test code = 429)                                        

 

             LYMPHOCYTES RELATIVE PERCENT 4 %                                   

 



             (BEAKER) (test code = 430)                                        

 

             MONOCYTES RELATIVE PERCENT 9 %                                    



             (BEAKER) (test code = 431)                                        

 

             EOSINOPHILS RELATIVE PERCENT 0 %                                   

 



             (BEAKER) (test code = 432)                                        

 

             BASOPHILS RELATIVE PERCENT 0 %                                    



             (BEAKER) (test code = 437)                                        

 

             NEUTROPHILS ABSOLUTE COUNT 11.55 K/ L   1.78-5.38    H            



             (BEAKER) (test code = 670)                                        

 

             LYMPHOCYTES ABSOLUTE COUNT 0.49 K/ L    1.32-3.57    L            



             (BEAKER) (test code = 414)                                        

 

             MONOCYTES ABSOLUTE COUNT (BEAKER) 1.16 K/ L    0.30-0.82    H      

      



             (test code = 415)                                        

 

             EOSINOPHILS ABSOLUTE COUNT 0.00 K/ L    0.04-0.54    L            



             (BEAKER) (test code = 416)                                        

 

             BASOPHILS ABSOLUTE COUNT (BEAKER) 0.02 K/ L    0.01-0.08           

      



             (test code = 417)                                        

 

             IMMATURE GRANULOCYTES-RELATIVE 0.50 %       0.00-1.00              

   



             PERCENT (BEAKER) (test code =                                      

  



             2801)                                               



BLOOD GAS, [id number] 03:20:41





             Test Item    Value        Reference Range Interpretation Comments

 

             PH ARTERIAL (BEAKER) (test code = 7.43         7.35-7.45           

      



             383)                                                

 

             PCO2 ARTERIAL (BEAKER) (test code 26 mm Hg     35-45        L      

      



             = 384)                                              

 

             PO2 ARTERIAL (BEAKER) (test code 155 mm Hg    80-90        H       

     



             = 385)                                              

 

             O2 SATURATION ARTERIAL (BEAKER) 99.1 %       96.0-97.0    H        

    



             (test code = 386)                                        

 

             HCO3 ARTERIAL (BEAKER) (test code 17 mmol/L    21-29        L      

      



             = 388)                                              

 

             BASE EXCESS ARTERIAL (BEAKER) -5.7 mmol/L  -2.0-3.0     L          

  



             (test code = 387)                                        

 

             PATIENT TEMPERATURE (BEAKER) 37.0                                  

 



             (test code = 1818)                                        

 

             FIO2 (BEAKER) (test code = 1819) 60.0                              

     



BLOOD GAS, [id number] 23:12:54





             Test Item    Value        Reference Range Interpretation Comments

 

             PH ARTERIAL (BEAKER) (test code = 7.42         7.35-7.45           

      



             383)                                                

 

             PCO2 ARTERIAL (BEAKER) (test code 27 mm Hg     35-45        L      

      



             = 384)                                              

 

             PO2 ARTERIAL (BEAKER) (test code 184 mm Hg    80-90        H       

     



             = 385)                                              

 

             O2 SATURATION ARTERIAL (BEAKER) 99.3 %       96.0-97.0    H        

    



             (test code = 386)                                        

 

             HCO3 ARTERIAL (BEAKER) (test code 17 mmol/L    21-29        L      

      



             = 388)                                              

 

             BASE EXCESS ARTERIAL (BEAKER) -6.0 mmol/L  -2.0-3.0     L          

  



             (test code = 387)                                        

 

             PATIENT TEMPERATURE (BEAKER) 36.5                                  

 



             (test code = 1818)                                        

 

             FIO2 (BEAKER) (test code = 1819) 80.0                              

     



XR ABDOMEN/KUB 1 VIEW [id number] 22:12:42
************************************************************[facility] : 1965 Sex: 
M************************************************************TECHNIQUE:XR 
ABDOMEN/KUB 1 VIEW PORTABLEINDICATION: corpak.COMPARISON: 
2023FINDINGS:Feeding tube tip projects over the first portion of the 
duodenum. Nospecific evidence for bowel obstruction. Supine radiographs 
areinsensitive for detection of free intraperitoneal air.IMPRESSION:Feeding tube
 tip now projects over the first portion of the duodenum.Electronically Signed 
By: Ravi Fisher2023 22:14 CDTWorkstation Name: [id number] GAS,
 [id number] 20:54:23





             Test Item    Value        Reference Range Interpretation Comments

 

             PH ARTERIAL (BEAKER) (test code = 7.40         7.35-7.45           

      



             383)                                                

 

             PCO2 ARTERIAL (BEAKER) (test code 30 mm Hg     35-45        L      

      



             = 384)                                              

 

             PO2 ARTERIAL (BEAKER) (test code 187 mm Hg    80-90        H       

     



             = 385)                                              

 

             O2 SATURATION ARTERIAL (BEAKER) 99.3 %       96.0-97.0    H        

    



             (test code = 386)                                        

 

             HCO3 ARTERIAL (BEAKER) (test code 18 mmol/L    21-29        L      

      



             = 388)                                              

 

             BASE EXCESS ARTERIAL (BEAKER) -5.5 mmol/L  -2.0-3.0     L          

  



             (test code = 387)                                        

 

             PATIENT TEMPERATURE (BEAKER) 37.0                                  

 



             (test code = 1818)                                        

 

             FIO2 (BEAKER) (test code = 1819) 100.0                             

     



BLOOD GAS, [id number] 20:16:35





             Test Item    Value        Reference Range Interpretation Comments

 

             PH ARTERIAL (BEAKER) (test code = 7.36         7.35-7.45           

      



             383)                                                

 

             PCO2 ARTERIAL (BEAKER) (test code 32 mm Hg     35-45        L      

      



             = 384)                                              

 

             PO2 ARTERIAL (BEAKER) (test code 211 mm Hg    80-90        H       

     



             = 385)                                              

 

             O2 SATURATION ARTERIAL (BEAKER) 99.4 %       96.0-97.0    H        

    



             (test code = 386)                                        

 

             HCO3 ARTERIAL (BEAKER) (test code 18 mmol/L    21-29        L      

      



             = 388)                                              

 

             BASE EXCESS ARTERIAL (BEAKER) -6.3 mmol/L  -2.0-3.0     L          

  



             (test code = 387)                                        

 

             PATIENT TEMPERATURE (BEAKER) 37.9                                  

 



             (test code = 1818)                                        

 

             FIO2 (BEAKER) (test code = 1819) 100.0                             

     



XR ABDOMEN/KUB 1 VIEW [id number] 19:23:18
************************************************************[facility] : 1965 Sex: 
M************************************************************TECHNIQUE:XR 
ABDOMEN/KUB 1 VIEW PORTABLEINDICATION: corpak.COMPARISON: 
2023FINDINGS:Feeding tube with the tip projecting over the gastric fundus. 
Mildgaseous distention of the stomach. No specific evidence for 
bowelobstruction. Supine radiographs are insensitive for detection of 
freeintraperitoneal air.IMPRESSION:Feeding tube tip projects over the gastric 
fundus.Electronically Signed By: Ravi Fisher2023 19:25 
CDTWorkstation Name: [id number] CHEST 1 VIEW PORTABLE / [id number] 
19:07:36************************************************************[facility] CENTERName: TIETZ, ROBERT : 1965 Sex: 
M************************************************************TECHNIQUE:Frontal 
view of the chest.INDICATION: Intubation.COMPARISON: 2023 at at 1:49 
AM.FINDINGS:LINES/TUBES: Endotracheal tube tip projected 4.4 cm above the level 
ofthe karen. Feeding tube tip projectedover the gastric fundus.HEART AND 
MEDIASTINUM: Cardiomediastinal contour is stable. LUNGS: Increase in right 
perihilar opacity. Hazy left infrahilaropacity.PLEURA: No pneumothorax. No 
significant pleural effusion.SOFT TISSUES AND BONES: Unremarkable.IMPRESSION:1. 
Endotracheal tube tip projected 4.4 cmabove the level of the karen.There are 
two increasing right perihilar and left infrahilar opacities.This may be related
 to asymmetric edema or developing pneumonia.Follow-up to resolution is 
advised.Electronically Signed By: Ravi Fisher2023 19:09 
CDTWorkstation Name: [id number] GAS, [id number] 18:22:09





             Test Item    Value        Reference Range Interpretation Comments

 

             PH ARTERIAL (BEAKER) (test code = 7.37         7.35-7.45           

      



             383)                                                

 

             PCO2 ARTERIAL (BEAKER) (test code 29 mm Hg     35-45        L      

      



             = 384)                                              

 

             PO2 ARTERIAL (BEAKER) (test code 151 mm Hg    80-90        H       

     



             = 385)                                              

 

             O2 SATURATION ARTERIAL (BEAKER) 98.8 %       96.0-97.0    H        

    



             (test code = 386)                                        

 

             HCO3 ARTERIAL (BEAKER) (test code 16 mmol/L    21-29        L      

      



             = 388)                                              

 

             BASE EXCESS ARTERIAL (BEAKER) -7.5 mmol/L  -2.0-3.0     L          

  



             (test code = 387)                                        

 

             PATIENT TEMPERATURE (BEAKER) 38.5                                  

 



             (test code = 1818)                                        

 

             FIO2 (BEAKER) (test code = 1819) 100.0                             

     



POCT-GLUCOSE [id number] 17:11:20





             Test Item    Value        Reference Range Interpretation Comments

 

             POC-GLUCOSE METER 143 mg/dL           H            : TESTED A

T [facility] [address]



             (BEAKER) (test code =                                        [place],



             [address])                                               [address]:



                                                                 /Techni

deejay ID



                                                                 = [id number] for



                                                                 Kingsley Diallo



BLOOD GAS, [id number] 17:03:39





             Test Item    Value        Reference Range Interpretation Comments

 

             PH ARTERIAL (BEAKER) (test code = 7.23         7.35-7.45    L      

      



             383)                                                

 

             PCO2 ARTERIAL (BEAKER) (test code 51 mm Hg     35-45        H      

      



             = 384)                                              

 

             PO2 ARTERIAL (BEAKER) (test code 169 mm Hg    80-90        H       

     



             = 385)                                              

 

             O2 SATURATION ARTERIAL (BEAKER) 98.8 %       96.0-97.0    H        

    



             (test code = 386)                                        

 

             HCO3 ARTERIAL (BEAKER) (test code 21 mmol/L    21-29               

      



             = 388)                                              

 

             BASE EXCESS ARTERIAL (BEAKER) -6.9 mmol/L  -2.0-3.0     L          

  



             (test code = 387)                                        

 

             PATIENT TEMPERATURE (BEAKER) 38.1                                  

 



             (test code = 1818)                                        

 

             FIO2 (BEAKER) (test code = 1819) 50.0                              

     



XR CHEST 1 VIEW PORTABLE / [id number] 15:04:51
************************************************************[facility] CENTERName: TIETZ, CHAD : 1965 Sex: 
M************************************************************CLINICAL HISTORY: 
Respiratory distress TECHNIQUE: 1 view of the chest.COMPARISON: 
2023IMPRESSION:ETT removal. Feeding tube remains below the diaphragm.There 
is new right lung base consolidation/atelectasis. Left infrahilaratelectasis is 
unchanged. There is blunting of the right costophrenicangle. The cardiome
diastinal silhouette is magnified by technique.Electronically Signed By: Abimael Art2023 15:06 CDTWorkstation Name: [id number] GAS, ARTERIAL
2023 13:58:39





             Test Item    Value        Reference Range Interpretation Comments

 

             PH ARTERIAL (BEAKER) (test code = 7.41         7.35-7.45           

      



             383)                                                

 

             PCO2 ARTERIAL (BEAKER) (test code 26 mm Hg     35-45        L      

      



             = 384)                                              

 

             PO2 ARTERIAL (BEAKER) (test code 197 mm Hg    80-90        H       

     



             = 385)                                              

 

             O2 SATURATION ARTERIAL (BEAKER) 99.4 %       96.0-97.0    H        

    



             (test code = 386)                                        

 

             HCO3 ARTERIAL (BEAKER) (test code 16 mmol/L    21-29        L      

      



             = 388)                                              

 

             BASE EXCESS ARTERIAL (BEAKER) -6.7 mmol/L  -2.0-3.0     L          

  



             (test code = 387)                                        

 

             PATIENT TEMPERATURE (BEAKER) 37.2                                  

 



             (test code = 1818)                                        

 

             FIO2 (BEAKER) (test code = 1819) 44.0                              

     



POCT-GLUCOSE [id number] 13:32:55





             Test Item    Value        Reference Range Interpretation Comments

 

             POC-GLUCOSE METER 100 mg/dL                        : TESTED A

T [facility] [address]



             (BEAKER) (test code =                                        [place],



             [address])                                               [address]:



                                                                 /Techni

deejay ID



                                                                 = [id number] for



                                                                 Kingsley Diallo



[id number] 12:02:30





             Test Item    Value        Reference Range Interpretation Comments

 

             PROCALCITONIN (BEAKER) (test code 0.12 ng/mL   <0.05        H      

      



             = 3036)                                             



SEPSIS RISK (ng/mL)Low: 0.05-0.50Intermediate: 0.51-2.00High: &gt;=2.01LACTIC 
ACID, [id number] 11:45:29





             Test Item    Value        Reference Range Interpretation Comments

 

             LACTATE BLOOD VENOUS 0.94 mmol/L  0.50-2.00                 Specime

n slightly



             (2) (BEAKER) (test                                        hemolyzed



             code = 2872)                                        



 ID - SHEBLIECT-GLUCOSE [id number] 09:00:12





             Test Item    Value        Reference Range Interpretation Comments

 

             POC-GLUCOSE METER 114 mg/dL           H            : TESTED A

T [facility] [address]



             (BEAKER) (test code =                                        [place],



             [address])                                               [address]:



                                                                 /Techni

deejay ID



                                                                 = [id number] for



                                                                 Kingsley Diallo



XR CHEST 1 VIEW PORTABLE / [id number] 08:33:53
********************************[facility] 1965 Sex: 
M************************************************************CLINICAL HISTORY: 
IntubatedTECHNIQUE: 1 view of the chest.COMPARISON: 2023IMPRESSION:ETT and 
feeding tube unchanged. Bibasilar bandlike opacities unchanged.No significant 
pleural fluid. The cardiomediastinal silhouette ismagnified by 
technique.Electronically Signed By: Abimael Art2023 08:35 CDTWorkstation 
Name: [id number] GAS, [id number] 04:54:00





             Test Item    Value        Reference Range Interpretation Comments

 

             PH ARTERIAL (BEAKER) (test code = 7.44         7.35-7.45           

      



             383)                                                

 

             PCO2 ARTERIAL (BEAKER) (test code 27 mm Hg     35-45        L      

      



             = 384)                                              

 

             PO2 ARTERIAL (BEAKER) (test code 174 mm Hg    80-90        H       

     



             = 385)                                              

 

             O2 SATURATION ARTERIAL (BEAKER) 99.3 %       96.0-97.0    H        

    



             (test code = 386)                                        

 

             HCO3 ARTERIAL (BEAKER) (test code 18 mmol/L    21-29        L      

      



             = 388)                                              

 

             BASE EXCESS ARTERIAL (BEAKER) -4.2 mmol/L  -2.0-3.0     L          

  



             (test code = 387)                                        

 

             PATIENT TEMPERATURE (BEAKER) 37.5                                  

 



             (test code = 1818)                                        

 

             FIO2 (BEAKER) (test code = 1819) 24.0                              

     



BASIC METABOLIC [id number] 04:25:31





             Test Item    Value        Reference Range Interpretation Comments

 

             SODIUM (BEAKER) 139 meq/L    136-145                   



             (test code = 381)                                        

 

             POTASSIUM    4.4 meq/L    3.5-5.1                   Specimen slight

ly



             (BEAKER) (test                                        hemolyzed



             code = 379)                                         

 

             CHLORIDE (BEAKER) 112 meq/L           H            



             (test code = 382)                                        

 

             CO2 (BEAKER) 19 meq/L     22-29        L            



             (test code = 355)                                        

 

             BLOOD UREA   19 mg/dL     7-21                      



             NITROGEN (BEAKER)                                        



             (test code = 354)                                        

 

             CREATININE   1.02 mg/dL   0.57-1.25                 Specimen slight

ly



             (BEAKER) (test                                        hemolyzed



             code = 358)                                         

 

             GLUCOSE RANDOM 111 mg/dL           H            



             (BEAKER) (test                                        



             code = 652)                                         

 

             CALCIUM (BEAKER) 8.2 mg/dL    8.4-10.2     L            



             (test code = 697)                                        

 

             EGFR (BEAKER) 87                                      Interpretatio

n of eGFR



             (test code = mL/min/1.73                            values Stage De

scription



             1092)        sq m                                   Result G1 Althea

l or high



                                                                 >=90 G2 Mildly 

decreased



                                                                 60-89 G3a Mildl

y to



                                                                 moderately 45-5

9 G3b



                                                                 Moderately to s

everely



                                                                 30-44 G4 Severl

y decreased



                                                                 15-29 G5 Kidney

 failure



                                                                 <15Reported eGF

R is based



                                                                 on the CKD-EPI 

2021



                                                                 equation that d

oes not use



                                                                 a race



                                                                 coefficientEsti

mated GFR



                                                                 is not as accur

ate as



                                                                 Creatinine Porsha tobar in



                                                                 predicting glom

erular



                                                                 filtration rate

. Estimated



                                                                 GFR is not appl

icable for



                                                                 dialysis patien

ts



 ID - DBCBC W/PLT COUNT &amp; AUTO [id number] 04:12:40





             Test Item    Value        Reference Range Interpretation Comments

 

             WHITE BLOOD CELL COUNT (BEAKER) 13.6 K/ L    3.5-10.5     H        

    



             (test code = 775)                                        

 

             RED BLOOD CELL COUNT (BEAKER) 5.76 M/ L    4.63-6.08               

  



             (test code = 761)                                        

 

             HEMOGLOBIN (BEAKER) (test code = 14.6 GM/DL   13.7-17.5            

     



             410)                                                

 

             HEMATOCRIT (BEAKER) (test code = 45.8 %       40.1-51.0            

     



             411)                                                

 

             MEAN CORPUSCULAR VOLUME (BEAKER) 80 fL        79-92                

     



             (test code = 753)                                        

 

             MEAN CORPUSCULAR HEMOGLOBIN 25.3 pg      25.7-32.2    L            



             (BEAKER) (test code = 751)                                        

 

             MEAN CORPUSCULAR HEMOGLOBIN CONC 31.9 GM/DL   32.3-36.5    L       

     



             (BEAKER) (test code = 752)                                        

 

             RED CELL DISTRIBUTION WIDTH 14.1 %       11.6-14.4                 



             (BEAKER) (test code = 412)                                        

 

             PLATELET COUNT (BEAKER) (test 191 K/CU MM  150-450                 

  



             code = 756)                                         

 

             MEAN PLATELET VOLUME (BEAKER) 10.2 fL      9.4-12.4                

  



             (test code = 754)                                        

 

             NUCLEATED RED BLOOD CELLS 0 /100 WBC   0-0                       



             (BEAKER) (test code = 413)                                        

 

             NEUTROPHILS RELATIVE PERCENT 88 %                                  

 



             (BEAKER) (test code = 429)                                        

 

             LYMPHOCYTES RELATIVE PERCENT 4 %                                   

 



             (BEAKER) (test code = 430)                                        

 

             MONOCYTES RELATIVE PERCENT 7 %                                    



             (BEAKER) (test code = 431)                                        

 

             EOSINOPHILS RELATIVE PERCENT 0 %                                   

 



             (BEAKER) (test code = 432)                                        

 

             BASOPHILS RELATIVE PERCENT 0 %                                    



             (BEAKER) (test code = 437)                                        

 

             NEUTROPHILS ABSOLUTE COUNT 11.98 K/ L   1.78-5.38    H            



             (BEAKER) (test code = 670)                                        

 

             LYMPHOCYTES ABSOLUTE COUNT 0.58 K/ L    1.32-3.57    L            



             (BEAKER) (test code = 414)                                        

 

             MONOCYTES ABSOLUTE COUNT (BEAKER) 0.97 K/ L    0.30-0.82    H      

      



             (test code = 415)                                        

 

             EOSINOPHILS ABSOLUTE COUNT 0.02 K/ L    0.04-0.54    L            



             (BEAKER) (test code = 416)                                        

 

             BASOPHILS ABSOLUTE COUNT (BEAKER) 0.02 K/ L    0.01-0.08           

      



             (test code = 417)                                        

 

             IMMATURE GRANULOCYTES-RELATIVE 0.50 %       0.00-1.00              

   



             PERCENT (BEAKER) (test code =                                      

  



             2801)                                               



POCT-GLUCOSE [id number] 04:02:47





             Test Item    Value        Reference Range Interpretation Comments

 

             POC-GLUCOSE METER 118 mg/dL           H            : TESTED A

T [facility] [address]



             (BEAKER) (test code =                                        [place] [facility],



             [address]38)                                               [address]:



                                                                 /Techni

deejay ID



                                                                 = [id number] for



                                                                 CHER KEENEI

TH



POCT-GLUCOSE [id number] 22:18:43





             Test Item    Value        Reference Range Interpretation Comments

 

             POC-GLUCOSE METER 112 mg/dL           H            : TESTED A

T [facility] [address]



             (BEAKER) (test code =                                        [facility]

[facility],



             [address]38)                                               [address]:



                                                                 /Techni

deejay ID



                                                                 = [id number] for



                                                                 CHER KEENEI

TH



POCT-GLUCOSE [id number] 18:11:35





             Test Item    Value        Reference Range Interpretation Comments

 

             POC-GLUCOSE METER 100 mg/dL                        : TESTED A

T [facility] [address]



             (BEAKER) (test code =                                        [facility]

[place] [facility],



             [address]38)                                               [address]:



                                                                 /Techni

deejay ID



                                                                 = [id number] for Allyssa Bui



URINALYSIS WITH MICROSCOPIC IF [id number] 14:21:46





             Test Item    Value        Reference Range Interpretation Comments

 

             COLOR (BEAKER) (test code = 470) Light Yellow                      

     

 

             CLARITY (BEAKER) (test code = Clear                                

  



             469)                                                

 

             SPECIFIC GRAVITY UA (BEAKER) 1.023        1.001-1.035              

 



             (test code = 468)                                        

 

             PH UA (BEAKER) (test code = 467) 6.0          5.0-8.0              

     

 

             PROTEIN UA (BEAKER) (test code = Negative     Negative             

     



             464)                                                

 

             GLUCOSE UA (BEAKER) (test code = Negative     Negative             

     



             365)                                                

 

             KETONES UA (BEAKER) (test code = Negative     Negative             

     



             371)                                                

 

             BILIRUBIN UA (BEAKER) (test code Negative     Negative             

     



             = 462)                                              

 

             BLOOD UA (BEAKER) (test code = Negative     Negative               

   



             461)                                                

 

             NITRITE UA (BEAKER) (test code = Negative     Negative             

     



             465)                                                

 

             LEUKOCYTE ESTERASE UA (BEAKER) Negative     Negative               

   



             (test code = 466)                                        

 

             UROBILINOGEN UA (BEAKER) (test 0.2          0.2-1.0                

   



             code = 463)                                         

 

             SOURCE(BEAKER) (test code =                                        



             2795)                                               



 ID - [auto]XR CHEST 1 VIEW PORTABLE / [id number] 13:26:09
************************************************************[facility] : 1965 Sex: 
M************************************************************CLINICAL HISTORY: 
IntubatedTECHNIQUE: 1 view of the chest.COMPARISON: 2023IMPRESSION:ETT again
 seen. New feeding tube in the distal stomach. There isincreased right basilar 
atelectasis. There is no significant pleuralfluid.Electronically Signed By: 
Abimael Art2023 13:28 CDTWorkstation Name: [id number] GAS, ARTERIAL
2023 09:38:22





             Test Item    Value        Reference Range Interpretation Comments

 

             PH ARTERIAL (BEAKER) (test code = 7.44         7.35-7.45           

      



             383)                                                

 

             PCO2 ARTERIAL (BEAKER) (test code 30 mm Hg     35-45        L      

      



             = 384)                                              

 

             PO2 ARTERIAL (BEAKER) (test code 130 mm Hg    80-90        H       

     



             = 385)                                              

 

             O2 SATURATION ARTERIAL (BEAKER) 98.7 %       96.0-97.0    H        

    



             (test code = 386)                                        

 

             HCO3 ARTERIAL (BEAKER) (test code 20 mmol/L    21-29        L      

      



             = 388)                                              

 

             BASE EXCESS ARTERIAL (BEAKER) -3.0 mmol/L  -2.0-3.0     L          

  



             (test code = 387)                                        

 

             PATIENT TEMPERATURE (BEAKER) 37.0                                  

 



             (test code = 1818)                                        

 

             FIO2 (BEAKER) (test code = 1819) 21.0                              

     



POCT-GLUCOSE [id number] 06:10:38





             Test Item    Value        Reference Range Interpretation Comments

 

             POC-GLUCOSE METER 94 mg/dL                         : TESTED A

T [facility] [address]



             (BEAKER) (test code =                                        [place],



             [address]38)                                               [address]:



                                                                 /Techni

deejay ID =



                                                                 [id number] for Kendra Tate



BASIC METABOLIC [id number] 03:44:06





             Test Item    Value        Reference Range Interpretation Comments

 

             SODIUM (BEAKER) 140 meq/L    136-145                   



             (test code = 381)                                        

 

             POTASSIUM    4.0 meq/L    3.5-5.1                   



             (BEAKER) (test                                        



             code = 379)                                         

 

             CHLORIDE (BEAKER) 113 meq/L           H            



             (test code = 382)                                        

 

             CO2 (BEAKER) 21 meq/L     22-29        L            



             (test code = 355)                                        

 

             BLOOD UREA   22 mg/dL     7-21         H            



             NITROGEN (BEAKER)                                        



             (test code = 354)                                        

 

             CREATININE   0.90 mg/dL   0.57-1.25                 



             (BEAKER) (test                                        



             code = 358)                                         

 

             GLUCOSE RANDOM 118 mg/dL           H            



             (BEAKER) (test                                        



             code = 652)                                         

 

             CALCIUM (BEAKER) 8.2 mg/dL    8.4-10.2     L            



             (test code = 697)                                        

 

             EGFR (BEAKER) 101                                     Interpretatio

n of eGFR



             (test code = mL/min/1.73                            values Stage De

scription



             1092)        sq m                                   Result G1 Althea

l or high



                                                                 >=90 G2 Mildly 

decreased



                                                                 60-89 G3a Mildl

y to



                                                                 moderately 45-5

9 G3b



                                                                 Moderately to s

everely



                                                                 30-44 G4 Severl

y decreased



                                                                 15-29 G5 Kidney

 failure



                                                                 <15Reported eGF

R is based



                                                                 on the CKD-EPI 





                                                                 equation that d

oes not use



                                                                 a race



                                                                 coefficientEsti

mated GFR



                                                                 is not as accur

ate as



                                                                 Creatinine Porsha tobar in



                                                                 predicting glom

erular



                                                                 filtration rate

. Estimated



                                                                 GFR is not appl

icable for



                                                                 dialysis patien

ts



 ID - [id number] 03:44:06





             Test Item    Value        Reference Range Interpretation Comments

 

             MAGNESIUM (BEAKER) (test code = 2.5 mg/dL    1.6-2.6               

    



             627)                                                



 ID - [id number] 03:44:06





             Test Item    Value        Reference Range Interpretation Comments

 

             PHOSPHORUS (BEAKER) (test code = 2.7 mg/dL    2.3-4.7              

     



             604)                                                



 ID - [id number] 03:44:05





             Test Item    Value        Reference Range Interpretation Comments

 

             PROTIME (BEAKER) (test code = 14.7 seconds 11.9-14.2    H          

  



             759)                                                

 

             INR (BEAKER) (test code = 370) 1.17         <=5.90                 

   



RECOMMENDED COUMADIN/WARFARIN INR THERAPY RANGESSTANDARD DOSE: 2.0 - 3.0 
Includes: PROPHYLAXIS for venous thrombosis, systemic embolization; TREATMENT 
for venous thrombosis and/or pulmonary embolus.HIGH RISK: Target INR is 2.5-3.5 
for patients with mechanical heart valves.CBC W/PLT COUNT &amp; AUTO 
[id number] 03:34:51





             Test Item    Value        Reference Range Interpretation Comments

 

             WHITE BLOOD CELL COUNT (BEAKER) 19.1 K/ L    3.5-10.5     H        

    



             (test code = 775)                                        

 

             RED BLOOD CELL COUNT (BEAKER) 5.34 M/ L    4.63-6.08               

  



             (test code = 761)                                        

 

             HEMOGLOBIN (BEAKER) (test code = 13.9 GM/DL   13.7-17.5            

     



             410)                                                

 

             HEMATOCRIT (BEAKER) (test code = 41.8 %       40.1-51.0            

     



             411)                                                

 

             MEAN CORPUSCULAR VOLUME (BEAKER) 78 fL        79-92        L       

     



             (test code = 753)                                        

 

             MEAN CORPUSCULAR HEMOGLOBIN 26.0 pg      25.7-32.2                 



             (BEAKER) (test code = 751)                                        

 

             MEAN CORPUSCULAR HEMOGLOBIN CONC 33.3 GM/DL   32.3-36.5            

     



             (BEAKER) (test code = 752)                                        

 

             RED CELL DISTRIBUTION WIDTH 14.5 %       11.6-14.4    H            



             (BEAKER) (test code = 412)                                        

 

             PLATELET COUNT (BEAKER) (test 178 K/CU MM  150-450                 

  



             code = 756)                                         

 

             MEAN PLATELET VOLUME (BEAKER) 9.9 fL       9.4-12.4                

  



             (test code = 754)                                        

 

             NUCLEATED RED BLOOD CELLS 0 /100 WBC   0-0                       



             (BEAKER) (test code = 413)                                        

 

             NEUTROPHILS RELATIVE PERCENT 87 %                                  

 



             (BEAKER) (test code = 429)                                        

 

             LYMPHOCYTES RELATIVE PERCENT 7 %                                   

 



             (BEAKER) (test code = 430)                                        

 

             MONOCYTES RELATIVE PERCENT 6 %                                    



             (BEAKER) (test code = 431)                                        

 

             EOSINOPHILS RELATIVE PERCENT 0 %                                   

 



             (BEAKER) (test code = 432)                                        

 

             BASOPHILS RELATIVE PERCENT 0 %                                    



             (BEAKER) (test code = 437)                                        

 

             NEUTROPHILS ABSOLUTE COUNT 16.60 K/ L   1.78-5.38    H            



             (BEAKER) (test code = 670)                                        

 

             LYMPHOCYTES ABSOLUTE COUNT 1.25 K/ L    1.32-3.57    L            



             (BEAKER) (test code = 414)                                        

 

             MONOCYTES ABSOLUTE COUNT (BEAKER) 1.09 K/ L    0.30-0.82    H      

      



             (test code = 415)                                        

 

             EOSINOPHILS ABSOLUTE COUNT 0.03 K/ L    0.04-0.54    L            



             (BEAKER) (test code = 416)                                        

 

             BASOPHILS ABSOLUTE COUNT (BEAKER) 0.02 K/ L    0.01-0.08           

      



             (test code = 417)                                        

 

             IMMATURE GRANULOCYTES-RELATIVE 0.60 %       0.00-1.00              

   



             PERCENT (BEAKER) (test code =                                      

  



             2801)                                               



BLOOD GAS, [id number] 03:23:38





             Test Item    Value        Reference Range Interpretation Comments

 

             PH ARTERIAL (BEAKER) (test code = 7.44         7.35-7.45           

      



             383)                                                

 

             PCO2 ARTERIAL (BEAKER) (test code 31 mm Hg     35-45        L      

      



             = 384)                                              

 

             PO2 ARTERIAL (BEAKER) (test code 211 mm Hg    80-90        H       

     



             = 385)                                              

 

             O2 SATURATION ARTERIAL (BEAKER) 99.5 %       96.0-97.0    H        

    



             (test code = 386)                                        

 

             HCO3 ARTERIAL (BEAKER) (test code 21 mmol/L    21-29               

      



             = 388)                                              

 

             BASE EXCESS ARTERIAL (BEAKER) -2.3 mmol/L  -2.0-3.0     L          

  



             (test code = 387)                                        

 

             PATIENT TEMPERATURE (BEAKER) 37.0                                  

 



             (test code = 1818)                                        

 

             FIO2 (BEAKER) (test code = 1819) 100.0                             

     



CT BRAIN WITHOUT IV [id number] 01:08:18
************************************************************[facility] CENTERName: TIETZ, ROBERT : 1965 Sex: 
M************************************************************EXAM: CT BRAIN 
WITHOUT IV CONTRASTINDICATION: Stroke, follow upTECHNIQUE: CT images from skull 
base to vertex without IV contrast.This exam was performed according to the 
departmental dose optimizationprogram which includes automated exposure control,
 adjustment of the mAand/or kV according to the patient size, and/or use of an 
iterativereconstruction technique.COMPARISON: Exam from 17 hours priorFINDINGS: 
Parenchyma: Unchanged intraparenchymal hemorrhage centered within theleft 
hemipons extending to the midbrain and left cerebral peduncle. Nochange in mild 
mass effect with slight effacement of the fourthventricle. No hydrocephalus. No 
intraventricular extension ofhemorrhage. No hydrocephalus. No evidence ofacute 
infarction. No newhemorrhage.Extra-axial Collection: NoneVentricular System: As 
above.Osseous Structures: No acute osseous abnormality. Included Orbits: 
NormalParanasal Sinuses: Predominantly clearTympanomastoid Cavities: 
NormalOther: NoneIMPRESSION:Unchanged hemorrhage centered within the left h
emipons extending to themidbrain and left cerebral peduncle. No acute interval 
abnormality.Electronically Signed By: Maciej Lozada2023 01:11 CDTWorkstation
 Name: [id number] ABDOMEN/KUB 1 VIEW [id number] 17:52:58
************************************************************[facility] 1965 Sex: 
M************************************************************XR ABDOMEN/KUB 1 
VIEW PORTABLEINDICATION: Stella advanceCOMPARISON: NoneTECHNIQUE: Limited 
portable radiograph of the lower chest and upperabdomen was acquired for 
purposes of evaluating tube placementFINDINGS/IMPRESSION:Feeding tube tip 
overlies the distal stomach. Electronically Signed By: Yojana Buckley2023 
17:55 CDTWorkstation Name: [id number] 17:26:34





             Test Item    Value        Reference Range Interpretation Comments

 

             PHOSPHORUS (BEAKER) (test code = 3.4 mg/dL    2.3-4.7              

     



             604)                                                



 ID - [id number] 17:26:34





             Test Item    Value        Reference Range Interpretation Comments

 

             POTASSIUM (BEAKER) (test code = 4.2 meq/L    3.5-5.1               

    



             379)                                                



 ID - [id number] 17:26:33





             Test Item    Value        Reference Range Interpretation Comments

 

             MAGNESIUM (BEAKER) (test code = 3.2 mg/dL    1.6-2.6      H        

    



             627)                                                



 ID - ADMINXR ABDOMEN/KUB 1 VIEW [id number] 14:07:03
************************************************************[facility] 1965 Sex: 
M************************************************************XR ABDOMEN/KUB 1 
VIEW PORTABLETECHNIQUE: Supine radiograph(s) of the abdomen and pelvis.HISTORY: 
Enteric tube placement verificationCOMPARISON: NoneIMPRESSION:Lines and tubes: 
Enteric tube is seen with tip at thegastric antrum.Bowel gas pattern: 
Nonobstructive bowel gas pattern. Moderate stoolthroughout the colonOther: No 
acute osseous abnormality. Residual contrast is seen in thebilateral collecting 
system and urinary bladder.Electronically Signed By: Britta Wheatley2023 
14:09 CDTWorkstation Name: [id number] GAS, [id number] 11:02:55





             Test Item    Value        Reference Range Interpretation Comments

 

             PH ARTERIAL (BEAKER) (test code = 7.41         7.35-7.45           

      



             383)                                                

 

             PCO2 ARTERIAL (BEAKER) (test code 33 mm Hg     35-45        L      

      



             = 384)                                              

 

             PO2 ARTERIAL (BEAKER) (test code 168 mm Hg    80-90        H       

     



             = 385)                                              

 

             O2 SATURATION ARTERIAL (BEAKER) 99.2 %       96.0-97.0    H        

    



             (test code = 386)                                        

 

             HCO3 ARTERIAL (BEAKER) (test code 21 mmol/L    21-29               

      



             = 388)                                              

 

             BASE EXCESS ARTERIAL (BEAKER) -3.1 mmol/L  -2.0-3.0     L          

  



             (test code = 387)                                        

 

             PATIENT TEMPERATURE (BEAKER) 37.0                                  

 



             (test code = 1818)                                        

 

             FIO2 (BEAKER) (test code = 1819) 30.0                              

     



HEMOGLOBIN [id number] 11:01:34





             Test Item    Value        Reference Range Interpretation Comments

 

             HEMOGLOBIN A1C 5.6 %        See_Comment                [Automated m

essage]



             ELECTROPHORESIS (BEAKER)                                        The

 system which



             (test code = 3811)                                        generated

 this result



                                                                 transmitted ref

erence



                                                                 range: <=5.6%. 

The



                                                                 reference range

 was



                                                                 not used to int

erpret



                                                                 this result as



                                                                 normal/abnormal

.



"The A1c is measured using a [facility]-certified method. HbA1c value equal to or 
greater than 6.5% as thediagnosis cutoff for diabetes. An HbA1c value of 5.7-
6.4% indicates increased risk for diabetes (prediabetes)." ID - ADM
HEPATIC FUNCTION [id number] 10:57:01





             Test Item    Value        Reference Range Interpretation Comments

 

             TOTAL PROTEIN (BEAKER) (test code = 6.2 gm/dL    6.0-8.3           

        



             770)                                                

 

             ALBUMIN (BEAKER) (test code = 1145) 3.6 g/dL     3.5-5.0           

        

 

             BILIRUBIN TOTAL (BEAKER) (test code 0.5 mg/dL    0.2-1.2           

        



             = 377)                                              

 

             BILIRUBIN DIRECT (BEAKER) (test 0.2 mg/dL    0.1-0.5               

    



             code = 706)                                         

 

             ALKALINE PHOSPHATASE (BEAKER) (test 88 U/L                   

        



             code = 346)                                         

 

             AST (SGOT) (BEAKER) (test code = 25 U/L       5-34                 

     



             353)                                                

 

             ALT (SGPT) (BEAKER) (test code = 16 U/L       6-55                 

     



             347)                                                



 ID - EOOCTA [id number] 08:10:09
************************************************************[facility] CENTERName: TIETZCHAD : 1965  Sex: 
M************************************************************CT BRAIN WITHOUT IV
CONTRAST, CTA CAROTID, CTA BRAINBRAIN CT WITHOUT CONTRASTINDICATION: Cerebral 
hemorrhage suspectedCOMPARISON: CT head, 7/3/2023TECHNIQUE:Rapid acquisition 
spiral images were obtained between the aortic archand the cranial vertex during
intravenous contrast infusion toreconstruct axial imagesand angiographic 3D 
maximum intensityprojections (MIP). 3-D volumetric reformatted images were 
created at [employer] workstation. Precontrast images of the brain were 
alsoobtained. Stenosis evaluation reported in compliance with NASCET 
criteria.DOSE REDUCTION: Dose modulation, iterative reconstruction, 
and/orweight-based adjustment of the mA/kV was utilized to reduce theradiation 
dose to as low as reasonably achievable.FINDINGS:CT BRAIN:Chronic lacunar 
infarct in the left frontal corona radiata. Chroniclacunar infarct in the right 
putamen/external capsule.A 3.7 x 2.0 x 2.5 cm (CC X AP X transverse)
intraparenchymal hematomaseen centered in the left morgan extending to the left 
midbrain. Midlinestructures are normally developed. Hypoattenuation within 
theperiventricular and subcortical white matter is present, nonspecific 
byimaging, however, statistically representing chronic microvascularchanges in 
this age group. Diffuse senescent parenchymal volume loss.No 
hydrocephalus.Atherosclerotic calcification of the intracranial internal carotid
andvertebral arteries. Orbits are within normal limits.Retention cysts in the 
right maxillary sinus. Endotracheal tube ispartially imaged. CTA BRAIN:Internal 
carotid arteries: Petrous, cavernous and supraclinoid portionspatent. Middle 
cerebral arteries: Patentto distal branches.Anterior cerebral arteries: Patent. 
No A-comm aneurysm.Basilar system: Patent vertebrobasilar system.Posterior 
cerebral arteries:Patent P1 and P2 segments. Venous opacification: Major dural 
sinuses unremarkable for bolus timing.Additional findings: None.CTA NECK:Common 
carotid arteries: The common carotid arteries are normal in size. Bifurcations: 
No flow-limiting stenosis. Cervical internal carotid arteries: No flow limiting 
stenosis.Vertebral arteries: Codominant. No origin stenosis.Arch anatomy: 
Conventional.Nonvascular findings:Osseous structures: No acute osseous 
abnormality. Intact calvarium andskull base. Mild spondylosis and facet 
arthropathy are present withinthe spine.Cervical soft tissues: No adenopathy. 
Patent aerodigestive tract. Theendotracheal tube extends to theproximal 
trachea.Lung apices: No apical consolidation or pneumothorax.IMPRESSION:CT 
brain:1. No significant change.2. Evolving 3.7 x 2.0 x 2.5 cm intraparenchymal 
hematoma in the leftpons extending to the left midbrain3. Chronic lacunar 
infarct in the right basal ganglia.CTA head and neck:1. No acute vascular 
abnormality in the head and neck.Electronically Signed By: Evaristo Villasenor2023 08:12 CDTWorkstation Name: [id number] 
08:10:09************************************************************[facility] : 1965 Sex: 
M************************************************************CT BRAIN WITHOUT IV
CONTRAST, CTA CAROTID, CTA BRAINBRAIN CT WITHOUT CONTRASTINDICATION: Cerebral 
hemorrhage suspectedCOMPARISON: CT head, 7/3/2023TECHNIQUE:Rapid acquisition 
spiral images were obtained between the aortic archand the cranial vertex during
intravenous contrast infusion toreconstruct axial images and angiographic 3D 
maximum intensityprojections (MIP). 3-D volumetric reformatted images were 
created at [employer] workstation. Precontrast images of the brain were 
alsoobtained. Stenosis evaluation reported in compliance with NASCET 
criteria.DOSE REDUCTION: Dose modulation, iterative reconstruction,and/orweight-
based adjustment of the mA/kV was utilized to reduce theradiation dose to as low
as reasonably achievable.FINDINGS:CT BRAIN:Chronic lacunar infarct in the left 
frontal corona radiata. Chroniclacunar infarct in the right putamen/external 
capsule.A 3.7 x 2.0 x 2.5 cm (CC X AP X transverse) intraparenchymal 
hematomaseen centered in the left morgan extending to the left midbrain. 
Midlinestructures are normally developed. Hypoattenuation within 
theperiventricular and subcortical white matter is present, nonspecific 
byimaging, however, statistically representing chronic microvascularchanges in
this age group. Diffuse senescent parenchymal volume loss.No 
hydrocephalus.Atherosclerotic calcification of the intracranial internal carotid
andvertebral arteries. Orbits are within normal limits.Retention cysts in the 
right maxillary sinus. Endotracheal tube ispartially imaged. CTA BRAIN:Internal 
carotid arteries: Petrous, cavernous and supraclinoid portionspatent. Middle 
cerebral arteries: Patent to distal branches.Anterior cerebral arteries: Patent.
No A-comm aneurysm.Basilar system: Patent vertebrobasilar system.Posterior 
cerebral arteries:Patent P1 and P2 segments. Venous opacification: Majordural 
sinuses unremarkable for bolus timing.Additional findings: None.CTA NECK:Common 
carotid arteries: The common carotid arteries are normal in size. Bifurcations: 
No flow-limiting stenosis. Cervicalinternal carotid arteries: No flow limiting 
stenosis.Vertebral arteries: Codominant. No origin stenosis.Arch anatomy: 
Conventional.Nonvascular findings:Osseous structures: No acute osseous 
abnormality.Intact calvarium andskull base. Mild spondylosis and facet 
arthropathy are present withinthe spine.Cervical soft tissues: No adenopathy. 
Patent aerodigestive tract. Theendotracheal tube extends to the proximal 
trachea.Lung apices: No apical consolidation or pneumothorax.IMPRESSION:CT 
brain:1. No significant change.2. Evolving 3.7 x 2.0 x 2.5 cm intraparenchymal 
hematoma in the leftpons extending to the left midbrain3. Chronic lacunar 
infarct in the right basal ganglia.CTA head and neck:1. No acute vascular 
abnormality in the head and neck.Electronically Signed By: Evaristo Villasenor2023 08:12 CDTWorkstation Name: [id number] BRAIN WITHOUT IV CONTRAST
2023 08:10:09
************************************************************[facility] CENTERName: TIETZ, ROBERT : 1965 Sex: 
M************************************************************CT BRAIN WITHOUT IV
CONTRAST, CTA CAROTID, CTA BRAINBRAIN CT WITHOUT CONTRASTINDICATION: Cerebral 
hemorrhage suspectedCOMPARISON: CT head, 7/3/2023TECHNIQUE:Rapid acquisition 
spiral images were obtained between the aortic archand the cranial vertex during
intravenous contrast infusion toreconstruct axial images and angiographic 3D 
maximum intensityprojections (MIP). 3-D volumetric reformatted images were 
created at [employer] workstation. Precontrast images of the brain were 
alsoobtained. Stenosis evaluation reported in compliance with NASCET 
criteria.DOSE REDUCTION: Dose modulation, iterative reconstruction,and/orweight-
based adjustment of the mA/kV was utilized to reduce theradiation dose to as low
as reasonably achievable.FINDINGS:CT BRAIN:Chronic lacunar infarct in the left 
frontal corona radiata. Chroniclacunar infarct in the right putamen/external 
capsule.A 3.7 x 2.0 x 2.5 cm (CC X AP X transverse) intraparenchymal 
hematomaseen centered in the left morgan extending to the left midbrain. 
Midlinestructures are normally developed. Hypoattenuation within 
theperiventricular and subcortical white matter is present, nonspecific 
byimaging, however, statistically representing chronic microvascularchanges in
this age group. Diffuse senescent parenchymal volume loss.No 
hydrocephalus.Atherosclerotic calcification of the intracranial internal carotid
andvertebral arteries. Orbits are within normal limits.Retention cysts in the 
right maxillary sinus. Endotracheal tube ispartially imaged. CTA BRAIN:Internal 
carotid arteries: Petrous, cavernous and supraclinoid portionspatent. Middle 
cerebral arteries: Patent to distal branches.Anterior cerebral arteries: Patent.
No A-comm aneurysm.Basilar system: Patent vertebrobasilar system.Posterior 
cerebral arteries:Patent P1 and P2 segments. Venous opacification: Majordural 
sinuses unremarkable for bolus timing.Additional findings: None.CTA NECK:Common 
carotid arteries: The common carotid arteries are normal in size. Bifurcations: 
No flow-limiting stenosis. Cervicalinternal carotid arteries: No flow limiting 
stenosis.Vertebral arteries: Codominant. No origin stenosis.Arch anatomy: 
Conventional.Nonvascular findings:Osseous structures: No acute osseous 
abnormality.Intact calvarium andskull base. Mild spondylosis and facet 
arthropathy are present withinthe spine.Cervical soft tissues: No adenopathy. 
Patent aerodigestive tract. Theendotracheal tube extends to the proximal 
trachea.Lung apices: No apical consolidation or pneumothorax.IMPRESSION:CT 
brain:1. No significant change.2. Evolving 3.7 x 2.0 x 2.5 cm intraparenchymal 
hematoma in the leftpons extending to the left midbrain3. Chronic lacunar 
infarct in the right basal ganglia.CTA head and neck:1. No acute vascular 
abnormality in the head and neck.Electronically Signed By: Evaristo Villasenor2023 08:12 CDTWorkstation Name: [id number] 
08:03:30





             Test Item    Value        Reference Range Interpretation Comments

 

             PHOSPHORUS (BEAKER) (test code = 2.8 mg/dL    2.3-4.7              

     



             604)                                                



 ID - [id number] 07:50:14





             Test Item    Value        Reference Range Interpretation Comments

 

             FIBRINOGEN LEVEL (BEAKER) (test 279 mg/dl    225-434               

    



             code = 658)                                         



[id number] 07:50:14





             Test Item    Value        Reference Range Interpretation Comments

 

             PARTIAL THROMBOPLASTIN TIME 26.5 seconds 22.5-36.0                 



             (BEAKER) (test code = 760)                                        



PROTHROMBIN TIME/[id number] 07:49:36





             Test Item    Value        Reference Range Interpretation Comments

 

             PROTIME (BEAKER) (test code = 14.8 seconds 11.9-14.2    H          

  



             759)                                                

 

             INR (BEAKER) (test code = 370) 1.23         <=5.90                 

   



RECOMMENDED COUMADIN/WARFARIN INR THERAPY RANGESSTANDARD DOSE: 2.0 - 3.0 
Includes: PROPHYLAXIS for venous thrombosis, systemic embolization; TREATMENT 
for venous thrombosis and/or pulmonary embolus.HIGH RISK: Target INR is 2.5-3.5 
for patients with mechanical heart valves.BLOOD GAS, [id number] 06:29:10





             Test Item    Value        Reference Range Interpretation Comments

 

             PH ARTERIAL (BEAKER) (test code = 7.48         7.35-7.45    H      

      



             383)                                                

 

             PCO2 ARTERIAL (BEAKER) (test code 27 mm Hg     35-45        L      

      



             = 384)                                              

 

             PO2 ARTERIAL (BEAKER) (test code 184 mm Hg    80-90        H       

     



             = 385)                                              

 

             O2 SATURATION ARTERIAL (BEAKER) 99.4 %       96.0-97.0    H        

    



             (test code = 386)                                        

 

             HCO3 ARTERIAL (BEAKER) (test code 20 mmol/L    21-29        L      

      



             = 388)                                              

 

             BASE EXCESS ARTERIAL (BEAKER) -2.2 mmol/L  -2.0-3.0     L          

  



             (test code = 387)                                        

 

             PATIENT TEMPERATURE (BEAKER) 36.8                                  

 



             (test code = 1818)                                        

 

             FIO2 (BEAKER) (test code = 1819) 40.0                              

     



XR CHEST 1 VIEW PORTABLE / [id number] 06:26:18
********************************************************[facility] : 1965 Sex: 
M************************************************************XR CHEST 1VIEW 
PORTABLE / BEDSIDEINDICATION: post intubationCOMPARISON: Prior day's 
examFINDINGS: Portable frontal view of the chest. IMPRESSION:Support Lines: ET 
tube tip is 4 cm superior to the karen. Lungs and pleura: Lungs are clear No 
significant pneumothorax.Heart and mediastinum: Normal contours.Additional 
findings: None.Electronically Signed By: Yojana Buckley2023 06:28 
CDTWorkstation Name: [id number] 01:46:30





             Test Item    Value        Reference Range Interpretation Comments

 

             PHOSPHORUS (BEAKER) 1.0 mg/dL    2.3-4.7      LL           Specimen

 slightly



             (test code = 604)                                        hemolyzed



 ID - ADMINBASIC METABOLIC [id number] 01:43:50





             Test Item    Value        Reference Range Interpretation Comments

 

             SODIUM (BEAKER) 139 meq/L    136-145                   



             (test code = 381)                                        

 

             POTASSIUM    3.9 meq/L    3.5-5.1                   Specimen slight

ly



             (BEAKER) (test                                        hemolyzed



             code = 379)                                         

 

             CHLORIDE (BEAKER) 113 meq/L           H            



             (test code = 382)                                        

 

             CO2 (BEAKER) 19 meq/L     22-29        L            



             (test code = 355)                                        

 

             BLOOD UREA   14 mg/dL     7-21                      



             NITROGEN (BEAKER)                                        



             (test code = 354)                                        

 

             CREATININE   0.84 mg/dL   0.57-1.25                 Specimen slight

ly



             (BEAKER) (test                                        hemolyzed



             code = 358)                                         

 

             GLUCOSE RANDOM 131 mg/dL           H            



             (BEAKER) (test                                        



             code = 652)                                         

 

             CALCIUM (BEAKER) 7.9 mg/dL    8.4-10.2     L            



             (test code = 697)                                        

 

             EGFR (BEAKER) 103                                     Interpretatio

n of eGFR



             (test code = mL/min/1.73                            values Stage De

scription



             1092)        sq m                                   Result G1 Althea

l or high



                                                                 >=90 G2 Mildly 

decreased



                                                                 60-89 G3a Mildl

y to



                                                                 moderately 45-5

9 G3b



                                                                 Moderately to s

everely



                                                                 30-44 G4 Severl

y decreased



                                                                 15-29 G5 Kidney

 failure



                                                                 <15Reported eGF

R is based



                                                                 on the CKD-EPI 

1



                                                                 equation that d

oes not use



                                                                 a race



                                                                 coefficientEsti

mated GFR



                                                                 is not as accur

ate as



                                                                 Creatinine Porsha

ekaterina in



                                                                 predicting glom

erular



                                                                 filtration rate

. Estimated



                                                                 GFR is not appl

icable for



                                                                 dialysis patien

ts



 ID - [id number] 01:43:28





             Test Item    Value        Reference Range Interpretation Comments

 

             MAGNESIUM (BEAKER) 1.8 mg/dL    1.6-2.6                   Specimen 

slightly



             (test code = 627)                                        hemolyzed



 ID - ADMINBLOOD GAS, [id number] 01:29:20





             Test Item    Value        Reference Range Interpretation Comments

 

             PH ARTERIAL (BEAKER) (test code = 7.44         7.35-7.45           

      



             383)                                                

 

             PCO2 ARTERIAL (BEAKER) (test code 32 mm Hg     35-45        L      

      



             = 384)                                              

 

             PO2 ARTERIAL (BEAKER) (test code 184 mm Hg    80-90        H       

     



             = 385)                                              

 

             O2 SATURATION ARTERIAL (BEAKER) 99.3 %       96.0-97.0    H        

    



             (test code = 386)                                        

 

             HCO3 ARTERIAL (BEAKER) (test code 21 mmol/L    21-29               

      



             = 388)                                              

 

             BASE EXCESS ARTERIAL (BEAKER) -2.2 mmol/L  -2.0-3.0     L          

  



             (test code = 387)                                        

 

             PATIENT TEMPERATURE (BEAKER) 36.8                                  

 



             (test code = 1818)                                        

 

             FIO2 (BEAKER) (test code = 1819) 40.0                              

     



CBC W/PLT COUNT &amp; AUTO [id number] 01:01:10





             Test Item    Value        Reference Range Interpretation Comments

 

             WHITE BLOOD CELL COUNT (BEAKER) 12.0 K/ L    3.5-10.5     H        

    



             (test code = 775)                                        

 

             RED BLOOD CELL COUNT (BEAKER) 5.34 M/ L    4.63-6.08               

  



             (test code = 761)                                        

 

             HEMOGLOBIN (BEAKER) (test code = 14.2 GM/DL   13.7-17.5            

     



             410)                                                

 

             HEMATOCRIT (BEAKER) (test code = 41.9 %       40.1-51.0            

     



             411)                                                

 

             MEAN CORPUSCULAR VOLUME (BEAKER) 79 fL        79-92                

     



             (test code = 753)                                        

 

             MEAN CORPUSCULAR HEMOGLOBIN 26.6 pg      25.7-32.2                 



             (BEAKER) (test code = 751)                                        

 

             MEAN CORPUSCULAR HEMOGLOBIN CONC 33.9 GM/DL   32.3-36.5            

     



             (BEAKER) (test code = 752)                                        

 

             RED CELL DISTRIBUTION WIDTH 13.9 %       11.6-14.4                 



             (BEAKER) (test code = 412)                                        

 

             PLATELET COUNT (BEAKER) (test 187 K/CU MM  150-450                 

  



             code = 756)                                         

 

             MEAN PLATELET VOLUME (BEAKER) 9.4 fL       9.4-12.4                

  



             (test code = 754)                                        

 

             NUCLEATED RED BLOOD CELLS 0 /100 WBC   0-0                       



             (BEAKER) (test code = 413)                                        

 

             NEUTROPHILS RELATIVE PERCENT 96 %                                  

 



             (BEAKER) (test code = 429)                                        

 

             LYMPHOCYTES RELATIVE PERCENT 2 %                                   

 



             (BEAKER) (test code = 430)                                        

 

             MONOCYTES RELATIVE PERCENT 1 %                                    



             (BEAKER) (test code = 431)                                        

 

             EOSINOPHILS RELATIVE PERCENT 0 %                                   

 



             (BEAKER) (test code = 432)                                        

 

             BASOPHILS RELATIVE PERCENT 0 %                                    



             (BEAKER) (test code = 437)                                        

 

             NEUTROPHILS ABSOLUTE COUNT 11.49 K/ L   1.78-5.38    H            



             (BEAKER) (test code = 670)                                        

 

             LYMPHOCYTES ABSOLUTE COUNT 0.28 K/ L    1.32-3.57    L            



             (BEAKER) (test code = 414)                                        

 

             MONOCYTES ABSOLUTE COUNT (BEAKER) 0.14 K/ L    0.30-0.82    L      

      



             (test code = 415)                                        

 

             EOSINOPHILS ABSOLUTE COUNT 0.00 K/ L    0.04-0.54    L            



             (BEAKER) (test code = 416)                                        

 

             BASOPHILS ABSOLUTE COUNT (BEAKER) 0.02 K/ L    0.01-0.08           

      



             (test code = 417)                                        

 

             IMMATURE GRANULOCYTES-RELATIVE 0.50 %       0.00-1.00              

   



             PERCENT (BEAKER) (test code =                                      

  



             2801)                                               



CT BRAIN WITHOUT IV [id number] 23:50:40
************************************************************[facility] : 1965 Sex: 
M************************************************************EXAM: CT BRAIN 
WITHOUT IV CONTRASTINDICATION: Cerebral hemorrhage suspectedNeuro deficit, 
acute, stroke suspectedTECHNIQUE: CT images from skull base to vertex without IV
contrast.This exam was performed according to the departmental dose 
optimizationprogram which includes automated exposure control, adjustment of the
mAand/or kV according to the patient size, and/or use of an 
iterativereconstruction technique.COMPARISON: None.FINDINGS: Parenchyma: There 
is acute hemorrhage centered within the left hemiponsextending to the left 
midbrain and left cerebral peduncle withmeasurements of 2.5 x 1.6 x 2.1 cm (4.2 
cc). Mild associated vasogenicedema and slight effacement of the adjacent fourth
ventricle whichremainspatent. No intraventricular extension. No hydrocephalus. 
Noevidence of acute large territory infarction. Chronic infarction of theright 
external capsule and chronic appearing lacunar infarction of theanterior limb of
left internal capsule.Extra-axial Collection: NoneVentricular System: As 
aboveOsseousStructures: No acute osseous abnormality. Included Orbits: 
NormalParanasal Sinuses: Predominantly clearTympanomastoid Cavities: 
NormalOther: NoneIMPRESSION:Acute hemorrhage (4.2 cc) centered in the left 
hemipons extending to theleft midbrain and left cerebral peduncle. There is mild
associated masseffect without hydrocephalus or intraventricular 
extension.Chronic bilateral basal ganglia infarctions.Discussed the on-call 
neurology resident at 11:49 PM 7/3/2023Electronically Signed By: Maciej Lozada2023 23:52 CDTWorkstation Name: [id number]

## 2023-07-29 LAB
ALBUMIN SERPL BCP-MCNC: 2.4 G/DL (ref 3.4–5)
ALP SERPL-CCNC: 169 U/L (ref 45–117)
ALT SERPL W P-5'-P-CCNC: 96 U/L (ref 16–61)
AST SERPL W P-5'-P-CCNC: 57 U/L (ref 15–37)
BUN BLD-MCNC: 42 MG/DL (ref 7–18)
GLUCOSE SERPLBLD-MCNC: 132 MG/DL (ref 74–106)
HCT VFR BLD CALC: 34.9 % (ref 39.6–49)
INR BLD: 1.19
LYMPHOCYTES # SPEC AUTO: 1.2 K/UL (ref 0.7–4.9)
MCV RBC: 79.3 FL (ref 80–100)
PMV BLD: 8.4 FL (ref 7.6–11.3)
POTASSIUM SERPL-SCNC: 3.8 MEQ/L (ref 3.5–5.1)
RBC # BLD: 4.41 M/UL (ref 4.33–5.43)
WBC # BLD AUTO: 16.4 THOU/UL (ref 4.3–10.9)

## 2023-07-29 RX ADMIN — IPRATROPIUM BROMIDE SCH MG: 0.5 SOLUTION RESPIRATORY (INHALATION) at 00:50

## 2023-07-29 RX ADMIN — SODIUM CHLORIDE SCH: 0.9 INJECTION, SOLUTION INTRAVENOUS at 14:40

## 2023-07-29 RX ADMIN — SODIUM CHLORIDE SCH MLS: 9 INJECTION, SOLUTION INTRAVENOUS at 10:52

## 2023-07-29 RX ADMIN — IPRATROPIUM BROMIDE SCH MG: 0.5 SOLUTION RESPIRATORY (INHALATION) at 20:15

## 2023-07-29 RX ADMIN — SODIUM CHLORIDE SCH MLS: 9 INJECTION, SOLUTION INTRAVENOUS at 18:40

## 2023-07-29 RX ADMIN — Medication SCH ML: at 09:00

## 2023-07-29 RX ADMIN — ALBUTEROL SULFATE SCH MG: 2.5 SOLUTION RESPIRATORY (INHALATION) at 08:00

## 2023-07-29 RX ADMIN — ALBUTEROL SULFATE SCH MG: 2.5 SOLUTION RESPIRATORY (INHALATION) at 00:50

## 2023-07-29 RX ADMIN — SODIUM CHLORIDE SCH MLS: 9 INJECTION, SOLUTION INTRAVENOUS at 01:17

## 2023-07-29 RX ADMIN — IPRATROPIUM BROMIDE SCH MG: 0.5 SOLUTION RESPIRATORY (INHALATION) at 14:40

## 2023-07-29 RX ADMIN — IPRATROPIUM BROMIDE SCH MG: 0.5 SOLUTION RESPIRATORY (INHALATION) at 08:00

## 2023-07-29 RX ADMIN — Medication SCH: at 21:00

## 2023-07-29 RX ADMIN — SODIUM CHLORIDE SCH MLS: 0.9 INJECTION, SOLUTION INTRAVENOUS at 10:51

## 2023-07-29 RX ADMIN — ALBUTEROL SULFATE SCH MG: 2.5 SOLUTION RESPIRATORY (INHALATION) at 20:15

## 2023-07-29 RX ADMIN — ALBUTEROL SULFATE SCH MG: 2.5 SOLUTION RESPIRATORY (INHALATION) at 14:40

## 2023-07-29 NOTE — RAD REPORT
EXAM DESCRIPTION:  RAD - Chest Single View - 7/29/2023 6:47 am

 

CLINICAL HISTORY:  pneumonia

Chest pain.

 

COMPARISON:  Abdomen 1 View (KUB) dated 7/27/2023; Chest Single View dated 7/26/2023; Chest Single Vi
ew dated 7/3/2023; Chest Single View dated 2/27/2022

 

FINDINGS:  Portable technique limits examination quality.

 

The lungs are underinflated resulting in vascular crowding. This appears unchanged since 07/26/2023 p
rior study. The heart is upper limit of normal in size. No displaced fractures.

 

IMPRESSION:  Stable chest since 07/26/2023 study.

## 2023-07-30 RX ADMIN — METOPROLOL TARTRATE SCH MG: 50 TABLET, FILM COATED ORAL at 06:19

## 2023-07-30 RX ADMIN — IPRATROPIUM BROMIDE SCH MG: 0.5 SOLUTION RESPIRATORY (INHALATION) at 07:35

## 2023-07-30 RX ADMIN — FOLIC ACID SCH MG: 1 TABLET ORAL at 08:32

## 2023-07-30 RX ADMIN — IPRATROPIUM BROMIDE SCH MG: 0.5 SOLUTION RESPIRATORY (INHALATION) at 02:10

## 2023-07-30 RX ADMIN — ALBUTEROL SULFATE SCH MG: 2.5 SOLUTION RESPIRATORY (INHALATION) at 02:10

## 2023-07-30 RX ADMIN — DULOXETINE HYDROCHLORIDE SCH MG: 30 CAPSULE, DELAYED RELEASE ORAL at 20:35

## 2023-07-30 RX ADMIN — SODIUM CHLORIDE SCH MLS: 9 INJECTION, SOLUTION INTRAVENOUS at 01:22

## 2023-07-30 RX ADMIN — SODIUM CHLORIDE SCH: 0.9 INJECTION, SOLUTION INTRAVENOUS at 04:00

## 2023-07-30 RX ADMIN — Medication SCH ML: at 20:36

## 2023-07-30 RX ADMIN — IPRATROPIUM BROMIDE SCH MG: 0.5 SOLUTION RESPIRATORY (INHALATION) at 20:10

## 2023-07-30 RX ADMIN — SODIUM CHLORIDE SCH MLS: 9 INJECTION, SOLUTION INTRAVENOUS at 08:33

## 2023-07-30 RX ADMIN — ALBUTEROL SULFATE SCH MG: 2.5 SOLUTION RESPIRATORY (INHALATION) at 20:10

## 2023-07-30 RX ADMIN — METOPROLOL TARTRATE SCH MG: 50 TABLET, FILM COATED ORAL at 17:15

## 2023-07-30 RX ADMIN — IPRATROPIUM BROMIDE SCH MG: 0.5 SOLUTION RESPIRATORY (INHALATION) at 13:30

## 2023-07-30 RX ADMIN — Medication SCH ML: at 08:33

## 2023-07-30 RX ADMIN — SODIUM CHLORIDE SCH MLS: 0.9 INJECTION, SOLUTION INTRAVENOUS at 16:32

## 2023-07-30 RX ADMIN — PANTOPRAZOLE SODIUM SCH MG: 40 GRANULE, DELAYED RELEASE ORAL at 20:35

## 2023-07-30 RX ADMIN — SODIUM CHLORIDE SCH MLS: 9 INJECTION, SOLUTION INTRAVENOUS at 16:32

## 2023-07-30 RX ADMIN — ALBUTEROL SULFATE SCH MG: 2.5 SOLUTION RESPIRATORY (INHALATION) at 07:35

## 2023-07-30 RX ADMIN — PANTOPRAZOLE SODIUM SCH MG: 40 GRANULE, DELAYED RELEASE ORAL at 08:32

## 2023-07-30 RX ADMIN — TERAZOSIN HYDROCHLORIDE ANHYDROUS SCH MG: 1 CAPSULE ORAL at 20:35

## 2023-07-30 RX ADMIN — ALBUTEROL SULFATE SCH MG: 2.5 SOLUTION RESPIRATORY (INHALATION) at 13:30

## 2023-07-30 NOTE — P.HP
Certification for Inpatient


Patient admitted to: Inpatient


With expected LOS: >2 Midnights


Patient will require the following post-hospital care: None


Practitioner: I am a practitioner with admitting privileges, knowledge of 

patient current condition, hospital course, and medical plan of care.


Services: Services provided to patient in accordance with Admission requirements

found in Title 42 Section 412.3 of the Code of Federal Regulations





Patient History


Date of Service: 07/28/23


Reason for admission: Leukocytosis and sepsis


History of Present Illness: 





Patient is a 57-year-old gentleman whose prolonged hospital course over the last

month started on July 4 after patient had a seizure and was brought to the 

emergency room.  Patient's work-up in the emergency room revealed a hemorrhagic 

infarct in the basal ganglia region.  Patient required intubation and PEG tube 

placement while patient was at [facility]. After patient was 

extubated patient did develop haemophilus influenza.  Patient was treated with 

IV antibiotics and patient was transferred to [facility] 

inpatient rehab few days ago-July 26.  Since patient has been in inpatient rehab

patient has been having some upper respiratory symptoms. Patient was found to 

have leukocytosis and it appeared he was septic. 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 

interacts with sign language. Patient is currently febrile and his white blood 

cell count has been elevated.  Patient was transferred to inpatient 

hospitalization for further evaluation.  Patient also has been having hematuria 

as patient had  hydronephrosis on prior imaging studies. Patient continues to 

have hematuria. We will further evaluate and monitor H&H and we may need to 

consult urology.  Patient will be admitted to inpatient services.


Allergies





No Known Allergies Allergy (Verified 07/26/23 15:46)


   





Home Medications: 








Albuterol Neb [Proventil 0.083% Neb Soln] 1 amp IN Q6H 07/26/23 


Duloxetine HCl [Cymbalta] 30 mg PO BEDTIME 07/26/23 


Folic Acid 1 mg PO DAILY 07/26/23 


Ipratropium Neb [Atrovent*] 1 amp IN Q6H 07/26/23 


Melatonin [Melatonin*] 3 mg PO BEDTIME PRN 07/26/23 


Multivit-Min/Ferrous Fumarate [Multivitamin Liquid] 5 ml PO DAILY 07/26/23 


Pantoprazole Granules [Protonix Granules*] 40 mg PO BID 07/26/23 


Terazosin HCl [Hytrin*] 2 mg PO BEDTIME 07/26/23 


modafiniL [Provigil*] 100 mg PO DAILY 07/26/23 








- Past Medical/Surgical History


Has patient received pneumonia vaccine in the past: No


Diabetic: No


-: Hypertension


-: deaf/mute


-: CVA- hemorrhagic


-: tonsillectomy


-: PEG tube


Psychosocial/ Personal History: Patient is Jehovah witness; would not want blood

 transfusion





- Family History


  ** Mother


Medical History: Cancer





- Social History


Smoking Status: Never smoker


Alcohol use: No


CD- Drugs: No


Caffeine use: No


Place of Residence: Home





Review of Systems


10-point ROS is otherwise unremarkable





Physical Examination





- Vital Signs


Temperature: 97.2 F


Blood Pressure: 167/104


Pulse: 91


Respirations: 18


Pulse Ox (%): 93





- Physical Exam


General: Alert, In no apparent distress, Cooperative, Mild distress


HEENT: Atraumatic, PERRLA, Mucous membr. moist/pink, EOMI, Sclerae nonicteric


Neck: Supple, 2+ carotid pulse no bruit, No LAD, Without JVD or thyroid 

abnormality


Respiratory: Clear to auscultation bilaterally, Normal air movement


Cardiovascular: Regular rate/rhythm, Normal S1 S2


Gastrointestinal: Normal bowel sounds, Soft and benign, Non-distended, No 

tenderness, Other (PEG tube)


Musculoskeletal: No tenderness


Integumentary: No rashes


Neurological: Cranial nerves 3-12 intact, Abnormal gait, Abnormal speech, 

Abnormal strength, Abnormal tone


Lymphatics: No axilla or inguinal lymphadenopathy


Urinary: Lopez catheter





Assessment & Plan





- Problems (Diagnosis)


(1) Sepsis


Current Visit: Yes   Status: Acute   





(2) Leukocytosis


Current Visit: Yes   Status: Acute   





(3) Hemorrhagic stroke


Current Visit: Yes   Status: Acute   





(4) Pneumonia


Current Visit: Yes   Status: Acute   





(5) PEG tube malfunction


Current Visit: Yes   Status: Acute   





(6) Pneumonia


Current Visit: Yes   Status: Acute   


Qualifiers: 


   Pneumonia type: due to Haemophilus influenzae 





(7) Gross hematuria


Current Visit: Yes   Status: Acute   





- Plan





Plan:


1. Continue with IV antibiotics


2. Awaiting culture


3. Repeat chest x-ray


4. Monitor H&H and hematuria; may need to do bladder irrigation


5. Monitor pneumonic process.


6. Continue with nebs as needed


7. O2 per protocol


8. Continue with gentle hydration


9. Repeat labs 


10. Neurochecks


11. PEG tube feeds


12. PT/OT/Speech therapy


13. Urology consult


14. GI and DVT prophylaxis





Discharge Plan: Home


Plan to discharge in: Greater than 2 days





- Advance Directives


Does patient have a Living Will: No


Does patient have a Durable POA for Healthcare: No





- Code Status/Comfort Care


Code Status Assessed: Yes


Code Status: Full Code


Critical Care: No


Time Spent Managing PTS Care (In Minutes): 45

## 2023-07-31 LAB
ALBUMIN SERPL BCP-MCNC: 2.2 G/DL (ref 3.4–5)
ALP SERPL-CCNC: 147 U/L (ref 45–117)
ALT SERPL W P-5'-P-CCNC: 118 U/L (ref 16–61)
AST SERPL W P-5'-P-CCNC: 39 U/L (ref 15–37)
BUN BLD-MCNC: 22 MG/DL (ref 7–18)
GLUCOSE SERPLBLD-MCNC: 111 MG/DL (ref 74–106)
HCT VFR BLD CALC: 33.4 % (ref 39.6–49)
INR BLD: 1.17
IRON SERPL-MCNC: 54 UG/DL (ref 65–175)
LYMPHOCYTES # SPEC AUTO: 1.1 K/UL (ref 0.7–4.9)
MAGNESIUM SERPL-MCNC: 1.6 MG/DL (ref 1.6–2.4)
MCV RBC: 78.5 FL (ref 80–100)
PMV BLD: 7.9 FL (ref 7.6–11.3)
POTASSIUM SERPL-SCNC: 3.9 MEQ/L (ref 3.5–5.1)
RBC # BLD: 4.26 M/UL (ref 4.33–5.43)
WBC # BLD AUTO: 10.2 THOU/UL (ref 4.3–10.9)

## 2023-07-31 RX ADMIN — IPRATROPIUM BROMIDE SCH MG: 0.5 SOLUTION RESPIRATORY (INHALATION) at 07:50

## 2023-07-31 RX ADMIN — Medication SCH ML: at 21:55

## 2023-07-31 RX ADMIN — ALBUTEROL SULFATE SCH MG: 2.5 SOLUTION RESPIRATORY (INHALATION) at 07:50

## 2023-07-31 RX ADMIN — SODIUM CHLORIDE SCH MLS: 9 INJECTION, SOLUTION INTRAVENOUS at 02:17

## 2023-07-31 RX ADMIN — SODIUM CHLORIDE SCH MLS: 0.9 INJECTION, SOLUTION INTRAVENOUS at 06:21

## 2023-07-31 RX ADMIN — METOPROLOL TARTRATE SCH MG: 50 TABLET, FILM COATED ORAL at 06:19

## 2023-07-31 RX ADMIN — FOLIC ACID SCH MG: 1 TABLET ORAL at 08:41

## 2023-07-31 RX ADMIN — SODIUM CHLORIDE SCH MLS: 9 INJECTION, SOLUTION INTRAVENOUS at 16:38

## 2023-07-31 RX ADMIN — IPRATROPIUM BROMIDE SCH MG: 0.5 SOLUTION RESPIRATORY (INHALATION) at 01:00

## 2023-07-31 RX ADMIN — IPRATROPIUM BROMIDE SCH MG: 0.5 SOLUTION RESPIRATORY (INHALATION) at 18:50

## 2023-07-31 RX ADMIN — SODIUM CHLORIDE SCH MLS: 9 INJECTION, SOLUTION INTRAVENOUS at 08:41

## 2023-07-31 RX ADMIN — IPRATROPIUM BROMIDE SCH MG: 0.5 SOLUTION RESPIRATORY (INHALATION) at 14:20

## 2023-07-31 RX ADMIN — TERAZOSIN HYDROCHLORIDE ANHYDROUS SCH MG: 1 CAPSULE ORAL at 21:55

## 2023-07-31 RX ADMIN — SODIUM CHLORIDE SCH: 0.9 INJECTION, SOLUTION INTRAVENOUS at 20:00

## 2023-07-31 RX ADMIN — PANTOPRAZOLE SODIUM SCH MG: 40 GRANULE, DELAYED RELEASE ORAL at 21:54

## 2023-07-31 RX ADMIN — ALBUTEROL SULFATE SCH MG: 2.5 SOLUTION RESPIRATORY (INHALATION) at 18:50

## 2023-07-31 RX ADMIN — Medication SCH: at 08:42

## 2023-07-31 RX ADMIN — PANTOPRAZOLE SODIUM SCH MG: 40 GRANULE, DELAYED RELEASE ORAL at 08:40

## 2023-07-31 RX ADMIN — SODIUM CHLORIDE SCH MLS: 0.9 INJECTION, SOLUTION INTRAVENOUS at 16:50

## 2023-07-31 RX ADMIN — ALBUTEROL SULFATE SCH MG: 2.5 SOLUTION RESPIRATORY (INHALATION) at 14:20

## 2023-07-31 RX ADMIN — DULOXETINE HYDROCHLORIDE SCH MG: 30 CAPSULE, DELAYED RELEASE ORAL at 21:55

## 2023-07-31 RX ADMIN — METOPROLOL TARTRATE SCH MG: 50 TABLET, FILM COATED ORAL at 16:38

## 2023-07-31 RX ADMIN — ALBUTEROL SULFATE SCH MG: 2.5 SOLUTION RESPIRATORY (INHALATION) at 01:00

## 2023-07-31 NOTE — P.CNS
Date of Consult: 07/31/23


Reason for Consult: gross hematuria


Chief Complaint: Leukocytosis and sepsis


History of Present Illness: 


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

with resultant right-sided weakness greater than left.  He describes having 

noted the development of gross hematuria only 2 days ago, though I am told it 

began several days ago and recurred within the last 2 days.  The patient has had

the urethral Lopez catheter placed since the CVA, but he acknowledges 

significant bothersome LUTS including frequency, urgency, and urge incontinence 

even prior to the CVA.  He does not have significant sensation beneath the 

bellybutton; so he is unable to describe any dysuria or suprapubic pain.





Past medical history as above





Past surgical history: Unknown penile surgery at age 8 and PEG tube placement


No known drug allergies


Social history: Never smoker





Examination:


Reasonably well-appearing and in no acute distress


Alert and awake


Receiving a nebulizer treatment at the time of the evaluation actively


No signs of dyspnea or respiratory distress


Abdomen soft, nontender, nondistended with PEG tube in place in left upper 

quadrant


Genitalia: Circumcised without lesion, orthotopic meatus patent with 18 Citizen of [place] 

urethral Lopez catheter in place and pink but translucent urine.  Evidence of 

nursing having irrigated the catheter was present in the room.


Scrotum without crepitus or erythema and at least the left testis was descended.





7/28/2023 WBC 19.4, hemoglobin 11.2, platelets 342


7/31/2023 WBC 10.2, creatinine 0.73, LFTs all elevated, UA micro 3+ heme, 1+ 

leukocyte Estrace, 20-50 WBCs, > 50 RBCs per hpf





I was given a report of a CT scan that had been performed on this patient, but 

that imaging is not available in this medical record for my review.  Patient was

transferred from a facility in [place], but he does not recall the name.





Assessment and recommendation:


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 

with right hemiparesis greater than left now with gross hematuria and an 

indwelling 18 Citizen of [place] urethral Lopez catheter.


-Follow-up urine culture, as infection likely source of the gross hematuria


-Recommend exchange urethral Lopez catheter and replace with an 18 Citizen of [place] coud 

tip catheter that is placed to the hub to ensure adequate catheter positioning 

with balloon not situated within the prostatic fossa causing hematuria


-If a report of a CT scan or imaging is available, I would appreciate the 

opportunity to review it, otherwise, in the absence of definitive infection 

seen, CT urography would be recommended.


-Follow-up as an outpatient within the next 3 weeks for cystoscopy


-May irrigate catheter, once exchanged, every 4 hours with 60 cc catheter tip 

syringe and NS, as long as his urine remains anything more than pink-tinged


Allergies





No Known Allergies Allergy (Verified 07/26/23 15:46)


   





Home medications list reviewed: Yes


Home Medications: 








Albuterol Neb [Proventil 0.083% Neb Soln] 1 amp IN Q6H 07/26/23 


Duloxetine HCl [Cymbalta] 30 mg PO BEDTIME 07/26/23 


Folic Acid 1 mg PO DAILY 07/26/23 


Ipratropium Neb [Atrovent*] 1 amp IN Q6H 07/26/23 


Melatonin [Melatonin*] 3 mg PO BEDTIME PRN 07/26/23 


Multivit-Min/Ferrous Fumarate [Multivitamin Liquid] 5 ml PO DAILY 07/26/23 


Pantoprazole Granules [Protonix Granules*] 40 mg PO BID 07/26/23 


Terazosin HCl [Hytrin*] 2 mg PO BEDTIME 07/26/23 


modafiniL [Provigil*] 100 mg PO DAILY 07/26/23 








- Past Medical/Surgical History


Diabetic: No


-: htn


-: deaf/mute


-: CVA- hemorrhagic


-: tonsillectomy





- Family History


  ** Mother


Medical History: Cancer





- Social History


Smoking Status: Unknown if ever smoked


Alcohol use: No


CD- Drugs: No


Caffeine use: No


Place of Residence: Home





Physical Examination














Temp Pulse Resp BP Pulse Ox


 


 97.6 F   78   16   154/98 H  95 


 


 07/31/23 16:00  07/31/23 16:38  07/31/23 16:00  07/31/23 16:38  07/31/23 16:00








Laboratory Data (last 24 hrs)











  07/31/23 07/31/23 07/31/23





  12:53 12:53 12:53


 


WBC    10.20


 


Hgb    11.0 L


 


Hct    33.4 L


 


Plt Count    347


 


PT   12.9 H 


 


INR   1.17 


 


APTT   30.0 


 


Sodium  139  


 


Potassium  3.9  


 


BUN  22 H  


 


Creatinine  0.73  


 


Glucose  111 H  


 


Magnesium  1.6  


 


Total Bilirubin  0.2  


 


AST  39 H  


 


ALT  118 H  


 


Alkaline Phosphatase  147 H  











- Problems


(1) Voiding dysfunction


Current Visit: Yes   Status: Acute   





(2) Gross hematuria


Current Visit: Yes   Status: Acute   





(3) Hemorrhagic stroke


Current Visit: Yes   Status: Acute   


Critical Care: No


Time Spent Managing Pts care (In Minutes): 40 ( #[id number] 

for 18 minutes)

## 2023-08-01 LAB
ALBUMIN SERPL BCP-MCNC: 2.6 G/DL (ref 3.4–5)
ALP SERPL-CCNC: 144 U/L (ref 45–117)
ALT SERPL W P-5'-P-CCNC: 112 U/L (ref 16–61)
AST SERPL W P-5'-P-CCNC: 32 U/L (ref 15–37)
BUN BLD-MCNC: 22 MG/DL (ref 7–18)
GLUCOSE SERPLBLD-MCNC: 125 MG/DL (ref 74–106)
HCT VFR BLD CALC: 34.7 % (ref 39.6–49)
POTASSIUM SERPL-SCNC: 3.7 MEQ/L (ref 3.5–5.1)

## 2023-08-01 RX ADMIN — Medication SCH: at 09:00

## 2023-08-01 RX ADMIN — ALBUTEROL SULFATE SCH MG: 2.5 SOLUTION RESPIRATORY (INHALATION) at 01:00

## 2023-08-01 RX ADMIN — METOPROLOL TARTRATE SCH MG: 50 TABLET, FILM COATED ORAL at 06:23

## 2023-08-01 RX ADMIN — LOSARTAN POTASSIUM SCH MG: 50 TABLET, FILM COATED ORAL at 09:54

## 2023-08-01 RX ADMIN — IPRATROPIUM BROMIDE SCH MG: 0.5 SOLUTION RESPIRATORY (INHALATION) at 20:15

## 2023-08-01 RX ADMIN — TERAZOSIN HYDROCHLORIDE ANHYDROUS SCH MG: 1 CAPSULE ORAL at 21:55

## 2023-08-01 RX ADMIN — SODIUM CHLORIDE SCH MLS: 0.9 INJECTION, SOLUTION INTRAVENOUS at 09:52

## 2023-08-01 RX ADMIN — IPRATROPIUM BROMIDE SCH MG: 0.5 SOLUTION RESPIRATORY (INHALATION) at 01:00

## 2023-08-01 RX ADMIN — IPRATROPIUM BROMIDE SCH MG: 0.5 SOLUTION RESPIRATORY (INHALATION) at 13:25

## 2023-08-01 RX ADMIN — Medication SCH ML: at 01:03

## 2023-08-01 RX ADMIN — LOSARTAN POTASSIUM SCH MG: 50 TABLET, FILM COATED ORAL at 21:56

## 2023-08-01 RX ADMIN — ALBUTEROL SULFATE SCH MG: 2.5 SOLUTION RESPIRATORY (INHALATION) at 20:15

## 2023-08-01 RX ADMIN — DULOXETINE HYDROCHLORIDE SCH MG: 30 CAPSULE, DELAYED RELEASE ORAL at 21:55

## 2023-08-01 RX ADMIN — METOPROLOL TARTRATE SCH MG: 50 TABLET, FILM COATED ORAL at 18:51

## 2023-08-01 RX ADMIN — Medication SCH ML: at 21:56

## 2023-08-01 RX ADMIN — ALBUTEROL SULFATE SCH MG: 2.5 SOLUTION RESPIRATORY (INHALATION) at 13:25

## 2023-08-01 RX ADMIN — ALBUTEROL SULFATE SCH MG: 2.5 SOLUTION RESPIRATORY (INHALATION) at 07:20

## 2023-08-01 RX ADMIN — AMLODIPINE BESYLATE SCH MG: 10 TABLET ORAL at 09:53

## 2023-08-01 RX ADMIN — SODIUM CHLORIDE SCH MLS: 9 INJECTION, SOLUTION INTRAVENOUS at 09:53

## 2023-08-01 RX ADMIN — SODIUM CHLORIDE SCH MLS: 9 INJECTION, SOLUTION INTRAVENOUS at 01:03

## 2023-08-01 RX ADMIN — PANTOPRAZOLE SODIUM SCH MG: 40 GRANULE, DELAYED RELEASE ORAL at 21:55

## 2023-08-01 RX ADMIN — SODIUM CHLORIDE SCH MLS: 9 INJECTION, SOLUTION INTRAVENOUS at 18:50

## 2023-08-01 RX ADMIN — FOLIC ACID SCH MG: 1 TABLET ORAL at 09:54

## 2023-08-01 RX ADMIN — Medication SCH ML: at 09:54

## 2023-08-01 RX ADMIN — PANTOPRAZOLE SODIUM SCH MG: 40 GRANULE, DELAYED RELEASE ORAL at 09:53

## 2023-08-01 RX ADMIN — IPRATROPIUM BROMIDE SCH MG: 0.5 SOLUTION RESPIRATORY (INHALATION) at 07:20

## 2023-08-01 NOTE — RAD REPORT
EXAM DESCRIPTION:  US - Renal Ultrasound-Complete - 7/31/2023 11:57 pm

 

CLINICAL HISTORY:  hematuria

Flank pain

 

COMPARISON:  No comparisons

 

FINDINGS:  Both kidneys are normal in size, shape and echotexture.

 

The right kidney measures 9.5 x 4.6 x 4.6 cm. No hydronephrosis, focal mass or perinephric fluid.

 

The left kidney measures 10.8 x 5.4 x 5.0 cm.. No hydronephrosis, focal mass or perinephric fluid. 11
 mm cyst noted parapelvic location.

 

Urinary bladder appears filled with debris.

 

IMPRESSION:  No worrisome renal abnormality.

 

Significant debris is present in the urinary bladder.

## 2023-08-01 NOTE — P.PN
Subjective


Date of Service: 07/29/23


Subjective: No new changes, No C/O voiced, Improving





Patient continues to have some hematuria.  Overall clinical symptoms are 

improving.





Review of Systems


10-point ROS is otherwise unremarkable





Physical Examination





- Vital Signs


Temperature: 97.2 F


Blood Pressure: 167/104


Pulse: 91


Respirations: 18


Pulse Ox (%): 93





- Physical Exam


General: Alert, In no apparent distress


HEENT: Atraumatic, PERRLA, EOMI


Neck: Supple, JVD not distended


Respiratory: Clear to auscultation bilaterally, Normal air movement


Cardiovascular: Regular rate/rhythm, Normal S1 S2, No murmurs


Gastrointestinal: Normal bowel sounds, Soft and benign, Non-distended, No 

tenderness, Other (PEG tube)


Musculoskeletal: No clubbing, No swelling, No tenderness


Integumentary: No rashes


Neurological: Sensation intact, Cranial nerves 3-12 intact





- Studies


Laboratory Data (last 24 hrs)











  07/31/23 07/31/23 07/31/23





  12:53 12:53 12:53


 


WBC    10.20


 


Hgb    11.0 L


 


Hct    33.4 L


 


Plt Count    347


 


PT   12.9 H 


 


INR   1.17 


 


APTT   30.0 


 


Sodium  139  


 


Potassium  3.9  


 


BUN  22 H  


 


Creatinine  0.73  


 


Glucose  111 H  


 


Magnesium  1.6  


 


Total Bilirubin  0.2  


 


AST  39 H  


 


ALT  118 H  


 


Alkaline Phosphatase  147 H  








Medications List Reviewed: Yes





Assessment & Plan





- Problems (Diagnosis)


(1) Sepsis


Current Visit: Yes   Status: Acute   





(2) Leukocytosis


Current Visit: Yes   Status: Acute   





(3) Hemorrhagic stroke


Current Visit: Yes   Status: Acute   





(4) PEG tube malfunction


Current Visit: Yes   Status: Acute   





(5) Pneumonia


Current Visit: Yes   Status: Acute   


Qualifiers: 


   Pneumonia type: due to Haemophilus influenzae 





(6) Gross hematuria


Current Visit: Yes   Status: Acute   





- Plan





Plan:


Continue with plan of care as mentioned below:


1. Continue with IV antibiotics


2. Awaiting culture


3. Repeat chest x-ray


4. Monitor H&H and hematuria; may need to do bladder irrigation


5. Monitor pneumonic process.


6. Continue with nebs as needed


7. O2 per protocol


8. Continue with gentle hydration


9. Repeat labs 


10. Neurochecks


11. PEG tube feeds


12. PT/OT/Speech therapy


13. Urology consult will be obtained if continues to have hematuria


14. GI and DVT prophylaxis








- Advance Directives


Does patient have a Living Will: No


Does patient have a Durable POA for Healthcare: No





- Code Status/Comfort Care


Code Status: Full Code

## 2023-08-01 NOTE — P.PN
Date of Service: 07/31/23





Subjective


Patient continues to have gross hematuria.  However, patient is tolerating tube 

feeds.  Interacts with sign language on iPad.  We will go ahead and try to get 

patient back to inpatient rehab as medically he is stable.  Appreciate urology 

input and further evaluation.  Continue with physical therapy/speech 

therapy/Occupational Therapy.Patient should be able to go back to inpatient 

rehab as long as her I can do any aggressive surgical intervention with urology.

 Hemoglobin is stable.





Physical Examination





- Vital Signs


reviewed





- Physical Exam


General: Alert, In no apparent distress


Respiratory: Clear to auscultation bilaterally, Normal air movement


Cardiovascular: Regular rate/rhythm, Normal S1 S2, No murmurs


Gastrointestinal: Normal bowel sounds, Soft and benign, Non-distended, No 

tenderness, Other (PEG tube)


Musculoskeletal: No clubbing, No swelling, No tenderness


Neurological: right sided weakness; 1/5-face upper extremity and lower 

extremity; left is 5/5





Assessment & Plan





- Problems (Diagnosis)


(1) Sepsis


Current Visit: Yes   Status: Acute   





(2) Leukocytosis


Current Visit: Yes   Status: Acute   





(3) Hemorrhagic stroke


Current Visit: Yes   Status: Acute   





(4) PEG tube malfunction


Current Visit: Yes   Status: Acute   





(5) Pneumonia


Current Visit: Yes   Status: Acute   


Qualifiers: 


   Pneumonia type: due to Haemophilus influenzae 





(6) Gross hematuria


Current Visit: Yes   Status: Acute   





- Plan





Plan:


Continue with plan of care as mentioned below:


1. Continue with IV antibiotics


2. Cultures been negative


3. Chest x-ray will be repeated as needed


4. Monitor H&H; Hgb is stable-pt is a Jehovah Witness so unlikely to accept 

blood transfusion if needed


5. Monitor pneumonic process. Continue Zosyn for aspiration


6. Continue with nebs as needed


7. O2 per protocol


8. Heplock IV


9. Repeat labs 


10. Neurochecks


11. PEG tube feeds at goal


12. PT/OT/Speech therapy appreciated


13. Urology consult for hematuria; appreciate their recommendations


14. GI and DVT prophylaxis








- Advance Directives


Does patient have a Living Will: No


Does patient have a Durable POA for Healthcare: No





- Code Status/Comfort Care


Code Status: Full Code

## 2023-08-01 NOTE — P.PN
Subjective


Date of Service: 23


Chief Complaint: Leukocytosis and sepsis


Mr. Robert Tietz has complete hearing loss (deafness). The following history was

obtained with the assistance of [employer] certified American Sign Language-

English , Mr. Deon IZAGUIRRE, (ID# [id number]).





No acute events overnight. He reports generalized weakness. He feels that his 

symptoms are gradually improving; however, he continues to have gross hematuria.

Urology has been consultedrecommendations appreciated. He denies any chest 

pain, palpitations, or shortness of breath.





Review of Systems


10-point ROS is otherwise unremarkable


General: Weakness (generalized)





Physical Examination





- Vital Signs


Temperature: 97.8 F


Blood Pressure: 160/99


Pulse: 69


Respirations: 18


Pulse Ox (%): 98





- Physical Exam


General: Alert, In no apparent distress, Oriented x3


HEENT: Atraumatic, Mucous membr. moist/pink, Sclerae nonicteric


Neck: JVD not distended


Respiratory: Clear to auscultation bilaterally, Normal air movement


Cardiovascular: No edema, Regular rate/rhythm, Normal S1 S2, No gallops, No 

rubs, No murmurs


Gastrointestinal: Normal bowel sounds, Soft and benign, Non-distended, No 

tenderness, No rebound, No guarding, Other (PEG-tube site is clean, dry, intact)


Musculoskeletal: No clubbing


Integumentary: No rashes


Neurological: Sensation intact, Other (left-sided facial droop), Abnormal 

strength (right-sided weakness (2-3/5 in RUE/RLE. 5/5 in LUE/LLE))





- Studies


Laboratory Data (last 24 hrs)











  23





  07:53 12:53 12:53


 


WBC   


 


Hgb   


 


Hct   


 


Plt Count   


 


PT    12.9 H


 


INR    1.17


 


APTT    30.0


 


Sodium  138  139 


 


Potassium  3.7  3.9 


 


BUN  22 H  22 H 


 


Creatinine  0.95  0.73 


 


Glucose  125 H  111 H 


 


Magnesium   1.6 


 


Total Bilirubin  0.2  0.2 


 


AST  32  39 H 


 


ALT  112 H  118 H 


 


Alkaline Phosphatase  144 H  147 H 














  23





  12:53


 


WBC  10.20


 


Hgb  11.0 L


 


Hct  33.4 L


 


Plt Count  347


 


PT 


 


INR 


 


APTT 


 


Sodium 


 


Potassium 


 


BUN 


 


Creatinine 


 


Glucose 


 


Magnesium 


 


Total Bilirubin 


 


AST 


 


ALT 


 


Alkaline Phosphatase 








Medications List Reviewed: Yes





Assessment And Plan





- Plan


NIH Stroke Scale


1a. Level of consciousness: 0 - Alert; keenly responsive  


1b. LOC questions: 0 - Both questions right


1c. LOC commands: 0 - Performs both tasks


2. Best Gaze: 0 - Normal


3. Visual: 0 - No visual loss


4. Facial Palsy: 3 - unilateral complete paralysis (upper/lower face)


5a. Motor left arm: 0 - No drift for 10 seconds


5b. Motor right arm: 1 - Drift, but doesn't hit bed


6a. Motor left le - No drift for 5 seconds


6b. Motor right le - Drift, hits bed


7. Limb ataxia: 0 - No ataxia


8. Sensory: 0 - Normal; no sensory loss


9. Best Language: 0 - Normal; no aphasia


10. Dysarthria: 0 - Unable to test


11. Extinction and Inattention: 0 - No abnormality 


12. Distal motor function: 0 - No abnormality





Total Score: 6











# Sepsis likely secondary to possible Pneumonia vs Urinary Tract Infection


He met SIRS criteria based on HR > 90 bpm, RR > 20 breaths/min, and WBC > 

12,000, and the suspected source is pneumonia vs a urinary tract infection. 


- Chest x-ray = "stable chest since 2023 study."


- Sepsis order set was initiated 


   - Lactate requested 


   - Blood cultures drawn 


   - Broad spectrum antibiotics started: Piperacillin-Tazobactam


   - In regards to fluids: 


      - 30 mL/kg of IV fluids was not administered given SBP > 90, MAP > 65





# Recent Hemorrhagic Cerebrovascular Accident


# Hypertension


Was sent down from inpatient rehab due to concern for sepsis.


- Neurology consulted - recommendations appreciated


- NIHSS = 6


- q4hr neurochecks


- PT/OT evaluation requested 


- Continue home amlodipine, losartan, metoprolol





# Gross Hematuria


# Left Parapelvic Cyst (11 mm)


- Renal ultrasound = "no worrisome renal abnormality. Significant debris is 

present in the urinary bladder."


- Urology consulted and he was evaluated by Dr. Cárdenas - recommendations 

appreciated


   - Suspects infection to be source of hematuria


   - Recommended Lopez catheter exchange 


   - Outpatient follow-up in 3 weeks for cystoscopy


   - q4hr catheter irrigation 


- He is a Jehovah witness, so he is unlikely to accept a blood transfusion











Manuel Sparks M.D.

## 2023-08-01 NOTE — P.PN
Date of Service: 07/30/23





Subjective


Patient is doing well and patient denies any new complaints.  Clinical symptoms 

are improving.





Physical Examination





- Vital Signs


reviewed





- Physical Exam


General: Alert, In no apparent distress


Respiratory: Clear to auscultation bilaterally, Normal air movement


Cardiovascular: Regular rate/rhythm, Normal S1 S2, No murmurs


Gastrointestinal: Normal bowel sounds, Soft and benign, Non-distended, No 

tenderness, Other (PEG tube)


Musculoskeletal: No clubbing, No swelling, No tenderness


Neurological: right sided weakness; 1/5-face upper extremity and lower 

extremity; left is 5/5





Assessment & Plan





- Problems (Diagnosis)


(1) Sepsis


Current Visit: Yes   Status: Acute   





(2) Leukocytosis


Current Visit: Yes   Status: Acute   





(3) Hemorrhagic stroke


Current Visit: Yes   Status: Acute   





(4) PEG tube malfunction


Current Visit: Yes   Status: Acute   





(5) Pneumonia


Current Visit: Yes   Status: Acute   


Qualifiers: 


   Pneumonia type: due to Haemophilus influenzae 





(6) Gross hematuria


Current Visit: Yes   Status: Acute   





- Plan





Plan:


Continue with plan of care as mentioned below:


1. Continue with IV antibiotics


2. Cultures been negative


3. Chest x-ray will be repeated as needed


4. Monitor H&H 


5. Monitor pneumonic process.


6. Continue with nebs as needed


7. O2 per protocol


8. Heplock IV


9. Repeat labs 


10. Neurochecks


11. PEG tube feeds


12. PT/OT/Speech therapy appreciated


13. Urology consult in AM as patient with continued with hematuria


14. GI and DVT prophylaxis








- Advance Directives


Does patient have a Living Will: No


Does patient have a Durable POA for Healthcare: No





- Code Status/Comfort Care


Code Status: Full Code

## 2023-08-02 LAB
ALBUMIN SERPL BCP-MCNC: 2.4 G/DL (ref 3.4–5)
ALP SERPL-CCNC: 128 U/L (ref 45–117)
ALT SERPL W P-5'-P-CCNC: 97 U/L (ref 16–61)
AST SERPL W P-5'-P-CCNC: 29 U/L (ref 15–37)
BUN BLD-MCNC: 24 MG/DL (ref 7–18)
GLUCOSE SERPLBLD-MCNC: 145 MG/DL (ref 74–106)
HCT VFR BLD CALC: 32.4 % (ref 39.6–49)
POTASSIUM SERPL-SCNC: 3.5 MEQ/L (ref 3.5–5.1)

## 2023-08-02 RX ADMIN — ALBUTEROL SULFATE SCH MG: 2.5 SOLUTION RESPIRATORY (INHALATION) at 02:00

## 2023-08-02 RX ADMIN — METOPROLOL TARTRATE SCH MG: 50 TABLET, FILM COATED ORAL at 05:53

## 2023-08-02 RX ADMIN — DULOXETINE HYDROCHLORIDE SCH MG: 30 CAPSULE, DELAYED RELEASE ORAL at 21:28

## 2023-08-02 RX ADMIN — METOPROLOL TARTRATE SCH MG: 50 TABLET, FILM COATED ORAL at 17:49

## 2023-08-02 RX ADMIN — SODIUM CHLORIDE SCH MLS: 9 INJECTION, SOLUTION INTRAVENOUS at 08:36

## 2023-08-02 RX ADMIN — IPRATROPIUM BROMIDE SCH MG: 0.5 SOLUTION RESPIRATORY (INHALATION) at 14:27

## 2023-08-02 RX ADMIN — ALBUTEROL SULFATE SCH MG: 2.5 SOLUTION RESPIRATORY (INHALATION) at 19:35

## 2023-08-02 RX ADMIN — PANTOPRAZOLE SODIUM SCH MG: 40 GRANULE, DELAYED RELEASE ORAL at 08:35

## 2023-08-02 RX ADMIN — Medication SCH ML: at 08:41

## 2023-08-02 RX ADMIN — IPRATROPIUM BROMIDE SCH MG: 0.5 SOLUTION RESPIRATORY (INHALATION) at 08:07

## 2023-08-02 RX ADMIN — AMLODIPINE BESYLATE SCH MG: 10 TABLET ORAL at 08:34

## 2023-08-02 RX ADMIN — LOSARTAN POTASSIUM SCH MG: 50 TABLET, FILM COATED ORAL at 21:28

## 2023-08-02 RX ADMIN — ALBUTEROL SULFATE SCH MG: 2.5 SOLUTION RESPIRATORY (INHALATION) at 08:07

## 2023-08-02 RX ADMIN — TERAZOSIN HYDROCHLORIDE ANHYDROUS SCH MG: 1 CAPSULE ORAL at 21:28

## 2023-08-02 RX ADMIN — Medication SCH ML: at 21:00

## 2023-08-02 RX ADMIN — ALBUTEROL SULFATE SCH MG: 2.5 SOLUTION RESPIRATORY (INHALATION) at 14:27

## 2023-08-02 RX ADMIN — FOLIC ACID SCH MG: 1 TABLET ORAL at 08:35

## 2023-08-02 RX ADMIN — Medication SCH ML: at 08:35

## 2023-08-02 RX ADMIN — SODIUM CHLORIDE SCH MLS: 9 INJECTION, SOLUTION INTRAVENOUS at 00:33

## 2023-08-02 RX ADMIN — IPRATROPIUM BROMIDE SCH MG: 0.5 SOLUTION RESPIRATORY (INHALATION) at 19:35

## 2023-08-02 RX ADMIN — LOSARTAN POTASSIUM SCH MG: 50 TABLET, FILM COATED ORAL at 08:35

## 2023-08-02 RX ADMIN — PANTOPRAZOLE SODIUM SCH MG: 40 GRANULE, DELAYED RELEASE ORAL at 21:28

## 2023-08-02 RX ADMIN — SODIUM CHLORIDE SCH MLS: 9 INJECTION, SOLUTION INTRAVENOUS at 16:56

## 2023-08-02 RX ADMIN — IPRATROPIUM BROMIDE SCH MG: 0.5 SOLUTION RESPIRATORY (INHALATION) at 02:00

## 2023-08-02 NOTE — P.PN
Subjective


Date of Service: 23


Chief Complaint: Leukocytosis and sepsis


Mr. Robert Tietz has complete hearing loss (deafness). The following history was

obtained with the assistance of [employer] certified American Sign Language-

English , Ms. Fabienne ARIASKishor, (ID# [id number]).





No new changes. He denies any new symptoms this morning. He is pending placement

into inpatient rehab. 





Review of Systems


10-point ROS is otherwise unremarkable


General: Weakness (right-sided)





Physical Examination





- Vital Signs


Temperature: 98.6 F


Blood Pressure: 143/81


Pulse: 95


Respirations: 19


Pulse Ox (%): 96





- Studies


Laboratory Data (last 24 hrs)











  23





  03:48 03:48


 


Hgb   10.9 L


 


Hct   32.4 L


 


Sodium  139 


 


Potassium  3.5 


 


BUN  24 H 


 


Creatinine  0.87 


 


Glucose  145 H 


 


Total Bilirubin  0.2 


 


AST  29 


 


ALT  97 H 


 


Alkaline Phosphatase  128 H 








Medications List Reviewed: Yes





Assessment And Plan





- Plan


- Physical Exam


General: Alert, In no apparent distress, Oriented x3


HEENT: Atraumatic, Mucous membr. moist/pink, Sclerae nonicteric


Neck: JVD not distended


Respiratory: Clear to auscultation bilaterally, Normal air movement


Cardiovascular: No edema, Regular rate/rhythm, No murmurs


Gastrointestinal: Normal bowel sounds, Soft, Non-distended, No tenderness, Other

(PEG-tube site is clean, dry, intact)


Musculoskeletal: No clubbing


Integumentary: No rashes


Neurological: Sensation intact, Other (left-sided facial droop), Abnormal 

strength (right-sided weakness (2-3/5 in RUE/RLE. 5/5 in LUE/LLE))








NIH Stroke Scale


1a. Level of consciousness: 0 - Alert; keenly responsive  


1b. LOC questions: 0 - Both questions right


1c. LOC commands: 0 - Performs both tasks


2. Best Gaze: 0 - Normal


3. Visual: 0 - No visual loss


4. Facial Palsy: 3 - unilateral complete paralysis (upper/lower face)


5a. Motor left arm: 0 - No drift for 10 seconds


5b. Motor right arm: 1 - Drift, but doesn't hit bed


6a. Motor left le - No drift for 5 seconds


6b. Motor right le - Drift, hits bed


7. Limb ataxia: 0 - No ataxia


8. Sensory: 0 - Normal; no sensory loss


9. Best Language: 0 - Normal; no aphasia


10. Dysarthria: 0 - Unable to test


11. Extinction and Inattention: 0 - No abnormality 


12. Distal motor function: 0 - No abnormality





Total Score: 6











# Sepsis likely secondary to possible Pneumonia vs Urinary Tract Infection


He met SIRS criteria based on HR > 90 bpm, RR > 20 breaths/min, and WBC > 

12,000, and the suspected source is pneumonia vs a urinary tract infection. 


- Chest x-ray = "stable chest since 2023 study."


- Sepsis order set was initiated 


   - Lactate = 0.5


   - Blood cultures drawn 


   - Broad spectrum antibiotics started: Piperacillin-Tazobactam


   - In regards to fluids: 


      - 30 mL/kg of IV fluids was not administered given SBP > 90, MAP > 65





# Recent Hemorrhagic Cerebrovascular Accident


# Hypertension


Was sent down from inpatient rehab due to concern for sepsis.


- Neurology consulted - recommendations appreciated


- NIHSS = 6


- q4hr neurochecks


- PT/OT evaluation requested 


- Continue home amlodipine, losartan, metoprolol





# Gross Hematuria


# Left Parapelvic Cyst (11 mm)


- Renal ultrasound = "no worrisome renal abnormality. Significant debris is 

present in the urinary bladder."


- Urology consulted and he was evaluated by Dr. Cárdenas - recommendations 

appreciated


   - Suspects infection to be source of hematuria


   - Recommended Lopez catheter exchange 


   - Outpatient follow-up in 3 weeks for cystoscopy


   - q4hr catheter irrigation 


- He is a Jehovah witness, so he is unlikely to accept a blood transfusion





: Pending placement into inpatient rehab. CM assistance appreciated.





Manuel Sparks M.D.

## 2023-08-02 NOTE — P.CNS
Date of Consult: 08/02/23


Consultation follow-up:





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 

with right hemiparesis greater than left now with gross hematuria and an 

indwelling 18 [place] citizen urethral Lopez catheter.


-Follow-up urine culture, as infection likely source of the gross hematuria


-Recommend exchange urethral Lopez catheter and replace with an 18 [place] citizen coud 

tip catheter that is placed to the hub to ensure adequate catheter positioning 

with balloon not situated within the prostatic fossa causing hematuria


-If a report of a CT scan or imaging is available, I would appreciate the 

opportunity to review it, otherwise, in the absence of definitive infection 

seen, CT urography would be recommended.


-Follow-up as an outpatient within the next 3 weeks for cystoscopy


-May irrigate catheter, once exchanged, every 4 hours with 60 cc catheter tip 

syringe and NS, as long as his urine remains anything more than pink-tinged





Patient seen and examined.  Sleeping at the time I arrived.  He indicated with 

the traditional hand signal that he was "okay".





Examination:


Urethral Lopez catheter in place draining port wine colored old hematuria





Hemoglobin 10.9 today from 11.2 on admission





Bladder irrigation procedure note:


Utilizing a 60 cc catheter tip syringe and initially sterile water 500 cc 

followed by normal saline 500 cc, I aggressively irrigated his bladder and 

removed likely 150 to 200 cc of formed clot.  At the end of the irrigation, no 

significant clot was retrieved, and the efflux of urine was minimally pink.  He 

tolerated the procedure well, in fact mostly slept through the procedure, and 

without complication.





Recommendations as above minus the need for catheter exchange.

## 2023-08-03 ENCOUNTER — HOSPITAL ENCOUNTER (INPATIENT)
Dept: HOSPITAL 97 - 5TH | Age: 58
LOS: 21 days | Discharge: NURSING, REHAB OR LONG-TERM CARE | DRG: 56 | End: 2023-08-24
Attending: PSYCHIATRY & NEUROLOGY | Admitting: PSYCHIATRY & NEUROLOGY
Payer: COMMERCIAL

## 2023-08-03 VITALS — BODY MASS INDEX: 23.6 KG/M2

## 2023-08-03 VITALS — DIASTOLIC BLOOD PRESSURE: 70 MMHG | SYSTOLIC BLOOD PRESSURE: 118 MMHG | TEMPERATURE: 98.4 F

## 2023-08-03 VITALS — OXYGEN SATURATION: 93 %

## 2023-08-03 DIAGNOSIS — I69.391: ICD-10-CM

## 2023-08-03 DIAGNOSIS — R31.9: ICD-10-CM

## 2023-08-03 DIAGNOSIS — I69.351: Primary | ICD-10-CM

## 2023-08-03 DIAGNOSIS — R42: ICD-10-CM

## 2023-08-03 DIAGNOSIS — I69.392: ICD-10-CM

## 2023-08-03 DIAGNOSIS — I10: ICD-10-CM

## 2023-08-03 DIAGNOSIS — A41.9: ICD-10-CM

## 2023-08-03 DIAGNOSIS — N40.1: ICD-10-CM

## 2023-08-03 DIAGNOSIS — R33.8: ICD-10-CM

## 2023-08-03 DIAGNOSIS — R13.10: ICD-10-CM

## 2023-08-03 DIAGNOSIS — I69.322: ICD-10-CM

## 2023-08-03 DIAGNOSIS — K21.9: ICD-10-CM

## 2023-08-03 DIAGNOSIS — J45.909: ICD-10-CM

## 2023-08-03 DIAGNOSIS — E86.0: ICD-10-CM

## 2023-08-03 DIAGNOSIS — R06.03: ICD-10-CM

## 2023-08-03 DIAGNOSIS — H91.3: ICD-10-CM

## 2023-08-03 DIAGNOSIS — J69.0: ICD-10-CM

## 2023-08-03 DIAGNOSIS — Z93.1: ICD-10-CM

## 2023-08-03 DIAGNOSIS — F17.200: ICD-10-CM

## 2023-08-03 DIAGNOSIS — R00.0: ICD-10-CM

## 2023-08-03 PROCEDURE — 84145 PROCALCITONIN (PCT): CPT

## 2023-08-03 PROCEDURE — 92508 TX SP LANG VOICE COMM GROUP: CPT

## 2023-08-03 PROCEDURE — 92611 MOTION FLUOROSCOPY/SWALLOW: CPT

## 2023-08-03 PROCEDURE — 97112 NEUROMUSCULAR REEDUCATION: CPT

## 2023-08-03 PROCEDURE — 97116 GAIT TRAINING THERAPY: CPT

## 2023-08-03 PROCEDURE — 84134 ASSAY OF PREALBUMIN: CPT

## 2023-08-03 PROCEDURE — 71045 X-RAY EXAM CHEST 1 VIEW: CPT

## 2023-08-03 PROCEDURE — 36415 COLL VENOUS BLD VENIPUNCTURE: CPT

## 2023-08-03 PROCEDURE — 74230 X-RAY XM SWLNG FUNCJ C+: CPT

## 2023-08-03 PROCEDURE — 85025 COMPLETE CBC W/AUTO DIFF WBC: CPT

## 2023-08-03 PROCEDURE — 97165 OT EVAL LOW COMPLEX 30 MIN: CPT

## 2023-08-03 PROCEDURE — 97530 THERAPEUTIC ACTIVITIES: CPT

## 2023-08-03 PROCEDURE — 92507 TX SP LANG VOICE COMM INDIV: CPT

## 2023-08-03 PROCEDURE — 92610 EVALUATE SWALLOWING FUNCTION: CPT

## 2023-08-03 PROCEDURE — 87040 BLOOD CULTURE FOR BACTERIA: CPT

## 2023-08-03 PROCEDURE — 97110 THERAPEUTIC EXERCISES: CPT

## 2023-08-03 PROCEDURE — 80048 BASIC METABOLIC PNL TOTAL CA: CPT

## 2023-08-03 PROCEDURE — 82040 ASSAY OF SERUM ALBUMIN: CPT

## 2023-08-03 PROCEDURE — 97542 WHEELCHAIR MNGMENT TRAINING: CPT

## 2023-08-03 PROCEDURE — 83735 ASSAY OF MAGNESIUM: CPT

## 2023-08-03 PROCEDURE — 97163 PT EVAL HIGH COMPLEX 45 MIN: CPT

## 2023-08-03 PROCEDURE — 82947 ASSAY GLUCOSE BLOOD QUANT: CPT

## 2023-08-03 PROCEDURE — 92526 ORAL FUNCTION THERAPY: CPT

## 2023-08-03 RX ADMIN — PANTOPRAZOLE SODIUM SCH MG: 40 GRANULE, DELAYED RELEASE ORAL at 10:06

## 2023-08-03 RX ADMIN — FOLIC ACID SCH MG: 1 TABLET ORAL at 10:07

## 2023-08-03 RX ADMIN — Medication SCH ML: at 21:20

## 2023-08-03 RX ADMIN — PANTOPRAZOLE SODIUM SCH MG: 40 GRANULE, DELAYED RELEASE ORAL at 20:57

## 2023-08-03 RX ADMIN — AMOXICILLIN AND CLAVULANATE POTASSIUM SCH MG: 600; 42.9 SUSPENSION ORAL at 21:47

## 2023-08-03 RX ADMIN — IPRATROPIUM BROMIDE SCH MG: 0.5 SOLUTION RESPIRATORY (INHALATION) at 07:30

## 2023-08-03 RX ADMIN — SODIUM CHLORIDE SCH MLS: 9 INJECTION, SOLUTION INTRAVENOUS at 10:06

## 2023-08-03 RX ADMIN — IPRATROPIUM BROMIDE SCH MG: 0.5 SOLUTION RESPIRATORY (INHALATION) at 14:05

## 2023-08-03 RX ADMIN — ALBUTEROL SULFATE SCH MG: 2.5 SOLUTION RESPIRATORY (INHALATION) at 14:05

## 2023-08-03 RX ADMIN — SODIUM CHLORIDE SCH MLS: 9 INJECTION, SOLUTION INTRAVENOUS at 01:02

## 2023-08-03 RX ADMIN — METOPROLOL TARTRATE SCH MG: 25 TABLET ORAL at 17:35

## 2023-08-03 RX ADMIN — LOSARTAN POTASSIUM SCH MG: 50 TABLET, FILM COATED ORAL at 10:06

## 2023-08-03 RX ADMIN — Medication SCH ML: at 10:12

## 2023-08-03 RX ADMIN — Medication SCH ML: at 01:02

## 2023-08-03 RX ADMIN — ALBUTEROL SULFATE SCH MG: 2.5 SOLUTION RESPIRATORY (INHALATION) at 01:00

## 2023-08-03 RX ADMIN — IPRATROPIUM BROMIDE SCH MG: 0.5 SOLUTION RESPIRATORY (INHALATION) at 01:00

## 2023-08-03 RX ADMIN — HUMAN INSULIN SCH: 100 INJECTION, SOLUTION SUBCUTANEOUS at 17:41

## 2023-08-03 RX ADMIN — ALBUTEROL SULFATE SCH MG: 2.5 SOLUTION RESPIRATORY (INHALATION) at 07:30

## 2023-08-03 RX ADMIN — IPRATROPIUM BROMIDE SCH MG: 0.5 SOLUTION RESPIRATORY (INHALATION) at 19:35

## 2023-08-03 RX ADMIN — Medication SCH ML: at 17:36

## 2023-08-03 RX ADMIN — TERAZOSIN HYDROCHLORIDE ANHYDROUS SCH MG: 1 CAPSULE ORAL at 20:56

## 2023-08-03 RX ADMIN — DULOXETINE HYDROCHLORIDE SCH MG: 30 CAPSULE, DELAYED RELEASE ORAL at 20:58

## 2023-08-03 RX ADMIN — LOSARTAN POTASSIUM SCH MG: 50 TABLET, FILM COATED ORAL at 20:57

## 2023-08-03 RX ADMIN — Medication SCH ML: at 10:07

## 2023-08-03 RX ADMIN — MELATONIN PRN MG: 3 TAB ORAL at 20:58

## 2023-08-03 RX ADMIN — AMLODIPINE BESYLATE SCH MG: 10 TABLET ORAL at 10:06

## 2023-08-03 RX ADMIN — METOPROLOL TARTRATE SCH MG: 50 TABLET, FILM COATED ORAL at 06:35

## 2023-08-03 RX ADMIN — ALBUTEROL SULFATE SCH MG: 2.5 SOLUTION RESPIRATORY (INHALATION) at 19:35

## 2023-08-03 NOTE — XMS REPORT
Continuity of Care Document

                            Created on:August 3, 2023



Patient:TIETZ, ROBERT EDWARD

Sex:Male

:1965

External Reference #:[id number]





Demographics







                          Address                   



                                                    [place] [address]

 

                          Home Phone                [phone]

 

                          Email Address             [email or web address]

 

                          Preferred Language        en-US

 

                          Marital Status            Unknown

 

                          Christianity Affiliation     Unknown

 

                          Race                      Unknown

 

                          Ethnic Group              Unknown









Author







                          Organization              [facility]

t

 

                          Address                   [address]



                                                    [place] [address]

 

                          Phone                     [phone]









Support







                Name            Relationship    Address         Phone

 

                SHEREE MERINO Sister          [address]    [phone]

1



                                                [place] [id number] 

 

                TIETZ, HEIDI M               Unavailable     [phone]









Care Team Providers







                    Name                Role                Phone

 

                    NISHANT MUELLER Attending Clinician Unavailable

 

                    CURLY WRIGHT Attending Clinician Unavailable

 

                    ARNOL IYER Attending Clinician Unavailable

 

                    VIC RIVERA Attending Clinician Unavailable

 

                    GUILLERMO CONNELL Attending Clinician Unavailable

 

                    JERICHO LOWERY      Attending Clinician Unavailable

 

                    VAZQUEZ PAYNE     Attending Clinician Unavailable

 

                    Gio Williamson Attending Clinician [phone]

 

                    VIC RIVERA Admitting Clinician Unavailable

 

                    GUILLERMO CONNELL Admitting Clinician Unavailable









Payers







           Payer Name Policy Type Policy Number Effective Date Expiration Date S

ource

 

           [employer]               [id number] 2023 00:00:00            



           POS/PPO/EPO                                             

 

           [employer]            [id number] 2022 00:00:00        

    



           OUT OF STATE                                             







Problems







       Condition Condition Condition Status Onset  Resolution Last   Treating Co

mments 

Source



       Name   Details Category        Date   Date   Treatment Clinician        



                                                 Date                 

 

       Blepharosp  Blepharos Problem Active               2022            

   [facility]



       asm    pasm                               22:01:28               l



       (disorder) (disorder)                                                  He

rmann



              Active                                                  



              Problem                                                  



              2022                                                  



              [facility]                                                  



              Neuro                                                   

 

       Hemifacial        Problem Active               2022               M

emoria



       spasm  Hemifacial                             22:01:28               l



       (finding) spasm                                                   [place]



              (finding)                                                  



              Active                                                  



              Problem                                                  



              2022                                                  



               [facility]ischer                                                  



              Neuro                                                   

 

       Cerebral  Cerebral Problem Active               2022               

[facility]



       hemorrhage hemorrhage                             22:01:28               

l



       (disorder) (disorder)                                                  He

rmann



              Active                                                  



              Problem                                                  



              2022                                                  



              Mischer                                                  



              Neuro                                                   

 

       Hypertensi  Hypertens Problem Active               2022            

   [facility]



       ve     [place]                                22:01:28               l



       disorder, disorder,                                                  [facility]

jazmine



       systemic systemic                                                  



       arterial arterial                                                  



       (disorder) (disorder)                                                  



              Active                                                  



              Problem                                                  



              2022                                                  



              Mischer                                                  



              Neuro                                                   

 

       Hypertensi  Hypertens Problem Active               2022            

   [facility]



       ve     [place]                                22:01:28               l



       emergency emergency                                                  [facility]erm

jazmine



       (disorder) (disorder)                                                  



              Active                                                  



              Problem                                                  



              2022                                                  



              [facility]                                                  



              Neuro                                                   







Allergies, Adverse Reactions, Alerts







       Allergy Allergy Status Severity Reaction(s) Onset  Inactive Treating Comm

ents 

Source



       Name   Type                        Date   Date   Clinician        

 

       NO KNOWN Allergy Active                                           [facility]







Social History







           Social Habit Start Date Stop Date  Quantity   Comments   Source

 

           Social History 2022                       [facility]



                      05:05:44   05:05:44                         

 

           Sex Assigned At [birth year]                       [facility]



           Birth      00:00:00   00:00:00                         [facility]







Medications







       Ordered Filled Start  Stop   Current Ordering Indication Dosage Frequency

 Signature

                    Comments            Components          Source



     Medication Medication Date Date Medication? Clinician                (SIG) 

          



     Name Name                                                   

 

     baclofen 10      2022-0      Yes                      10 mg = 1           M

emoria



     mg oral      4-27                               tab, PO,           l



     tablet      18:43:                               Bedtime, #           [facility]

nn



               00                                 30 tab, 3           



                                                  Refill(s),           



                                                  Pharmacy:           



                                                  [facility]           



                                                  Pharmacy           



                                                  808,           



                                                  162.56,           



                                                  cm,            



                                                  22           



                                                  13:19:00           



                                                  CDT,           



                                                  Height,           



                                                  75.455,           



                                                  kg,            



                                                  22           



                                                  13:19:00           



                                                  CDT,           



                                                  Weight           

 

     baclofen 10      2022-0      Yes                      10 mg = 1           M

emoria



     mg oral      4-27                               tab, PO,           l



     tablet      18:43:                               Bedtime, #           [facility]

nn



               00                                 30 tab, 3           



                                                  Refill(s),           



                                                  Pharmacy:           



                                                  [facility]           



                                                  Pharmacy           



                                                  808,           



                                                  162.56,           



                                                  cm,            



                                                  22           



                                                  13:19:00           



                                                  CDT,           



                                                  Height,           



                                                  75.455,           



                                                  kg,            



                                                  22           



                                                  13:19:00           



                                                  CDT,           



                                                  Weight           

 

     baclofen 10      2022-0      Yes                      10 mg = 1           M

emoria



     mg oral      4-27                               tab, PO,           l



     tablet      18:43:                               Bedtime, #           [facility]

nn



               00                                 30 tab, 3           



                                                  Refill(s),           



                                                  Pharmacy:           



                                                  [facility]           



                                                  Pharmacy           



                                                  808,           



                                                  162.56,           



                                                  cm,            



                                                  22           



                                                  13:19:00           



                                                  CDT,           



                                                  Height,           



                                                  75.455,           



                                                  kg,            



                                                  22           



                                                  13:19:00           



                                                  CDT,           



                                                  Weight           

 

     baclofen 10      -0      Yes                      10 mg = 1           M

emoria



     mg oral      4-27                               tab, PO,           l



     tablet      18:43:                               Bedtime, #           [facility]

nn



               00                                 30 tab, 3           



                                                  Refill(s),           



                                                  Pharmacy:           



                                                  [facility]           



                                                  Pharmacy           



                                                  808,           



                                                  162.56,           



                                                  cm,            



                                                  22           



                                                  13:19:00           



                                                  CDT,           



                                                  Height,           



                                                  75.455,           



                                                  kg,            



                                                  22           



                                                  13:19:00           



                                                  CDT,           



                                                  Weight           

 

     baclofen 10      -0      Yes                      10 mg = 1           M

emoria



     mg oral      4-27                               tab, PO,           l



     tablet      18:43:                               Bedtime, #           [facility]

nn



               00                                 30 tab, 3           



                                                  Refill(s),           



                                                  Pharmacy:           



                                                  [facility]           



                                                  Pharmacy           



                                                  808,           



                                                  162.56,           



                                                  cm,            



                                                  22           



                                                  13:19:00           



                                                  CDT,           



                                                  Height,           



                                                  75.455,           



                                                  kg,            



                                                  22           



                                                  13:19:00           



                                                  CDT,           



                                                  Weight           

 

     baclofen 10      -0      Yes                      10 mg = 1           M

emoria



     mg oral      4-27                               tab, PO,           l



     tablet      18:43:                               Bedtime, #           [facility]

nn



               00                                 30 tab, 3           



                                                  Refill(s),           



                                                  Pharmacy:           



                                                  [facility]           



                                                  Pharmacy           



                                                  808,           



                                                  162.56,           



                                                  cm,            



                                                  22           



                                                  13:19:00           



                                                  CDT,           



                                                  Height,           



                                                  75.455,           



                                                  kg,            



                                                  22           



                                                  13:19:00           



                                                  CDT,           



                                                  Weight           

 

     baclofen 10      -0      Yes                      10 mg = 1           M

emoria



     mg oral      4-27                               tab, PO,           l



     tablet      18:43:                               Bedtime, #           [facility]

nn



               00                                 30 tab, 3           



                                                  Refill(s),           



                                                  Pharmacy:           



                                                  [facility]           



                                                  Pharmacy           



                                                  808,           



                                                  162.56,           



                                                  cm,            



                                                  22           



                                                  13:19:00           



                                                  CDT,           



                                                  Height,           



                                                  75.455,           



                                                  kg,            



                                                  22           



                                                  13:19:00           



                                                  CDT,           



                                                  Weight           

 

     baclofen 10      -0      Yes                      10 mg = 1           M

emoria



     mg oral      4-27                               tab, PO,           l



     tablet      18:43:                               Bedtime, #           [facility]

nn



               00                                 30 tab, 3           



                                                  Refill(s),           



                                                  Pharmacy:           



                                                  [facility]           



                                                  Pharmacy           



                                                  808,           



                                                  162.56,           



                                                  cm,            



                                                  22           



                                                  13:19:00           



                                                  CDT,           



                                                  Height,           



                                                  75.455,           



                                                  kg,            



                                                  22           



                                                  13:19:00           



                                                  CDT,           



                                                  Weight           

 

     baclofen 10      -0      Yes                      10 mg = 1           M

emoria



     mg oral      4-27                               tab, PO,           l



     tablet      18:43:                               Bedtime, #           [facility]

nn



               00                                 30 tab, 3           



                                                  Refill(s),           



                                                  Pharmacy:           



                                                  [facility]           



                                                  Pharmacy           



                                                  808,           



                                                  162.56,           



                                                  cm,            



                                                  22           



                                                  13:19:00           



                                                  CDT,           



                                                  Height,           



                                                  75.455,           



                                                  kg,            



                                                  22           



                                                  13:19:00           



                                                  CDT,           



                                                  Weight           

 

     baclofen 10      -0      Yes                      10 mg = 1           M

emoria



     mg oral      4-27                               tab, PO,           l



     tablet      18:43:                               Bedtime, #           [facility]

nn



               00                                 30 tab, 3           



                                                  Refill(s),           



                                                  Pharmacy:           



                                                  [facility]           



                                                  Pharmacy           



                                                  808,           



                                                  162.56,           



                                                  cm,            



                                                  22           



                                                  13:19:00           



                                                  CDT,           



                                                  Height,           



                                                  75.455,           



                                                  kg,            



                                                  22           



                                                  13:19:00           



                                                  CDT,           



                                                  Weight           

 

     baclofen 10      -0      Yes                      10 mg = 1           M

emoria



     mg oral      4-27                               tab, PO,           l



     tablet      18:43:                               Bedtime, #           [facility]

nn



               00                                 30 tab, 3           



                                                  Refill(s),           



                                                  Pharmacy:           



                                                  [facility]           



                                                  Pharmacy           



                                                  808,           



                                                  162.56,           



                                                  cm,            



                                                  22           



                                                  13:19:00           



                                                  CDT,           



                                                  Height,           



                                                  75.455,           



                                                  kg,            



                                                  22           



                                                  13:19:00           



                                                  CDT,           



                                                  Weight           

 

     baclofen 10      -0      Yes                      10 mg = 1           M

[facility]



     mg oral      4-27                               tab, PO,           l



     tablet      18:43:                               Bedtime, #           [facility]

nn



               00                                 30 tab, 3           



                                                  Refill(s),           



                                                  Pharmacy:           



                                                  [facility]           



                                                  Pharmacy           



                                                  808,           



                                                  162.56,           



                                                  cm,            



                                                  22           



                                                  13:19:00           



                                                  CDT,           



                                                  Height,           



                                                  75.455,           



                                                  kg,            



                                                  22           



                                                  13:19:00           



                                                  CDT,           



                                                  Weight           

 

     baclofen 10      -0      Yes                      10 mg = 1           M

[facility]



     mg oral      4-27                               tab, PO,           l



     tablet      18:43:                               Bedtime, #           [facility]

nn



               00                                 30 tab, 3           



                                                  Refill(s),           



                                                  Pharmacy:           



                                                  [facility]           



                                                  Pharmacy           



                                                  808,           



                                                  162.56,           



                                                  cm,            



                                                  22           



                                                  13:19:00           



                                                  CDT,           



                                                  Height,           



                                                  75.455,           



                                                  kg,            



                                                  22           



                                                  13:19:00           



                                                  CDT,           



                                                  Weight           

 

     baclofen 10      -0      Yes                      10 mg = 1           M

[facility]



     mg oral      4-27                               tab, PO,           l



     tablet      18:43:                               Bedtime, #           [facility]

nn



               00                                 30 tab, 3           



                                                  Refill(s),           



                                                  Pharmacy:           



                                                  [facility]           



                                                  Pharmacy           



                                                  808,           



                                                  162.56,           



                                                  cm,            



                                                  22           



                                                  13:19:00           



                                                  CDT,           



                                                  Height,           



                                                  75.455,           



                                                  kg,            



                                                  22           



                                                  13:19:00           



                                                  CDT,           



                                                  Weight           

 

     baclofen 10      -0      Yes                      10 mg = 1           M

[facility]



     mg oral      4-27                               tab, PO,           l



     tablet      18:43:                               Bedtime, #           [facility]

nn



               00                                 30 tab, 3           



                                                  Refill(s),           



                                                  Pharmacy:           



                                                  [facility]           



                                                  Pharmacy           



                                                  808,           



                                                  162.56,           



                                                  cm,            



                                                  22           



                                                  13:19:00           



                                                  CDT,           



                                                  Height,           



                                                  75.455,           



                                                  kg,            



                                                  22           



                                                  13:19:00           



                                                  CDT,           



                                                  Weight           

 

     baclofen 10      -0      Yes                      10 mg = 1           M

[facility]



     mg oral      4-27                               tab, PO,           l



     tablet      18:43:                               Bedtime, #           [facility]

nn



               00                                 30 tab, 3           



                                                  Refill(s),           



                                                  Pharmacy:           



                                                  [facility]           



                                                  Pharmacy           



                                                  808,           



                                                  162.56,           



                                                  cm,            



                                                  22           



                                                  13:19:00           



                                                  CDT,           



                                                  Height,           



                                                  75.455,           



                                                  kg,            



                                                  22           



                                                  13:19:00           



                                                  CDT,           



                                                  Weight           

 

     baclofen 10      -0      Yes                      10 mg = 1           M

[facility]



     mg oral      4-27                               tab, PO,           l



     tablet      18:43:                               Bedtime, #           [facility]

nn



               00                                 30 tab, 3           



                                                  Refill(s),           



                                                  Pharmacy:           



                                                  [facility]           



                                                  Pharmacy           



                                                  808,           



                                                  162.56,           



                                                  cm,            



                                                  22           



                                                  13:19:00           



                                                  CDT,           



                                                  Height,           



                                                  75.455,           



                                                  kg,            



                                                  22           



                                                  13:19:00           



                                                  CDT,           



                                                  Weight           







Vital Signs







             Vital Name   Observation Time Observation Value Comments     Source

 

             WEIGHT       2023 05:26:00 68.675 kg                 

 

             WEIGHT       2023-07-15 06:00:00 68 kg                     

 

             WEIGHT       2023 06:00:00 68 kg                     

 

             WEIGHT       2023 00:00:00 68.5 kg                   

 

             WEIGHT       2023 06:00:00 68.3 kg                   

 

             WEIGHT       2023 06:00:00 68.2 kg                   

 

             HEIGHT       2023 07:00:00 162.6 cm                  

 

             WEIGHT       2023 00:00:00 68 kg                     

 

             WEIGHT       2023 05:26:00 68.675 kg                 

 

             WEIGHT       2023-07-15 06:00:00 68 kg                     

 

             WEIGHT       2023 06:00:00 68 kg                     

 

             WEIGHT       2023 00:00:00 68.5 kg                   

 

             WEIGHT       2023 06:00:00 68.3 kg                   

 

             WEIGHT       2023 06:00:00 68.2 kg                   

 

             HEIGHT       2023 07:00:00 162.6 cm                  

 

             WEIGHT       2023 00:00:00 68 kg                     

 

             Systolic (mm Hg) 2022 18:10:00                           [facility]

[facility] [place]

 

             Diastolic (mm Hg) 2022 18:10:00                           [facility]

[facility] [place]

 

             Heart Rate   2022 18:10:00                           [facility]

 [place]

 

             Respitory Rate 2022 18:10:00                           [facility]

[facility] [place]

 

             Height       2022 18:10:00 162.56 cm                 [facility]

 [place]

 

             Weight       2022 18:10:00                           [facility]

 [place]

 

             BMI Calculated 2022 18:10:00                           [facility]

[facility] [place]

 

             Systolic (mm Hg) 2020 20:22:00                           [facility]

[facility] [place]

 

             Diastolic (mm Hg) 2020 20:22:00                           [facility]

[facility] [place]

 

             Heart Rate   2020 20:22:00                           [facility]

 [place]

 

             Respitory Rate 2020 20:22:00                           [facility]

al [place]

 

             Height       2020 20:22:00 162.56 cm                 [facility]

 [place]

 

             Weight       2020 20:22:00                           [facility]

 [place]

 

             BMI Calculated 2020 20:22:00                           [facility]

[facility] [place]







Procedures

This patient has no known procedures.



Encounters







        Start   End     Encounter Admission Attending Care    Care    Encounter 

Source



        Date/Time Date/Time Type    Type    Clinicians Facility Department ID   

   

 

        2023         Inpatient ER      ERVIN [facility]    [id number]

8 [facility]



        16:43:43                         [facility]                         

 

        2023         Inpatient ER      [facility]    [id number]

4 [facility]



        00:00:00                         [facility]                          

 

        2023         Inpatient ER      [facility]    [facility]    [id number]

5 [facility]



        13:43:58                         [facility]                         

 

        2023         Inpatient ER      [facility]    [facility]    [id number]

7 [facility]



        12:07:38                         [facility]                         

 

        2023         Inpatient ER      [facility]    [facility]    [id number]

9 [facility]



        18:57:40                         [facility]                         

 

        2023         Inpatient ER      [facility]    [facility]    [id number]

7 [facility]



        13:21:32                         [facility]                         

 

        2023         Inpatient ER      [facility]    [facility]    [id number]

9 [facility]



        11:12:44                         [facility]                         

 

        2023-07-15         Inpatient ER      [facility]    [facility]    [id number]

5 [facility]



        18:09:15                         [facility]                         

 

        2023         Inpatient ER      AVINASHANDSAMINA [facility]    [facility]    [id number]

091 [facility]



        10:47:24                         N, LINTU                         

 

        2023         Inpatient ER      BERSHAD, [facility]    [facility]    [id number]

 [facility]



        08:14:58                         [facility]                            

 

        2023         Inpatient ER      [facility]    [facility]    [id number]

 [facility]



        08:14:51                         [facility]                            

 

        2023         Inpatient ER      [facility]    [facility]    [id number]

0 [facility]



        04:37:56                         [facility]                          

 

        2023         Inpatient ER      [facility]IGHT[facility]    [facility]    [id number]

8 [facility]



        04:20:00                         [facility]                          

 

        2023         Inpatient ER      [facility]SHAD, [facility]    [facility]    [id number]

 [facility]



        00:25:30                         [facility]                            

 

        2023-07-10         Inpatient ER      [facility]    [facility]    [id number]

 [facility]



        00:05:49                         [facility]                            

 

        2023         Inpatient ER      [facility]SHAD, [facility]    [facility]    [id number]

 [facility]



        00:27:15                         [facility]                            

 

        2023         Inpatient ER              [facility]    [facility]    [id number] 

[facility]



        00:26:38                                                         

 

        2023         Inpatient ER              [facility]    [facility]    [id number] 

[facility]



        15:12:58                                                         

 

        2023         Inpatient ER      [facility]SHAD, [facility]    [facility]    [id number]

 [facility]



        14:35:00                         [facility]                            

 

        2023         Inpatient ER              [facility]    [facility]    [id number] 

[facility]



        13:27:25                                                         

 

        2023         Inpatient ER      RAMACHANDRA [facility]    [facility]    [id number]

018 [facility]



        08:29:48                         N, [facility]                         

 

        2023         Inpatient ER      [facility]ACHANDRA [facility]    [facility]    [id number]

127 [facility]



        07:58:55                         N, [facility]                         

 

        2023         Inpatient ER              [facility]    [facility]    [id number] 

[facility]



        00:07:44                                                         

 

        2023         Inpatient ER      BERSHAD, [facility]    [facility]    [id number]

 [facility]



        20:29:11                         [facility]                            

 

        2023         Inpatient ER              [facility]    [facility]    [id number] 

[facility]



        17:15:27                                                         

 

        2023         Inpatient ER              [facility]    [facility]    [id number] 

[facility]



        17:15:20                                                         

 

        2023         Inpatient ER      RAMACHANDRA [facility]    S[facility]    [id number]

918 [facility]



        16:03:43                         N, [facility]                         

 

        2023         Inpatient ER      RAMACHANDRA [facility]    S[facility]    [id number]

911 [facility]



        16:03:37                         N, [facility]                         

 

        2023         Inpatient ER      BERSHAD, [facility]    [facility]    [id number]

 [facility]



        13:19:28                         [facility]                            

 

        2023         Inpatient ER      BERSHAD, [facility]    [facility]    [id number]

 [facility]



        00:25:37                         [facility]                            

 

        2023         Inpatient ER      [facility]SHAD, [facility]    [facility]    [id number]

 [facility]



        09:20:39                         [facility]                            

 

        2023         Inpatient ER      [facility]SHAD, [facility]    [facility]    [id number]

 [facility]



        14:30:51                         [facility]                            

 

        2023         Inpatient ER      RAMACHANDRA [facility]    [facility]    [id number]

039 [facility]



        10:05:39                         N, [facility]                         

 

        2023         Inpatient ER      KO, [facility]    [facility]    [id number] 

[facility]



        06:27:46                         [facility]                           

 

        2023         Inpatient ER      KO, [facility]    [facility]    [id number] 

[facility]



        06:27:39                         [facility]                           

 

        2023         Inpatient ER      KO [facility]    [id number] 

SLE



        06:27:32                         [facility]                           

 

        2023         Inpatient ER      KO [facility]    [id number] 

SLE



        00:28:35                         [facility]                           

 

        2023         Outpatient                 UTH     UTH     [id number]

 [facility]



        23:37:31                                                 [id number] [facility]

 

        2023         Inpatient ER      KO [facility]    [id number] 

SLE



        23:02:31                         [facility]                           

 

        2022         Outpatient                 UTH     UTH     [id number]

 [facility]



        12:58:22                                                 [id number] [facility]

 

        2022         Outpatient         BEVERLEY, [facility]     [id number]

 [facility]



        17:00:59                         [facility]

 

        2022         Outpatient         KERRY, [facility]     [id number]

 [facility]



        10:22:46                         [facility]

 

        2022         Inpatient ER      KO [facility]   Neurosurger [id number]

112 [facility]



        18:28:49                         [facility]

 

        2023 Inpatient ER      ERVIN, [facility]    Neuro ICU 

[id number] [facility]



        22:50:00 10:56:00                 [facility]                         

 

        2023 Inpatient ER      SOHEILA [facility]    0

[id number] [facility]



        12:12:14 00:00:00                 N, [facility]                         

 

        2023 Inpatient ER      KO [facility]    [id number]

85 [facility]



        00:38:59 00:00:00                 [facility]                           

 

        2022 Ambulatory                 nullFlavo [facility]     [id number]

[id number] [facility]



        16:00:00 16:00:00 Pre-Reg                 r       Neurology 02      l



                                                        Sari         [place]

 

        2022 Ambulatory                 nullFlavo [facility]     [id number]

[id number] [facility]



        16:00:00 16:00:00 Pre-Reg                 r       Neurology 02      l



                                                        Sari         [place]

 

        2022 Outpatient         WHITNEY [facility] 554

[id number] 



        11:00:00 11:00:00                 Gio                   02      



                                        Joaquin                         

 

        2022 Outpatient                 [facility]    IE    [id number]

365 [facility]



        13:45:00 13:45:00                                         02      l



                                                                        [place]

 

        2022 Outpatient                 nullFlavo [facility]     [id number]

[id number] [facility]



        18:00:00 04:59:59                         r       Neurology 01      l



                                                        Sari Castillo

 

        2022 Outpatient                 nullFlavo [facility]     [id number]

[id number] [facility]



        18:00:00 04:59:59                         r       Neurology 01      l



                                                        Sari Castillo

 

        2022 Outpatient         Teri  [facility] M[facility] 554

[id number] 



        13:00:00 23:59:59                 Gio                   01      



                                        Joaquin                         

 

        2022 Outpatient                 [facility]    [facility]    [id number]

365 [facility]



        13:00:00 13:00:00                                         01      cirilo



                                                                        [place]

 

        2020 Outpatient                 nullFlavo [facility]     [id number]

[id number] [facility]



        20:15:00 05:59:59                         r       Neurology 00      l



                                                        Sari Hernandezann

 

        2020 Outpatient                 nullFlavo [facility]     [id number]

[id number] [facility]



        20:15:00 05:59:59                         r       Neurology 00      l



                                                        Sari Hernandezann

 

        2020 [facility]         [facility]

[id number] 



        14:15:00 23:59:59                 Gio                   00      



                                        Joaquin                         

 

        2020 Outpatient                 [facility]    IE    [id number]

365 [facility]



        14:15:00 14:15:00                                         00      cirilo Castillo







Results







           Test Description Test Time  Test Comments Results    Result Comments 

Source









                    POCT-GLUCOSE METER  2023 07:10:15 









                      Test Item  Value      Reference Range Interpretation Comme

nts









             POC-GLUCOSE METER (BEAKER) 113 mg/dL           H            :

 TESTED AT [facility] [address]



             (test code = 1538)                                        [place] [address]:



                                                                 /Techni

deejay ID = [id number] for



                                                                 PRECIOUS SMITH



[id number] 06:34:45





             Test Item    Value        Reference Range Interpretation Comments

 

             MAGNESIUM (BEAKER) (test code = 1.8 mg/dL    1.6-2.6               

    



             627)                                                



 ID - [id number] 06:34:45





             Test Item    Value        Reference Range Interpretation Comments

 

             PHOSPHORUS (BEAKER) (test code = 2.8 mg/dL    2.3-4.7              

     



             604)                                                



 ID - ADMINPOCT-GLUCOSE [id number] 01:10:15





             Test Item    Value        Reference Range Interpretation Comments

 

             POC-GLUCOSE METER 100 mg/dL                        : TESTED A

T [facility] 6720



             (BEAKER) (test code =                                        [facility],



             1538)                                               [id number]:



                                                                 /Techni

deejay ID



                                                                 = [id number] for FABBY MARTIN



POCT-GLUCOSE [id number] 17:12:52





             Test Item    Value        Reference Range Interpretation Comments

 

             POC-GLUCOSE METER 104 mg/dL                        : TESTED A

T [facility] 6720



             (BEAKER) (test code =                                        [facility],



             1538)                                               [address]:



                                                                 /Techni

deejay ID



                                                                 = [id number] for Vannesa Griffiths



POCT-GLUCOSE [id number] 12:42:29





             Test Item    Value        Reference Range Interpretation Comments

 

             POC-GLUCOSE METER 109 mg/dL                        : TESTED A

T [facility] 6720



             (BEAKER) (test code =                                        [facility],



             1538)                                               [id number]:



                                                                 /Techni

deejay ID



                                                                 = [id number] for Vannesa Griffiths



[id number] 05:29:16





             Test Item    Value        Reference Range Interpretation Comments

 

             PHOSPHORUS (BEAKER) (test code = 2.7 mg/dL    2.3-4.7              

     



             604)                                                



 ID - CHIKIS WBASIC METABOLIC [id number] 05:29:15





             Test Item    Value        Reference Range Interpretation Comments

 

             SODIUM (BEAKER) 142 meq/L    136-145                   



             (test code = 381)                                        

 

             POTASSIUM    3.9 meq/L    3.5-5.1                   



             (BEAKER) (test                                        



             code = 379)                                         

 

             CHLORIDE (BEAKER) 109 meq/L           H            



             (test code = 382)                                        

 

             CO2 (BEAKER) 24 meq/L     22-29                     



             (test code = 355)                                        

 

             BLOOD UREA   28 mg/dL     7-21         H            



             NITROGEN (BEAKER)                                        



             (test code = 354)                                        

 

             CREATININE   0.78 mg/dL   0.57-1.25                 



             (BEAKER) (test                                        



             code = 358)                                         

 

             GLUCOSE RANDOM 112 mg/dL           H            



             (BEAKER) (test                                        



             code = 652)                                         

 

             CALCIUM (BEAKER) 8.9 mg/dL    8.4-10.2                  



             (test code = 697)                                        

 

             EGFR (BEAKER) 105                                     Interpretatio

n of eGFR



             (test code = mL/min/1.73                            values Stage De

scription



             1092)        sq m                                   Result G1 Althea

l or high



                                                                 >=90 G2 Mildly 

decreased



                                                                 60-89 G3a Mildl

y to



                                                                 moderately 45-5

9 G3b



                                                                 Moderately to s

everely



                                                                 30-44 G4 Severl

y decreased



                                                                 15-29 G5 Kidney

 failure



                                                                 <15Reported eGF

R is based



                                                                 on the CKD-EPI 





                                                                 equation that d

oes not use



                                                                 a race



                                                                 coefficientEsti

mated GFR



                                                                 is not as accur

ate as



                                                                 Creatinine Porsha

ekaterina in



                                                                 predicting glom

erular



                                                                 filtration rate

. Estimated



                                                                 GFR is not appl

icable for



                                                                 dialysis patien

ts



 ID - CHIKIS [id number] 05:29:15





             Test Item    Value        Reference Range Interpretation Comments

 

             MAGNESIUM (BEAKER) (test code = 2.0 mg/dL    1.6-2.6               

    



             627)                                                



 ID - CHIKIS WPOCT-GLUCOSE [id number] 01:39:19





             Test Item    Value        Reference Range Interpretation Comments

 

             POC-GLUCOSE METER 129 mg/dL           H            : TESTED A

T [facility] 67[address]



             (BEAKER) (test code =                                        [place],



             1538)                                               [address]:



                                                                 /Techni

deejay ID



                                                                 = [id number] for FABBY MARTIN



CBC W/PLT COUNT &amp; AUTO [id number] 10:38:05





             Test Item    Value        Reference Range Interpretation Comments

 

             WHITE BLOOD CELL COUNT (BEAKER) 17.7 K/ L    3.5-10.5     H        

    



             (test code = 775)                                        

 

             RED BLOOD CELL COUNT (BEAKER) 4.70 M/ L    4.63-6.08               

  



             (test code = 761)                                        

 

             HEMOGLOBIN (BEAKER) (test code = 12.2 GM/DL   13.7-17.5    L       

     



             410)                                                

 

             HEMATOCRIT (BEAKER) (test code = 38.1 %       40.1-51.0    L       

     



             411)                                                

 

             MEAN CORPUSCULAR VOLUME (BEAKER) 81 fL        79-92                

     



             (test code = 753)                                        

 

             MEAN CORPUSCULAR HEMOGLOBIN 26.0 pg      25.7-32.2                 



             (BEAKER) (test code = 751)                                        

 

             MEAN CORPUSCULAR HEMOGLOBIN CONC 32.0 GM/DL   32.3-36.5    L       

     



             (BEAKER) (test code = 752)                                        

 

             RED CELL DISTRIBUTION WIDTH 14.3 %       11.6-14.4                 



             (BEAKER) (test code = 412)                                        

 

             PLATELET COUNT (BEAKER) (test 382 K/CU MM  150-450                 

  



             code = 756)                                         

 

             MEAN PLATELET VOLUME (BEAKER) 9.5 fL       9.4-12.4                

  



             (test code = 754)                                        

 

             NUCLEATED RED BLOOD CELLS 0 /100 WBC   0-0                       



             (BEAKER) (test code = 413)                                        

 

             NEUTROPHILS RELATIVE PERCENT 86 %                                  

 



             (BEAKER) (test code = 429)                                        

 

             LYMPHOCYTES RELATIVE PERCENT 6 %                                   

 



             (BEAKER) (test code = 430)                                        

 

             MONOCYTES RELATIVE PERCENT 6 %                                    



             (BEAKER) (test code = 431)                                        

 

             EOSINOPHILS RELATIVE PERCENT 1 %                                   

 



             (BEAKER) (test code = 432)                                        

 

             BASOPHILS RELATIVE PERCENT 0 %                                    



             (BEAKER) (test code = 437)                                        

 

             NEUTROPHILS ABSOLUTE COUNT 15.23 K/ L   1.78-5.38    H            



             (BEAKER) (test code = 670)                                        

 

             LYMPHOCYTES ABSOLUTE COUNT 1.01 K/ L    1.32-3.57    L            



             (BEAKER) (test code = 414)                                        

 

             MONOCYTES ABSOLUTE COUNT (BEAKER) 1.13 K/ L    0.30-0.82    H      

      



             (test code = 415)                                        

 

             EOSINOPHILS ABSOLUTE COUNT 0.18 K/ L    0.04-0.54                 



             (BEAKER) (test code = 416)                                        

 

             BASOPHILS ABSOLUTE COUNT (BEAKER) 0.06 K/ L    0.01-0.08           

      



             (test code = 417)                                        

 

             IMMATURE GRANULOCYTES-RELATIVE 0.50 %       0.00-1.00              

   



             PERCENT (BEAKER) (test code =                                      

  



             2801)                                               



POCT-GLUCOSE [id number] 06:03:32





             Test Item    Value        Reference Range Interpretation Comments

 

             POC-GLUCOSE METER 123 mg/dL           H            : TESTED A

T [facility] [address]



             (BEAKER) (test code =                                        [place],



             1538)                                               [address]:



                                                                 /Techni

deejay ID



                                                                 = [id number] for SON MONTANA



[id number] 05:07:42





             Test Item    Value        Reference Range Interpretation Comments

 

             PHOSPHORUS (BEAKER) (test code = 2.5 mg/dL    2.3-4.7              

     



             604)                                                



 ID - CHIKIS [id number] 05:07:41





             Test Item    Value        Reference Range Interpretation Comments

 

             MAGNESIUM (BEAKER) (test code = 2.0 mg/dL    1.6-2.6               

    



             627)                                                



 ID - CHIKIS WPOCT-GLUCOSE [id number] 00:05:37





             Test Item    Value        Reference Range Interpretation Comments

 

             POC-GLUCOSE METER 122 mg/dL           H            : TESTED A

T [facility] 6720



             (BEAKER) (test code =                                        [facility]

[employer] [facility],



             1538)                                               [address]:



                                                                 /Techni

deejay ID



                                                                 = [id number] for SON MONTANA



URINALYSIS W/ REFLEX URINE [id number] 18:50:27





             Test Item    Value        Reference Range Interpretation Comments

 

             COLOR (BEAKER) (test code = 470) Yellow                            

     

 

             CLARITY (BEAKER) (test code = 469) Hazy                            

       

 

             SPECIFIC GRAVITY UA (BEAKER) (test 1.029        1.001-1.035        

       



             code = 468)                                         

 

             PH UA (BEAKER) (test code = 467) 7.0          5.0-8.0              

     

 

             PROTEIN UA (BEAKER) (test code = 20 mg/dL     Negative     A       

     



             464)                                                

 

             GLUCOSE UA (BEAKER) (test code = Negative     Negative             

     



             365)                                                

 

             KETONES UA (BEAKER) (test code = Negative     Negative             

     



             371)                                                

 

             BILIRUBIN UA (BEAKER) (test code = Negative     Negative           

       



             462)                                                

 

             BLOOD UA (BEAKER) (test code = 461) Negative     Negative          

        

 

             NITRITE UA (BEAKER) (test code = Negative     Negative             

     



             465)                                                

 

             LEUKOCYTE ESTERASE UA (BEAKER) (test Negative     Negative         

         



             code = 466)                                         

 

             UROBILINOGEN UA (BEAKER) (test code 2            0.2-1.0      H    

        



             = 463)                                              

 

             RBC UA (BEAKER) (test code = 519) 2 /HPF                           

      

 

             WBC UA (BEAKER) (test code = 520) 5 /HPF                           

      

 

             MUCUS (BEAKER) (test code = 1574) Rare                             

      

 

             SOURCE(BEAKER) (test code = 2795)                                  

      



 ID - [auto] ID - techPOCT-GLUCOSE [id number] 17:27:05





             Test Item    Value        Reference Range Interpretation Comments

 

             POC-GLUCOSE METER 144 mg/dL           H            : TESTED A

T [facility] 6720



             (BEAKER) (test code =                                        [facility],



             1538)                                               [address]:



                                                                 /Techni

deejay ID



                                                                 = [id number] for An

Vannesa adams



XR CHEST 1 VIEW PORTABLE / [id number] 17:17:52
************************************************************[facility] : 1965 Sex: 
M************************************************************Chest one v
iew:HISTORY: follow up PneumoniaComparison: 2023There has been partial 
clearing of the left base. Patchy airspaceopacity in the right lung base is now 
noted. There is no pleuraleffusion identified. Cardiac size is within normal 
limits. The feedingtube noted previously has been removed in the inter
kimberly.IMPRESSION:Partial clearing of the left lung base with new limitedpatchy 
airspace disease in theright base.Electronically Signed By: Tawanda Balbuena2023 17:19 CDTWorkstation Name: [id number]-GLUCOSE METER
2023 12:28:58





             Test Item    Value        Reference Range Interpretation Comments

 

             POC-GLUCOSE METER 124 mg/dL           H            : TESTED A

T [facility]



             ([employer]) (test code =                                        [facility],



             1538)                                               [address]:



                                                                 /Techni

deejay ID



                                                                 = [id number] for An

Vannesa adams



POCT-GLUCOSE [id number] 06:37:26





             Test Item    Value        Reference Range Interpretation Comments

 

             POC-GLUCOSE METER 130 mg/dL           H            : TESTED A

T [facility] 6720



             (BEAKER) (test code =                                        [facility]

[facility],



             1538)                                               [address]:



                                                                 /Techni

deejay ID



                                                                 = [id number] for SON MONTANA



[id number] 05:52:45





             Test Item    Value        Reference Range Interpretation Comments

 

             MAGNESIUM (BEAKER) (test code = 2.1 mg/dL    1.6-2.6               

    



             627)                                                



 ID - [id number] 05:52:45





             Test Item    Value        Reference Range Interpretation Comments

 

             PHOSPHORUS (BEAKER) (test code = 2.7 mg/dL    2.3-4.7              

     



             604)                                                



 ID - MMPOCT-GLUCOSE [id number] 00:24:47





             Test Item    Value        Reference Range Interpretation Comments

 

             POC-GLUCOSE METER 105 mg/dL                        : TESTED A

T [facility] 6720



             (BEAKER) (test code =                                        [facility],



             1538)                                               [address]:



                                                                 /Techni

deejay ID



                                                                 = [id number] for SON MONTANA



POCT-GLUCOSE [id number] 12:59:07





             Test Item    Value        Reference Range Interpretation Comments

 

             POC-GLUCOSE METER 90 mg/dL                         : TESTED A

T [facility] 6720



             (BEAKER) (test code =                                        [facility],



             1538)                                               [address]:



                                                                 /Techni

deejay ID =



                                                                 [id number] for MIMI FOX



POCT-GLUCOSE [id number] 06:53:06





             Test Item    Value        Reference Range Interpretation Comments

 

             POC-GLUCOSE METER 79 mg/dL                         : TESTED A

T [facility] 6720



             (BEAKER) (test code =                                        [facility],



             1538)                                               [address]:



                                                                 /Techni

deejay ID =



                                                                 [id number] for SOPHIA RAY



[id number] 06:00:35





             Test Item    Value        Reference Range Interpretation Comments

 

             MAGNESIUM (BEAKER) (test code = 2.1 mg/dL    1.6-2.6               

    



             627)                                                



 ID - [id number] 06:00:35





             Test Item    Value        Reference Range Interpretation Comments

 

             PHOSPHORUS (BEAKER) (test code = 3.2 mg/dL    2.3-4.7              

     



             604)                                                



 ID - BSBASIC METABOLIC [id number] 06:00:34





             Test Item    Value        Reference Range Interpretation Comments

 

             SODIUM (BEAKER) 144 meq/L    136-145                   



             (test code = 381)                                        

 

             POTASSIUM    4.4 meq/L    3.5-5.1                   



             (BEAKER) (test                                        



             code = 379)                                         

 

             CHLORIDE (BEAKER) 111 meq/L           H            



             (test code = 382)                                        

 

             CO2 (BEAKER) 25 meq/L     22-29                     



             (test code = 355)                                        

 

             BLOOD UREA   35 mg/dL     7-21         H            



             NITROGEN (BEAKER)                                        



             (test code = 354)                                        

 

             CREATININE   0.87 mg/dL   0.57-1.25                 



             (BEAKER) (test                                        



             code = 358)                                         

 

             GLUCOSE RANDOM 81 mg/dL                         



             (BEAKER) (test                                        



             code = 652)                                         

 

             CALCIUM (BEAKER) 8.9 mg/dL    8.4-10.2                  



             (test code = 697)                                        

 

             EGFR (BEAKER) 102                                     Interpretatio

n of eGFR



             (test code = mL/min/1.73                            values Stage De

scription



             1092)        sq m                                   Result G1 Althea

l or high



                                                                 >=90 G2 Mildly 

decreased



                                                                 60-89 G3a Mildl

y to



                                                                 moderately 45-5

9 G3b



                                                                 Moderately to s

everely



                                                                 30-44  G4 Sever

ly



                                                                 decreased 15-29

 G5 Kidney



                                                                 failure <15Repo

rted eGFR



                                                                 is based on the

 CKD-EPI



                                                                  equation t

hat does



                                                                 not use a race



                                                                 coefficientEsti

mated GFR



                                                                 is not as accur

ate as



                                                                 Creatinine Porsha

ekaterina in



                                                                 predicting glom

erular



                                                                 filtration rate

. Estimated



                                                                 GFR is not appl

icable for



                                                                 dialysis patien

ts



 ID - BSCBC W/PLT COUNT &amp; AUTO [id number] 05:31:38





             Test Item    Value        Reference Range Interpretation Comments

 

             WHITE BLOOD CELL COUNT (BEAKER) 10.1 K/ L    3.5-10.5              

    



             (test code = 775)                                        

 

             RED BLOOD CELL COUNT (BEAKER) 4.79 M/ L    4.63-6.08               

  



             (test code = 761)                                        

 

             HEMOGLOBIN (BEAKER) (test code = 12.4 GM/DL   13.7-17.5    L       

     



             410)                                                

 

             HEMATOCRIT (BEAKER) (test code = 40.1 %       40.1-51.0            

     



             411)                                                

 

             MEAN CORPUSCULAR VOLUME (BEAKER) 84 fL        79-92                

     



             (test code = 753)                                        

 

             MEAN CORPUSCULAR HEMOGLOBIN 25.9 pg      25.7-32.2                 



             (BEAKER) (test code = 751)                                        

 

             MEAN CORPUSCULAR HEMOGLOBIN CONC 30.9 GM/DL   32.3-36.5    L       

     



             (BEAKER) (test code = 752)                                        

 

             RED CELL DISTRIBUTION WIDTH 14.1 %       11.6-14.4                 



             (BEAKER) (test code = 412)                                        

 

             PLATELET COUNT (BEAKER) (test 392 K/CU MM  150-450                 

  



             code = 756)                                         

 

             MEAN PLATELET VOLUME (BEAKER) 9.7 fL       9.4-12.4                

  



             (test code = 754)                                        

 

             NUCLEATED RED BLOOD CELLS 0 /100 WBC   0-0                       



             (BEAKER) (test code = 413)                                        

 

             NEUTROPHILS RELATIVE PERCENT 78 %                                  

 



             (BEAKER) (test code = 429)                                        

 

             LYMPHOCYTES RELATIVE PERCENT 11 %                                  

 



             (BEAKER) (test code = 430)                                        

 

             MONOCYTES RELATIVE PERCENT 8 %                                    



             (BEAKER) (test code = 431)                                        

 

             EOSINOPHILS RELATIVE PERCENT 2 %                                   

 



             (BEAKER) (test code = 432)                                        

 

             BASOPHILS RELATIVE PERCENT 1 %                                    



             (BEAKER) (test code = 437)                                        

 

             NEUTROPHILS ABSOLUTE COUNT 7.85 K/ L    1.78-5.38    H            



             (BEAKER) (test code = 670)                                        

 

             LYMPHOCYTES ABSOLUTE COUNT 1.12 K/ L    1.32-3.57    L            



             (BEAKER) (test code = 414)                                        

 

             MONOCYTES ABSOLUTE COUNT (BEAKER) 0.76 K/ L    0.30-0.82           

      



             (test code = 415)                                        

 

             EOSINOPHILS ABSOLUTE COUNT 0.24 K/ L    0.04-0.54                 



             (BEAKER) (test code = 416)                                        

 

             BASOPHILS ABSOLUTE COUNT (BEAKER) 0.08 K/ L    0.01-0.08           

      



             (test code = 417)                                        

 

             IMMATURE GRANULOCYTES-RELATIVE 0.50 %       0.00-1.00              

   



             PERCENT (BEAKER) (test code =                                      

  



             2801)                                               



POCT-GLUCOSE [id number] 01:00:35





             Test Item    Value        Reference Range Interpretation Comments

 

             POC-GLUCOSE METER 93 mg/dL                         : TESTED A

T [facility] [address]



             (BEAKER) (test code =                                        [facility],



             153)                                               [address]:



                                                                 /Techni

deejay ID =



                                                                 [id number] for SOPHIA RAY



POCT-GLUCOSE [id number] 18:15:53





             Test Item    Value        Reference Range Interpretation Comments

 

             POC-GLUCOSE METER 88 mg/dL                         : TESTED A

T [facility] 6720



             (BEAKER) (test code =                                        [facility],



             1538)                                               [address]:



                                                                 /Techni

deejay ID =



                                                                 [id number] for EDUARDO FINE



POCT-GLUCOSE [id number] 13:34:49





             Test Item    Value        Reference Range Interpretation Comments

 

             POC-GLUCOSE METER 100 mg/dL                        : TESTED A

T [facility] [address]



             (BEAKER) (test code =                                        [facility],



             1538)                                               [address]:



                                                                 /Techni

deejay ID



                                                                 = [id number] for EDUARDO DUTTA



PROTHROMBIN [id number] 06:38:54





             Test Item    Value        Reference Range Interpretation Comments

 

             PROTIME (BEAKER) (test code = 14.7 seconds 11.9-14.2    H          

  



             759)                                                

 

             INR (BEAKER) (test code = 370) 1.23         <=5.90                 

   



RECOMMENDED COUMADIN/WARFARIN INR THERAPY RANGESSTANDARD DOSE: 2.0 - 3.0 
Includes: PROPHYLAXIS for venous thrombosis, systemic embolization; TREATMENT 
for venous thrombosis and/or pulmonary embolus.HIGH RISK: Target INR is 2.5-3.5 
for patients with mechanical heart valves.POCT-GLUCOSE [id number] 06:23:43





             Test Item    Value        Reference Range Interpretation Comments

 

             POC-GLUCOSE METER 108 mg/dL                        : TESTED A

T [facility] [address]



             (BEAKER) (test code =                                        [facility],



             1538)                                               [address]:



                                                                 /Techni

deejay ID



                                                                 = [id number] for VAZQUEZ SIERRA



[id number] 04:40:04





             Test Item    Value        Reference Range Interpretation Comments

 

             MAGNESIUM (BEAKER) (test code = 2.0 mg/dL    1.6-2.6               

    



             627)                                                



 ID - [id number] 04:40:04





             Test Item    Value        Reference Range Interpretation Comments

 

             PHOSPHORUS (BEAKER) (test code = 3.1 mg/dL    2.3-4.7              

     



             604)                                                



 ID - DBBASIC METABOLIC [id number] 04:40:03





             Test Item    Value        Reference Range Interpretation Comments

 

             SODIUM (BEAKER) 143 meq/L    136-145                   



             (test code = 381)                                        

 

             POTASSIUM    3.8 meq/L    3.5-5.1                   



             (BEAKER) (test                                        



             code = 379)                                         

 

             CHLORIDE (BEAKER) 109 meq/L           H            



             (test code = 382)                                        

 

             CO2 (BEAKER) 25 meq/L     22-29                     



             (test code = 355)                                        

 

             BLOOD UREA   36 mg/dL     7-21         H            



             NITROGEN (BEAKER)                                        



             (test code = 354)                                        

 

             CREATININE   0.92 mg/dL   0.57-1.25                 



             (BEAKER) (test                                        



             code = 358)                                         

 

             GLUCOSE RANDOM 110 mg/dL           H            



             (BEAKER) (test                                        



             code = 652)                                         

 

             CALCIUM (BEAKER) 8.7 mg/dL    8.4-10.2                  



             (test code = 697)                                        

 

             EGFR (BEAKER) 99                                      Interpretatio

n of eGFR



             (test code = mL/min/1.73                            values  Stage D

escription



             1092)        sq m                                   Result G1 Althea

l or high



                                                                 >=90 G2 Mildly 

decreased



                                                                 60-89 G3a Mildl

y to



                                                                 moderately 45-5

9 G3b



                                                                 Moderately to s

everely



                                                                 30-44 G4 Severl

y decreased



                                                                 15-29 G5 Kidney

 failure



                                                                 <15Reported eGF

R is based



                                                                 on the CKD-EPI 





                                                                 equation that d

oes not use



                                                                 a race



                                                                 coefficientEsti

mated GFR



                                                                 is not as accur

ate as



                                                                 Creatinine Porsha

ekaterina in



                                                                 predicting glom

erular



                                                                 filtration rate

. Estimated



                                                                 GFR is not appl

icable for



                                                                 dialysis patien

ts



 ID - DBCBC W/PLT COUNT &amp; AUTO [id number] 04:38:30





             Test Item    Value        Reference Range Interpretation Comments

 

             WHITE BLOOD CELL COUNT (BEAKER) 11.4 K/ L    3.5-10.5     H        

    



             (test code = 775)                                        

 

             RED BLOOD CELL COUNT (BEAKER) 4.69 M/ L    4.63-6.08               

  



             (test code = 761)                                        

 

             HEMOGLOBIN (BEAKER) (test code = 11.9 GM/DL   13.7-17.5    L       

     



             410)                                                

 

             HEMATOCRIT (BEAKER) (test code = 37.9 %       40.1-51.0    L       

     



             411)                                                

 

             MEAN CORPUSCULAR VOLUME (BEAKER) 81 fL        79-92                

     



             (test code = 753)                                        

 

             MEAN CORPUSCULAR HEMOGLOBIN 25.4 pg      25.7-32.2    L            



             (BEAKER) (test code = 751)                                        

 

             MEAN CORPUSCULAR HEMOGLOBIN CONC 31.4 GM/DL   32.3-36.5    L       

     



             (BEAKER) (test code = 752)                                        

 

             RED CELL DISTRIBUTION WIDTH 14.5 %       11.6-14.4    H            



             (BEAKER) (test code = 412)                                        

 

             PLATELET COUNT (BEAKER) (test 455 K/CU MM  150-450      H          

  



             code = 756)                                         

 

             MEAN PLATELET VOLUME (BEAKER) 9.6 fL       9.4-12.4                

  



             (test code = 754)                                        

 

             NUCLEATED RED BLOOD CELLS 0 /100 WBC   0-0                       



             (BEAKER) (test code = 413)                                        

 

             NEUTROPHILS RELATIVE PERCENT 76 %                                  

 



             (BEAKER) (test code = 429)                                        

 

             LYMPHOCYTES RELATIVE PERCENT 12 %                                  

 



             (BEAKER) (test code = 430)                                        

 

             MONOCYTES RELATIVE PERCENT 9 %                                    



             (BEAKER) (test code = 431)                                        

 

             EOSINOPHILS RELATIVE PERCENT 3 %                                   

 



             (BEAKER) (test code = 432)                                        

 

             BASOPHILS RELATIVE PERCENT 1 %                                    



             (BEAKER) (test code = 437)                                        

 

             NEUTROPHILS ABSOLUTE COUNT 8.59 K/ L    1.78-5.38    H            



             (BEAKER) (test code = 670)                                        

 

             LYMPHOCYTES ABSOLUTE COUNT 1.32 K/ L    1.32-3.57                 



             (BEAKER) (test code = 414)                                        

 

             MONOCYTES ABSOLUTE COUNT (BEAKER) 1.01 K/ L    0.30-0.82    H      

      



             (test code = 415)                                        

 

             EOSINOPHILS ABSOLUTE COUNT 0.29 K/ L    0.04-0.54                 



             (BEAKER) (test code = 416)                                        

 

             BASOPHILS ABSOLUTE COUNT (BEAKER) 0.07 K/ L    0.01-0.08           

      



             (test code = 417)                                        

 

             IMMATURE GRANULOCYTES-RELATIVE 0.60 %       0.00-1.00              

   



             PERCENT (BEAKER) (test code =                                      

  



             2801)                                               



POCT-GLUCOSE [id number] 23:40:37





             Test Item    Value        Reference Range Interpretation Comments

 

             POC-GLUCOSE METER 140 mg/dL           H            : TESTED A

T [facility] [address]



             (BEAKER) (test code =                                        [facility],



             153)                                               [address]:



                                                                 /Techni

deejay ID



                                                                 = [id number] for VAZQUEZ SIERRA



POCT-GLUCOSE [id number] 17:58:13





             Test Item    Value        Reference Range Interpretation Comments

 

             POC-GLUCOSE METER 118 mg/dL           H            : TESTED A

T [facility] [address]



             (BEAKER) (test code =                                        [facility],



             153)                                               [address]:



                                                                 /Techni

deejay ID



                                                                 = [id number] for An

derson,



                                                                 Vannesa



POCT-GLUCOSE [id number] 12:12:53





             Test Item    Value        Reference Range Interpretation Comments

 

             POC-GLUCOSE METER 123 mg/dL           H            : TESTED A

T [facility] [address]



             (BEAKER) (test code =                                        [facility],



             153)                                               [address]:



                                                                 /Techni

deejay ID



                                                                 = [id number] for An

derson,



                                                                 Vannesa



POCT-GLUCOSE [id number] 06:03:50





             Test Item    Value        Reference Range Interpretation Comments

 

             POC-GLUCOSE METER 120 mg/dL           H            : TESTED A

T [facility] [address]



             (BEAKER) (test code =                                        [facility] [place],



             1538)                                               [address]:



                                                                 /Techni

deejay ID



                                                                 = [id number] for SON MONTANA



[id number] 06:03:44





             Test Item    Value        Reference Range Interpretation Comments

 

             PHOSPHORUS (BEAKER) (test code = 3.2 mg/dL    2.3-4.7              

     



             604)                                                



 ID - [id number] 06:03:43





             Test Item    Value        Reference Range Interpretation Comments

 

             MAGNESIUM (BEAKER) (test code = 2.1 mg/dL    1.6-2.6               

    



             627)                                                



 ID - BSBASIC METABOLIC [id number] 06:03:42





             Test Item    Value        Reference Range Interpretation Comments

 

             SODIUM (BEAKER) 139 meq/L    136-145                   



             (test code = 381)                                        

 

             POTASSIUM    4.1 meq/L    3.5-5.1                   



             (BEAKER) (test                                        



             code = 379)                                         

 

             CHLORIDE (BEAKER) 107 meq/L                        



             (test code = 382)                                        

 

             CO2 (BEAKER) 25 meq/L     22-29                     



             (test code = 355)                                        

 

             BLOOD UREA   37 mg/dL     7-21         H            



             NITROGEN (BEAKER)                                        



             (test code = 354)                                        

 

             CREATININE   0.93 mg/dL   0.57-1.25                 



             (BEAKER) (test                                        



             code = 358)                                         

 

             GLUCOSE RANDOM 125 mg/dL           H            



             (BEAKER) (test                                        



             code = 652)                                         

 

             CALCIUM (BEAKER) 8.8 mg/dL    8.4-10.2                  



             (test code = 697)                                        

 

             EGFR (BEAKER) 98                                      Interpretatio

n of eGFR



             (test code = mL/min/1.73                            values Stage De

scription



             1092)        sq m                                   Result G1 Althea

l or high



                                                                 >=90 G2 Mildly 

decreased



                                                                 60-89 G3a Mildl

y to



                                                                 moderately 45-5

9 G3b



                                                                 Moderately to s

everely



                                                                 30-44 G4 Severl

y decreased



                                                                 15-29 G5 Kidney

 failure



                                                                 <15Reported eGF

R is based



                                                                 on the CKD-EPI 

2021



                                                                 equation that d

oes not use



                                                                 a race



                                                                 coefficientEsti

mated GFR



                                                                 is not as accur

ate as



                                                                 Creatinine Porsha

ekaterina in



                                                                 predicting glom

erular



                                                                 filtration rate

. Estimated



                                                                 GFR is not appl

icable for



                                                                 dialysis patien

ts



 ID - BSCBC W/PLT COUNT &amp; AUTO [id number] 05:13:39





             Test Item    Value        Reference Range Interpretation Comments

 

             WHITE BLOOD CELL COUNT (BEAKER) 17.6 K/ L    3.5-10.5     H        

    



             (test code = 775)                                        

 

             RED BLOOD CELL COUNT (BEAKER) 4.84 M/ L    4.63-6.08               

  



             (test code = 761)                                        

 

             HEMOGLOBIN (BEAKER) (test code = 12.7 GM/DL   13.7-17.5    L       

     



             410)                                                

 

             HEMATOCRIT (BEAKER) (test code = 38.9 %       40.1-51.0    L       

     



             411)                                                

 

             MEAN CORPUSCULAR VOLUME (BEAKER) 80 fL        79-92                

     



             (test code = 753)                                        

 

             MEAN CORPUSCULAR HEMOGLOBIN 26.2 pg      25.7-32.2                 



             (BEAKER) (test code = 751)                                        

 

             MEAN CORPUSCULAR HEMOGLOBIN CONC 32.6 GM/DL   32.3-36.5            

     



             (BEAKER) (test code = 752)                                        

 

             RED CELL DISTRIBUTION WIDTH 14.5 %       11.6-14.4    H            



             (BEAKER) (test code = 412)                                        

 

             PLATELET COUNT (BEAKER) (test 438 K/CU MM  150-450                 

  



             code = 756)                                         

 

             MEAN PLATELET VOLUME (BEAKER) 9.6 fL       9.4-12.4                

  



             (test code = 754)                                        

 

             NUCLEATED RED BLOOD CELLS 0 /100 WBC   0-0                       



             (BEAKER) (test code = 413)                                        

 

             NEUTROPHILS RELATIVE PERCENT 81 %                                  

 



             (BEAKER) (test code = 429)                                        

 

             LYMPHOCYTES RELATIVE PERCENT 8 %                                   

 



             (BEAKER) (test code = 430)                                        

 

             MONOCYTES RELATIVE PERCENT 8 %                                    



             (BEAKER) (test code = 431)                                        

 

             EOSINOPHILS RELATIVE PERCENT 2 %                                   

 



             (BEAKER) (test code = 432)                                        

 

             BASOPHILS RELATIVE PERCENT 1 %                                    



             (BEAKER) (test code = 437)                                        

 

             NEUTROPHILS ABSOLUTE COUNT 14.33 K/ L   1.78-5.38    H            



             (BEAKER) (test code = 670)                                        

 

             LYMPHOCYTES ABSOLUTE COUNT 1.43 K/ L    1.32-3.57                 



             (BEAKER) (test code = 414)                                        

 

             MONOCYTES ABSOLUTE COUNT (BEAKER) 1.35 K/ L    0.30-0.82    H      

      



             (test code = 415)                                        

 

             EOSINOPHILS ABSOLUTE COUNT 0.26 K/ L    0.04-0.54                 



             (BEAKER) (test code = 416)                                        

 

             BASOPHILS ABSOLUTE COUNT (BEAKER) 0.08 K/ L    0.01-0.08           

      



             (test code = 417)                                        

 

             IMMATURE GRANULOCYTES-RELATIVE 0.90 %       0.00-1.00              

   



             PERCENT (TONIO) (test code =                                      

  



             2801)                                               



POCT-GLUCOSE [id number] 00:21:43





             Test Item    Value        Reference Range Interpretation Comments

 

             POC-GLUCOSE METER 122 mg/dL           H            : TESTED A

[facility]



             (TONIO) (test code =                                        [facility],



             1538)                                               [address]:



                                                                 /Techni

deejay ID



                                                                 = [id number] for SON MONTANA



POCT-GLUCOSE [id number] 17:44:59





             Test Item    Value        Reference Range Interpretation Comments

 

             POC-GLUCOSE METER 91 mg/dL                         : TESTED A

[facility]



             (TONIO) (test code =                                        [facility]

[facility],



             1538)                                               [address]:



                                                                 /Techni

deejay ID =



                                                                 [id number] for MIMI FOX



XR ABDOMEN/KUB 1 VIEW [id number] 15:07:20
************************************************************[facility], ROBERT : 1965 Sex: 
M************************************************************TECHNIQUE:XR 
ABDOMEN/KUB 1 VIEW PORTABLEINDICATION: NG tube placement.COMPARISON: 
2023FINDINGS:Feeding tube tip projects over the second portion of the 
duodenum. Acatheter projects over the pelvis. Nonobstructive bowel gas 
pattern.Supine radiographs are insensitive for detection of free 
intraperitonealair.IMPRESSION:1. Feeding tube tip projects over the second 
portion of the duodenum.Electronically SignedBy: Ravi Fisher2023 
15:09 CDTWorkstation Name: [id number]-GLUCOSE [id number] 11:55:25





             Test Item    Value        Reference Range Interpretation Comments

 

             POC-GLUCOSE METER 85 mg/dL                         : TESTED A

T [facility] [address]



             (BEAKER) (test code =                                        [place],



             [address]38)                                               [id number]:



                                                                 /Techni

deejay ID =



                                                                 [id number] for MIMI FOX



(CELLAVISION MANUAL DIFF)2023 06:41:39





             Test Item    Value        Reference Range Interpretation Comments

 

             NEUTROPHILS - REL 88 %                                   



             (CELLAVISION)(BEAKER) (test code                                   

     



             = 2816)                                             

 

             LYMPHOCYTES - REL 7 %                                    



             (CELLAVISION)(BEAKER) (test code                                   

     



             = 2817)                                             

 

             MONOCYTES - REL 3 %                                    



             (CELLAVISION)(BEAKER) (test code                                   

     



             = 2818)                                             

 

             EOSINOPHILS - REL 1 %                                    



             (CELLAVISION)(BEAKER) (test code                                   

     



             = 2819)                                             

 

             BASOPHILS - REL 1 %                                    



             (CELLAVISION)(BEAKER) (test code                                   

     



             = 2820)                                             

 

             NEUTROPHILS - ABS 16.10 K/ul   1.78-5.38    H            



             (CELLAVISION)(BEAKER) (test code                                   

     



             = 2830)                                             

 

             LYMPHOCYTES - ABS 1.28 K/ul    1.32-3.57    L            



             (CELLAVISION)(BEAKER) (test code                                   

     



             = 2831)                                             

 

             MONOCYTES - ABS 0.55 K/uL    0.30-0.82                 



             (CELLAVISION)(BEAKER) (test code                                   

     



             = 2832)                                             

 

             EOSINOPHILS - ABS 0.18 K/uL    0.04-0.54                 



             (CELLAVISION)(BEAKER) (test code                                   

     



             = 2834)                                             

 

             BASOPHILS - ABS 0.18 K/uL    0.01-0.08    H            



             (CELLAVISION)(BEAKER) (test code                                   

     



             = 2835)                                             

 

             TOTAL COUNTED (BEAKER) (test code 100                              

      



             = 1351)                                             

 

             WBC MORPHOLOGY (BEAKER) (test Normal                               

  



             code = 487)                                         

 

             PLT MORPHOLOGY (BEAKER) (test Normal                               

  



             code = 486)                                         

 

             ANISOCYTOSIS (BEAKER) (test code 2+ moderate                       

     



             = 961)                                              

 

             MICROCYTES (BEAKER) (test code = 2+ moderate                       

     



             965)                                                

 

             POIKILOCYTES (BEAKER) (test code 1+ few                            

     



             = 966)                                              

 

             OVALOCYTES (BEAKER) (test code = 1+ few                            

     



             477)                                                

 

             ARTIFACT (CELLAVISION)(BEAKER) Present                             

   



             (test code = 3432)                                        

 

             PLATELET CONCENTRATION Adequate                               



             (CELLAVISION)(BEAKER) (test code                                   

     



             = 3438)                                             



 ID - Laurie Bashir comments: Slide comments:CBC W/PLT COUNT 
&amp; AUTO [id number] 06:41:38





             Test Item    Value        Reference Range Interpretation Comments

 

             WHITE BLOOD CELL COUNT (BEAKER) 18.3 K/ L    3.5-10.5     H        

    



             (test code = 775)                                        

 

             RED BLOOD CELL COUNT (BEAKER) 5.21 M/ L    4.63-6.08               

  



             (test code = 761)                                        

 

             HEMOGLOBIN (BEAKER) (test code = 13.5 GM/DL   13.7-17.5    L       

     



             410)                                                

 

             HEMATOCRIT (BEAKER) (test code = 41.8 %       40.1-51.0            

     



             411)                                                

 

             MEAN CORPUSCULAR VOLUME (BEAKER) 80 fL        79-92                

     



             (test code = 753)                                        

 

             MEAN CORPUSCULAR HEMOGLOBIN 25.9 pg      25.7-32.2                 



             (BEAKER) (test code = 751)                                        

 

             MEAN CORPUSCULAR HEMOGLOBIN CONC 32.3 GM/DL   32.3-36.5            

     



             (BEAKER) (test code = 752)                                        

 

             RED CELL DISTRIBUTION WIDTH 14.6 %       11.6-14.4    H            



             (BEAKER) (test code = 412)                                        

 

             PLATELET COUNT (BEAKER) (test 410 K/CU MM  150-450                 

  



             code = 756)                                         

 

             MEAN PLATELET VOLUME (BEAKER) 9.4 fL       9.4-12.4                

  



             (test code = 754)                                        

 

             NUCLEATED RED BLOOD CELLS 0 /100 WBC   0-0                       



             (BEAKER) (test code = 413)                                        



POCT-GLUCOSE [id number] 06:12:53





             Test Item    Value        Reference Range Interpretation Comments

 

             POC-GLUCOSE METER 98 mg/dL                         : TESTED A

T [facility] [address]



             (BEAKER) (test code =                                        [place],



             [address])                                               [address]:



                                                                 /Techni

deejay ID =



                                                                 [id number] for SON GLYNN



[id number] 04:43:04





             Test Item    Value        Reference Range Interpretation Comments

 

             PHOSPHORUS (BEAKER) (test code = 3.5 mg/dL    2.3-4.7              

     



             604)                                                



 ID - EOOBASIC METABOLIC [id number] 04:43:03





             Test Item    Value        Reference Range Interpretation Comments

 

             SODIUM (BEAKER) 139 meq/L    136-145                   



             (test code = 381)                                        

 

             POTASSIUM    4.2 meq/L    3.5-5.1                   



             (BEAKER) (test                                        



             code = 379)                                         

 

             CHLORIDE (BEAKER) 107 meq/L                        



             (test code = 382)                                        

 

             CO2 (BEAKER) 23 meq/L     22-29                     



             (test code = 355)                                        

 

             BLOOD UREA   34 mg/dL     7-21         H            



             NITROGEN (BEAKER)                                        



             (test code = 354)                                        

 

             CREATININE   0.99 mg/dL   0.57-1.25                 



             (BEAKER) (test                                        



             code = 358)                                         

 

             GLUCOSE RANDOM 96 mg/dL                         



             (BEAKER) (test                                        



             code = 652)                                         

 

             CALCIUM (BEAKER) 9.0 mg/dL    8.4-10.2                  



             (test code = 697)                                        

 

             EGFR (BEAKER) 90                                      Interpretatio

n of eGFR



             (test code = mL/min/1.73                            values Stage De

scription



             1092)        sq m                                   Result G1 Althea

l or high



                                                                 >=90 G2 Mildly 

decreased



                                                                 60-89 G3a Mildl

y to



                                                                 moderately 45-5

9 G3b



                                                                 Moderately to s

everely



                                                                 30-44 G4 Severl

y decreased



                                                                 15-29 G5 Kidney

 failure



                                                                 <15Reported eGF

R is based



                                                                 on the CKD-EPI 





                                                                 equation that d

oes not use



                                                                 a race



                                                                 coefficientEsti

mated GFR



                                                                 is not as accur

ate as



                                                                 Creatinine Porsha

ekaterina in



                                                                 predicting glom

erular



                                                                 filtration rate

. Estimated



                                                                 GFR is not appl

icable for



                                                                 dialysis patien

ts



 ID - [id number] 04:43:03





             Test Item    Value        Reference Range Interpretation Comments

 

             MAGNESIUM (BEAKER) (test code = 2.1 mg/dL    1.6-2.6               

    



             627)                                                



 ID - EOOPOCT-GLUCOSE [id number] 23:57:38





             Test Item    Value        Reference Range Interpretation Comments

 

             POC-GLUCOSE METER 93 mg/dL                         : TESTED A

T [facility] [address]



             (BEAKER) (test code =                                        [facility],



             [address]38)                                               [address]:



                                                                 /Techni

deejay ID =



                                                                 [id number] for DOMENICO

RANI



                                                                 ZITACHINEDU



POCT-GLUCOSE [id number] 19:00:28





             Test Item    Value        Reference Range Interpretation Comments

 

             POC-GLUCOSE METER 125 mg/dL           H            : TESTED A

T [facility] [address]



             (BEAKER) (test code =                                        [facility],



             [address]38)                                               [address]:



                                                                 /Techni

deejay ID



                                                                 = [id number] for Danielito Montana



POCT-GLUCOSE [id number] 12:41:10





             Test Item    Value        Reference Range Interpretation Comments

 

             POC-GLUCOSE METER 116 mg/dL           H            : TESTED A

T [facility] [address]



             (BEAKER) (test code =                                        [place],



             [address])                                               [address]:



                                                                 /Techni

deejay ID



                                                                 = [id number] for Danielito Montana



(CELLAVISION MANUAL DIFF)2023 07:54:05





             Test Item    Value        Reference Range Interpretation Comments

 

             NEUTROPHILS - REL 90 %                                   



             (CELLAVISION)(BEAKER) (test code =                                 

       



             2816)                                               

 

             LYMPHOCYTES - REL 5 %                                    



             (CELLAVISION)(BEAKER) (test code =                                 

       



             2817)                                               

 

             MONOCYTES - REL 3 %                                    



             (CELLAVISION)(BEAKER) (test code =                                 

       



             2818)                                               

 

             ATYPICAL LYMPHOCYTES - REL 2 %          0-0          H            



             (CELLAVISION)(BEAKER) (test code =                                 

       



             2829)                                               

 

             NEUTROPHILS - ABS 23.76 K/ul   1.78-5.38    H            



             (CELLAVISION)(BEAKER) (test code =                                 

       



             2830)                                               

 

             LYMPHOCYTES - ABS 1.32 K/ul    1.32-3.57                 



             (CELLAVISION)(BEAKER) (test code =                                 

       



             2831)                                               

 

             MONOCYTES - ABS 0.79 K/uL    0.30-0.82                 



             (CELLAVISION)(BEAKER) (test code =                                 

       



             2832)                                               

 

             ATYPICAL LYMPHOCYTES - ABS 0.53 K/uL    0.00-0.00    H            



             (CELLAVISION)(BEAKER) (test code =                                 

       



             2858)                                               

 

             TOTAL COUNTED (BEAKER) (test code 100                              

      



             = 1351)                                             

 

             WBC MORPHOLOGY (BEAKER) (test code Normal                          

       



             = 487)                                              

 

             GIANT PLATELETS (BEAKER) (test Present                             

   



             code = 313)                                         

 

             ANISOCYTOSIS (BEAKER) (test code = 1+ few                          

       



             961)                                                

 

             MICROCYTES (BEAKER) (test code = 1+ few                            

     



             965)                                                

 

             POIKILOCYTES (BEAKER) (test code = 1+ few                          

       



             966)                                                

 

             ELLIPTOCYTES (BEAKER) (test code = 1+ few                          

       



             962)                                                

 

             ARTIFACT (CELLAVISION)(BEAKER) Present                             

   



             (test code = 3432)                                        

 

             PLATELET CONCENTRATION Adequate                               



             (CELLAVISION)(BEAKER) (test code =                                 

       



             3438)                                               



 ID - rhoda Robertson comments: Slide comments:CBC W/PLT COUNT &amp; 
AUTO [id number] 07:54:04





             Test Item    Value        Reference Range Interpretation Comments

 

             WHITE BLOOD CELL COUNT (BEAKER) 26.4 K/ L    3.5-10.5     H        

    



             (test code = 775)                                        

 

             RED BLOOD CELL COUNT (BEAKER) 5.51 M/ L    4.63-6.08               

  



             (test code = 761)                                        

 

             HEMOGLOBIN (BEAKER) (test code = 14.1 GM/DL   13.7-17.5            

     



             410)                                                

 

             HEMATOCRIT (BEAKER) (test code = 44.0 %       40.1-51.0            

     



             411)                                                

 

             MEAN CORPUSCULAR VOLUME (BEAKER) 80 fL        79-92                

     



             (test code = 753)                                        

 

             MEAN CORPUSCULAR HEMOGLOBIN 25.6 pg      25.7-32.2    L            



             (BEAKER) (test code = 751)                                        

 

             MEAN CORPUSCULAR HEMOGLOBIN CONC 32.0 GM/DL   32.3-36.5    L       

     



             (BEAKER) (test code = 752)                                        

 

             RED CELL DISTRIBUTION WIDTH 14.7 %       11.6-14.4    H            



             (BEAKER) (test code = 412)                                        

 

             PLATELET COUNT (BEAKER) (test 426 K/CU MM  150-450                 

  



             code = 756)                                         

 

             MEAN PLATELET VOLUME (BEAKER) 9.8 fL       9.4-12.4                

  



             (test code = 754)                                        

 

             NUCLEATED RED BLOOD CELLS 0 /100 WBC   0-0                       



             (BEAKER) (test code = 413)                                        



POCT-GLUCOSE [id number] 07:17:47





             Test Item    Value        Reference Range Interpretation Comments

 

             POC-GLUCOSE METER 128 mg/dL           H            : TESTED A

T [facility] [address]



             (BEAKER) (test code =                                        [place],



             [address])                                               [address]:



                                                                 /Techni

deejay ID



                                                                 = [id number] for Ch

triston,



                                                                 Chelle



[id number] 06:22:58





             Test Item    Value        Reference Range Interpretation Comments

 

             MAGNESIUM (BEAKER) 2.1 mg/dL    1.6-2.6                   Specimen 

slightly



             (test code = 627)                                        hemolyzed



 ID - CHIKIS [id number] 06:22:58





             Test Item    Value        Reference Range Interpretation Comments

 

             PHOSPHORUS (BEAKER) 3.6 mg/dL    2.3-4.7                   Specimen

 slightly



             (test code = 604)                                        hemolyzed



 ID Yo DIOP WBASIC METABOLIC [id number] 06:22:58





             Test Item    Value        Reference Range Interpretation Comments

 

             SODIUM (BEAKER) 139 meq/L    136-145                   



             (test code = 381)                                        

 

             POTASSIUM    4.8 meq/L    3.5-5.1                   Specimen slight

ly



             (BEAKER) (test                                        hemolyzed



             code = 379)                                         

 

             CHLORIDE (BEAKER) 106 meq/L                        



             (test code = 382)                                        

 

             CO2 (BEAKER) 25 meq/L     22-29                     



             (test code = 355)                                        

 

             BLOOD UREA   29 mg/dL     7-21         H            



             NITROGEN (BEAKER)                                        



             (test code = 354)                                        

 

             CREATININE   0.95 mg/dL   0.57-1.25                 Specimen slight

ly



             (BEAKER) (test                                        hemolyzed



             code = 358)                                         

 

             GLUCOSE RANDOM 112 mg/dL           H            



             (BEAKER) (test                                        



             code = 652)                                         

 

             CALCIUM (BEAKER) 9.4 mg/dL    8.4-10.2                  



             (test code = 697)                                        

 

             EGFR (BEAKER) 95                                       Interpretati

on of eGFR



             (test code = mL/min/1.73                            values Stage De

scription



             1092)        sq m                                   Result G1 Althea

l or high



                                                                 >=90 G2 Mildly 

decreased



                                                                 60-89 G3a Mildl

y to



                                                                 moderately 45-5

9 G3b



                                                                 Moderately to s

everely



                                                                 30-44 G4 Severl

y decreased



                                                                 15-29 G5 Kidney

 failure



                                                                 <15Reported eGF

R is based



                                                                 on the CKD-EPI 





                                                                 equation that d

oes not use



                                                                 a race



                                                                 coefficientEsti

mated GFR



                                                                 is not as accur

ate as



                                                                 Creatinine Porsha tobar in



                                                                 predicting glom

erular



                                                                 filtration rate

. Estimated



                                                                 GFR is not appl

icable for



                                                                 dialysis patien

ts



 ID Yo DIOP WPOCT-GLUCOSE [id number] 23:56:34





             Test Item    Value        Reference Range Interpretation Comments

 

             POC-GLUCOSE METER 106 mg/dL                        : TESTED A

T [facility] [address]



             (BEAKER) (test code =                                        [place],



             1538)                                               [address]:



                                                                 /Techni

deejay ID



                                                                 = [id number] for Ch

Chelle goldstein



XR ABDOMEN/KUB 1 VIEW [id number] 20:53:28
************************************************************[facility] : 1965 Sex: 
M************************************************************EXAMINATION: XR 
ABDOMEN/KUB 1 VIEW PORTABLE INDICATION: NG tube placementCOMPARISON: KUB of 
23 FINDINGS/IMPRESSION:Enteric tube tip terminates in the proximal 
duodenum. No bowelobstruction or free air. No acute osseous 
injury.Electronically Signed By: Laura Treviño2023 20:55 CDTWorkstation Name:
 [id number] [id number] 18:09:40





             Test Item    Value        Reference Range Interpretation Comments

 

             POC-GLUCOSE METER 113 mg/dL           H            : TESTED A

T [facility] [address]



             (TONIO) (test code =                                        [place],



             1538)                                               [id number]:



                                                                 /Techni

deejay ID



                                                                 = [id number] for KOLE DIAZMARGARITA



                                                                 EDUARDO



XR CHEST 1 VIEW PORTABLE / [id number] 15:35:17
************************************************************[facility] : 1965 Sex: 
M************************************************************AP view ofthe chest
 dated 3COMPARISON: 3CLINICAL INFORMATION: follow up 
infiltartesComment: Heart is normal in size. Pulmonary vasculature 
isunremarkable. Opacity is seen in the left lower lobe suggestivepneumonia 
unchanged from prior study. The rest of lungs are clear. Nopleural effusion or
pneumothorax is seen. Feeding tube remains in place.IMPRESSION:Left lower lobe 
pneumonia unchanged from prior study.Electronically Signed By: Maciej Almonte2023 15:37 CDTWorkstation Name: [id number] [id number] 
12:18:49





             Test Item    Value        Reference Range Interpretation Comments

 

             POC-GLUCOSE METER 120 mg/dL           H            : TESTED A

T [facility] [address]



             (BEAKER) (test code =                                        [place],



             [address]38)                                               [address]:



                                                                 /Techni

deejay ID



                                                                 = [id number] for EDUARDO DUTTA



BLOOD [id number] 06:00:10





             Test Item    Value        Reference Range Interpretation Comments

 

             CULTURE (BEAKER) (test No growth in 5 days                         

  



             code = 1095)                                        



BLOOD [id number] 06:00:09





             Test Item    Value        Reference Range Interpretation Comments

 

             CULTURE (BEAKER) (test No growth in 5 days                         

  



             code = 1095)                                        



[id number] 04:53:04





             Test Item    Value        Reference Range Interpretation Comments

 

             MAGNESIUM (BEAKER) (test code = 2.1 mg/dL    1.6-2.6               

    



             627)                                                



[id number] 04:53:04





             Test Item    Value        Reference Range Interpretation Comments

 

             PHOSPHORUS (BEAKER) (test code = 3.1 mg/dL    2.3-4.7              

     



             604)                                                



BASIC METABOLIC [id number] 04:53:03





             Test Item    Value        Reference Range Interpretation Comments

 

             SODIUM (BEAKER) 137 meq/L    136-145                   



             (test code = 381)                                        

 

             POTASSIUM    4.4 meq/L    3.5-5.1                   



             (BEAKER) (test                                        



             code = 379)                                         

 

             CHLORIDE (BEAKER) 103 meq/L                        



             (test code = 382)                                        

 

             CO2 (BEAKER) 25 meq/L     22-29                     



             (test code = 355)                                        

 

             BLOOD UREA   34 mg/dL     7-21         H            



             NITROGEN (BEAKER)                                        



             (test code = 354)                                        

 

             CREATININE   0.87 mg/dL   0.57-1.25                 



             (BEAKER) (test                                        



             code = 358)                                         

 

             GLUCOSE RANDOM 122 mg/dL           H            



             (BEAKER) (test                                        



             code = 652)                                         

 

             CALCIUM (BEAKER) 8.7 mg/dL    8.4-10.2                  



             (test code = 697)                                        

 

             EGFR (BEAKER) 102                                      Interpretati

on of eGFR



             (test code = mL/min/1.73                            values Stage De

scription



             1092)        sq m                                   Result G1 Althea

l or high



                                                                 >=90 G2 Mildly 

decreased



                                                                 60-89 G3a Mildl

y to



                                                                 moderately 45-5

9 G3b



                                                                 Moderately to s

everely



                                                                 30-44 G4 Severl

y decreased



                                                                 15-29 G5 Kidney

 failure



                                                                 <15Reported eGF

R is based



                                                                 on the CKD-EPI 





                                                                 equation that d

oes not use



                                                                 a race



                                                                 coefficientEsti

mated GFR



                                                                 is not as accur

ate as



                                                                 Creatinine Porsha

ekaterina in



                                                                 predicting glom

erular



                                                                 filtration rate

. Estimated



                                                                 GFR is not appl

icable for



                                                                 dialysis patien

ts



CBC W/PLT COUNT &amp; AUTO [id number] 04:36:30





             Test Item    Value        Reference Range Interpretation Comments

 

             WHITE BLOOD CELL COUNT (BEAKER) 12.1 K/ L    3.5-10.5     H        

    



             (test code = 775)                                        

 

             RED BLOOD CELL COUNT (BEAKER) 5.10 M/ L    4.63-6.08               

  



             (test code = 761)                                        

 

             HEMOGLOBIN (BEAKER) (test code = 13.0 GM/DL   13.7-17.5    L       

     



             410)                                                

 

             HEMATOCRIT (BEAKER) (test code = 41.1 %       40.1-51.0            

     



             411)                                                

 

             MEAN CORPUSCULAR VOLUME (BEAKER) 81 fL        79-92                

     



             (test code = 753)                                        

 

             MEAN CORPUSCULAR HEMOGLOBIN 25.5 pg      25.7-32.2    L            



             (BEAKER) (test code = 751)                                        

 

             MEAN CORPUSCULAR HEMOGLOBIN CONC 31.6 GM/DL   32.3-36.5    L       

     



             (BEAKER) (test code = 752)                                        

 

             RED CELL DISTRIBUTION WIDTH 14.1 %       11.6-14.4                 



             (BEAKER) (test code = 412)                                        

 

             PLATELET COUNT (BEAKER) (test 350 K/CU MM  150-450                 

  



             code = 756)                                         

 

             MEAN PLATELET VOLUME (BEAKER) 9.7 fL       9.4-12.4                

  



             (test code = 754)                                        

 

             NUCLEATED RED BLOOD CELLS 0 /100 WBC   0-0                       



             (BEAKER) (test code = 413)                                        

 

             NEUTROPHILS RELATIVE PERCENT 76 %                                  

 



             (BEAKER) (test code = 429)                                        

 

             LYMPHOCYTES RELATIVE PERCENT 11 %                                  

 



             (BEAKER) (test code = 430)                                        

 

             MONOCYTES RELATIVE PERCENT 9 %                                    



             (BEAKER) (test code = 431)                                        

 

             EOSINOPHILS RELATIVE PERCENT 2 %                                   

 



             (BEAKER) (test code = 432)                                        

 

             BASOPHILS RELATIVE PERCENT 1 %                                    



             (BEAKER) (test code = 437)                                        

 

             NEUTROPHILS ABSOLUTE COUNT 9.23 K/ L    1.78-5.38    H            



             (BEAKER) (test code = 670)                                        

 

             LYMPHOCYTES ABSOLUTE COUNT 1.28 K/ L    1.32-3.57    L            



             (BEAKER) (test code = 414)                                        

 

             MONOCYTES ABSOLUTE COUNT (BEAKER) 1.08 K/ L    0.30-0.82    H      

      



             (test code = 415)                                        

 

             EOSINOPHILS ABSOLUTE COUNT 0.28 K/ L    0.04-0.54                 



             (BEAKER) (test code = 416)                                        

 

             BASOPHILS ABSOLUTE COUNT (BEAKER) 0.08 K/ L    0.01-0.08           

      



             (test code = 417)                                        

 

             IMMATURE GRANULOCYTES-RELATIVE 1.30 %       0.00-1.00    H         

   



             PERCENT (BEAKER) (test code =                                      

  



             2801)                                               



POCT-GLUCOSE [id number] 21:57:20





             Test Item    Value        Reference Range Interpretation Comments

 

             POC-GLUCOSE METER 121 mg/dL           H            : TESTED A

T [facility] [address]



             (BEAKER) (test code =                                        [facility],



             1538)                                               [address]:



                                                                 /Techni

deejay ID



                                                                 = [id number] for Lauren Wade



POCT-GLUCOSE [id number] 17:46:20





             Test Item    Value        Reference Range Interpretation Comments

 

             POC-GLUCOSE METER 109 mg/dL                        : TESTED A

T [facility] [address]



             (BEAKER) (test code =                                        [facility],



             1538)                                               [address]:



                                                                 /Techni

deejay ID



                                                                 = [id number] for Deon Gunterjohn



POCT-GLUCOSE [id number] 12:37:23





             Test Item    Value        Reference Range Interpretation Comments

 

             POC-GLUCOSE METER 94 mg/dL                         : TESTED A

T [facility] [address]



             (BEAKER) (test code =                                        [facility],



             1538)                                               [address]:



                                                                 /Techni

deejay ID =



                                                                 [id number] for Mackenzie mar



                                                                 Andresniels



XR ABDOMEN/KUB 1 VIEW [id number] 12:18:19
************************************************************[facility] : 1965 Sex: 
M************************************************************ONE VIEW AB
DOMENHISTORY: Feeding tube placementCOMPARISON:7/15/2023FINDINGS:2 supine AP 
images of the abdomen were obtained.The feeding tube has been advanced, with its
 tip now at the junction ofthe second and third portions of the duodenum.No 
dilated bowel loops are visualized.Electronically Signed By: Reese Núñez
2023 12:20 CDTWorkstation Name: [id number]-GLUCOSE [id number] 
08:28:32





             Test Item    Value        Reference Range Interpretation Comments

 

             POC-GLUCOSE METER 91 mg/dL                         : TESTED A

T [facility] [address]



             (BEAKER) (test code =                                        [facility],



             1538)                                               [address]:



                                                                 /Techni

deejay ID =



                                                                 [id number] for Winnie De Los Santos



CBC W/PLT COUNT &amp; AUTO [id number] 04:56:54





             Test Item    Value        Reference Range Interpretation Comments

 

             WHITE BLOOD CELL COUNT (BEAKER) 14.6 K/ L    3.5-10.5     H        

    



             (test code = 775)                                        

 

             RED BLOOD CELL COUNT (BEAKER) 5.16 M/ L    4.63-6.08               

  



             (test code = 761)                                        

 

             HEMOGLOBIN (BEAKER) (test code = 13.0 GM/DL   13.7-17.5    L       

     



             410)                                                

 

             HEMATOCRIT (BEAKER) (test code = 41.4 %       40.1-51.0            

     



             411)                                                

 

             MEAN CORPUSCULAR VOLUME (BEAKER) 80 fL        79-92                

     



             (test code = 753)                                        

 

             MEAN CORPUSCULAR HEMOGLOBIN 25.2 pg      25.7-32.2    L            



             (BEAKER) (test code = 751)                                        

 

             MEAN CORPUSCULAR HEMOGLOBIN CONC 31.4 GM/DL   32.3-36.5    L       

     



             (BEAKER) (test code = 752)                                        

 

             RED CELL DISTRIBUTION WIDTH 14.6 %       11.6-14.4    H            



             (BEAKER) (test code = 412)                                        

 

             PLATELET COUNT (BEAKER) (test 341 K/CU MM  150-450                 

  



             code = 756)                                         

 

             MEAN PLATELET VOLUME (BEAKER) 9.9 fL       9.4-12.4                

  



             (test code = 754)                                        

 

             NUCLEATED RED BLOOD CELLS 0 /100 WBC   0-0                       



             (BEAKER) (test code = 413)                                        

 

             NEUTROPHILS RELATIVE PERCENT 81 %                                  

 



             (BEAKER) (test code = 429)                                        

 

             LYMPHOCYTES RELATIVE PERCENT 7 %                                   

 



             (BEAKER) (test code = 430)                                        

 

             MONOCYTES RELATIVE PERCENT 8 %                                    



             (BEAKER) (test code = 431)                                        

 

             EOSINOPHILS RELATIVE PERCENT 2 %                                   

 



             (BEAKER) (test code = 432)                                        

 

             BASOPHILS RELATIVE PERCENT 1 %                                    



             (BEAKER) (test code = 437)                                        

 

             NEUTROPHILS ABSOLUTE COUNT 11.86 K/ L   1.78-5.38    H            



             (BEAKER) (test code = 670)                                        

 

             LYMPHOCYTES ABSOLUTE COUNT 1.08 K/ L    1.32-3.57    L            



             (BEAKER) (test code = 414)                                        

 

             MONOCYTES ABSOLUTE COUNT (BEAKER) 1.13 K/ L    0.30-0.82    H      

      



             (test code = 415)                                        

 

             EOSINOPHILS ABSOLUTE COUNT 0.26 K/ L    0.04-0.54                 



             (BEAKER) (test code = 416)                                        

 

             BASOPHILS ABSOLUTE COUNT (BEAKER) 0.07 K/ L    0.01-0.08           

      



             (test code = 417)                                        

 

             IMMATURE GRANULOCYTES-RELATIVE 1.40 %       0.00-1.00    H         

   



             PERCENT (BEAKER) (test code =                                      

  



             2801)                                               



[id number] 04:52:25





             Test Item    Value        Reference Range Interpretation Comments

 

             MAGNESIUM (BEAKER) (test code = 2.0 mg/dL    1.6-2.6               

    



             627)                                                



 ID - [id number] 04:52:25





             Test Item    Value        Reference Range Interpretation Comments

 

             PHOSPHORUS (BEAKER) (test code = 3.0 mg/dL    2.3-4.7              

     



             604)                                                



 ID - MARCOBASIC METABOLIC [id number] 04:52:24





             Test Item    Value        Reference Range Interpretation Comments

 

             SODIUM (BEAKER) 140 meq/L    136-145                   



             (test code = 381)                                        

 

             POTASSIUM    4.5 meq/L    3.5-5.1                   



             (BEAKER) (test                                        



             code = 379)                                         

 

             CHLORIDE (BEAKER) 107 meq/L                        



             (test code = 382)                                        

 

             CO2 (BEAKER) 22 meq/L     22-29                     



             (test code = 355)                                        

 

             BLOOD UREA   24 mg/dL     7-21         H            



             NITROGEN (BEAKER)                                        



             (test code = 354)                                        

 

             CREATININE   0.77 mg/dL   0.57-1.25                 



             (BEAKER) (test                                        



             code = 358)                                         

 

             GLUCOSE RANDOM 94 mg/dL                         



             (BEAKER) (test                                        



             code = 652)                                         

 

             CALCIUM (BEAKER) 9.2 mg/dL    8.4-10.2                  



             (test code = 697)                                        

 

             EGFR (BEAKER) 105                                     Interpretatio

n of eGFR



             (test code = mL/min/1.73                            values Stage De

scription



             1092)        sq m                                   Result G1 Althea

l or high



                                                                 >=90 G2 Mildly 

decreased



                                                                 60-89 G3a Mildl

y to



                                                                 moderately 45-5

9 G3b



                                                                 Moderately to s

everely



                                                                 30-44 G4 Severl

y decreased



                                                                 15-29 G5 Kidney

 failure



                                                                 <15Reported eGF

R is based



                                                                 on the CKD-EPI 





                                                                 equation that d

oes not use



                                                                 a race



                                                                 coefficientEsti

mated GFR



                                                                 is not as accur

ate as



                                                                 Creatinine Porsha

ekaterina in



                                                                 predicting glom

erular



                                                                 filtration rate

. Estimated



                                                                 GFR is not appl

icable for



                                                                 dialysis patien

ts



 ID - MARCOPOCT-GLUCOSE METER2023-07-15 22:19:57





             Test Item    Value        Reference Range Interpretation Comments

 

             POC-GLUCOSE METER 85 mg/dL                         : TESTED A

T [facility] [address]



             (BERUBIN) (test code =                                        [place] [facility],



             1538)                                               [address]:



                                                                 /Techni

deejay ID =



                                                                 [id number] for Wehelen

erOscarha



XR ABDOMEN/KUB 1 VIEW PORTABLE2023-07-15 21:30:05
************************************************************[facility] : 1965 Sex: 
M************************************************************EXAM/TECHNIQUE: XR 
ABDOMEN/KUB 1 VIEW PORTABLEINDICATION: cortrak placementCOMPARISON: 
2023.FINDINGS: Feeding tube terminates in the stomach. Nondilated loops of 
large smallbowel. No acute osseous process.IMPRESSION:Impression:Feeding tube 
terminates in the stomach.Electronically Signed By: Cristobal Babin07/15/2023 
21:32 CDTWorkstation Name: [id number]-GLUCOSE METER2023-07-15 18:11:10





             Test Item    Value        Reference Range Interpretation Comments

 

             POC-GLUCOSE METER 88 mg/dL                         : TESTED A

T [facility] [address]



             (BEAKER) (test code =                                        [employer] [place],



             1538)                                               [id number]:



                                                                 /Techni

deejay ID =



                                                                 [id number] for Danielito Glynn



LIPID PANEL2023-07-15 13:11:54





             Test Item    Value        Reference Range Interpretation Comments

 

             TRIGLYCERIDES (BEAKER) (test code = 77 mg/dL                       

        



             540)                                                

 

             CHOLESTEROL (BEAKER) (test code = 123 mg/dL                        

      



             631)                                                

 

             HDL CHOLESTEROL (BEAKER) (test code 24 mg/dL                       

        



             = 976)                                              

 

             LDL CHOLESTEROL CALCULATED (BEAKER) 84 mg/dL                       

        



             (test code = 633)                                        



Triglyceride Reference Range: Low Risk &lt;150 Borderline 150-199 High Risk 200-
499 Very High Risk &gt;=500Cholesterol Reference Range: Low Risk &lt;200 
Borderline 200-239 High Risk &gt;240HDL Cholesterol Reference Range: Low Risk 
&gt;=60 High Risk &lt;40LDL Cholesterol Reference Range: Optimal &lt;100 Near 
Optimal 100-129 Borderline 130-159 High 160-189 Very High &gt;=190  ID -
 ADMINPOCT-GLUCOSE [id number] 12:44:17





             Test Item    Value        Reference Range Interpretation Comments

 

             POC-GLUCOSE METER 113 mg/dL           H            : TESTED A

T [facility] [address]



             (BEAKER) (test code =                                        [place],



             1538)                                               [address]:



                                                                 /Techni

deejay ID



                                                                 = [id number] for Danielito Montana



POCT-GLUCOSE METER2023-07-15 08:51:18





             Test Item    Value        Reference Range Interpretation Comments

 

             POC-GLUCOSE METER 108 mg/dL                        : TESTED A

T [facility] [address]



             (BEAKER) (test code =                                        [place],



             1538)                                               [id number]:



                                                                 /Techni

deejay ID



                                                                 = [id number] for Danielito Montana



POCT-GLUCOSE METER2023-07-15 06:40:10





             Test Item    Value        Reference Range Interpretation Comments

 

             POC-GLUCOSE METER 117 mg/dL           H            : TESTED A

T [facility] [address]



             (BEAKER) (test code =                                        [facility],



             1538)                                               [address]:



                                                                 /Techni

deejay ID



                                                                 = [id number] for Lauren Wade



[id number] 06:06:26





             Test Item    Value        Reference Range Interpretation Comments

 

             PHOSPHORUS (BEAKER) (test code = 3.0 mg/dL    2.3-4.7              

     



             604)                                                



BASIC METABOLIC PANEL2023-07-15 06:06:25





             Test Item    Value        Reference Range Interpretation Comments

 

             SODIUM (BEAKER) 139 meq/L    136-145                   



             (test code = 381)                                        

 

             POTASSIUM    4.4 meq/L    3.5-5.1                   



             (BEAKER) (test                                        



             code = 379)                                         

 

             CHLORIDE (BEAKER) 108 meq/L           H            



             (test code = 382)                                        

 

             CO2 (BEAKER) 24 meq/L     22-29                     



             (test code = 355)                                        

 

             BLOOD UREA   28 mg/dL     7-21         H            



             NITROGEN (BEAKER)                                        



             (test code = 354)                                        

 

             CREATININE   0.81 mg/dL   0.57-1.25                 



             (BEAKER) (test                                        



             code = 358)                                         

 

             GLUCOSE RANDOM 111 mg/dL           H            



             (BEAKER) (test                                        



             code = 652)                                         

 

             CALCIUM (BEAKER) 8.6 mg/dL    8.4-10.2                  



             (test code = 697)                                        

 

             EGFR (BEAKER) 104                                     Interpretatio

n of eGFR



             (test code = mL/min/1.73                            values Stage De

scription



             1092)        sq m                                   Result G1 Althea

l or high



                                                                 >=90 G2 Mildly 

decreased



                                                                 60-89 G3a Mildl

y to



                                                                 moderately 45-5

9 G3b



                                                                 Moderately to s

everely



                                                                 30-44 G4 Severl

y decreased



                                                                 15-29 G5 Kidney

 failure



                                                                 <15Reported eGF

R is based



                                                                 on the CKD-EPI 





                                                                 equation that d

oes not use



                                                                 a race



                                                                 coefficientEsti

mated GFR



                                                                 is not as accur

ate as



                                                                 Creatinine Porsha

ekaterina in



                                                                 predicting glom

erular



                                                                 filtration rate

. Estimated



                                                                 GFR is not appl

icable for



                                                                 dialysis patien

ts



[id number] 06:06:25





             Test Item    Value        Reference Range Interpretation Comments

 

             MAGNESIUM (BEAKER) (test code = 1.9 mg/dL    1.6-2.6               

    



             627)                                                



CBC W/PLT COUNT &amp; AUTO DIFFERENTIAL2023-07-15 05:31:57





             Test Item    Value        Reference Range Interpretation Comments

 

             WHITE BLOOD CELL COUNT (BEAKER) 11.3 K/ L    3.5-10.5     H        

    



             (test code = 775)                                        

 

             RED BLOOD CELL COUNT (BEAKER) 4.68 M/ L    4.63-6.08               

  



             (test code = 761)                                        

 

             HEMOGLOBIN (BEAKER) (test code = 11.9 GM/DL   13.7-17.5    L       

     



             410)                                                

 

             HEMATOCRIT (BEAKER) (test code = 37.9 %       40.1-51.0    L       

     



             411)                                                

 

             MEAN CORPUSCULAR VOLUME (BEAKER) 81 fL        79-92                

     



             (test code = 753)                                        

 

             MEAN CORPUSCULAR HEMOGLOBIN 25.4 pg      25.7-32.2    L            



             (BEAKER) (test code = 751)                                        

 

             MEAN CORPUSCULAR HEMOGLOBIN CONC 31.4 GM/DL   32.3-36.5    L       

     



             (BEAKER) (test code = 752)                                        

 

             RED CELL DISTRIBUTION WIDTH 14.7 %       11.6-14.4    H            



             (BEAKER) (test code = 412)                                        

 

             PLATELET COUNT (BEAKER) (test 327 K/CU MM  150-450                 

  



             code = 756)                                         

 

             MEAN PLATELET VOLUME (BEAKER) 9.9 fL       9.4-12.4                

  



             (test code = 754)                                        

 

             NUCLEATED RED BLOOD CELLS 0 /100 WBC   0-0                       



             (BEAKER) (test code = 413)                                        

 

             NEUTROPHILS RELATIVE PERCENT 77 %                                  

 



             (BEAKER) (test code = 429)                                        

 

             LYMPHOCYTES RELATIVE PERCENT 11 %                                  

 



             (BEAKER) (test code = 430)                                        

 

             MONOCYTES RELATIVE PERCENT 8 %                                    



             (BEAKER) (test code = 431)                                        

 

             EOSINOPHILS RELATIVE PERCENT 2 %                                   

 



             (BEAKER) (test code = 432)                                        

 

             BASOPHILS RELATIVE PERCENT 0 %                                    



             (BEAKER) (test code = 437)                                        

 

             NEUTROPHILS ABSOLUTE COUNT 8.67 K/ L    1.78-5.38    H            



             (BEAKER) (test code = 670)                                        

 

             LYMPHOCYTES ABSOLUTE COUNT 1.26 K/ L    1.32-3.57    L            



             (BEAKER) (test code = 414)                                        

 

             MONOCYTES ABSOLUTE COUNT (BEAKER) 0.93 K/ L    0.30-0.82    H      

      



             (test code = 415)                                        

 

             EOSINOPHILS ABSOLUTE COUNT 0.27 K/ L    0.04-0.54                 



             (BEAKER) (test code = 416)                                        

 

             BASOPHILS ABSOLUTE COUNT (BEAKER) 0.03 K/ L    0.01-0.08           

      



             (test code = 417)                                        

 

             IMMATURE GRANULOCYTES-RELATIVE 1.20 %       0.00-1.00    H         

   



             PERCENT (BEAKER) (test code =                                      

  



             2801)                                               



POCT-GLUCOSE [id number] 23:34:04





             Test Item    Value        Reference Range Interpretation Comments

 

             POC-GLUCOSE METER 109 mg/dL                        : TESTED A

T [facility] [address]



             (BEAKER) (test code =                                        [facility] [place],



             1538)                                               [id number]:



                                                                 /Techni

deejay ID



                                                                 = [id number] for Lauren Wade



POCT-GLUCOSE [id number] 18:31:14





             Test Item    Value        Reference Range Interpretation Comments

 

             POC-GLUCOSE METER 112 mg/dL           H            : TESTED A

T [facility] [address]



             (BEAKER) (test code =                                        [facility],



             1538)                                               [address]:



                                                                 /Techni

deejay ID



                                                                 = [id number] for FANTASMA BANGURA



SPUTUM CULTURE + GRAM [id number] 11:17:03





             Test Item    Value        Reference Range Interpretation Comments

 

             CULTURE (BEAKER) 4+ Normal respiratory                           



             (test code = 1095) clemente present                           

 

             GRAM STAIN RESULT 1+ WBCs                                



             (BEAKER) (test code =                                        



             1123)                                               

 

             GRAM STAIN RESULT 1+ budding yeast                           



             (BEAKER) (test code =                                        



             [address])                                              

 

             GRAM STAIN RESULT <1+ gram variable rods                           



             (BEAKER) (test code =                                        



             [address])                                              



[id number] 07:15:02





             Test Item    Value        Reference Range Interpretation Comments

 

             MAGNESIUM (BEAKER) (test code = 1.8 mg/dL    1.6-2.6               

    



             627)                                                



 ID - [id number] 07:15:02





             Test Item    Value        Reference Range Interpretation Comments

 

             PHOSPHORUS (BEAKER) (test code = 2.6 mg/dL    2.3-4.7              

     



             604)                                                



 ID - MMBASIC METABOLIC [id number] 07:15:01





             Test Item    Value        Reference Range Interpretation Comments

 

             SODIUM (BEAKER) 141 meq/L    136-145                   



             (test code = 381)                                        

 

             POTASSIUM    4.3 meq/L    3.5-5.1                   



             (BEAKER) (test                                        



             code = 379)                                         

 

             CHLORIDE (BEAKER) 112 meq/L           H            



             (test code = 382)                                        

 

             CO2 (BEAKER) 22 meq/L     22-29                     



             (test code = 355)                                        

 

             BLOOD UREA   26 mg/dL     7-21         H            



             NITROGEN (BEAKER)                                        



             (test code = 354)                                        

 

             CREATININE   0.76 mg/dL   0.57-1.25                 



             (BEAKER) (test                                        



             code = 358)                                         

 

             GLUCOSE RANDOM 104 mg/dL                        



             (BEAKER) (test                                        



             code = 652)                                         

 

             CALCIUM (BEAKER) 8.2 mg/dL    8.4-10.2     L            



             (test code = 697)                                        

 

             EGFR (BEAKER) 106                                     Interpretatio

n of eGFR



             (test code = mL/min/1.73                            values Stage De

scription



             1092)        sq m                                   Result G1 Althea

l or high



                                                                 >=90 G2 Mildly 

decreased



                                                                 60-89 G3a Mildl

y to



                                                                 moderately 45-5

9 G3b



                                                                 Moderately to s

everely



                                                                 30-44 G4 Severl

y decreased



                                                                 15-29 G5  Kidne

y failure



                                                                 <15Reported eGF

R is based



                                                                 on the CKD-EPI 





                                                                 equation that d

oes not use



                                                                 a race



                                                                 coefficientEsti

mated GFR



                                                                 is not as accur

ate as



                                                                 Creatinine Porsha

ekaterina in



                                                                 predicting glom

erular



                                                                 filtration rate

. Estimated



                                                                 GFR is not appl

icable for



                                                                 dialysis patien

ts



 ID - MMCBC W/PLT COUNT &amp; AUTO [id number] 06:11:39





             Test Item    Value        Reference Range Interpretation Comments

 

             WHITE BLOOD CELL COUNT (BEAKER) 13.3 K/ L    3.5-10.5     H        

    



             (test code = 775)                                        

 

             RED BLOOD CELL COUNT (BEAKER) 4.46 M/ L    4.63-6.08    L          

  



             (test code = 761)                                        

 

             HEMOGLOBIN (BEAKER) (test code = 11.4 GM/DL   13.7-17.5    L       

     



             410)                                                

 

             HEMATOCRIT (BEAKER) (test code = 36.6 %       40.1-51.0    L       

     



             411)                                                

 

             MEAN CORPUSCULAR VOLUME (BEAKER) 82 fL        79-92                

     



             (test code = 753)                                        

 

             MEAN CORPUSCULAR HEMOGLOBIN 25.6 pg      25.7-32.2    L            



             (BEAKER) (test code = 751)                                        

 

             MEAN CORPUSCULAR HEMOGLOBIN CONC 31.1 GM/DL   32.3-36.5    L       

     



             (BEAKER) (test code = 752)                                        

 

             RED CELL DISTRIBUTION WIDTH 14.8 %       11.6-14.4    H            



             (BEAKER) (test code = 412)                                        

 

             PLATELET COUNT (BEAKER) (test 277 K/CU MM  150-450                 

  



             code = 756)                                         

 

             MEAN PLATELET VOLUME (BEAKER) 10.1 fL      9.4-12.4                

  



             (test code = 754)                                        

 

             NUCLEATED RED BLOOD CELLS 0 /100 WBC   0-0                       



             (BEAKER) (test code = 413)                                        

 

             NEUTROPHILS RELATIVE PERCENT 78 %                                  

 



             (BEAKER) (test code = 429)                                        

 

             LYMPHOCYTES RELATIVE PERCENT 10 %                                  

 



             (BEAKER) (test code = 430)                                        

 

             MONOCYTES RELATIVE PERCENT 8 %                                    



             (BEAKER) (test code = 431)                                        

 

             EOSINOPHILS RELATIVE PERCENT 3 %                                   

 



             (BEAKER) (test code = 432)                                        

 

             BASOPHILS RELATIVE PERCENT 0 %                                    



             (BEAKER) (test code = 437)                                        

 

             NEUTROPHILS ABSOLUTE COUNT 10.42 K/ L   1.78-5.38    H            



             (BEAKER) (test code = 670)                                        

 

             LYMPHOCYTES ABSOLUTE COUNT 1.34 K/ L    1.32-3.57                 



             (BEAKER) (test code = 414)                                        

 

             MONOCYTES ABSOLUTE COUNT (BEAKER) 1.02 K/ L    0.30-0.82    H      

      



             (test code = 415)                                        

 

             EOSINOPHILS ABSOLUTE COUNT 0.34 K/ L    0.04-0.54                 



             (BEAKER) (test code = 416)                                        

 

             BASOPHILS ABSOLUTE COUNT (BEAKER) 0.04 K/ L    0.01-0.08           

      



             (test code = 417)                                        

 

             IMMATURE GRANULOCYTES-RELATIVE 1.30 %       0.00-1.00    H         

   



             PERCENT (BEAKER) (test code =                                      

  



             2801)                                               



POCT-GLUCOSE [id number] 06:09:35





             Test Item    Value        Reference Range Interpretation Comments

 

             POC-GLUCOSE METER 111 mg/dL           H            : TESTED A

T [facility] [address]



             (BEAKER) (test code =                                        [facility],



             1538)                                               [id number]:



                                                                 /Techni

deejay ID



                                                                 = [id number] for Debora Lujan



POCT-GLUCOSE [id number] 01:00:38





             Test Item    Value        Reference Range Interpretation Comments

 

             POC-GLUCOSE METER 105 mg/dL                        : TESTED A

T [facility] [address]



             (BEAKER) (test code                                        [facility],



             = 1538)                                             [address]:



                                                                 /Techni

deejay ID =



                                                                 [id number] for Sudheer

blerLaureanoa



POCT-GLUCOSE [id number] 00:22:13





             Test Item    Value        Reference Range Interpretation Comments

 

             POC-GLUCOSE METER 101 mg/dL                        : TESTED A

T [facility] [address]



             (BEAKER) (test code =                                        [facility],



             153)                                               [address]:



                                                                 /Techni

deejay ID



                                                                 = [id number] for JULIO SERRANO



VANCOMYCIN LEVEL, [id number] 18:01:21





             Test Item    Value        Reference Range Interpretation Comments

 

             VANCOMYCIN TROUGH (BEAKER) (test 8.2 ug/mL    10.0-20.0    L       

     



             code = 522)                                         



 ID - BSPOCT-GLUCOSE [id number] 16:47:25





             Test Item    Value        Reference Range Interpretation Comments

 

             POC-GLUCOSE METER 106 mg/dL                        : TESTED A

T [facility] [address]



             (BEAKER) (test code =                                        [facility],



             153)                                               [address]:



                                                                 /Techni

deejay ID



                                                                 = [id number] for Susan Bills



POCT-GLUCOSE [id number] 11:49:02





             Test Item    Value        Reference Range Interpretation Comments

 

             POC-GLUCOSE METER 121 mg/dL           H            : TESTED A

T [facility] [address]



             (BEAKER) (test code =                                        [facility],



             1538)                                               [address]:



                                                                 /Techni

deejay ID



                                                                 = [id number] for Susan Bills



POCT-GLUCOSE [id number] 06:31:39





             Test Item    Value        Reference Range Interpretation Comments

 

             POC-GLUCOSE METER 109 mg/dL                        : TESTED A

T [facility] [address]



             (BEAKER) (test code =                                        [facility],



             153)                                               [address]:



                                                                 /Techni

deejay ID



                                                                 = [id number] for RAMYA KAPLAN



CBC W/PLT COUNT &amp; AUTO [id number] 04:20:15





             Test Item    Value        Reference Range Interpretation Comments

 

             WHITE BLOOD CELL COUNT (BEAKER) 15.2 K/ L    3.5-10.5     H        

    



             (test code = 775)                                        

 

             RED BLOOD CELL COUNT (BEAKER) 4.62 M/ L    4.63-6.08    L          

  



             (test code = 761)                                        

 

             HEMOGLOBIN (BEAKER) (test code = 11.9 GM/DL   13.7-17.5    L       

     



             410)                                                

 

             HEMATOCRIT (BEAKER) (test code = 37.6 %       40.1-51.0    L       

     



             411)                                                

 

             MEAN CORPUSCULAR VOLUME (BEAKER) 81 fL        79-92                

     



             (test code = 753)                                        

 

             MEAN CORPUSCULAR HEMOGLOBIN 25.8 pg      25.7-32.2                 



             (BEAKER) (test code = 751)                                        

 

             MEAN CORPUSCULAR HEMOGLOBIN CONC 31.6 GM/DL   32.3-36.5    L       

     



             (BEAKER) (test code = 752)                                        

 

             RED CELL DISTRIBUTION WIDTH 14.9 %       11.6-14.4    H            



             (BEAKER) (test code = 412)                                        

 

             PLATELET COUNT (BEAKER) (test 253 K/CU MM  150-450                 

  



             code = 756)                                         

 

             MEAN PLATELET VOLUME (BEAKER) 10.1 fL      9.4-12.4                

  



             (test code = 754)                                        

 

             NUCLEATED RED BLOOD CELLS 0 /100 WBC   0-0                       



             (BEAKER) (test code = 413)                                        

 

             NEUTROPHILS RELATIVE PERCENT 80 %                                  

 



             (BEAKER) (test code = 429)                                        

 

             LYMPHOCYTES RELATIVE PERCENT 9 %                                   

 



             (BEAKER) (test code = 430)                                        

 

             MONOCYTES RELATIVE PERCENT 7 %                                    



             (BEAKER) (test code = 431)                                        

 

             EOSINOPHILS RELATIVE PERCENT 2 %                                   

 



             (BEAKER) (test code = 432)                                        

 

             BASOPHILS RELATIVE PERCENT 0 %                                    



             (BEAKER) (test code = 437)                                        

 

             NEUTROPHILS ABSOLUTE COUNT 12.16 K/ L   1.78-5.38    H            



             (BEAKER) (test code = 670)                                        

 

             LYMPHOCYTES ABSOLUTE COUNT 1.43 K/ L    1.32-3.57                 



             (BEAKER) (test code = 414)                                        

 

             MONOCYTES ABSOLUTE COUNT (BEAKER) 1.07 K/ L    0.30-0.82    H      

      



             (test code = 415)                                        

 

             EOSINOPHILS ABSOLUTE COUNT 0.31 K/ L    0.04-0.54                 



             (BEAKER) (test code = 416)                                        

 

             BASOPHILS ABSOLUTE COUNT (BEAKER) 0.05 K/ L    0.01-0.08           

      



             (test code = 417)                                        

 

             IMMATURE GRANULOCYTES-RELATIVE 1.10 %       0.00-1.00    H         

   



             PERCENT (BEAKER) (test code =                                      

  



             2801)                                               



BASIC METABOLIC [id number] 04:10:46





             Test Item    Value        Reference Range Interpretation Comments

 

             SODIUM (BEAKER) 142 meq/L    136-145                   



             (test code = 381)                                        

 

             POTASSIUM    4.1 meq/L    3.5-5.1                   



             (BEAKER) (test                                        



             code = 379)                                         

 

             CHLORIDE (BEAKER) 111 meq/L           H            



             (test code = 382)                                        

 

             CO2 (BEAKER) 25 meq/L     22-29                     



             (test code = 355)                                        

 

             BLOOD UREA   31 mg/dL     7-21         H            



             NITROGEN (BEAKER)                                        



             (test code = 354)                                        

 

             CREATININE   0.81 mg/dL   0.57-1.25                 



             (BEAKER) (test                                        



             code = 358)                                         

 

             GLUCOSE RANDOM 110 mg/dL           H            



             (BEAKER) (test                                        



             code = 652)                                         

 

             CALCIUM (BEAKER) 8.2 mg/dL    8.4-10.2     L            



             (test code = 697)                                        

 

             EGFR (BEAKER) 104                                     Interpretatio

n of eGFR



             (test code = mL/min/1.73                            values Stage De

scription



             1092)        sq m                                   Result G1 Althea

l or high



                                                                 >=90 G2 Mildly 

decreased



                                                                 60-89 G3a Mildl

y to



                                                                 moderately 45-5

9 G3b



                                                                 Moderately to s

everely



                                                                 30-44 G4 Severl

y decreased



                                                                 15-29 G5 Kidney

 failure



                                                                 <15Reported eGF

R is based



                                                                 on the CKD-EPI 





                                                                 equation that d

oes not use



                                                                 a race



                                                                 coefficientEsti

mated GFR



                                                                 is not as accur

ate as



                                                                 Creatinine Porsha

ekaterina in



                                                                 predicting glom

erular



                                                                 filtration rate

. Estimated



                                                                 GFR is not appl

icable for



                                                                 dialysis patien

ts



 ID - [id number] 04:10:46





             Test Item    Value        Reference Range Interpretation Comments

 

             MAGNESIUM (BEAKER) (test code = 1.9 mg/dL    1.6-2.6               

    



             627)                                                



 ID - [id number] 04:10:46





             Test Item    Value        Reference Range Interpretation Comments

 

             PHOSPHORUS (BEAKER) (test code = 2.6 mg/dL    2.3-4.7              

     



             604)                                                



 ID - ADMINPOCT-GLUCOSE [id number] 00:36:00





             Test Item    Value        Reference Range Interpretation Comments

 

             POC-GLUCOSE METER 115 mg/dL           H            : TESTED A

T [facility] [address]



             (BEAKER) (test code =                                        [place],



             [address]38)                                               [id number]:



                                                                 /Techni

deejay ID



                                                                 = [id number] for RAMYA KAPLAN



POCT-GLUCOSE [id number] 17:40:35





             Test Item    Value        Reference Range Interpretation Comments

 

             POC-GLUCOSE METER 107 mg/dL                        : TESTED A

T [facility] [address]



             (BEAKER) (test code =                                        [place] [facility],



             [address]38)                                               [address]:



                                                                 /Techni

deejay ID



                                                                 = [id number] for Susan Bills



XR ABDOMEN/KUB 1 VIEW [id number] 13:44:03
************************************************************[facility] CENTERName: [facility], ROBERT : 1965 Sex: 
M************************************************************EXAMINATION: XR 
ABDOMEN/KUB 1 VIEW PORTABLE INDICATION: Diarreha, concern for colitisCOMPARISON:
 2023 FINDINGS/IMPRESSION:No bowel obstruction or free air. No 
radiographically apparent urinarycalculi. No acute osseous injury.The tip of the
 Dobbhoff feeding tube overlies the mid duodenum.Electronically Signed By: Jennifer Drew2023 13:46 CDTWorkstation Name: [id number] METER
2023 11:58:32





             Test Item    Value        Reference Range Interpretation Comments

 

             POC-GLUCOSE METER 124 mg/dL           H            : TESTED A

T [facility] [address]



             (BEAKER) (test code =                                        [place],



             [address]38)                                               [address]:



                                                                 /Techni

deejay ID



                                                                 = [id number] for LL

LIZA TORRESA



LACTIC ACID, [id number] 08:57:10





             Test Item    Value        Reference Range Interpretation Comments

 

             LACTATE BLOOD VENOUS (2) (BEAKER) 0.78 mmol/L  0.50-2.00           

      



             (test code = 2872)                                        



(CELLAVISION MANUAL DIFF)2023 07:51:51





             Test Item    Value        Reference Range Interpretation Comments

 

             NEUTROPHILS - REL 93 %                                   



             (CELLAVISION)(BEAKER) (test code                                   

     



             = 2816)                                             

 

             LYMPHOCYTES - REL 2 %                                    



             (CELLAVISION)(BEAKER) (test code                                   

     



             = 2817)                                             

 

             MONOCYTES - REL 3 %                                    



             (CELLAVISION)(BEAKER) (test code                                   

     



             = 2818)                                             

 

             EOSINOPHILS - REL 2 %                                    



             (CELLAVISION)(BEAKER) (test code                                   

     



             = 2819)                                             

 

             NEUTROPHILS - ABS 21.76 K/ul   1.78-5.38    H            



             (CELLAVISION)(BEAKER) (test code                                   

     



             = 2830)                                             

 

             LYMPHOCYTES - ABS 0.47 K/ul    1.32-3.57    L            



             (CELLAVISION)(BEAKER) (test code                                   

     



             = 2831)                                             

 

             MONOCYTES - ABS 0.70 K/uL    0.30-0.82                 



             (CELLAVISION)(BEAKER) (test code                                   

     



             = 2832)                                             

 

             EOSINOPHILS - ABS 0.47 K/uL    0.04-0.54                 



             (CELLAVISION)(BEAKER) (test code                                   

     



             = 2834)                                             

 

             TOTAL COUNTED (BEAKER) (test code 100                              

      



             = 1351)                                             

 

             WBC MORPHOLOGY (BEAKER) (test Normal                               

  



             code = 487)                                         

 

             PLT MORPHOLOGY (BEAKER) (test Normal                               

  



             code = 486)                                         

 

             POLYCHROMATOPHILLIC RBCS(BEAKER) 2+ moderate                       

     



             (test code = 478)                                        

 

             ANISOCYTOSIS (BEAKER) (test code 1+ few                            

     



             = 961)                                              

 

             MICROCYTES (BEAKER) (test code = 1+ few                            

     



             965)                                                

 

             POIKILOCYTES (BEAKER) (test code 1+ few                            

     



             = 966)                                              

 

             OVALOCYTES (BEAKER) (test code = 1+ few                            

     



             477)                                                

 

             ARTIFACT (CELLAVISION)(BEAKER) Present                             

   



             (test code = 3432)                                        

 

             PLATELET CONCENTRATION Adequate                               



             (CELLAVISION)(BEAKER) (test code                                   

     



             = 3438)                                             



 ID - Laurie Bashir comments: Slide comments:CBC W/PLT COUNT 
&amp; AUTO [id number] 07:51:37





             Test Item    Value        Reference Range Interpretation Comments

 

             WHITE BLOOD CELL COUNT (BEAKER) 23.4 K/ L    3.5-10.5     H        

    



             (test code = 775)                                        

 

             RED BLOOD CELL COUNT (BEAKER) 5.22 M/ L    4.63-6.08               

  



             (test code = 761)                                        

 

             HEMOGLOBIN (BEAKER) (test code = 13.4 GM/DL   13.7-17.5    L       

     



             410)                                                

 

             HEMATOCRIT (BEAKER) (test code = 42.0 %       40.1-51.0            

     



             411)                                                

 

             MEAN CORPUSCULAR VOLUME (BEAKER) 81 fL        79-92                

     



             (test code = 753)                                        

 

             MEAN CORPUSCULAR HEMOGLOBIN 25.7 pg      25.7-32.2                 



             (BEAKER) (test code = 751)                                        

 

             MEAN CORPUSCULAR HEMOGLOBIN CONC 31.9 GM/DL   32.3-36.5    L       

     



             (BEAKER) (test code = 752)                                        

 

             RED CELL DISTRIBUTION WIDTH 14.6 %       11.6-14.4    H            



             (BEAKER) (test code = 412)                                        

 

             PLATELET COUNT (BEAKER) (test 262 K/CU MM  150-450                 

  



             code = 756)                                         

 

             MEAN PLATELET VOLUME (BEAKER) 9.8 fL       9.4-12.4                

  



             (test code = 754)                                        

 

             NUCLEATED RED BLOOD CELLS 0 /100 WBC   0-0                       



             (BEAKER) (test code = 413)                                        



URINALYSIS [id number] 06:44:11





             Test Item    Value        Reference Range Interpretation Comments

 

             RBC UA (BEAKER) (test code = 519) 40 /HPF                          

      

 

             WBC UA (BEAKER) (test code = 520) 3 /HPF                           

      

 

             MUCUS (BEAKER) (test code = 1574) Rare                             

      

 

             SQUAMOUS EPITHELIAL (BEAKER) (test 4 /HPF                          

       



             code = 516)                                         



 ID - techURINALYSIS WITH MICROSCOPIC IF [id number] 06:26:58





             Test Item    Value        Reference Range Interpretation Comments

 

             COLOR (BEAKER) (test code = 470) Yellow                            

     

 

             CLARITY (BEAKER) (test code = 469) Clear                           

       

 

             SPECIFIC GRAVITY UA (BEAKER) (test 1.026        1.001-1.035        

       



             code = 468)                                         

 

             PH UA (BEAKER) (test code = 467) 6.0          5.0-8.0              

     

 

             PROTEIN UA (BEAKER) (test code = 30 mg/dL     Negative     A       

     



             464)                                                

 

             GLUCOSE UA (BEAKER) (test code = Negative     Negative             

     



             365)                                                

 

             KETONES UA (BEAKER) (test code = 10 mg/dL     Negative     A       

     



             371)                                                

 

             BILIRUBIN UA (BEAKER) (test code = Negative     Negative           

       



             462)                                                

 

             BLOOD UA (BEAKER) (test code = 461) Small        Negative     A    

        

 

             NITRITE UA (BEAKER) (test code = Negative     Negative             

     



             465)                                                

 

             LEUKOCYTE ESTERASE UA (BEAKER) (test Negative     Negative         

         



             code = 466)                                         

 

             UROBILINOGEN UA (BEAKER) (test code 4            0.2-1.0      H    

        



             = 463)                                              

 

             SOURCE(BEAKER) (test code = 2795)                                  

      



 ID - [auto]XR CHEST 1 VIEW PORTABLE / [id number] 05:32:07
************************************************************[facility] CENTERName: [facility], ROBERT : 1965  Sex: 
M************************************************************CLINICAL I
NDICATION: c/f pneumoniaComparison: 2023The cardiomediastinal contours are 
stable.The lung volumes remain low. Central pulmonary vascular prominence 
andbilateral parenchymal opacities are unchanged.There is no pneumothorax.A 
feeding tube remains in place.Electronically Signed By: Sunny Cárdenas2023 
05:34 CDTWorkstation Name: [id number] 04:01:45





             Test Item    Value        Reference Range Interpretation Comments

 

             PROCALCITONIN (BEAKER) (test code 0.26 ng/mL   <0.05        H      

      



             = 3036)                                             



SEPSIS RISK (ng/mL)Low: 0.05-0.50Intermediate: 0.51-2.00High: &gt;=2.01BASIC 
METABOLIC [id number] 03:34:39





             Test Item    Value        Reference Range Interpretation Comments

 

             SODIUM (BEAKER) 143 meq/L    136-145                   



             (test code = 381)                                        

 

             POTASSIUM    4.2 meq/L    3.5-5.1                   



             (BEAKER) (test                                        



             code = 379)                                         

 

             CHLORIDE (BEAKER) 110 meq/L           H            



             (test code = 382)                                        

 

             CO2 (BEAKER) 25 meq/L     22-29                     



             (test code = 355)                                        

 

             BLOOD UREA   35 mg/dL     7-21         H            



             NITROGEN (BEAKER)                                        



             (test code = 354)                                        

 

             CREATININE   0.91 mg/dL   0.57-1.25                 



             (BEAKER) (test                                        



             code = 358)                                         

 

             GLUCOSE RANDOM 142 mg/dL           H            



             (BEAKER) (test                                        



             code = 652)                                         

 

             CALCIUM (BEAKER) 8.2 mg/dL    8.4-10.2     L            



             (test code = 697)                                        

 

             EGFR (BEAKER) 100                                     Interpretatio

n of eGFR



             (test code = mL/min/1.73                            values Stage De

scription



             1092)        sq m                                   Result G1 Althea

l or high



                                                                 >=90 G2 Mildly 

decreased



                                                                 60-89 G3a Mildl

y to



                                                                 moderately 45-5

9 G3b



                                                                 Moderately to s

everely



                                                                 30-44 G4 Severl

y decreased



                                                                 15-29 G5 Kidney

 failure



                                                                 <15Reported eGF

R is based



                                                                 on the CKD-EPI 





                                                                 equation that d

oes not use



                                                                 a race



                                                                 coefficientEsti

mated GFR



                                                                 is not as accur

ate as



                                                                 Creatinine Porsha

ekaterina in



                                                                 predicting glom

erular



                                                                 filtration rate

. Estimated



                                                                 GFR is not appl

icable for



                                                                 dialysis patien

ts



 ID - CHIKIS [id number] 03:34:39





             Test Item    Value        Reference Range Interpretation Comments

 

             MAGNESIUM (BEAKER) (test code = 2.0 mg/dL    1.6-2.6               

    



             627)                                                



 ID - CHIKIS [id number] 03:34:39





             Test Item    Value        Reference Range Interpretation Comments

 

             PHOSPHORUS (BEAKER) (test code = 2.4 mg/dL    2.3-4.7              

     



             604)                                                



 ID - CHIKIS WPOCT-GLUCOSE [id number] 03:21:18





             Test Item    Value        Reference Range Interpretation Comments

 

             POC-GLUCOSE METER 140 mg/dL           H            : TESTED A

T [facility] [address]



             (BEAKER) (test code =                                        [facility]

[employer] [facility],



             153)                                               [address]:



                                                                 /Techni

deejay ID



                                                                 = [id number] for YE

 (V),



                                                                 WEIPING



POCT-GLUCOSE [id number] 21:27:28





             Test Item    Value        Reference Range Interpretation Comments

 

             POC-GLUCOSE METER 129 mg/dL           H            : TESTED A

T [facility] [address]



             (BEAKER) (test code =                                        [facility]

[employer] [facility],



             1538)                                               [address]:



                                                                 /Techni

deejay ID



                                                                 = [id number] for YE

 (V),



                                                                 WEIPING



POCT-GLUCOSE [id number] 12:21:08





             Test Item    Value        Reference Range Interpretation Comments

 

             POC-GLUCOSE METER 142 mg/dL           H            : TESTED A

T [facility] [address]



             (BEAKER) (test code =                                        [facility]

[employer] [facility],



             1538)                                               [id number]:



                                                                 /Techni

deejay ID



                                                                 = [id number] for Susan Bills



XR CHEST 1 VIEW PORTABLE / [id number] 08:50:01
************************************************************[facility], [facility] : 1965 Sex: 
M************************************************************XR CHEST 1VIEW 
PORTABLE / BEDSIDEINDICATION: IntubationCOMPARISON: Prior day's examFINDINGS: 
Portable frontal view of the chest. IMPRESSION:Support Lines: Endotracheal tube 
removed. Stable enteric tube.Lungs andpleura: Low lung volumes. Increased right 
lung baseatelectasis. Stable left lower lobe streaky opacities. No 
pneumothorax.Heart and mediastinum: Stable contours. Additional findings: 
None.Electronically Signed By: Ritchie Gómez2023 08:52 CDTWorkstation 
Name: [id number] W/PLT COUNT &amp; AUTO [id number] 08:44:26





             Test Item    Value        Reference Range Interpretation Comments

 

             WHITE BLOOD CELL COUNT 13.6 K/ L    3.5-10.5     H            



             (BEAKER) (test code =                                        



             775)                                                

 

             RED BLOOD CELL COUNT 5.82 M/ L    4.63-6.08                 



             (BEAKER) (test code =                                        



             761)                                                

 

             HEMOGLOBIN (BEAKER) 15.0 GM/DL   13.7-17.5                 DISCORDA

NT HGB



             (test code = 410)                                        RESULT COM

PARED TO



                                                                 PREVIOUS RESULT

;



                                                                 CLINICAL CORREL

ATION



                                                                 REQUIRED.

 

             HEMATOCRIT (BEAKER) 46.3 %       40.1-51.0                 



             (test code = 411)                                        

 

             MEAN CORPUSCULAR 80 fL        79-92                     



             VOLUME (BEAKER) (test                                        



             code = 753)                                         

 

             MEAN CORPUSCULAR 25.8 pg      25.7-32.2                 



             HEMOGLOBIN (BEAKER)                                        



             (test code = 751)                                        

 

             MEAN CORPUSCULAR 32.4 GM/DL   32.3-36.5                 



             HEMOGLOBIN CONC                                        



             (BEAKER) (test code =                                        



             752)                                                

 

             RED CELL DISTRIBUTION 14.7 %       11.6-14.4    H            



             WIDTH (BEAKER) (test                                        



             code = 412)                                         

 

             PLATELET COUNT 256 K/CU MM  150-450                   



             (BEAKER) (test code =                                        



             756)                                                

 

             MEAN PLATELET VOLUME 10.0 fL      9.4-12.4                  



             (BEAKER) (test code =                                        



             754)                                                

 

             NUCLEATED RED BLOOD 0 /100 WBC   0-0                       



             CELLS (BEAKER) (test                                        



             code = 413)                                         

 

             NEUTROPHILS RELATIVE 82 %                                   



             PERCENT (BEAKER) (test                                        



             code = 429)                                         

 

             LYMPHOCYTES RELATIVE 7 %                                    



             PERCENT (BEAKER) (test                                        



             code = 430)                                         

 

             MONOCYTES RELATIVE 8 %                                    



             PERCENT (BEAKER) (test                                        



             code = 431)                                         

 

             EOSINOPHILS RELATIVE 2 %                                    



             PERCENT (BEAKER) (test                                        



             code = 432)                                         

 

             BASOPHILS RELATIVE 0 %                                    



             PERCENT (BEAKER) (test                                        



             code = 437)                                         

 

             NEUTROPHILS ABSOLUTE 11.18 K/ L   1.78-5.38    H            



             COUNT (BEAKER) (test                                        



             code = 670)                                         

 

             LYMPHOCYTES ABSOLUTE 0.93 K/ L    1.32-3.57    L            



             COUNT (BEAKER) (test                                        



             code = 414)                                         

 

             MONOCYTES ABSOLUTE 1.07 K/ L    0.30-0.82    H            



             COUNT (BEAKER) (test                                        



             code = 415)                                         

 

             EOSINOPHILS ABSOLUTE 0.27 K/ L    0.04-0.54                 



             COUNT (BEAKER) (test                                        



             code = 416)                                         

 

             BASOPHILS ABSOLUTE 0.05 K/ L    0.01-0.08                 



             COUNT (BEAKER) (test                                        



             code = 417)                                         

 

             IMMATURE     0.50 %       0.00-1.00                 



             GRANULOCYTES-RELATIVE                                        



             PERCENT (BEAKER) (test                                        



             code = 2801)                                        



POCT-GLUCOSE [id number] 06:38:49





             Test Item    Value        Reference Range Interpretation Comments

 

             POC-GLUCOSE METER 91 mg/dL                         : TESTED A

T [facility] [address]



             (BEAKER) (test code =                                        [place],



             1538)                                               [id number]:



                                                                 /Techni

deejay ID =



                                                                 [id number] for Kendra Tate



[id number] 05:32:17





             Test Item    Value        Reference Range Interpretation Comments

 

             PHOSPHORUS (BEAKER) 2.6 mg/dL    2.3-4.7                   Specimen

 slightly



             (test code = 604)                                        hemolyzed



 ID Yo DIOP WBASIC METABOLIC [id number] 05:32:17





             Test Item    Value        Reference Range Interpretation Comments

 

             SODIUM (BEAKER) 141 meq/L    136-145                   



             (test code = 381)                                        

 

             POTASSIUM    4.4 meq/L    3.5-5.1                   Specimen slight

ly



             (BEAKER) (test                                        hemolyzed



             code = 379)                                         

 

             CHLORIDE (BEAKER) 109 meq/L           H            



             (test code = 382)                                        

 

             CO2 (BEAKER) 21 meq/L     22-29        L            



             (test code = 355)                                        

 

             BLOOD UREA   28 mg/dL     7-21         H            



             NITROGEN (BEAKER)                                        



             (test code = 354)                                        

 

             CREATININE   0.81 mg/dL   0.57-1.25                 Specimen slight

ly



             (BEAKER) (test                                        hemolyzed



             code = 358)                                         

 

             GLUCOSE RANDOM 103 mg/dL                        



             (BEAKER) (test                                        



             code = 652)                                         

 

             CALCIUM (BEAKER) 8.8 mg/dL    8.4-10.2                  



             (test code = 697)                                        

 

             EGFR (BEAKER) 104                                     Interpretatio

n of eGFR



             (test code = mL/min/1.73                            values Stage De

scription



             1092)        sq m                                   Result G1 Althea

l or high



                                                                 >=90 G2 Mildly 

decreased



                                                                 60-89 G3a Mildl

y to



                                                                 moderately 45-5

9 G3b



                                                                 Moderately to s

everely



                                                                 30-44 G4 Severl

y decreased



                                                                 15-29 G5 Kidney

 failure



                                                                 <15Reported eGF

R is based



                                                                 on the CKD-EPI 





                                                                 equation that d

oes not use



                                                                 a race



                                                                 coefficientEsti

mated GFR



                                                                 is not as accur

ate as



                                                                 Creatinine Porsha tobar in



                                                                 predicting glom

erular



                                                                 filtration rate

. Estimated



                                                                 GFR is not appl

icable for



                                                                 dialysis patien

ts



 ID - CHIKIS [id number] 05:32:16





             Test Item    Value        Reference Range Interpretation Comments

 

             MAGNESIUM (BEAKER) 2.1 mg/dL    1.6-2.6                   Specimen 

slightly



             (test code = 627)                                        hemolyzed



 ID - CHIKIS WPOCT-GLUCOSE [id number] 23:53:55





             Test Item    Value        Reference Range Interpretation Comments

 

             POC-GLUCOSE METER 87 mg/dL                         : TESTED A

T [facility] [address]



             (BEAKER) (test code =                                        [place],



             1538)                                               [address]:



                                                                 /Techni

deejay ID =



                                                                 [id number] for Kendra Tate



BLOOD [id number] 15:01:19





             Test Item    Value        Reference Range Interpretation Comments

 

             CULTURE (BEAKER) (test No growth in 5 days                         

  



             code = 1095)                                        



The specimen volume collected for this blood culture was below the optimum (10 
mL per bottle or 20 mL total). Use of lower volumes may adversely affect 
recovery and/or detection times of some organisms.BLOOD CULTURE2023-07-10 
15:01:19





             Test Item    Value        Reference Range Interpretation Comments

 

             CULTURE (BEAKER) (test No growth in 5 days                         

  



             code = 1095)                                        



The specimen volume collected for this blood culture was below the optimum (10 
mL per bottle or 20 mL total). Use of lower volumes may adversely affect 
recovery and/or detection times of some organisms.POCT-GLUCOSE METER2023-07-10 
14:36:04





             Test Item    Value        Reference Range Interpretation Comments

 

             POC-GLUCOSE METER 115 mg/dL           H            : TESTED LANDY

T [facility] [address]



             (BEAKER) (test code                                        [facility] 

[facility],



             = 1538)                                             [address]:



                                                                 /Techni

deejay ID =



                                                                 [id number] for Whit reynaga



                                                                 ([facility])Treva



BASIC METABOLIC [id number] 14:32:42





             Test Item    Value        Reference Range Interpretation Comments

 

             SODIUM (BEAKER) 142 meq/L    136-145                   



             (test code = 381)                                        

 

             POTASSIUM    4.4 meq/L    3.5-5.1                   



             (BEAKER) (test                                        



             code = 379)                                         

 

             CHLORIDE (BEAKER) 109 meq/L           H            



             (test code = 382)                                        

 

             CO2 (BEAKER) 25 meq/L     22-29                     



             (test code = 355)                                        

 

             BLOOD UREA   24 mg/dL     7-21         H            



             NITROGEN (BEAKER)                                        



             (test code = 354)                                        

 

             CREATININE   0.86 mg/dL   0.57-1.25                 



             (BEAKER) (test                                        



             code = 358)                                         

 

             GLUCOSE RANDOM 107 mg/dL           H            



             (BEAKER) (test                                        



             code = 652)                                         

 

             CALCIUM (BEAKER) 8.9 mg/dL    8.4-10.2                  



             (test code = 697)                                        

 

             EGFR (BEAKER) 103                                     Interpretatio

n of eGFR



             (test code = mL/min/1.73                            values Stage De

scription



             1092)        sq m                                   Result G1 Althea

l or high



                                                                 >=90 G2 Mildly 

decreased



                                                                 60-89 G3a Mildl

y to



                                                                 moderately 45-5

9 G3b



                                                                 Moderately to s

everely



                                                                 30-44 G4 Severl

y decreased



                                                                 15-29 G5 Kidney

 failure



                                                                 <15Reported eGF

R is based



                                                                 on the CKD-EPI 





                                                                 equation that d

oes not use



                                                                 a race



                                                                 coefficientEsti

mated GFR



                                                                 is not as accur

ate as



                                                                 Creatinine Porsha

ekaterina in



                                                                 predicting glom

erular



                                                                 filtration rate

. Estimated



                                                                 GFR is not appl

icable for



                                                                 dialysis patien

ts



 ID - EOOMAGNESIUM2023-07-10 14:32:42





             Test Item    Value        Reference Range Interpretation Comments

 

             MAGNESIUM (BEAKER) (test code = 2.3 mg/dL    1.6-2.6               

    



             627)                                                



 ID - EOOPOCT-GLUCOSE METER2023-07-10 11:22:50





             Test Item    Value        Reference Range Interpretation Comments

 

             POC-GLUCOSE METER 114 mg/dL           H            : TESTED A

T [facility] [address]



             (BEAKER) (test code =                                        [place],



             1538)                                               [address]:



                                                                 /Techni

deejay ID



                                                                 = [id number] for An

Chastity daniels



XR CHEST 1 VIEW PORTABLE / BEDSIDE2023-07-10 06:21:45
************************************************************[facility] : 1965 Sex: 
M************************************************************XR CHEST 1VIEW 
PORTABLE / BEDSIDEINDICATION: IntubationCOMPARISON: Prior day's examFINDINGS: 
Portable frontal view of the chest. IMPRESSION:Support Lines: ET tube terminates
 4 cm above the karen. Otherwisestable support apparatus. Lungs and pleura: Low
 lung volumes with stable small patchy opacitieswithout newlung consolidation. 
Small effusions not excluded. Nopneumothorax.Heart and mediastinum: Stable 
contours. Additional findings: None.Electronically Signed By: Ritchie Gómez07/10/2023 06:23 CDTWorkstation Name: [id number] METABOLIC PANEL
2023-07-10 04:58:19





             Test Item    Value        Reference Range Interpretation Comments

 

             SODIUM (BEAKER) 139 meq/L    136-145                   Verified by 

clinical



             (test code = 381)                                        history

 

             POTASSIUM    4.0 meq/L    3.5-5.1                   



             (BEAKER) (test                                        



             code = 379)                                         

 

             CHLORIDE (BEAKER) 110 meq/L           H            



             (test code = 382)                                        

 

             CO2 (BEAKER) 24 meq/L     22-29                     



             (test code = 355)                                        

 

             BLOOD UREA   29 mg/dL     7-21         H            



             NITROGEN (BEAKER)                                        



             (test code = 354)                                        

 

             CREATININE   0.83 mg/dL   0.57-1.25                 



             (BEAKER) (test                                        



             code = 358)                                         

 

             GLUCOSE RANDOM 137 mg/dL           H            



             (BEAKER) (test                                        



             code = 652)                                         

 

             CALCIUM (BEAKER) 8.1 mg/dL    8.4-10.2     L            



             (test code = 697)                                        

 

             EGFR (BEAKER) 103                                     Interpretatio

n of eGFR



             (test code = mL/min/1.73                            values Stage De

scription



             1092)        sq m                                   Result G1 Althea

l or high



                                                                 >=90 G2 Mildly 

decreased



                                                                 60-89 G3a Mildl

y to



                                                                 moderately 45-5

9 G3b



                                                                 Moderately to s

everely



                                                                 30-44 G4 Severl

y decreased



                                                                 15-29 G5 Kidney

 failure



                                                                 <15Reported eGF

R is based



                                                                 on the CKD-EPI 





                                                                 equation that d

oes not use



                                                                 a race



                                                                 coefficientEsti

mated GFR



                                                                 is not as accur

ate as



                                                                 Creatinine Porsha

ekaterina in



                                                                 predicting glom

erular



                                                                 filtration rate

. Estimated



                                                                 GFR is not appl

icable for



                                                                 dialysis patien

ts



 ID - BVOperator ID - EOOPHOSPHORUS2023-07-10 04:14:08





             Test Item    Value        Reference Range Interpretation Comments

 

             PHOSPHORUS (BEAKER) (test code = 3.3 mg/dL    2.3-4.7              

     



             604)                                                



 ID - BVMAGNESIUM2023-07-10 04:14:07





             Test Item    Value        Reference Range Interpretation Comments

 

             MAGNESIUM (BEAKER) (test code = 1.9 mg/dL    1.6-2.6               

    



             627)                                                



 ID - BVPOCT-GLUCOSE METER2023-07-10 03:53:24





             Test Item    Value        Reference Range Interpretation Comments

 

             POC-GLUCOSE METER 105 mg/dL                        : TESTED A

T [facility] [address]



             (BEAKER) (test code =                                        [place],



             1538)                                               [address]:



                                                                 /Techni

deejay ID



                                                                 = [id number] for



                                                                 STEPHANIE KEENE



CBC W/PLT COUNT &amp; AUTO DIFFERENTIAL2023-07-10 03:46:39





             Test Item    Value        Reference Range Interpretation Comments

 

             WHITE BLOOD CELL COUNT (BEAKER) 8.3 K/ L     3.5-10.5              

    



             (test code = 775)                                        

 

             RED BLOOD CELL COUNT (BEAKER) 4.57 M/ L    4.63-6.08    L          

  



             (test code = 761)                                        

 

             HEMOGLOBIN (BEAKER) (test code = 11.6 GM/DL   13.7-17.5    L       

     



             410)                                                

 

             HEMATOCRIT (BEAKER) (test code = 37.9 %       40.1-51.0    L       

     



             411)                                                

 

             MEAN CORPUSCULAR VOLUME (BEAKER) 83 fL        79-92                

     



             (test code = 753)                                        

 

             MEAN CORPUSCULAR HEMOGLOBIN 25.4 pg      25.7-32.2    L            



             (BEAKER) (test code = 751)                                        

 

             MEAN CORPUSCULAR HEMOGLOBIN CONC 30.6 GM/DL   32.3-36.5    L       

     



             (BEAKER) (test code = 752)                                        

 

             RED CELL DISTRIBUTION WIDTH 14.8 %       11.6-14.4    H            



             (BEAKER) (test code = 412)                                        

 

             PLATELET COUNT (BEAKER) (test 182 K/CU MM  150-450                 

  



             code = 756)                                         

 

             MEAN PLATELET VOLUME (BEAKER) 10.5 fL      9.4-12.4                

  



             (test code = 754)                                        

 

             NUCLEATED RED BLOOD CELLS 0 /100 WBC   0-0                       



             (BEAKER) (test code = 413)                                        

 

             NEUTROPHILS RELATIVE PERCENT 68 %                                  

 



             (BEAKER) (test code = 429)                                        

 

             LYMPHOCYTES RELATIVE PERCENT 16 %                                  

 



             (BEAKER) (test code = 430)                                        

 

             MONOCYTES RELATIVE PERCENT 12 %                                   



             (BEAKER) (test code = 431)                                        

 

             EOSINOPHILS RELATIVE PERCENT 4 %                                   

 



             (BEAKER) (test code = 432)                                        

 

             BASOPHILS RELATIVE PERCENT 0 %                                    



             (BEAKER) (test code = 437)                                        

 

             NEUTROPHILS ABSOLUTE COUNT 5.62 K/ L    1.78-5.38    H            



             (BEAKER) (test code = 670)                                        

 

             LYMPHOCYTES ABSOLUTE COUNT 1.30 K/ L    1.32-3.57    L            



             (BEAKER) (test code = 414)                                        

 

             MONOCYTES ABSOLUTE COUNT (BEAKER) 0.96 K/ L    0.30-0.82    H      

      



             (test code = 415)                                        

 

             EOSINOPHILS ABSOLUTE COUNT 0.33 K/ L    0.04-0.54                 



             (BEAKER) (test code = 416)                                        

 

             BASOPHILS ABSOLUTE COUNT (BEAKER) 0.03 K/ L    0.01-0.08           

      



             (test code = 417)                                        

 

             IMMATURE GRANULOCYTES-RELATIVE 0.60 %       0.00-1.00              

   



             PERCENT (BEAKER) (test code =                                      

  



             2801)                                               



BLOOD GAS, [id number] 03:43:46





             Test Item    Value        Reference Range Interpretation Comments

 

             PH ARTERIAL (BEAKER) (test code = 7.45         7.35-7.45           

      



             383)                                                

 

             PCO2 ARTERIAL (BEAKER) (test code 37 mm Hg     35-45               

      



             = 384)                                              

 

             PO2 ARTERIAL (BEAKER) (test code = 225 mm Hg    80-90        H     

       



             385)                                                

 

             O2 SATURATION ARTERIAL (BEAKER) 99.5 %       96.0-97.0    H        

    



             (test code = 386)                                        

 

             HCO3 ARTERIAL (BEAKER) (test code 25 mmol/L    21-29               

      



             = 388)                                              

 

             BASE EXCESS ARTERIAL (BEAKER) 1.1 mmol/L   -2.0-3.0                

  



             (test code = 387)                                        

 

             PATIENT TEMPERATURE (BEAKER) (test 35.8                            

       



             code = 1818)                                        

 

             FIO2 (BEAKER) (test code = 1819) 32.0                              

     



POCT-GLUCOSE [id number] 22:29:45





             Test Item    Value        Reference Range Interpretation Comments

 

             POC-GLUCOSE METER 149 mg/dL           H            : TESTED A

T [facility] [address]



             (BEAKER) (test code =                                        [facility],



             1538)                                               [address]:



                                                                 /Techni

deejay ID



                                                                 = [id number] for



                                                                 STEPHANIE KEENE



POCT-GLUCOSE [id number] 14:53:25





             Test Item    Value        Reference Range Interpretation Comments

 

             POC-GLUCOSE METER 131 mg/dL           H            : TESTED A

T [facility] [address]



             (BEAKER) (test code =                                        [facility],



             1538)                                               [address]:



                                                                 /Techni

deejay ID



                                                                 = [id number] for An

Chastity daniels



POCT-GLUCOSE [id number] 11:25:13





             Test Item    Value        Reference Range Interpretation Comments

 

             POC-GLUCOSE METER 125 mg/dL           H            : TESTED A

T [facility] [address]



             (BEAKER) (test code =                                        [facility],



             1538)                                               [address]:



                                                                 /Techni

deejay ID



                                                                 = [id number] for An

akGypsy velasquezia



XR CHEST 1 VIEW PORTABLE / [id number] 09:08:16
************************************************************[facility] : 1965 Sex: 
M************************************************************EXAMINATION: XR 
CHEST 1 VIEW PORTABLE / BEDSIDE INDICATION: IntubationCOMPARISON: CXR of the 
prior day FINDINGS:LINES/TUBES: Endotracheal tube terminates 2.4 cm above the 
karen.Interval removal of previously seen feeding tube and placement of an 
NGtube which courses below the diaphragm and out of view. LUNGS: Stable lung 
volumes. No new airspace consolidation.PLEURA: No pleural effusion or 
pneumothorax.MEDIASTINUM: The cardiomediastinal silhouette is stable in size 
andshape.BONES/SOFT TISSUES: No acute osseous injury.ABDOMEN: No free air under 
the diaphragm.IMPRESSION:No significant interval change.Electronically Signed 
By: Laura Treviño2023 09:10 CDTWorkstation Name: [id number]
 03:52:49





             Test Item    Value        Reference Range Interpretation Comments

 

             MAGNESIUM (BEAKER) (test code = 2.3 mg/dL    1.6-2.6               

    



             627)                                                



 ID - [id number] 03:52:49





             Test Item    Value        Reference Range Interpretation Comments

 

             PHOSPHORUS (BEAKER) (test code = 2.4 mg/dL    2.3-4.7              

     



             604)                                                



 ID - MARCOBASIC METABOLIC [id number] 03:52:48





             Test Item    Value        Reference Range Interpretation Comments

 

             SODIUM (BEAKER) 148 meq/L    136-145      H            



             (test code = 381)                                        

 

             POTASSIUM    3.9 meq/L    3.5-5.1                   



             (BEAKER) (test                                        



             code = 379)                                         

 

             CHLORIDE (BEAKER) 113 meq/L           H            



             (test code = 382)                                        

 

             CO2 (BEAKER) 27 meq/L     22-29                     



             (test code = 355)                                        

 

             BLOOD UREA   38 mg/dL     7-21         H            



             NITROGEN (BEAKER)                                        



             (test code = 354)                                        

 

             CREATININE   1.44 mg/dL   0.57-1.25    H            



             (BEAKER) (test                                        



             code = 358)                                         

 

             GLUCOSE RANDOM 111 mg/dL           H            



             (BEAKER) (test                                        



             code = 652)                                         

 

             CALCIUM (BEAKER) 8.1 mg/dL    8.4-10.2     L            



             (test code = 697)                                        

 

             EGFR (BEAKER) 58                                      Interpretatio

n of eGFR



             (test code = mL/min/1.73                            values Stage De

scription



             1092)        sq m                                   Result G1 Althea

l or high



                                                                 >=90 G2 Mildly 

decreased



                                                                 60-89 G3a Mildl

y to



                                                                 moderately 45-5

9 G3b



                                                                 Moderately to s

everely



                                                                 30-44 G4 Severl

y decreased



                                                                 15-29 G5 Kidney

 failure



                                                                 <15Reported eGF

R is based



                                                                 on the CKD-EPI 





                                                                 equation that d

oes not use



                                                                 a race



                                                                 coefficientEsti

mated GFR



                                                                 is not as accur

ate as



                                                                 Creatinine Porsha

ekaterina in



                                                                 predicting glom

erular



                                                                 filtration rate

. Estimated



                                                                 GFR is not appl

icable for



                                                                 dialysis patien

ts



 ID - MARCOCBC W/PLT COUNT &amp; AUTO [id number] 03:35:35





             Test Item    Value        Reference Range Interpretation Comments

 

             WHITE BLOOD CELL COUNT (BEAKER) 11.7 K/ L    3.5-10.5     H        

    



             (test code = 775)                                        

 

             RED BLOOD CELL COUNT (BEAKER) 4.41 M/ L    4.63-6.08    L          

  



             (test code = 761)                                        

 

             HEMOGLOBIN (BEAKER) (test code = 11.3 GM/DL   13.7-17.5    L       

     



             410)                                                

 

             HEMATOCRIT (BEAKER) (test code = 35.5 %       40.1-51.0    L       

     



             411)                                                

 

             MEAN CORPUSCULAR VOLUME (BEAKER) 81 fL        79-92                

     



             (test code = 753)                                        

 

             MEAN CORPUSCULAR HEMOGLOBIN 25.6 pg      25.7-32.2    L            



             (BEAKER) (test code = 751)                                        

 

             MEAN CORPUSCULAR HEMOGLOBIN CONC 31.8 GM/DL   32.3-36.5    L       

     



             (BEAKER) (test code = 752)                                        

 

             RED CELL DISTRIBUTION WIDTH 14.6 %       11.6-14.4    H            



             (BEAKER) (test code = 412)                                        

 

             PLATELET COUNT (BEAKER) (test 197 K/CU MM  150-450                 

  



             code = 756)                                         

 

             MEAN PLATELET VOLUME (BEAKER) 10.3 fL      9.4-12.4                

  



             (test code = 754)                                        

 

             NUCLEATED RED BLOOD CELLS 0 /100 WBC   0-0                       



             (BEAKER) (test code = 413)                                        

 

             NEUTROPHILS RELATIVE PERCENT 79 %                                  

 



             (BEAKER) (test code = 429)                                        

 

             LYMPHOCYTES RELATIVE PERCENT 8 %                                   

 



             (BEAKER) (test code = 430)                                        

 

             MONOCYTES RELATIVE PERCENT 11 %                                   



             (BEAKER) (test code = 431)                                        

 

             EOSINOPHILS RELATIVE PERCENT 1 %                                   

 



             (BEAKER) (test code = 432)                                        

 

             BASOPHILS RELATIVE PERCENT 0 %                                    



             (BEAKER) (test code = 437)                                        

 

             NEUTROPHILS ABSOLUTE COUNT 9.28 K/ L    1.78-5.38    H            



             (BEAKER) (test code = 670)                                        

 

             LYMPHOCYTES ABSOLUTE COUNT 0.97 K/ L    1.32-3.57    L            



             (BEAKER) (test code = 414)                                        

 

             MONOCYTES ABSOLUTE COUNT (BEAKER) 1.29 K/ L    0.30-0.82    H      

      



             (test code = 415)                                        

 

             EOSINOPHILS ABSOLUTE COUNT 0.08 K/ L    0.04-0.54                 



             (BEAKER) (test code = 416)                                        

 

             BASOPHILS ABSOLUTE COUNT (BEAKER) 0.02 K/ L    0.01-0.08           

      



             (test code = 417)                                        

 

             IMMATURE GRANULOCYTES-RELATIVE 0.50 %       0.00-1.00              

   



             PERCENT (BEAKER) (test code =                                      

  



             2801)                                               



BLOOD GAS, [id number] 03:29:39





             Test Item    Value        Reference Range Interpretation Comments

 

             PH ARTERIAL (BEAKER) (test code = 7.47         7.35-7.45    H      

      



             383)                                                

 

             PCO2 ARTERIAL (BEAKER) (test code 43 mm Hg     35-45               

      



             = 384)                                              

 

             PO2 ARTERIAL (BEAKER) (test code = 219 mm Hg    80-90        H     

       



             385)                                                

 

             O2 SATURATION ARTERIAL (BEAKER) 99.5 %       96.0-97.0    H        

    



             (test code = 386)                                        

 

             HCO3 ARTERIAL (BEAKER) (test code 30 mmol/L    21-29        H      

      



             = 388)                                              

 

             BASE EXCESS ARTERIAL (BEAKER) 5.8 mmol/L   -2.0-3.0     H          

  



             (test code = 387)                                        

 

             PATIENT TEMPERATURE (BEAKER) (test 38.0                            

       



             code = 1818)                                        

 

             FIO2 (BEAKER) (test code = 1819) 24.0                              

     



POCT-GLUCOSE [id number] 02:52:45





             Test Item    Value        Reference Range Interpretation Comments

 

             POC-GLUCOSE METER 110 mg/dL                        : TESTED A

T [facility] [address]



             (BEAKER) (test code =                                        [facility],



             1538)                                               [address]:



                                                                 /Techni

deejay ID



                                                                 = [id number] for



                                                                 STEPHANIE KEENE



POCT-GLUCOSE [id number] 02:49:54





             Test Item    Value        Reference Range Interpretation Comments

 

             POC-GLUCOSE METER 167 mg/dL           H            : TESTED A

T [facility] [address]



             (BEAKER) (test code =                                        [facility],



             1538)                                               [address]:



                                                                 /Techni

deejay ID



                                                                 = [id number] for



                                                                 STEPHANIE KEENE



BASIC METABOLIC [id number] 16:07:43





             Test Item    Value        Reference Range Interpretation Comments

 

             SODIUM (BEAKER) 148 meq/L    136-145      H            



             (test code = 381)                                        

 

             POTASSIUM    3.9 meq/L    3.5-5.1                   



             (BEAKER) (test                                        



             code = 379)                                         

 

             CHLORIDE (BEAKER) 112 meq/L           H            



             (test code = 382)                                        

 

             CO2 (BEAKER) 30 meq/L     22-29        H            



             (test code = 355)                                        

 

             BLOOD UREA   39 mg/dL     7-21         H            



             NITROGEN (BEAKER)                                        



             (test code = 354)                                        

 

             CREATININE   1.77 mg/dL   0.57-1.25    H            



             (BEAKER) (test                                        



             code = 358)                                         

 

             GLUCOSE RANDOM 152 mg/dL           H            



             (BEAKER) (test                                        



             code = 652)                                         

 

             CALCIUM (BEAKER) 8.3 mg/dL    8.4-10.2     L            



             (test code = 697)                                        

 

             EGFR (BEAKER) 45                                      Interpretatio

n of eGFR



             (test code = mL/min/1.73                            values Stage De

scription



             1092)        sq m                                   Result G1 Althea

l or high



                                                                 >=90 G2 Mildly 

decreased



                                                                 60-89 G3a Mildl

y to



                                                                 moderately 45-5

9 G3b



                                                                 Moderately to s

everely



                                                                 30-44 G4 Severl

y decreased



                                                                  15-29 G5 Kidne

y failure



                                                                 <15Reported eGF

R is based



                                                                 on the CKD-EPI 





                                                                 equation that d

oes not use



                                                                 a race



                                                                 coefficientEsti

mated GFR



                                                                 is not as accur

ate as



                                                                 Creatinine Porsha tobar in



                                                                 predicting glom

erular



                                                                 filtration rate

. Estimated



                                                                 GFR is not appl

icable for



                                                                 dialysis patien

ts



 ID - DBSPUTUM CULTURE + GRAM [id number] 10:16:15





             Test Item    Value        Reference Range Interpretation Comments

 

             CULTURE                                A            1+ Beta-hemolyt

ic



             (BEAKER) (test                                        streptococcus

 group



             code = 1095)                                        G, by serologic

al



                                                                 grouping

 

             GRAM STAIN   4+ WBCs                                



             RESULT (BEAKER)                                        



             (test code =                                        



             1123)                                               

 

             GRAM STAIN   1+ gram positive                           



             RESULT (BEAKER) cocci in chains and                           



             (test code = pairs                                  



             113012)                                             

 

             GRAM STAIN   1+ gram negative                           



             RESULT (BEAKER) coccobacilli                           



             (test code =                                        



             417068)                                             

 

             GRAM STAIN   <1+ gram positive                           



             RESULT (BEAKER) rods                                   



             (test code =                                        



             713140)                                             

 

             GRAM STAIN   <1+ gram positive                           



             RESULT (BEAKER) cocci in clusters                           



             (test code =                                        



             651770)                                             



3+ Normal respiratory clemente presentXR CHEST 1 VIEW PORTABLE / [id number] 
06:31:38************************************************************[facility] : 1965 Sex: 
M************************************************************Chest one v
iew:HISTORY: PneumoniaCompared to 2023. Bibasilar subsegmental atelectasis 
is present and improved on the left.There is a minimal left pleural effusion. 
Cardiac size remains withinnormal limits. Anendotracheal tube and enteric 
feeding tube appearunchanged in position.Electronically Signed By: Tawanda Balbuena2023 06:33 CDTWorkstation Name: [id number] 
03:37:56





             Test Item    Value        Reference Range Interpretation Comments

 

             MAGNESIUM (BEAKER) (test code = 2.3 mg/dL    1.6-2.6               

    



             627)                                                



 ID - [id number] 03:37:56





             Test Item    Value        Reference Range Interpretation Comments

 

             PHOSPHORUS (BEAKER) (test code = 3.2 mg/dL    2.3-4.7              

     



             604)                                                



 ID - ADMINBASIC METABOLIC [id number] 03:37:55





             Test Item    Value        Reference Range Interpretation Comments

 

             SODIUM (BEAKER) 145 meq/L    136-145                   



             (test code = 381)                                        

 

             POTASSIUM    4.0 meq/L    3.5-5.1                   



             (BEAKER) (test                                        



             code = 379)                                         

 

             CHLORIDE (BEAKER) 111 meq/L           H            



             (test code = 382)                                        

 

             CO2 (BEAKER) 25 meq/L     22-29                     



             (test code = 355)                                        

 

             BLOOD UREA   37 mg/dL     7-21         H            



             NITROGEN (BEAKER)                                        



             (test code = 354)                                        

 

             CREATININE   2.17 mg/dL   0.57-1.25    H            



             (BEAKER) (test                                        



             code = 358)                                         

 

             GLUCOSE RANDOM 152 mg/dL           H            



             (BEAKER) (test                                        



             code = 652)                                         

 

             CALCIUM (BEAKER) 8.2 mg/dL    8.4-10.2     L            



             (test code = 697)                                        

 

             EGFR (BEAKER) 35                                       Interpretati

on of eGFR



             (test code = mL/min/1.73                            values Stage De

scription



             1092)        sq m                                   Result G1 Althea

l or high



                                                                 >=90 G2 Mildly 

decreased



                                                                 60-89 G3a Mildl

y to



                                                                 moderately  45-

59 G3b



                                                                 Moderately to s

everely



                                                                 30-44 G4 Severl

y decreased



                                                                 15-29 G5 Kidney

 failure



                                                                 <15Reported eGF

R is based



                                                                 on the CKD-EPI 





                                                                 equation that d

oes not use



                                                                 a race



                                                                 coefficientEsti

mated GFR



                                                                 is not as accur

ate as



                                                                 Creatinine Porsha

ekaterina in



                                                                 predicting glom

erular



                                                                 filtration rate

. Estimated



                                                                 GFR is not appl

icable for



                                                                 dialysis patien

ts



 ID - ADMINCBC W/PLT COUNT &amp; AUTO [id number] 03:32:03





             Test Item    Value        Reference Range Interpretation Comments

 

             WHITE BLOOD CELL COUNT (BEAKER) 11.8 K/ L    3.5-10.5     H        

    



             (test code = 775)                                        

 

             RED BLOOD CELL COUNT (BEAKER) 4.93 M/ L    4.63-6.08               

  



             (test code = 761)                                        

 

             HEMOGLOBIN (BEAKER) (test code = 12.8 GM/DL   13.7-17.5    L       

     



             410)                                                

 

             HEMATOCRIT (BEAKER) (test code = 38.7 %       40.1-51.0    L       

     



             411)                                                

 

             MEAN CORPUSCULAR VOLUME (BEAKER) 79 fL        79-92                

     



             (test code = 753)                                        

 

             MEAN CORPUSCULAR HEMOGLOBIN 26.0 pg      25.7-32.2                 



             (BEAKER) (test code = 751)                                        

 

             MEAN CORPUSCULAR HEMOGLOBIN CONC 33.1 GM/DL   32.3-36.5            

     



             (BEAKER) (test code = 752)                                        

 

             RED CELL DISTRIBUTION WIDTH 14.6 %       11.6-14.4    H            



             (BEAKER) (test code = 412)                                        

 

             PLATELET COUNT (BEAKER) (test 190 K/CU MM  150-450                 

  



             code = 756)                                         

 

             MEAN PLATELET VOLUME (BEAKER) 10.1 fL      9.4-12.4                

  



             (test code = 754)                                        

 

             NUCLEATED RED BLOOD CELLS 0 /100 WBC   0-0                       



             (BEAKER) (test code = 413)                                        

 

             NEUTROPHILS RELATIVE PERCENT 95 %                                  

 



             (BEAKER) (test code = 429)                                        

 

             LYMPHOCYTES RELATIVE PERCENT 2 %                                   

 



             (BEAKER) (test code = 430)                                        

 

             MONOCYTES RELATIVE PERCENT 2 %                                    



             (BEAKER) (test code = 431)                                        

 

             EOSINOPHILS RELATIVE PERCENT 0 %                                   

 



             (BEAKER) (test code = 432)                                        

 

             BASOPHILS RELATIVE PERCENT 0 %                                    



             (BEAKER) (test code = 437)                                        

 

             NEUTROPHILS ABSOLUTE COUNT 11.30 K/ L   1.78-5.38    H            



             (BEAKER) (test code = 670)                                        

 

             LYMPHOCYTES ABSOLUTE COUNT 0.25 K/ L    1.32-3.57    L            



             (BEAKER) (test code = 414)                                        

 

             MONOCYTES ABSOLUTE COUNT (BEAKER) 0.21 K/ L    0.30-0.82    L      

      



             (test code = 415)                                        

 

             EOSINOPHILS ABSOLUTE COUNT 0.00 K/ L    0.04-0.54    L            



             (BEAKER) (test code = 416)                                        

 

             BASOPHILS ABSOLUTE COUNT (BEAKER) 0.02 K/ L    0.01-0.08           

      



             (test code = 417)                                        

 

             IMMATURE GRANULOCYTES-RELATIVE 0.50 %       0.00-1.00              

   



             PERCENT (BEAKER) (test code =                                      

  



             2801)                                               



BLOOD GAS, [id number] 03:15:14





             Test Item    Value        Reference Range Interpretation Comments

 

             PH ARTERIAL (BEAKER) (test code = 7.47         7.35-7.45    H      

      



             383)                                                

 

             PCO2 ARTERIAL (BEAKER) (test code 42 mm Hg     35-45               

      



             = 384)                                              

 

             PO2 ARTERIAL (BEAKER) (test code = 100 mm Hg    80-90        H     

       



             385)                                                

 

             O2 SATURATION ARTERIAL (BEAKER) 97.8 %       96.0-97.0    H        

    



             (test code = 386)                                        

 

             HCO3 ARTERIAL ([facility]) (test code 30 mmol/L    21-29        H      

      



             = 388)                                              

 

             BASE EXCESS ARTERIAL ([facility]) 5.5 mmol/L   -2.0-3.0     H          

  



             (test code = 387)                                        

 

             PATIENT TEMPERATURE ([facility]) (test 37.0                            

       



             code = 1818)                                        

 

             FIO2 ([facility]) (test code = 1819) 24.0                              

     



POCT-GLUCOSE [id number] 23:26:56





             Test Item    Value        Reference Range Interpretation Comments

 

             POC-GLUCOSE METER 133 mg/dL           H            : TESTED A

T [facility] [address]



             ([facility]) (test code =                                        [facility]



             [address])                                               [address]:



                                                                 /Techni

deejay ID



                                                                 = [id number] for RED FINK



POCT-GLUCOSE [id number] 19:00:51





             Test Item    Value        Reference Range Interpretation Comments

 

             POC-GLUCOSE METER 125 mg/dL           H            : Notified

 RN/MD:



             ([facility]) (test code =                                        TESTED

 AT [facility] [address]



             1538)                                               [facility],



                                                                 [id number]:



                                                                 /Techni

deejay ID



                                                                 = [id number] for BARBARA NEWTON



XR CHEST 1 VIEW PORTABLE / [id number] 18:19:28
************************************************************[facility] : 1965 Sex: 
M************************************************************Exam: XR CHEST 1 
VIEW PORTABLE / BEDSIDEDate: 2023 6:18 PMIndication:worsening 
hypoxiaComparison: Chest radiograph from earlier 
today.IMPRESSION:Lines/Tubes:Unchanged support devices.Lungs and Pleura : Low 
lung volumes. Mildly increasing perihilaropacities. Trace bilateral pleural 
effusions. No pneumothorax.Heart/Mediastinum:Unchanged.Bones/Soft Tissues: No 
acute osseous abnormality.Upper abdomen: Unremarkable.Electronically Signed By: 
Oneal Welch2023 18:21 CDTWorkstation Name: [id number] METABOLIC PANEL
2023 17:42:19





             Test Item    Value        Reference Range Interpretation Comments

 

             SODIUM (BEAKER) 145 meq/L    136-145                   



             (test code = 381)                                        

 

             POTASSIUM    3.3 meq/L    3.5-5.1      L            



             (BEAKER) (test                                        



             code = 379)                                         

 

             CHLORIDE (BEAKER) 113 meq/L           H            



             (test code = 382)                                        

 

             CO2 (BEAKER) 22 meq/L     22-29                     



             (test code = 355)                                        

 

             BLOOD UREA   46 mg/dL     7-21         H            



             NITROGEN (BEAKER)                                        



             (test code = 354)                                        

 

             CREATININE   3.80 mg/dL   0.57-1.25    H            



             (BEAKER) (test                                        



             code = 358)                                         

 

             GLUCOSE RANDOM 160 mg/dL           H            



             (BEAKER) (test                                        



             code = 652)                                         

 

             CALCIUM (BEAKER) 7.6 mg/dL    8.4-10.2     L            



             (test code = 697)                                        

 

             EGFR (BEAKER) 18                                      Interpretatio

n of eGFR



             (test code = mL/min/1.73                            values Stage De

scription



             1092)        sq m                                   Result G1 Althea

l or high



                                                                 >=90 G2 Mildly 

decreased



                                                                 60-89 G3a Mildl

y to



                                                                 moderately 45-5

9 G3b



                                                                 Moderately to s

everely



                                                                 30-44 G4 Severl

y decreased



                                                                 15-29 G5 Kidney

 failure



                                                                 <15Reported eGF

R is based



                                                                 on the CKD-EPI 





                                                                 equation that d

oes not use



                                                                 a race



                                                                 coefficientEsti

mated GFR



                                                                 is not as accur

ate as



                                                                 Creatinine Porsha

ekaterina in



                                                                 predicting glom

erular



                                                                 filtration rate

. Estimated



                                                                 GFR is not appl

icable for



                                                                 dialysis patien

ts



 ID - ADMINBLOOD GAS, [id number] 17:07:40





             Test Item    Value        Reference Range Interpretation Comments

 

             PH ARTERIAL (BEAKER) (test code = 7.45         7.35-7.45           

      



             383)                                                

 

             PCO2 ARTERIAL (BEAKER) (test code 32 mm Hg     35-45        L      

      



             = 384)                                              

 

             PO2 ARTERIAL (BEAKER) (test code 101 mm Hg    80-90        H       

     



             = 385)                                              

 

             O2 SATURATION ARTERIAL (BEAKER) 97.9 %       96.0-97.0    H        

    



             (test code = 386)                                        

 

             HCO3 ARTERIAL (BEAKER) (test code 22 mmol/L    21-29               

      



             = 388)                                              

 

             BASE EXCESS ARTERIAL (BEAKER) -1.2 mmol/L  -2.0-3.0                

  



             (test code = 387)                                        

 

             PATIENT TEMPERATURE (BEAKER) 37.0                                  

 



             (test code = 1818)                                        

 

             FIO2 (BEAKER) (test code = 1819) 21.0                              

     



BASIC METABOLIC [id number] 16:56:17





             Test Item    Value        Reference Range Interpretation Comments

 

             SODIUM (BEAKER) 141 meq/L    136-145                   



             (test code = 381)                                        

 

             POTASSIUM    4.4 meq/L    3.5-5.1                   Specimen modera

tely



             (BEAKER) (test                                        hemolyzed



             code = 379)                                         

 

             CHLORIDE (BEAKER) 113 meq/L           H            



             (test code = 382)                                        

 

             CO2 (BEAKER) 16 meq/L     22-29        L            



             (test code = 355)                                        

 

             BLOOD UREA   46 mg/dL     7-21         H            



             NITROGEN (BEAKER)                                        



             (test code = 354)                                        

 

             CREATININE   4.66 mg/dL   0.57-1.25    H            Specimen modera

tely



             (BEAKER) (test                                        hemolyzed



             code = 358)                                         

 

             GLUCOSE RANDOM 151 mg/dL           H            



             (BEAKER) (test                                        



             code = 652)                                         

 

             CALCIUM (BEAKER) 7.9 mg/dL    8.4-10.2     L            



             (test code = 697)                                        

 

             EGFR (BEAKER) 14                                      Interpretatio

n of eGFR



             (test code = mL/min/1.73                            values Stage De

scription



             1092)        sq m                                   Result G1 Althea

l or high



                                                                 >=90 G2 Mildly 

decreased



                                                                 60-89 G3a  Mild

ly to



                                                                 moderately 45-5

9 G3b



                                                                 Moderately to s

everely



                                                                 30-44 G4 Severl

y decreased



                                                                 15-29 G5 Kidney

 failure



                                                                 <15Reported eGF

R is based



                                                                 on the CKD-EPI 

2021



                                                                 equation that d

oes not use



                                                                 a race



                                                                 coefficientEsti

mated GFR



                                                                 is not as accur

ate as



                                                                 Creatinine Porsha tobar in



                                                                 predicting glom

erular



                                                                 filtration rate

. Estimated



                                                                 GFR is not appl

icable for



                                                                 dialysis patien

ts



 ID - BSUS RENAL [id number] 16:15:21
************************************************************[facility] : 1965 Sex: 
M************************************************************EXAM: Renal 
UltrasoundINDICATION: EWELINA vs. Renal Failure COMPARISON: None TECHNIQUE: 
Transverse and longitudinalimages of the kidneys and bladderwere obtained. 
FINDINGS: Right Kidney: Length: 10.7 cmAppearance: Normal echogenicity. 
Collecting system: No hydronephrosisStones: NoneCyst/Mass: NoneLeft Kidney: 
Length: 10.2 cmAppearance: Normal echogenicity. Collecting system: No 
hydronephrosisStones: NoneCyst/Mass:NoneBladder: Lopez catheter in the 
decompressed bladder.IMPRESSION:No hydronephrosis or renal calclul
i.Electronically Signed By: Laura Treviño2023 16:17 CDTWorkstation Name: 
[id number] NITROGEN, RANDOM [id number] 14:25:38





             Test Item    Value        Reference Range Interpretation Comments

 

             UREA NITROGEN URINE (BEAKER) (test 351 mg/dL                       

       



             code = 538)                                         



Reference Range: No NormalsOperator ID - ADMINCREATININE, RANDOM [id number]
 14:25:37





             Test Item    Value        Reference Range Interpretation Comments

 

             CREATININE URINE (BEAKER) (test 117.9 mg/dL                        

    



             code = 375)                                         



Reference Range: No NormalsOperator ID - ADMINSODIUM, RANDOM [id number] 
14:25:37





             Test Item    Value        Reference Range Interpretation Comments

 

             SODIUM URINE (BEAKER) (test code = 43 meq/L                        

       



             243)                                                



Reference Range: No NormalsOperator ID - ADMINBASIC METABOLIC [id number] 
14:01:34





             Test Item    Value        Reference Range Interpretation Comments

 

             SODIUM (BEAKER) 143 meq/L    136-145                   



             (test code = 381)                                        

 

             POTASSIUM    3.8 meq/L    3.5-5.1                   



             (BEAKER) (test                                        



             code = 379)                                         

 

             CHLORIDE (BEAKER) 112 meq/L           H            



             (test code = 382)                                        

 

             CO2 (BEAKER) 18 meq/L     22-29        L            



             (test code = 355)                                        

 

             BLOOD UREA   48 mg/dL     7-21         H            



             NITROGEN (BEAKER)                                        



             (test code = 354)                                        

 

             CREATININE   4.83 mg/dL   0.57-1.25    H            



             (BEAKER) (test                                        



             code = 358)                                         

 

             GLUCOSE RANDOM 154 mg/dL           H            



             (BEAKER) (test                                        



             code = 652)                                         

 

             CALCIUM (BEAKER) 7.8 mg/dL    8.4-10.2     L            



             (test code = 697)                                        

 

             EGFR (BEAKER) 14                                      Interpretatio

n of eGFR



             (test code = mL/min/1.73                            values Stage De

scription



             1092)        sq m                                   Result G1 Althea

l or high



                                                                 >=90 G2 Mildly 

decreased



                                                                 60-89 G3a Mildl

y to



                                                                 moderately 45-5

9 G3b



                                                                 Moderately to s

everely



                                                                 30-44 G4 Severl

y decreased



                                                                 15-29 G5 Kidney

 failure



                                                                 <15Reported eGF

R is based



                                                                 on the CKD-EPI 





                                                                 equation that d

oes not use



                                                                 a race



                                                                 coefficientEsti

mated GFR



                                                                 is not as accur

ate as



                                                                 Creatinine Porsha

ekaterina in



                                                                 predicting glom

erular



                                                                 filtration rate

. Estimated



                                                                 GFR is not appl

icable for



                                                                 dialysis patien

ts



 ID - MARCOB-TYPE NATRIURETIC FACTOR (BNP)2023 13:54:47





             Test Item    Value        Reference Range Interpretation Comments

 

             B-TYPE NATRIURETIC PEPTIDE (BEAKER) 108 pg/mL    0-100        H    

        



             (test code = 700)                                        



 ID - MARCOBLOOD GAS, [id number] 13:38:48





             Test Item    Value        Reference Range Interpretation Comments

 

             PH ARTERIAL (BEAKER) (test code = 7.47         7.35-7.45    H      

      



             383)                                                

 

             PCO2 ARTERIAL (BEAKER) (test code 25 mm Hg     35-45        L      

      



             = 384)                                              

 

             PO2 ARTERIAL (BEAKER) (test code 208 mm Hg    80-90        H       

     



             = 385)                                              

 

             O2 SATURATION ARTERIAL (BEAKER) 99.5 %       96.0-97.0    H        

    



             (test code = 386)                                        

 

             HCO3 ARTERIAL (BEAKER) (test code 17 mmol/L    21-29        L      

      



             = 388)                                              

 

             BASE EXCESS ARTERIAL (BEAKER) -4.5 mmol/L  -2.0-3.0     L          

  



             (test code = 387)                                        

 

             PATIENT TEMPERATURE (BEAKER) 37.0                                  

 



             (test code = 1818)                                        

 

             FIO2 (BEAKER) (test code = 1819) 21                                

     



OSMOLALITY, [id number] 12:37:47





             Test Item    Value        Reference Range Interpretation Comments

 

             OSMOLALITY, SERUM (BEAKER) (test 302 mOsm/kg  275-295      H       

     



             code = 615)                                         



POCT-GLUCOSE [id number] 12:14:10





             Test Item    Value        Reference Range Interpretation Comments

 

             POC-GLUCOSE METER 144 mg/dL           H            : Notified

 RN/MD:



             (BEAKER) (test code =                                        TESTED

 AT [facility] [id number])                                               [place] [facility],



                                                                 [id number]:



                                                                 /Techni

deejay ID



                                                                 = [id number] for BARBARA NEWTON



CREATINE KINASE (CK)2023 12:06:52





             Test Item    Value        Reference Range Interpretation Comments

 

             CREATINE KINASE TOTAL (BEAKER) (test 393 U/L             H   

         



             code = 380)                                         



 ID - LUISITOSMOLALITY, [id number] 11:47:18





             Test Item    Value        Reference Range Interpretation Comments

 

             OSMOLALITY URINE 355 mOsm/kg  See_Comment                [Automated

 message]



             (BEAKER) (test code =                                        The sy

stem which



             614)                                                generated this 

result



                                                                 transmitted ref

erence



                                                                 range: 50-1,200



                                                                 mOsm/kg. The



                                                                 reference range

 was



                                                                 not used to int

erpret



                                                                 this result as



                                                                 normal/abnormal

.



URINALYSIS WITH MICROSCOPIC IF [id number] 11:46:16





             Test Item    Value        Reference Range Interpretation Comments

 

             COLOR (BEAKER) (test code = 470) Pink                              

     

 

             CLARITY (BEAKER) (test code = 469) Hazy                            

       

 

             SPECIFIC GRAVITY UA (BEAKER) (test 1.020        1.001-1.035        

       



             code = 468)                                         

 

             PH UA (BEAKER) (test code = 467) 6.0          5.0-8.0              

     

 

             PROTEIN UA (BEAKER) (test code = 100 mg/dL    Negative     A       

     



             464)                                                

 

             GLUCOSE UA (BEAKER) (test code = 30 mg/dL     Negative     A       

     



             365)                                                

 

             KETONES UA (BEAKER) (test code = Trace        Negative     A       

     



             371)                                                

 

             BILIRUBIN UA (BEAKER) (test code = Negative     Negative           

       



             462)                                                

 

             BLOOD UA (BEAKER) (test code = 461) Moderate     Negative     A    

        

 

             NITRITE UA (BEAKER) (test code = Negative     Negative             

     



             465)                                                

 

             LEUKOCYTE ESTERASE UA (BEAKER) Moderate     Negative     A         

   



             (test code = 466)                                        

 

             UROBILINOGEN UA (BEAKER) (test code 6            0.2-1.0      H    

        



             = 463)                                              

 

             SOURCE(BEAKER) (test code = 2795)                                  

      



 ID - [auto] ID - techURINALYSIS [id number] 11:46:16





             Test Item    Value        Reference Range Interpretation Comments

 

             RBC UA (BEAKER) (test code = 519) 56 /HPF                          

      

 

             WBC UA (BEAKER) (test code = 520) 14 /HPF                          

      

 

             MUCUS (BEAKER) (test code = 1574) Rare                             

      

 

             SQUAMOUS EPITHELIAL (BEAKER) (test < /HPF                          

       



             code = 516)                                         

 

             CASTS (BEAKER) (test code = 1579) 1 /LPF                           

      

 

             CRYSTALS, URINE (BEAKER) (test Occasional   None Seen    A         

   



             code = 1521)                                        



 ID - techBLOOD GAS, [id number] 09:07:12





             Test Item    Value        Reference Range Interpretation Comments

 

             PH ARTERIAL (BEAKER) (test code 7.46         7.35-7.45    H        

    



             = 383)                                              

 

             PCO2 ARTERIAL (BEAKER) (test 15 mm Hg     35-45        LL          

 



             code = 384)                                         

 

             PO2 ARTERIAL (BEAKER) (test code 202 mm Hg    80-90        H       

     



             = 385)                                              

 

             O2 SATURATION ARTERIAL (BEAKER) 99.5 %       96.0-97.0    H        

    



             (test code = 386)                                        

 

             HCO3 ARTERIAL (BEAKER) (test 10 mmol/L    21-29        LL          

 



             code = 388)                                         

 

             BASE EXCESS ARTERIAL (BEAKER) -12.0 mmol/L -2.0-3.0     L          

  



             (test code = 387)                                        

 

             PATIENT TEMPERATURE (BEAKER) 37.0                                  

 



             (test code = 1818)                                        

 

             FIO2 (BEAKER) (test code = 1819) 30.0                              

     



HEPATIC FUNCTION [id number] 08:17:04





             Test Item    Value        Reference Range Interpretation Comments

 

             TOTAL PROTEIN (BEAKER) (test code = 5.8 gm/dL    6.0-8.3      L    

        



             770)                                                

 

             ALBUMIN (BEAKER) (test code = 1145) 3.1 g/dL     3.5-5.0      L    

        

 

             BILIRUBIN TOTAL (BEAKER) (test code 0.9 mg/dL    0.2-1.2           

        



             = 377)                                              

 

             BILIRUBIN DIRECT (BEAKER) (test 0.4 mg/dL    0.1-0.5               

    



             code = 706)                                         

 

             ALKALINE PHOSPHATASE (BEAKER) (test 84 U/L                   

        



             code = 346)                                         

 

             AST (SGOT) (BEAKER) (test code = 34 U/L       5-34                 

     



             353)                                                

 

             ALT (SGPT) (BEAKER) (test code = 24 U/L       6-55                 

     



             347)                                                



 ID - MARCOXR CHEST 1 VIEW PORTABLE / [id number] 06:56:03
************************************************************[facility] : 1965 Sex: 
M************************************************************CLINICAL HISTORY: 
pneumonia follow-upTECHNIQUE: 1 view of the chest.COMPARISON: 
2023IMPRESSION:ETT terminates 3 cm above the karen. Feeding tube now seen 
in theduodenum. Low lung volumes are again seen with decreased right 
perihilarairspace opacity. There are no significant appearing effusions. 
Thecardiomediastinal silhouette is magnified by technique.Electronically Signed 
By: Abimael Art2023 06:58 CDTWorkstation Name: [id number] METABOLIC 
[id number] 04:01:18





             Test Item    Value        Reference Range Interpretation Comments

 

             SODIUM (BEAKER) 141 meq/L    136-145                   



             (test code = 381)                                        

 

             POTASSIUM    3.8 meq/L    3.5-5.1                   



             (BEAKER) (test                                        



             code = 379)                                         

 

             CHLORIDE (BEAKER) 113 meq/L           H            



             (test code = 382)                                        

 

             CO2 (BEAKER) 16 meq/L     22-29        L            



             (test code = 355)                                        

 

             BLOOD UREA   42 mg/dL     7-21         H            



             NITROGEN (BEAKER)                                        



             (test code = 354)                                        

 

             CREATININE   3.25 mg/dL   0.57-1.25    H            



             (BEAKER) (test                                        



             code = 358)                                         

 

             GLUCOSE RANDOM 159 mg/dL           H            



             (BEAKER) (test                                        



             code = 652)                                         

 

             CALCIUM (BEAKER) 7.9 mg/dL    8.4-10.2     L            



             (test code = 697)                                        

 

             EGFR (BEAKER) 22                                      Interpretatio

n of eGFR



             (test code = mL/min/1.73                            values Stage De

scription



             1092)        sq m                                   Result G1 Althea

l or high



                                                                 >=90 G2 Mildly 

decreased



                                                                 60-89 G3a Mildl

y to



                                                                 moderately 45-5

9 G3b



                                                                 Moderately to s

everely



                                                                 30-44 G4 Severl

y decreased



                                                                 15-29 G5 Kidney

 failure



                                                                 <15Reported eGF

R is based



                                                                 on the CKD-EPI 

2021



                                                                 equation that d

oes not use



                                                                 a race



                                                                 coefficientEsti

mated GFR



                                                                 is not as accur

ate as



                                                                 Creatinine Porsha

ekaterina in



                                                                 predicting glom

erular



                                                                 filtration rate

. Estimated



                                                                 GFR is not appl

icable for



                                                                 dialysis patien

ts



 ID - [id number] 03:53:48





             Test Item    Value        Reference Range Interpretation Comments

 

             MAGNESIUM (BEAKER) (test code = 2.0 mg/dL    1.6-2.6               

    



             627)                                                



 ID - [id number] 03:53:48





             Test Item    Value        Reference Range Interpretation Comments

 

             PHOSPHORUS (BEAKER) (test code = 4.8 mg/dL    2.3-4.7      H       

     



             604)                                                



 ID - ADMINCBC W/PLT COUNT &amp; AUTO [id number] 03:39:24





             Test Item    Value        Reference Range Interpretation Comments

 

             WHITE BLOOD CELL COUNT (BEAKER) 13.3 K/ L    3.5-10.5     H        

    



             (test code = 775)                                        

 

             RED BLOOD CELL COUNT (BEAKER) 4.96 M/ L    4.63-6.08               

  



             (test code = 761)                                        

 

             HEMOGLOBIN (BEAKER) (test code = 12.9 GM/DL   13.7-17.5    L       

     



             410)                                                

 

             HEMATOCRIT (BEAKER) (test code = 39.3 %       40.1-51.0    L       

     



             411)                                                

 

             MEAN CORPUSCULAR VOLUME (BEAKER) 79 fL        79-92                

     



             (test code = 753)                                        

 

             MEAN CORPUSCULAR HEMOGLOBIN 26.0 pg      25.7-32.2                 



             (BEAKER) (test code = 751)                                        

 

             MEAN CORPUSCULAR HEMOGLOBIN CONC 32.8 GM/DL   32.3-36.5            

     



             (BEAKER) (test code = 752)                                        

 

             RED CELL DISTRIBUTION WIDTH 14.6 %       11.6-14.4    H            



             (BEAKER) (test code = 412)                                        

 

             PLATELET COUNT (BEAKER) (test 171 K/CU MM  150-450                 

  



             code = 756)                                         

 

             MEAN PLATELET VOLUME (BEAKER) 10.2 fL      9.4-12.4                

  



             (test code = 754)                                        

 

             NUCLEATED RED BLOOD CELLS 0 /100 WBC   0-0                       



             (BEAKER) (test code = 413)                                        

 

             NEUTROPHILS RELATIVE PERCENT 87 %                                  

 



             (BEAKER) (test code = 429)                                        

 

             LYMPHOCYTES RELATIVE PERCENT 4 %                                   

 



             (BEAKER) (test code = 430)                                        

 

             MONOCYTES RELATIVE PERCENT 9 %                                    



             (BEAKER) (test code = 431)                                        

 

             EOSINOPHILS RELATIVE PERCENT 0 %                                   

 



             (BEAKER) (test code = 432)                                        

 

             BASOPHILS RELATIVE PERCENT 0 %                                    



             (BEAKER) (test code = 437)                                        

 

             NEUTROPHILS ABSOLUTE COUNT 11.55 K/ L   1.78-5.38    H            



             (BEAKER) (test code = 670)                                        

 

             LYMPHOCYTES ABSOLUTE COUNT 0.49 K/ L    1.32-3.57    L            



             (BEAKER) (test code = 414)                                        

 

             MONOCYTES ABSOLUTE COUNT (BEAKER) 1.16 K/ L    0.30-0.82    H      

      



             (test code = 415)                                        

 

             EOSINOPHILS ABSOLUTE COUNT 0.00 K/ L    0.04-0.54    L            



             (BEAKER) (test code = 416)                                        

 

             BASOPHILS ABSOLUTE COUNT (BEAKER) 0.02 K/ L    0.01-0.08           

      



             (test code = 417)                                        

 

             IMMATURE GRANULOCYTES-RELATIVE 0.50 %       0.00-1.00              

   



             PERCENT (BEAKER) (test code =                                      

  



             2801)                                               



BLOOD GAS, [id number] 03:20:41





             Test Item    Value        Reference Range Interpretation Comments

 

             PH ARTERIAL (BEAKER) (test code = 7.43         7.35-7.45           

      



             383)                                                

 

             PCO2 ARTERIAL (BEAKER) (test code 26 mm Hg     35-45        L      

      



             = 384)                                              

 

             PO2 ARTERIAL (BEAKER) (test code 155 mm Hg    80-90        H       

     



             = 385)                                              

 

             O2 SATURATION ARTERIAL (BEAKER) 99.1 %       96.0-97.0    H        

    



             (test code = 386)                                        

 

             HCO3 ARTERIAL (BEAKER) (test code 17 mmol/L    21-29        L      

      



             = 388)                                              

 

             BASE EXCESS ARTERIAL (BEAKER) -5.7 mmol/L  -2.0-3.0     L          

  



             (test code = 387)                                        

 

             PATIENT TEMPERATURE (BEAKER) 37.0                                  

 



             (test code = 1818)                                        

 

             FIO2 (BEAKER) (test code = 1819) 60.0                              

     



BLOOD GAS, [id number] 23:12:54





             Test Item    Value        Reference Range Interpretation Comments

 

             PH ARTERIAL (BEAKER) (test code = 7.42         7.35-7.45           

      



             383)                                                

 

             PCO2 ARTERIAL (BEAKER) (test code 27 mm Hg     35-45        L      

      



             = 384)                                              

 

             PO2 ARTERIAL (BEAKER) (test code 184 mm Hg    80-90        H       

     



             = 385)                                              

 

             O2 SATURATION ARTERIAL (BEAKER) 99.3 %       96.0-97.0    H        

    



             (test code = 386)                                        

 

             HCO3 ARTERIAL (BEAKER) (test code 17 mmol/L    21-29        L      

      



             = 388)                                              

 

             BASE EXCESS ARTERIAL (BEAKER) -6.0 mmol/L  -2.0-3.0     L          

  



             (test code = 387)                                        

 

             PATIENT TEMPERATURE (BEAKER) 36.5                                  

 



             (test code = 1818)                                        

 

             FIO2 (BEAKER) (test code = 1819) 80.0                              

     



XR ABDOMEN/KUB 1 VIEW [id number] 22:12:42
************************************************************[facility] : 1965 Sex: 
M************************************************************TECHNIQUE:XR 
ABDOMEN/KUB 1 VIEW PORTABLEINDICATION: corpak.COMPARISON: 
2023FINDINGS:Feeding tube tip projects over the first portion of the 
duodenum. Nospecific evidence for bowel obstruction. Supine radiographs 
areinsensitive for detection of free intraperitoneal air.IMPRESSION:Feeding tube
 tip now projects over the first portion of the duodenum.Electronically Signed 
By: Ravi Fisher2023 22:14 CDTWorkstation Name: [id number] GAS,
 [id number] 20:54:23





             Test Item    Value        Reference Range Interpretation Comments

 

             PH ARTERIAL (BEAKER) (test code = 7.40         7.35-7.45           

      



             383)                                                

 

             PCO2 ARTERIAL (BEAKER) (test code 30 mm Hg     35-45        L      

      



             = 384)                                              

 

             PO2 ARTERIAL (BEAKER) (test code 187 mm Hg    80-90        H       

     



             = 385)                                              

 

             O2 SATURATION ARTERIAL (BEAKER) 99.3 %       96.0-97.0    H        

    



             (test code = 386)                                        

 

             HCO3 ARTERIAL (BEAKER) (test code 18 mmol/L    21-29        L      

      



             = 388)                                              

 

             BASE EXCESS ARTERIAL (BEAKER) -5.5 mmol/L  -2.0-3.0     L          

  



             (test code = 387)                                        

 

             PATIENT TEMPERATURE (BEAKER) 37.0                                  

 



             (test code = 1818)                                        

 

             FIO2 (BEAKER) (test code = 1819) 100.0                             

     



BLOOD GAS, [id number] 20:16:35





             Test Item    Value        Reference Range Interpretation Comments

 

             PH ARTERIAL (BEAKER) (test code = 7.36         7.35-7.45           

      



             383)                                                

 

             PCO2 ARTERIAL (BEAKER) (test code 32 mm Hg     35-45        L      

      



             = 384)                                              

 

             PO2 ARTERIAL (BEAKER) (test code 211 mm Hg    80-90        H       

     



             = 385)                                              

 

             O2 SATURATION ARTERIAL (BEAKER) 99.4 %       96.0-97.0    H        

    



             (test code = 386)                                        

 

             HCO3 ARTERIAL (BEAKER) (test code 18 mmol/L    21-29        L      

      



             = 388)                                              

 

             BASE EXCESS ARTERIAL (BEAKER) -6.3 mmol/L  -2.0-3.0     L          

  



             (test code = 387)                                        

 

             PATIENT TEMPERATURE (BEAKER) 37.9                                  

 



             (test code = 1818)                                        

 

             FIO2 (BEAKER) (test code = 1819) 100.0                             

     



XR ABDOMEN/KUB 1 VIEW [id number] 19:23:18
************************************************************[facility] : 1965 Sex: 
M************************************************************TECHNIQUE:XR 
ABDOMEN/KUB 1 VIEW PORTABLEINDICATION: corpak.COMPARISON: 
2023FINDINGS:Feeding tube with the tip projecting over the gastric fundus. 
Mildgaseous distention of the stomach. No specific evidence for 
bowelobstruction. Supine radiographs are insensitive for detection of 
freeintraperitoneal air.IMPRESSION:Feeding tube tip projects over the gastric 
fundus.Electronically Signed By: Ravi Mtngdmwloqw85/06/2023 19:25 
CDTWorkstation Name: [id number] CHEST 1 VIEW PORTABLE / [id number] 
19:07:36************************************************************[facility] : 1965 Sex: 
M************************************************************TECHNIQUE:Frontal 
view of the chest.INDICATION: Intubation.COMPARISON: 2023 at at 1:49 
AM.FINDINGS:LINES/TUBES: Endotracheal tube tip projected 4.4 cm above the level 
ofthe karen. Feeding tube tip projectedover the gastric fundus.HEART AND 
MEDIASTINUM: Cardiomediastinal contour is stable. LUNGS: Increase in right 
perihilar opacity. Hazy left infrahilaropacity.PLEURA: No pneumothorax. No 
significant pleural effusion.SOFT TISSUES AND BONES: Unremarkable.IMPRESSION:1. 
Endotracheal tube tip projected 4.4 cmabove the level of the karen.There are 
two increasing right perihilar and left infrahilar opacities.This may be related
 to asymmetric edema or developing pneumonia.Follow-up to resolution is 
advised.Electronically Signed By: Ravi Fisher2023 19:09 
CDTWorkstation Name: [id number] GAS, [id number] 18:22:09





             Test Item    Value        Reference Range Interpretation Comments

 

             PH ARTERIAL (BEAKER) (test code = 7.37         7.35-7.45           

      



             383)                                                

 

             PCO2 ARTERIAL (BEAKER) (test code 29 mm Hg     35-45        L      

      



             = 384)                                              

 

             PO2 ARTERIAL (BEAKER) (test code 151 mm Hg    80-90        H       

     



             = 385)                                              

 

             O2 SATURATION ARTERIAL (BEAKER) 98.8 %       96.0-97.0    H        

    



             (test code = 386)                                        

 

             HCO3 ARTERIAL (BEAKER) (test code 16 mmol/L    21-29        L      

      



             = 388)                                              

 

             BASE EXCESS ARTERIAL (BEAKER) -7.5 mmol/L  -2.0-3.0     L          

  



             (test code = 387)                                        

 

             PATIENT TEMPERATURE (BEAKER) 38.5                                  

 



             (test code = 1818)                                        

 

             FIO2 (BEAKER) (test code = 1819) 100.0                             

     



POCT-GLUCOSE [id number] 17:11:20





             Test Item    Value        Reference Range Interpretation Comments

 

             POC-GLUCOSE METER 143 mg/dL           H            : [facility]ED A

[facility]



             (BE[facility]) (test code =                                        [place],



             [address])                                               [address]:



                                                                 /Techni

deejay ID



                                                                 = [id number] for



                                                                 Kingsley Diallo



BLOOD GAS, [id number] 17:03:39





             Test Item    Value        Reference Range Interpretation Comments

 

             PH ARTERIAL (BEAKER) (test code = 7.23         7.35-7.45    L      

      



             383)                                                

 

             PCO2 ARTERIAL (BEAKER) (test code 51 mm Hg     35-45        H      

      



             = 384)                                              

 

             PO2 ARTERIAL (BEAKER) (test code 169 mm Hg    80-90        H       

     



             = 385)                                              

 

             O2 SATURATION ARTERIAL (BEAKER) 98.8 %       96.0-97.0    H        

    



             (test code = 386)                                        

 

             HCO3 ARTERIAL (BEAKER) (test code 21 mmol/L    21-29               

      



             = 388)                                              

 

             BASE EXCESS ARTERIAL (BEAKER) -6.9 mmol/L  -2.0-3.0     L          

  



             (test code = 387)                                        

 

             PATIENT TEMPERATURE (BEAKER) 38.1                                  

 



             (test code = 1818)                                        

 

             FIO2 (BEAKER) (test code = 1819) 50.0                              

     



XR CHEST 1 VIEW PORTABLE / [id number] 15:04:51
************************************************************[facility] : 1965 Sex: 
M************************************************************CLINICAL HISTORY: 
Respiratory distress TECHNIQUE: 1 view of the chest.COMPARISON: 
2023IMPRESSION:ETT removal. Feeding tube remains below the diaphragm.There 
is new right lung base consolidation/atelectasis. Left infrahilaratelectasis is 
unchanged. There is blunting of the right costophrenicangle. The cardiome
diastinal silhouette is magnified by technique.Electronically Signed By: Abimael Art2023 15:06 CDTWorkstation Name: [id number] GAS, ARTERIAL
2023 13:58:39





             Test Item    Value        Reference Range Interpretation Comments

 

             PH ARTERIAL (BEAKER) (test code = 7.41         7.35-7.45           

      



             383)                                                

 

             PCO2 ARTERIAL (BEAKER) (test code 26 mm Hg     35-45        L      

      



             = 384)                                              

 

             PO2 ARTERIAL (BEAKER) (test code 197 mm Hg    80-90        H       

     



             = 385)                                              

 

             O2 SATURATION ARTERIAL (BEAKER) 99.4 %       96.0-97.0    H        

    



             (test code = 386)                                        

 

             HCO3 ARTERIAL (BEAKER) (test code 16 mmol/L    21-29        L      

      



             = 388)                                              

 

             BASE EXCESS ARTERIAL (BEAKER) -6.7 mmol/L  -2.0-3.0     L          

  



             (test code = 387)                                        

 

             PATIENT TEMPERATURE (BEAKER) 37.2                                  

 



             (test code = 1818)                                        

 

             FIO2 (BEAKER) (test code = 1819) 44.0                              

     



POCT-GLUCOSE [id number] 13:32:55





             Test Item    Value        Reference Range Interpretation Comments

 

             POC-GLUCOSE METER 100 mg/dL                        : TESTED A

T [facility] [address]



             (BEAKER) (test code =                                        [place],



             [address])                                               [address]:



                                                                 /Techni

deejay ID



                                                                 = [id number] for



                                                                 Bethbill

Kingsley sanchez



[id number] 12:02:30





             Test Item    Value        Reference Range Interpretation Comments

 

             PROCALCITONIN (BEAKER) (test code 0.12 ng/mL   <0.05        H      

      



             = 3036)                                             



SEPSIS RISK (ng/mL)Low: 0.05-0.50Intermediate: 0.51-2.00High: &gt;=2.01LACTIC 
ACID, [id number] 11:45:29





             Test Item    Value        Reference Range Interpretation Comments

 

             LACTATE BLOOD VENOUS 0.94 mmol/L  0.50-2.00                 Specime

n slightly



             (2) (TONIO) (test                                        hemolyzed



             code = 2872)                                        



 ID - MARCOPOCT-GLUCOSE [id number] 09:00:12





             Test Item    Value        Reference Range Interpretation Comments

 

             POC-GLUCOSE METER 114 mg/dL           H            : TESTED A

T [facility] [address]



             (TONIO) (test code =                                        [place],



             [address])                                               [address]:



                                                                 /Techni

deejay ID



                                                                 = [id number] for



                                                                 Kingsley Diallo



XR CHEST 1 VIEW PORTABLE / [id number] 08:33:53
************************************************************[facility] : 1965 Sex: 
M************************************************************CLINICAL HISTORY: 
IntubatedTECHNIQUE: 1 view of the chest.COMPARISON: 2023IMPRESSION:ETT and 
feeding tube unchanged. Bibasilar bandlike opacities unchanged.No significant 
pleural fluid. The cardiomediastinal silhouette ismagnified by 
technique.Electronically Signed By: Abimael Art2023 08:35 CDTWorkstation 
Name: [id number] GAS, [id number] 04:54:00





             Test Item    Value        Reference Range Interpretation Comments

 

             PH ARTERIAL (TONIO) (test code = 7.44         7.35-7.45           

      



             383)                                                

 

             PCO2 ARTERIAL (BEAKER) (test code 27 mm Hg     35-45        L      

      



             = 384)                                              

 

             PO2 ARTERIAL (BEAKER) (test code 174 mm Hg    80-90        H       

     



             = 385)                                              

 

             O2 SATURATION ARTERIAL (BEAKER) 99.3 %       96.0-97.0    H        

    



             (test code = 386)                                        

 

             HCO3 ARTERIAL (BEAKER) (test code 18 mmol/L    21-29        L      

      



             = 388)                                              

 

             BASE EXCESS ARTERIAL (BEAKER) -4.2 mmol/L  -2.0-3.0     L          

  



             (test code = 387)                                        

 

             PATIENT TEMPERATURE (BEAKER) 37.5                                  

 



             (test code = 1818)                                        

 

             FIO2 (BEAKER) (test code = 1819) 24.0                              

     



BASIC METABOLIC [id number] 04:25:31





             Test Item    Value        Reference Range Interpretation Comments

 

             SODIUM (BEAKER) 139 meq/L    136-145                   



             (test code = 381)                                        

 

             POTASSIUM    4.4 meq/L    3.5-5.1                   Specimen slight

ly



             (BEAKER) (test                                        hemolyzed



             code = 379)                                         

 

             CHLORIDE (BEAKER) 112 meq/L           H            



             (test code = 382)                                        

 

             CO2 (BEAKER) 19 meq/L     22-29        L            



             (test code = 355)                                        

 

             BLOOD UREA   19 mg/dL     7-21                      



             NITROGEN (BEAKER)                                        



             (test code = 354)                                        

 

             CREATININE   1.02 mg/dL   0.57-1.25                 Specimen slight

ly



             (BEAKER) (test                                        hemolyzed



             code = 358)                                         

 

             GLUCOSE RANDOM 111 mg/dL           H            



             (BEAKER) (test                                        



             code = 652)                                         

 

             CALCIUM (BEAKER) 8.2 mg/dL    8.4-10.2     L            



             (test code = 697)                                        

 

             EGFR (BEAKER) 87                                      Interpretatio

n of eGFR



             (test code = mL/min/1.73                            values Stage De

scription



             1092)        sq m                                   Result G1 Althea

l or high



                                                                 >=90 G2 Mildly 

decreased



                                                                 60-89 G3a  Mild

ly to



                                                                 moderately 45-5

9 G3b



                                                                 Moderately to s

everely



                                                                 30-44 G4 Severl

y decreased



                                                                 15-29 G5 Kidney

 failure



                                                                 <15Reported eGF

R is based



                                                                 on the CKD-EPI 

1



                                                                 equation that d

oes not use



                                                                 a race



                                                                 coefficientEsti

mated GFR



                                                                 is not as accur

ate as



                                                                 Creatinine Porsha

ekaterina in



                                                                 predicting glom

erular



                                                                 filtration rate

. Estimated



                                                                 GFR is not appl

icable for



                                                                 dialysis patien

ts



 ID - DBCBC W/PLT COUNT &amp; AUTO [id number] 04:12:40





             Test Item    Value        Reference Range Interpretation Comments

 

             WHITE BLOOD CELL COUNT (BEAKER) 13.6 K/ L    3.5-10.5     H        

    



             (test code = 775)                                        

 

             RED BLOOD CELL COUNT (BEAKER) 5.76 M/ L    4.63-6.08               

  



             (test code = 761)                                        

 

             HEMOGLOBIN (BEAKER) (test code = 14.6 GM/DL   13.7-17.5            

     



             410)                                                

 

             HEMATOCRIT (BEAKER) (test code = 45.8 %       40.1-51.0            

     



             411)                                                

 

             MEAN CORPUSCULAR VOLUME (BEAKER) 80 fL        79-92                

     



             (test code = 753)                                        

 

             MEAN CORPUSCULAR HEMOGLOBIN 25.3 pg      25.7-32.2    L            



             (BEAKER) (test code = 751)                                        

 

             MEAN CORPUSCULAR HEMOGLOBIN CONC 31.9 GM/DL   32.3-36.5    L       

     



             (BEAKER) (test code = 752)                                        

 

             RED CELL DISTRIBUTION WIDTH 14.1 %       11.6-14.4                 



             (BEAKER) (test code = 412)                                        

 

             PLATELET COUNT (BEAKER) (test 191 K/CU MM  150-450                 

  



             code = 756)                                         

 

             MEAN PLATELET VOLUME (BEAKER) 10.2 fL      9.4-12.4                

  



             (test code = 754)                                        

 

             NUCLEATED RED BLOOD CELLS 0 /100 WBC   0-0                       



             (BEAKER) (test code = 413)                                        

 

             NEUTROPHILS RELATIVE PERCENT 88 %                                  

 



             (BEAKER) (test code = 429)                                        

 

             LYMPHOCYTES RELATIVE PERCENT 4 %                                   

 



             (BEAKER) (test code = 430)                                        

 

             MONOCYTES RELATIVE PERCENT 7 %                                    



             (BEAKER) (test code = 431)                                        

 

             EOSINOPHILS RELATIVE PERCENT 0 %                                   

 



             (BEAKER) (test code = 432)                                        

 

             BASOPHILS RELATIVE PERCENT 0 %                                    



             (BEAKER) (test code = 437)                                        

 

             NEUTROPHILS ABSOLUTE COUNT 11.98 K/ L   1.78-5.38    H            



             (BEAKER) (test code = 670)                                        

 

             LYMPHOCYTES ABSOLUTE COUNT 0.58 K/ L    1.32-3.57    L            



             (BEAKER) (test code = 414)                                        

 

             MONOCYTES ABSOLUTE COUNT (BEAKER) 0.97 K/ L    0.30-0.82    H      

      



             (test code = 415)                                        

 

             EOSINOPHILS ABSOLUTE COUNT 0.02 K/ L    0.04-0.54    L            



             (BEAKER) (test code = 416)                                        

 

             BASOPHILS ABSOLUTE COUNT (BEAKER) 0.02 K/ L    0.01-0.08           

      



             (test code = 417)                                        

 

             IMMATURE GRANULOCYTES-RELATIVE 0.50 %       0.00-1.00              

   



             PERCENT (BEAKER) (test code =                                      

  



             2801)                                               



POCT-GLUCOSE [id number] 04:02:47





             Test Item    Value        Reference Range Interpretation Comments

 

             POC-GLUCOSE METER 118 mg/dL           H            : TESTED A

T [facility] [address]



             (BEAKER) (test code =                                        [facility],



             1538)                                               [id number]:



                                                                 /Techni

deejay ID



                                                                 = [id number] for



                                                                 AKHIONBARE, STEPHANIE

TH



POCT-GLUCOSE [id number] 22:18:43





             Test Item    Value        Reference Range Interpretation Comments

 

             POC-GLUCOSE METER 112 mg/dL           H            : TESTED A

T [facility] [address]



             (BEAKER) (test code =                                        [facility],



             [address]38)                                               [address]:



                                                                 /Techni

deejay ID



                                                                 = [id number] for



                                                                 AKHIONBARE, STEPHANIE

TH



POCT-GLUCOSE [id number] 18:11:35





             Test Item    Value        Reference Range Interpretation Comments

 

             POC-GLUCOSE METER 100 mg/dL                        : TESTED A

T [facility] [address]



             (BEAKER) (test code =                                        [facility],



             [address]38)                                               [address]:



                                                                 /Techni

deejay ID



                                                                 = [id number] for Allyssa Bui



URINALYSIS WITH MICROSCOPIC IF [id number] 14:21:46





             Test Item    Value        Reference Range Interpretation Comments

 

             COLOR (BEAKER) (test code = 470) Light Yellow                      

     

 

             CLARITY (BEAKER) (test code = Clear                                

  



             469)                                                

 

             SPECIFIC GRAVITY UA (BEAKER) 1.023        1.001-1.035              

 



             (test code = 468)                                        

 

             PH UA (BEAKER) (test code = 467) 6.0          5.0-8.0              

     

 

             PROTEIN UA (BEAKER) (test code = Negative     Negative             

     



             464)                                                

 

             GLUCOSE UA (BEAKER) (test code = Negative     Negative             

     



             365)                                                

 

             KETONES UA (BEAKER) (test code = Negative     Negative             

     



             371)                                                

 

             BILIRUBIN UA (BEAKER) (test code Negative     Negative             

     



             = 462)                                              

 

             BLOOD UA (BEAKER) (test code = Negative     Negative               

   



             461)                                                

 

             NITRITE UA (BEAKER) (test code = Negative     Negative             

     



             465)                                                

 

             LEUKOCYTE ESTERASE UA (BEAKER) Negative     Negative               

   



             (test code = 466)                                        

 

             UROBILINOGEN UA (BEAKER) (test 0.2          0.2-1.0                

   



             code = 463)                                         

 

             SOURCE(BEAKER) (test code =                                        



             2795)                                               



 ID - [auto]XR CHEST 1 VIEW PORTABLE / [id number] 13:26:09
************************************************************[facility] : 1965 Sex: 
M************************************************************CLINICAL HISTORY: 
IntubatedTECHNIQUE: 1 view of the chest.COMPARISON: 2023IMPRESSION:ETT again
 seen. New feeding tube in the distal stomach. There isincreased right basilar 
atelectasis. There is no significant pleuralfluid.Electronically Signed By: 
Abimael Art2023 13:28 CDTWorkstation Name: [id number] GAS, ARTERIAL
2023 09:38:22





             Test Item    Value        Reference Range Interpretation Comments

 

             PH ARTERIAL (BEAKER) (test code = 7.44         7.35-7.45           

      



             383)                                                

 

             PCO2 ARTERIAL (BEAKER) (test code 30 mm Hg     35-45        L      

      



             = 384)                                              

 

             PO2 ARTERIAL (BEAKER) (test code 130 mm Hg    80-90        H       

     



             = 385)                                              

 

             O2 SATURATION ARTERIAL (BEAKER) 98.7 %       96.0-97.0    H        

    



             (test code = 386)                                        

 

             HCO3 ARTERIAL (BEAKER) (test code 20 mmol/L    21-29        L      

      



             = 388)                                              

 

             BASE EXCESS ARTERIAL (BEAKER) -3.0 mmol/L  -2.0-3.0     L          

  



             (test code = 387)                                        

 

             PATIENT TEMPERATURE (BEAKER) 37.0                                  

 



             (test code = 1818)                                        

 

             FIO2 (BEAKER) (test code = 1819) 21.0                              

     



POCT-GLUCOSE [id number] 06:10:38





             Test Item    Value        Reference Range Interpretation Comments

 

             POC-GLUCOSE METER 94 mg/dL                         : TESTED A

T [facility] [address]



             (BEAKER) (test code =                                        [place],



             1538)                                               [id number]:



                                                                 /Techni

deejay ID =



                                                                 [id number] for Kendra Tate



BASIC METABOLIC [id number] 03:44:06





             Test Item    Value        Reference Range Interpretation Comments

 

             SODIUM (BEAKER) 140 meq/L    136-145                   



             (test code = 381)                                        

 

             POTASSIUM    4.0 meq/L    3.5-5.1                   



             (BEAKER) (test                                        



             code = 379)                                         

 

             CHLORIDE (BEAKER) 113 meq/L           H            



             (test code = 382)                                        

 

             CO2 (BEAKER) 21 meq/L     22-29        L            



             (test code = 355)                                        

 

             BLOOD UREA   22 mg/dL     7-21         H            



             NITROGEN (BEAKER)                                        



             (test code = 354)                                        

 

             CREATININE   0.90 mg/dL   0.57-1.25                 



             (BEAKER) (test                                        



             code = 358)                                         

 

             GLUCOSE RANDOM 118 mg/dL           H            



             (BEAKER) (test                                        



             code = 652)                                         

 

             CALCIUM (BEAKER) 8.2 mg/dL    8.4-10.2     L            



             (test code = 697)                                        

 

             EGFR (BEAKER) 101                                      Interpretati

on of eGFR



             (test code = mL/min/1.73                            values Stage De

scription



             1092)        sq m                                   Result G1 Althea

l or high



                                                                 >=90 G2 Mildly 

decreased



                                                                 60-89 G3a Mildl

y to



                                                                 moderately 45-5

9 G3b



                                                                 Moderately to s

everely



                                                                 30-44 G4 Severl

y decreased



                                                                 15-29 G5 Kidney

 failure



                                                                 <15Reported eGF

R is based



                                                                 on the CKD-EPI 





                                                                 equation that d

oes not use



                                                                 a race



                                                                 coefficientEsti

mated GFR



                                                                 is not as accur

ate as



                                                                 Creatinine Porsha

ekaterina in



                                                                 predicting glom

erular



                                                                 filtration rate

. Estimated



                                                                 GFR is not appl

icable for



                                                                 dialysis patien

ts



 ID - [id number] 03:44:06





             Test Item    Value        Reference Range Interpretation Comments

 

             MAGNESIUM (BEAKER) (test code = 2.5 mg/dL    1.6-2.6               

    



             627)                                                



 ID - [id number] 03:44:06





             Test Item    Value        Reference Range Interpretation Comments

 

             PHOSPHORUS (BEAKER) (test code = 2.7 mg/dL    2.3-4.7              

     



             604)                                                



 ID - [id number] 03:44:05





             Test Item    Value        Reference Range Interpretation Comments

 

             PROTIME (BEAKER) (test code = 14.7 seconds 11.9-14.2    H          

  



             759)                                                

 

             INR (BEAKER) (test code = 370) 1.17         <=5.90                 

   



RECOMMENDED COUMADIN/WARFARIN INR THERAPY RANGESSTANDARD DOSE: 2.0 - 3.0 
Includes: PROPHYLAXIS for venous thrombosis, systemic embolization; TREATMENT 
for venous thrombosis and/or pulmonary embolus.HIGH RISK: Target INR is 2.5-3.5 
for patients with mechanical heart valves.CBC W/PLT COUNT &amp; AUTO 
[id number] 03:34:51





             Test Item    Value        Reference Range Interpretation Comments

 

             WHITE BLOOD CELL COUNT (BEAKER) 19.1 K/ L    3.5-10.5     H        

    



             (test code = 775)                                        

 

             RED BLOOD CELL COUNT (BEAKER) 5.34 M/ L    4.63-6.08               

  



             (test code = 761)                                        

 

             HEMOGLOBIN (BEAKER) (test code = 13.9 GM/DL   13.7-17.5            

     



             410)                                                

 

             HEMATOCRIT (BEAKER) (test code = 41.8 %       40.1-51.0            

     



             411)                                                

 

             MEAN CORPUSCULAR VOLUME (BEAKER) 78 fL        79-92        L       

     



             (test code = 753)                                        

 

             MEAN CORPUSCULAR HEMOGLOBIN 26.0 pg      25.7-32.2                 



             (BEAKER) (test code = 751)                                        

 

             MEAN CORPUSCULAR HEMOGLOBIN CONC 33.3 GM/DL   32.3-36.5            

     



             (BEAKER) (test code = 752)                                        

 

             RED CELL DISTRIBUTION WIDTH 14.5 %       11.6-14.4    H            



             (BEAKER) (test code = 412)                                        

 

             PLATELET COUNT (BEAKER) (test 178 K/CU MM  150-450                 

  



             code = 756)                                         

 

             MEAN PLATELET VOLUME (BEAKER) 9.9 fL       9.4-12.4                

  



             (test code = 754)                                        

 

             NUCLEATED RED BLOOD CELLS 0 /100 WBC   0-0                       



             (BEAKER) (test code = 413)                                        

 

             NEUTROPHILS RELATIVE PERCENT 87 %                                  

 



             (BEAKER) (test code = 429)                                        

 

             LYMPHOCYTES RELATIVE PERCENT 7 %                                   

 



             (BEAKER) (test code = 430)                                        

 

             MONOCYTES RELATIVE PERCENT 6 %                                    



             (BEAKER) (test code = 431)                                        

 

             EOSINOPHILS RELATIVE PERCENT 0 %                                   

 



             (BEAKER) (test code = 432)                                        

 

             BASOPHILS RELATIVE PERCENT 0 %                                    



             (BEAKER) (test code = 437)                                        

 

             NEUTROPHILS ABSOLUTE COUNT 16.60 K/ L   1.78-5.38    H            



             (BEAKER) (test code = 670)                                        

 

             LYMPHOCYTES ABSOLUTE COUNT 1.25 K/ L    1.32-3.57    L            



             (BEAKER) (test code = 414)                                        

 

             MONOCYTES ABSOLUTE COUNT (BEAKER) 1.09 K/ L    0.30-0.82    H      

      



             (test code = 415)                                        

 

             EOSINOPHILS ABSOLUTE COUNT 0.03 K/ L    0.04-0.54    L            



             (BEAKER) (test code = 416)                                        

 

             BASOPHILS ABSOLUTE COUNT (BEAKER) 0.02 K/ L    0.01-0.08           

      



             (test code = 417)                                        

 

             IMMATURE GRANULOCYTES-RELATIVE 0.60 %       0.00-1.00              

   



             PERCENT (BEAKER) (test code =                                      

  



             2801)                                               



BLOOD GAS, [id number] 03:23:38





             Test Item    Value        Reference Range Interpretation Comments

 

             PH ARTERIAL (BEAKER) (test code = 7.44         7.35-7.45           

      



             383)                                                

 

             PCO2 ARTERIAL (BEAKER) (test code 31 mm Hg     35-45        L      

      



             = 384)                                              

 

             PO2 ARTERIAL (BEAKER) (test code 211 mm Hg    80-90        H       

     



             = 385)                                              

 

             O2 SATURATION ARTERIAL (BEAKER) 99.5 %       96.0-97.0    H        

    



             (test code = 386)                                        

 

             HCO3 ARTERIAL (BEAKER) (test code 21 mmol/L    21-29               

      



             = 388)                                              

 

             BASE EXCESS ARTERIAL (BEAKER) -2.3 mmol/L  -2.0-3.0     L          

  



             (test code = 387)                                        

 

             PATIENT TEMPERATURE (BEAKER) 37.0                                  

 



             (test code = 1818)                                        

 

             FIO2 (BEAKER) (test code = 1819) 100.0                             

     



CT BRAIN WITHOUT IV [id number] 01:08:18
************************************************************[facility] : 1965 Sex: 
M************************************************************EXAM: CT BRAIN 
WITHOUT IV CONTRASTINDICATION: Stroke, follow upTECHNIQUE: CT images from skull 
base to vertex without IV contrast.This exam was performed according to the 
departmental dose optimizationprogram which includes automated exposure control,
 adjustment of the mAand/or kV according to the patient size, and/or use of an 
iterativereconstruction technique.COMPARISON: Exam from 17 hours priorFINDINGS: 
Parenchyma: Unchanged intraparenchymal hemorrhage centered within theleft 
hemipons extending to the midbrain and left cerebral peduncle. Nochange in mild 
mass effect with slight effacement of the fourthventricle. No hydrocephalus. No 
intraventricular extension ofhemorrhage. No hydrocephalus. No evidence ofacute 
infarction. No newhemorrhage.Extra-axial Collection: NoneVentricular System: As 
above.Osseous Structures: No acute osseous abnormality. Included Orbits: 
NormalParanasal Sinuses: Predominantly clearTympanomastoid Cavities: 
NormalOther: NoneIMPRESSION:Unchanged hemorrhage centered within the left h
emipons extending to themidbrain and left cerebral peduncle. No acute interval 
abnormality.Electronically Signed By: Maciej Lozada2023 01:11 CDTWorkstation
 Name: [id number] ABDOMEN/KUB 1 VIEW [id number] 17:52:58
************************************************************[facility] : 1965 Sex: 
M************************************************************XR ABDOMEN/KUB 1 
VIEW PORTABLEINDICATION: Stella advanceCOMPARISON: NoneTECHNIQUE: Limited 
portable radiograph of the lower chest and upperabdomen was acquired for 
purposes of evaluating tube placementFINDINGS/IMPRESSION:Feeding tube tip 
overlies the distal stomach. Electronically Signed By: Yojana Buckley2023 
17:55 CDTWorkstation Name: [id number] 17:26:34





             Test Item    Value        Reference Range Interpretation Comments

 

             PHOSPHORUS (BEAKER) (test code = 3.4 mg/dL    2.3-4.7              

     



             604)                                                



 ID - [id number] 17:26:34





             Test Item    Value        Reference Range Interpretation Comments

 

             POTASSIUM (BEAKER) (test code = 4.2 meq/L    3.5-5.1               

    



             379)                                                



 ID - [id number] 17:26:33





             Test Item    Value        Reference Range Interpretation Comments

 

             MAGNESIUM (BEAKER) (test code = 3.2 mg/dL    1.6-2.6      H        

    



             627)                                                



 ID - ADMINXR ABDOMEN/KUB 1 VIEW [id number] 14:07:03
************************************************************[facility] : 1965 Sex: 
M************************************************************XR ABDOMEN/KUB 1 
VIEW PORTABLETECHNIQUE: Supine radiograph(s) of the abdomen and pelvis.HISTORY: 
Enteric tube placement verificationCOMPARISON: NoneIMPRESSION:Lines and tubes: 
Enteric tube is seen with tip at thegastric antrum.Bowel gas pattern: 
Nonobstructive bowel gas pattern. Moderate stoolthroughout the colonOther: No 
acute osseous abnormality. Residual contrast is seen in thebilateral collecting 
system and urinary bladder.Electronically Signed By: Britta Wheatley2023 
14:09 CDTWorkstation Name: [id number] GAS, [id number] 11:02:55





             Test Item    Value        Reference Range Interpretation Comments

 

             PH ARTERIAL (BEAKER) (test code = 7.41         7.35-7.45           

      



             383)                                                

 

             PCO2 ARTERIAL (BEAKER) (test code 33 mm Hg     35-45        L      

      



             = 384)                                              

 

             PO2 ARTERIAL (BEAKER) (test code 168 mm Hg    80-90        H       

     



             = 385)                                              

 

             O2 SATURATION ARTERIAL (BEAKER) 99.2 %       96.0-97.0    H        

    



             (test code = 386)                                        

 

             HCO3 ARTERIAL (BEAKER) (test code 21 mmol/L    21-29               

      



             = 388)                                              

 

             BASE EXCESS ARTERIAL (BEAKER) -3.1 mmol/L  -2.0-3.0     L          

  



             (test code = 387)                                        

 

             PATIENT TEMPERATURE (BEAKER) 37.0                                  

 



             (test code = 1818)                                        

 

             FIO2 (BEAKER) (test code = 1819) 30.0                              

     



HEMOGLOBIN [id number] 11:01:34





             Test Item    Value        Reference Range Interpretation Comments

 

             HEMOGLOBIN A1C 5.6 %        See_Comment                [Automated m

essage]



             ELECTROPHORESIS (BEAKER)                                        The

 system which



             (test code = 3811)                                        generated

 this result



                                                                 transmitted ref

erence



                                                                 range: <=5.6%. 

The



                                                                 reference range

 was



                                                                 not used to int

erpret



                                                                 this result as



                                                                 normal/abnormal

.



"The A1c is measured using a NGSP-certified method. HbA1c value equal to or 
greater than 6.5% as thediagnosis cutoff for diabetes. An HbA1c value of 5.7-
6.4% indicates increased risk for diabetes (prediabetes)." ID - ADM
HEPATIC FUNCTION [id number] 10:57:01





             Test Item    Value        Reference Range Interpretation Comments

 

             TOTAL PROTEIN (BEAKER) (test code = 6.2 gm/dL    6.0-8.3           

        



             770)                                                

 

             ALBUMIN (BEAKER) (test code = 1145) 3.6 g/dL     3.5-5.0           

        

 

             BILIRUBIN TOTAL (BEAKER) (test code 0.5 mg/dL    0.2-1.2           

        



             = 377)                                              

 

             BILIRUBIN DIRECT (BEAKER) (test 0.2 mg/dL    0.1-0.5               

    



             code = 706)                                         

 

             ALKALINE PHOSPHATASE (BEAKER) (test 88 U/L                   

        



             code = 346)                                         

 

             AST (SGOT) (BEAKER) (test code = 25 U/L       5-34                 

     



             353)                                                

 

             ALT (SGPT) (BEAKER) (test code = 16 U/L       6-55                 

     



             347)                                                



 ID - EOOCTA [id number] 08:10:09
************************************************************[facility] : 1965 Sex: 
M************************************************************CT BRAIN WITHOUT IV
CONTRAST, CTA CAROTID, CTA BRAINBRAIN CT WITHOUT CONTRASTINDICATION: Cerebral 
hemorrhage suspectedCOMPARISON: CT head, 7/3/2023TECHNIQUE:Rapid acquisition 
spiral images were obtained between the aortic archand the cranial vertex during
intravenous contrast infusion toreconstruct axial images and angiographic 3D 
maximum intensityprojections (MIP). 3-D volumetric reformatted images were 
created at [employer] workstation. Precontrast images of the brain were 
alsoobtained. Stenosis evaluation reported in compliance with NASCET 
criteria.DOSE REDUCTION: Dose modulation, iterative reconstruction,and/orweight-
based adjustment of the mA/kV was utilized to reduce theradiation dose to as low
as reasonably achievable.FINDINGS:CT BRAIN:Chronic lacunar infarct in the left 
frontal corona radiata. Chroniclacunar infarct in the right putamen/external 
capsule.A 3.7 x 2.0 x 2.5 cm (CC X AP X transverse) intraparenchymal 
hematomaseen centered in the left morgan extending to the left midbrain. 
Midlinestructures are normally developed. Hypoattenuation within 
theperiventricular and subcortical white matter is present, nonspecific 
byimaging, however, statistically representing chronic microvascularchanges in
this age group. Diffuse senescent parenchymal volume loss.No 
hydrocephalus.Atherosclerotic calcification of the intracranial internal carotid
andvertebral arteries. Orbits are within normal limits.Retention cysts in the 
right maxillary sinus. Endotracheal tube ispartially imaged. CTA BRAIN:Internal 
carotid arteries: Petrous, cavernous and supraclinoid portionspatent. Middle 
cerebral arteries: Patent to distal branches.Anterior cerebral arteries: Patent.
No A-comm aneurysm.Basilar system: Patent vertebrobasilar system.Posterior 
cerebral arteries:Patent P1 and P2 segments. Venous opacification: Majordural 
sinuses unremarkable for bolus timing.Additional findings: None.CTA NECK:Common 
carotid arteries: The common carotid arteries are normal in size. Bifurcations: 
No flow-limiting stenosis. Cervicalinternal carotid arteries: No flow limiting 
stenosis.Vertebral arteries: Codominant. No origin stenosis.Arch anatomy: 
Conventional.Nonvascular findings:Osseous structures: No acute osseous 
abnormality.Intact calvarium andskull base. Mild spondylosis and facet 
arthropathy are present withinthe spine.Cervical soft tissues: No adenopathy. 
Patent aerodigestive tract. Theendotracheal tube extends to the proximal 
trachea.Lung apices: No apical consolidation or pneumothorax.IMPRESSION:CT 
brain:1. No significant change.2. Evolving 3.7 x 2.0 x 2.5 cm intraparenchymal 
hematoma in the leftpons extending to the left midbrain3. Chronic lacunar 
infarct in the right basal ganglia.CTA head and neck:1. No acute vascular 
abnormality in the head and neck.Electronically Signed By: Evaristo Villasenor2023 08:12 CDTWorkstation Name: [id number] 
08:10:09************************************************************[facility] : 1965  Sex: 
M************************************************************CT BRAIN WITHOUT IV
CONTRAST, CTA CAROTID, CTA BRAINBRAIN CT WITHOUT CONTRASTINDICATION: Cerebral 
hemorrhage suspectedCOMPARISON: CT head, 7/3/2023TECHNIQUE:Rapid acquisition 
spiral images were obtained between the aortic archand the cranial vertex during
intravenous contrast infusion toreconstruct axial imagesand angiographic 3D 
maximum intensityprojections (MIP). 3-D volumetric reformatted images were 
created at [employer] workstation. Precontrast images of the brain were 
alsoobtained. Stenosis evaluation reported in compliance with NASCET 
criteria.DOSE REDUCTION: Dose modulation, iterative reconstruction, 
and/orweight-based adjustment of the mA/kV was utilized to reduce theradiation 
dose to as low as reasonably achievable.FINDINGS:CT BRAIN:Chronic lacunar 
infarct in the left frontal corona radiata. Chroniclacunar infarct in the right 
putamen/external capsule.A 3.7 x 2.0 x 2.5 cm (CC X AP X transverse)
intraparenchymal hematomaseen centered in the left morgan extending to the left 
midbrain. Midlinestructures are normally developed. Hypoattenuation within 
theperiventricular and subcortical white matter is present, nonspecific 
byimaging, however, statistically representing chronic microvascularchanges in 
this age group. Diffuse senescent parenchymal volume loss.No 
hydrocephalus.Atherosclerotic calcification of the intracranial internal carotid
andvertebral arteries. Orbits are within normal limits.Retention cysts in the 
right maxillary sinus. Endotracheal tube ispartially imaged. CTA BRAIN:Internal 
carotid arteries: Petrous, cavernous and supraclinoid portionspatent. Middle 
cerebral arteries: Patentto distal branches.Anterior cerebral arteries: Patent. 
No A-comm aneurysm.Basilar system: Patent vertebrobasilar system.Posterior 
cerebral arteries:Patent P1 and P2 segments. Venous opacification: Major dural 
sinuses unremarkable for bolus timing.Additional findings: None.CTA NECK:Common 
carotid arteries: The common carotid arteries are normal in size. Bifurcations: 
No flow-limiting stenosis. Cervical internal carotid arteries: No flow limiting 
stenosis.Vertebral arteries: Codominant. No origin stenosis.Arch anatomy: 
Conventional.Nonvascular findings:Osseous structures: No acute osseous 
abnormality. Intact calvarium andskull base. Mild spondylosis and facet 
arthropathy are present withinthe spine.Cervical soft tissues: No adenopathy. 
Patent aerodigestive tract. Theendotracheal tube extends to theproximal 
trachea.Lung apices: No apical consolidation or pneumothorax.IMPRESSION:CT 
brain:1. No significant change.2. Evolving 3.7 x 2.0 x 2.5 cm intraparenchymal 
hematoma in the leftpons extending to the left midbrain3. Chronic lacunar 
infarct in the right basal ganglia.CTA head and neck:1. No acute vascular 
abnormality in the head and neck.Electronically Signed By: Evaristo Villasenor2023 08:12 CDTWorkstation Name: [id number] BRAIN WITHOUT IV CONTRAST
2023 08:10:09
************************************************************[facility] : 1965 Sex: 
M************************************************************CT BRAIN WITHOUT IV
CONTRAST, CTA CAROTID, CTA BRAINBRAIN CT WITHOUT CONTRASTINDICATION: Cerebral 
hemorrhage suspectedCOMPARISON: CT head, 7/3/2023TECHNIQUE:Rapid acquisition 
spiral images were obtained between the aortic archand the cranial vertex during
intravenous contrast infusion toreconstruct axial images and angiographic 3D 
maximum intensityprojections (MIP). 3-D volumetric reformatted images were 
created at [employer] workstation. Precontrast images of the brain were 
alsoobtained. Stenosis evaluation reported in compliance with NASCET 
criteria.DOSE REDUCTION: Dose modulation, iterative reconstruction,and/orweight-
based adjustment of the mA/kV was utilized to reduce theradiation dose to as low
as reasonably achievable.FINDINGS:CT BRAIN:Chronic lacunar infarct in the left 
frontal corona radiata. Chroniclacunar infarct in the right putamen/external 
capsule.A 3.7 x 2.0 x 2.5 cm (CC X AP X transverse) intraparenchymal 
hematomaseen centered in the left morgan extending to the left midbrain. 
Midlinestructures are normally developed. Hypoattenuation within 
theperiventricular and subcortical white matter is present, nonspecific 
byimaging, however, statistically representing chronic microvascularchanges in
this age group. Diffuse senescent parenchymal volume loss.No 
hydrocephalus.Atherosclerotic calcification of the intracranial internal carotid
andvertebral arteries. Orbits are within normal limits.Retention cysts in the 
right maxillary sinus. Endotracheal tube ispartially imaged. CTA BRAIN:Internal 
carotid arteries: Petrous, cavernous and supraclinoid portionspatent. Middle 
cerebral arteries: Patent to distal branches.Anterior cerebral arteries: Patent.
No A-comm aneurysm.Basilar system: Patent vertebrobasilar system.Posterior 
cerebral arteries:Patent P1 and P2 segments. Venous opacification: Majordural 
sinuses unremarkable for bolus timing.Additional findings: None.CTA NECK:Common 
carotid arteries: The common carotid arteries are normal in size. Bifurcations: 
No flow-limiting stenosis. Cervicalinternal carotid arteries: No flow limiting 
stenosis.Vertebral arteries: Codominant. No origin stenosis.Arch anatomy: 
Conventional.Nonvascular findings:Osseous structures: No acute osseous 
abnormality.Intact calvarium andskull base. Mild spondylosis and facet 
arthropathy are present withinthe spine.Cervical soft tissues: No adenopathy. 
Patent aerodigestive tract. Theendotracheal tube extends to the proximal 
trachea.Lung apices: No apical consolidation or pneumothorax.IMPRESSION:CT 
brain:1. No significant change.2. Evolving 3.7 x 2.0 x 2.5 cm intraparenchymal 
hematoma in the leftpons extending to the left midbrain3. Chronic lacunar 
infarct in the right basal ganglia.CTA head and neck:1. No acute vascular 
abnormality in the head and neck.Electronically Signed By: Evaristo Villasenor2023 08:12 CDTWorkstation Name: [id number] 
08:03:30





             Test Item    Value        Reference Range Interpretation Comments

 

             PHOSPHORUS (BEAKER) (test code = 2.8 mg/dL    2.3-4.7              

     



             604)                                                



 ID - [id number] 07:50:14





             Test Item    Value        Reference Range Interpretation Comments

 

             FIBRINOGEN LEVEL (BEAKER) (test 279 mg/dl    225-434               

    



             code = 658)                                         



[id number] 07:50:14





             Test Item    Value        Reference Range Interpretation Comments

 

             PARTIAL THROMBOPLASTIN TIME 26.5 seconds 22.5-36.0                 



             (BEAKER) (test code = 760)                                        



PROTHROMBIN TIME/[id number] 07:49:36





             Test Item    Value        Reference Range Interpretation Comments

 

             PROTIME (BEAKER) (test code = 14.8 seconds 11.9-14.2    H          

  



             759)                                                

 

             INR (BEAKER) (test code = 370) 1.23         <=5.90                 

   



RECOMMENDED COUMADIN/WARFARIN INR THERAPY RANGESSTANDARD DOSE: 2.0 - 3.0 
Includes: PROPHYLAXIS for venous thrombosis, systemic embolization; TREATMENT 
for venous thrombosis and/or pulmonary embolus.HIGH RISK: Target INR is 2.5-3.5 
for patients with mechanical heart valves.BLOOD GAS, [id number] 06:29:10





             Test Item    Value        Reference Range Interpretation Comments

 

             PH ARTERIAL (BEAKER) (test code = 7.48         7.35-7.45    H      

      



             383)                                                

 

             PCO2 ARTERIAL (BEAKER) (test code 27 mm Hg     35-45        L      

      



             = 384)                                              

 

             PO2 ARTERIAL (BEAKER) (test code 184 mm Hg    80-90        H       

     



             = 385)                                              

 

             O2 SATURATION ARTERIAL (BEAKER) 99.4 %       96.0-97.0    H        

    



             (test code = 386)                                        

 

             HCO3 ARTERIAL (BEAKER) (test code 20 mmol/L    21-29        L      

      



             = 388)                                              

 

             BASE EXCESS ARTERIAL (BEAKER) -2.2 mmol/L  -2.0-3.0     L          

  



             (test code = 387)                                        

 

             PATIENT TEMPERATURE (BEAKER) 36.8                                  

 



             (test code = 1818)                                        

 

             FIO2 (BEAKER) (test code = 1819) 40.0                              

     



XR CHEST 1 VIEW PORTABLE / [id number] 06:26:18
********************************************************[facility] : 1965 Sex: 
M************************************************************XR CHEST 1VIEW 
PORTABLE / BEDSIDEINDICATION: post intubationCOMPARISON: Prior day's 
examFINDINGS: Portable frontal view of the chest. IMPRESSION:Support Lines: ET 
tube tip is 4 cm superior to the karen. Lungs and pleura: Lungs are clear No 
significant pneumothorax.Heart and mediastinum: Normal contours.Additional 
findings: None.Electronically Signed By: Yojana Buckley2023 06:28 
CDTWorkstation Name: [id number] 01:46:30





             Test Item    Value        Reference Range Interpretation Comments

 

             PHOSPHORUS (BEAKER) 1.0 mg/dL    2.3-4.7      LL           Specimen

 slightly



             (test code = 604)                                        hemolyzed



 ID - ADMINBASIC METABOLIC [id number] 01:43:50





             Test Item    Value        Reference Range Interpretation Comments

 

             SODIUM (BEAKER) 139 meq/L    136-145                   



             (test code = 381)                                        

 

             POTASSIUM    3.9 meq/L    3.5-5.1                   Specimen slight

ly



             (BEAKER) (test                                        hemolyzed



             code = 379)                                         

 

             CHLORIDE (BEAKER) 113 meq/L           H            



             (test code = 382)                                        

 

             CO2 (BEAKER) 19 meq/L     22-29        L            



             (test code = 355)                                        

 

             BLOOD UREA   14 mg/dL     7-21                      



             NITROGEN (BEAKER)                                        



             (test code = 354)                                        

 

             CREATININE   0.84 mg/dL   0.57-1.25                 Specimen slight

ly



             (BEAKER) (test                                        hemolyzed



             code = 358)                                         

 

             GLUCOSE RANDOM 131 mg/dL           H            



             (BEAKER) (test                                        



             code = 652)                                         

 

             CALCIUM (BEAKER) 7.9 mg/dL    8.4-10.2     L            



             (test code = 697)                                        

 

             EGFR (BEAKER) 103                                     Interpretatio

n of eGFR



             (test code = mL/min/1.73                            values Stage De

scription



             1092)        sq m                                   Result G1 Althea

l or high



                                                                 >=90 G2 Mildly 

decreased



                                                                 60-89 G3a Mildl

y to



                                                                 moderately 45-5

9 G3b



                                                                 Moderately to s

everely



                                                                 30-44 G4 Severl

y decreased



                                                                 15-29 G5 Kidney

 failure



                                                                 <15Reported eGF

R is based



                                                                 on the CKD-EPI 





                                                                 equation that d

oes not use



                                                                 a race



                                                                 coefficientEsti

mated GFR



                                                                 is not as accur

ate as



                                                                 Creatinine Porsha tobar in



                                                                 predicting glom

erular



                                                                 filtration rate

. Estimated



                                                                 GFR is not appl

icable for



                                                                 dialysis patien

ts



 ID - [id number] 01:43:28





             Test Item    Value        Reference Range Interpretation Comments

 

             MAGNESIUM (BEAKER) 1.8 mg/dL    1.6-2.6                   Specimen 

slightly



             (test code = 627)                                        hemolyzed



 ID - ADMINBLOOD GAS, [id number] 01:29:20





             Test Item    Value        Reference Range Interpretation Comments

 

             PH ARTERIAL (BEAKER) (test code = 7.44         7.35-7.45           

      



             383)                                                

 

             PCO2 ARTERIAL (BEAKER) (test code 32 mm Hg     35-45        L      

      



             = 384)                                              

 

             PO2 ARTERIAL (BEAKER) (test code 184 mm Hg    80-90        H       

     



             = 385)                                              

 

             O2 SATURATION ARTERIAL (BEAKER) 99.3 %       96.0-97.0    H        

    



             (test code = 386)                                        

 

             HCO3 ARTERIAL (BEAKER) (test code 21 mmol/L    21-29               

      



             = 388)                                              

 

             BASE EXCESS ARTERIAL (BEAKER) -2.2 mmol/L  -2.0-3.0     L          

  



             (test code = 387)                                        

 

             PATIENT TEMPERATURE (BEAKER) 36.8                                  

 



             (test code = 1818)                                        

 

             FIO2 (BEAKER) (test code = 1819) 40.0                              

     



CBC W/PLT COUNT &amp; AUTO [id number] 01:01:10





             Test Item    Value        Reference Range Interpretation Comments

 

             WHITE BLOOD CELL COUNT (BEAKER) 12.0 K/ L    3.5-10.5     H        

    



             (test code = 775)                                        

 

             RED BLOOD CELL COUNT (BEAKER) 5.34 M/ L    4.63-6.08               

  



             (test code = 761)                                        

 

             HEMOGLOBIN (BEAKER) (test code = 14.2 GM/DL   13.7-17.5            

     



             410)                                                

 

             HEMATOCRIT (BEAKER) (test code = 41.9 %       40.1-51.0            

     



             411)                                                

 

             MEAN CORPUSCULAR VOLUME (BEAKER) 79 fL        79-92                

     



             (test code = 753)                                        

 

             MEAN CORPUSCULAR HEMOGLOBIN 26.6 pg      25.7-32.2                 



             (BEAKER) (test code = 751)                                        

 

             MEAN CORPUSCULAR HEMOGLOBIN CONC 33.9 GM/DL   32.3-36.5            

     



             (BEAKER) (test code = 752)                                        

 

             RED CELL DISTRIBUTION WIDTH 13.9 %       11.6-14.4                 



             (BEAKER) (test code = 412)                                        

 

             PLATELET COUNT (BEAKER) (test 187 K/CU MM  150-450                 

  



             code = 756)                                         

 

             MEAN PLATELET VOLUME (BEAKER) 9.4 fL       9.4-12.4                

  



             (test code = 754)                                        

 

             NUCLEATED RED BLOOD CELLS 0 /100 WBC   0-0                       



             (BEAKER) (test code = 413)                                        

 

             NEUTROPHILS RELATIVE PERCENT 96 %                                  

 



             (BEAKER) (test code = 429)                                        

 

             LYMPHOCYTES RELATIVE PERCENT 2 %                                   

 



             (BEAKER) (test code = 430)                                        

 

             MONOCYTES RELATIVE PERCENT 1 %                                    



             (BEAKER) (test code = 431)                                        

 

             EOSINOPHILS RELATIVE PERCENT 0 %                                   

 



             (BEAKER) (test code = 432)                                        

 

             BASOPHILS RELATIVE PERCENT 0 %                                    



             (BEAKER) (test code = 437)                                        

 

             NEUTROPHILS ABSOLUTE COUNT 11.49 K/ L   1.78-5.38    H            



             (BEAKER) (test code = 670)                                        

 

             LYMPHOCYTES ABSOLUTE COUNT 0.28 K/ L    1.32-3.57    L            



             (BEAKER) (test code = 414)                                        

 

             MONOCYTES ABSOLUTE COUNT (BEAKER) 0.14 K/ L    0.30-0.82    L      

      



             (test code = 415)                                        

 

             EOSINOPHILS ABSOLUTE COUNT 0.00 K/ L    0.04-0.54    L            



             (BEAKER) (test code = 416)                                        

 

             BASOPHILS ABSOLUTE COUNT (BEAKER) 0.02 K/ L    0.01-0.08           

      



             (test code = 417)                                        

 

             IMMATURE GRANULOCYTES-RELATIVE 0.50 %       0.00-1.00              

   



             PERCENT (BEAKER) (test code =                                      

  



             2801)                                               



CT BRAIN WITHOUT IV [id number] 23:50:40
************************************************************[facility]  : 1965 Sex: 
M************************************************************EXAM: CT BRAIN 
WITHOUT IV CONTRASTINDICATION: Cerebral hemorrhage suspectedNeuro deficit, 
acute, stroke suspectedTECHNIQUE: CT images from skull base to vertex without IV
contrast.This exam was performed according to the departmental dose 
optimizationprogram which includes automated exposure control, adjustmentof the 
mAand/or kV according to the patient size, and/or use of an 
iterativereconstruction technique.COMPARISON: None.FINDINGS: Parenchyma: There 
is acute hemorrhage centered within the left hemiponsextending to the left 
midbrain and left cerebral peduncle withmeasurements of 2.5 x 1.6 x 2.1 cm (4.2 
cc). Mild associated vasogenicedema and slight effacement of the adjacent fourth
ventricle whichremains patent. No intraventricular extension. No hydrocephalus. 
Noevidence of acute large territory infarction. Chronic infarction of theright 
external capsule and chronic appearing lacunar infarction of theanterior limb of
left internal capsule.Extra-axial Collection: NoneVentricular System: As 
aboveOsseous Structures: No acute osseous abnormality. Included Orbits: 
NormalParanasal Sinuses: Predominantly clearTympanomastoid Cavities: 
NormalOther: NoneIMPRESSION:Acute hemorrhage (4.2 cc) centered in the left 
hemipons extending to theleft midbrain and left cerebral peduncle. There is mild
associated masseffect without hydrocephalus or intraventricular 
extension.Chronic bilateral basal ganglia infarctions.Discussed the on-call 
neurology resident at 11:49 PM 7/3/2023Electronically Signed By: Maciej Lozada07/0
3/2023 23:52 CDTWorkstation Name: [id number]

## 2023-08-03 NOTE — P.CNS
Date of Consult: 08/03/23


Consultation follow-up:





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 

with right hemiparesis greater than left now with gross hematuria and an 

indwelling 18 Uzbek urethral Lopez catheter.








Patient seen and examined.  Awake and in no acute distress.





Examination:


Urethral Lopez catheter in place draining translucent light tea colored old 

hematuria








Recommendation:


-f/u for cystoscopy as outpatient on discharge

## 2023-08-03 NOTE — P.DS
Admission Date: 23


Discharge Date: 23


Disposition: TRANSFER TO INPATIENT REHAB


Discharge Condition: FAIR


Reason for Admission: Leukocytosis and sepsis


Consultations: 


1. Neurology


2. Urology


Hospital Course: 





DIAGNOSES:


# Sepsis likely secondary to possible Pneumonia vs Urinary Tract Infection


# Recent Hemorrhagic Cerebrovascular Accident


# Elevated LFTs - improved


# Hypertension


# Gross Hematuria


# Left Parapelvic Cyst (11 mm)





HOSPITAL COURSE:


Mr. Robert Tietz has complete hearing loss (deafness). The following 

communication was achieved with the assistance of [employer] certified American

Sign Language-English ,  De FERNANDEZKishor, (ID# [id number]).





Mr. Robert Tietz is a pleasant 57 year old male with a past medical history 

significant for a recent hemorrhagic cerebrovascular accident s/p PEG-tube and 

hypertension who was admitted to the [facility] on 

2023 due to concern for sepsis and gross hematuria. 





He was admitted to the Medicine service. Upon further evaluation, he was found 

to meet SIRS criteria and had a suspected source of pneumonia versus a urinary 

tract infection. His chest x-ray revealed, "stable chest since 2023 

study." He was also noted to have gross hematuria. His renal ultrasound, "no 

worrisome renal abnormality. Significant debris is present in the urinary 

bladder." Urology was consulted and he was evaluated by Dr. Cárdenas. He felt 

that a urinary tract infection was the source of his hematuria. He was treated 

with IV antibiotics and, over the course of his hospitalization, his symptoms 

improved significantly. PT was consulted and it was recommended that he be re-

admitted to inpatient rehab for continued therapy services. With the assistance 

of case management, this was arranged.





In regards to his hematuria, he was advised to follow-up with Urology as an 

outpatient for possible CT urography and cystoscopy to further evaluate for an 

underlying urologic malignancy.





On 2023, he was seen on rounds and deemed medically stable for discharge. 

He was discharged with instructions to schedule follow-up appointments with PCP,

with Neurology (Dr. Upton), and with Urology (Dr. Cárdenas). He was given the 

opportunity to ask questions and reported no further questions. Furthermore, all

questions were answered to the best of my ability.





A copy of this discharge summary will be sent to the above providers to 

facilitate continuity of care.





Today, I personally spent 25 minutes on his case, of which greater than 50% of 

the time was spent in patient education, counseling, and coordination of care as

described above.








- Physical Exam


General: Alert, In no apparent distress, Oriented x3


HEENT: Atraumatic, Mucous membr. moist/pink, Sclerae nonicteric


Neck: JVD not distended


Respiratory: Clear to auscultation bilaterally, Normal air movement


Cardiovascular: No edema, Regular rate/rhythm, No murmurs


Gastrointestinal: Normal bowel sounds, Soft, Non-distended, No tenderness, Other

(PEG-tube site is clean, dry, intact)


Musculoskeletal: No clubbing


Integumentary: No rashes


Neurological: Sensation intact, Other (left-sided facial droop), Abnormal 

strength (right-sided weakness (2-3/5 in RUE/RLE. 5/5 in LUE/LLE))








NIH Stroke Scale


1a. Level of consciousness: 0 - Alert; keenly responsive  


1b. LOC questions: 0 - Both questions right


1c. LOC commands: 0 - Performs both tasks


2. Best Gaze: 0 - Normal


3. Visual: 0 - No visual loss


4. Facial Palsy: 3 - unilateral complete paralysis (upper/lower face)


5a. Motor left arm: 0 - No drift for 10 seconds


5b. Motor right arm: 1 - Drift, but doesn't hit bed


6a. Motor left le - No drift for 5 seconds


6b. Motor right le - Drift, hits bed


7. Limb ataxia: 0 - No ataxia


8. Sensory: 0 - Normal; no sensory loss


9. Best Language: 0 - Normal; no aphasia


10. Dysarthria: 0 - Unable to test


11. Extinction and Inattention: 0 - No abnormality 


12. Distal motor function: 0 - No abnormality





Total Score: 6





Vital Signs/Physical Exam: 














Temp Pulse Resp BP Pulse Ox


 


 98.4 F   98 H  20   118/70   93 


 


 23 08:00  23 08:00  23 08:00  23 08:00  23 08:00








Laboratory Data at Discharge: 














WBC  10.20 thou/uL (4.3-10.9)   23  12:53    


 


Hgb  10.9 g/dL (13.6-17.9)  L  23  03:48    


 


Hct  32.4 % (39.6-49.0)  L  23  03:48    


 


Plt Count  347 thou/uL (152-406)   23  12:53    


 


PT  12.9 SECONDS (9.5-12.5)  H  23  12:53    


 


INR  1.17   23  12:53    


 


APTT  30.0 SECONDS (24.3-36.9)   23  12:53    


 


Sodium  139 mEq/L (136-145)   23  03:48    


 


Potassium  3.5 mEq/L (3.5-5.1)   23  03:48    


 


BUN  24 mg/dL (7-18)  H  23  03:48    


 


Creatinine  0.87 mg/dL (0.70-1.30)   23  03:48    


 


Glucose  145 mg/dL ()  H  23  03:48    


 


Magnesium  1.6 mg/dL (1.6-2.4)   23  12:53    


 


Total Bilirubin  0.2 mg/dL (0.2-1.0)   23  03:48    


 


AST  29 U/L (15-37)   23  03:48    


 


ALT  97 U/L (16-61)  H  23  03:48    


 


Alkaline Phosphatase  128 U/L ()  H  23  03:48    








Home Medications: 








Albuterol Neb [Proventil 0.083% Neb Soln] 1 amp IN Q6H 23 


Duloxetine HCl [Cymbalta] 30 mg PO BEDTIME 23 


Folic Acid 1 mg PO DAILY 23 


Ipratropium Neb [Atrovent*] 1 amp IN Q6H 23 


Melatonin [Melatonin*] 3 mg PO BEDTIME PRN 23 


Multivit-Min/Ferrous Fumarate [Multivitamin Liquid] 5 ml PO DAILY 23 


Pantoprazole Granules [Protonix Granules*] 40 mg PO BID 23 


Terazosin HCl [Hytrin*] 2 mg PO BEDTIME 23 


modafiniL [Provigil*] 100 mg PO DAILY 23 


Amlodipine [Norvasc*] 10 mg PO DAILY  tab 23 


Amox Tr/Potassium Clavulanate [Augmentin 400-57 mg/5 ml] 10 ml FT BID 10 Days #1

23 


Losartan Potassium [Cozaar*] 50 mg PO BID 23 


Metoprolol Tartrate [Lopressor*] 75 mg PO BID 6AM 6PM  tab 23 


Vital AF 50 ml FT DAILY  bot 23 





New Medications: 


Amox Tr/Potassium Clavulanate [Augmentin 400-57 mg/5 ml] 10 ml FT BID 10 Days #1


Physician Discharge Instructions: 


- Continue care at [facility]





- Once discharged, please schedule a follow-up with Urology (Dr. Cárdenas). He 

will need to further evaluate the blood in your urine to make sure it is not due

to a bladder/kidney cancer.


Diet: AHA


Activity: Fall precautions


Followup: 


Rehan Upton MD [ASSOCIATE-ACTIVE - CAN ADMIT] - 


Luis Daniel Cárdenas [ACTIVE - CAN ADMIT] - 


Time spent managing pt's care (in minutes): 25

## 2023-08-04 LAB
ALBUMIN SERPL BCP-MCNC: 2.6 G/DL (ref 3.4–5)
BUN BLD-MCNC: 29 MG/DL (ref 7–18)
GLUCOSE SERPLBLD-MCNC: 143 MG/DL (ref 74–106)
HCT VFR BLD CALC: 32.7 % (ref 39.6–49)
LYMPHOCYTES # SPEC AUTO: 1.8 K/UL (ref 0.7–4.9)
MAGNESIUM SERPL-MCNC: 2.1 MG/DL (ref 1.6–2.4)
MCV RBC: 78.6 FL (ref 80–100)
PMV BLD: 7.6 FL (ref 7.6–11.3)
POTASSIUM SERPL-SCNC: 3.9 MEQ/L (ref 3.5–5.1)
PREALB SERPL-MCNC: 28.6 MG/DL (ref 20–40)
RBC # BLD: 4.16 M/UL (ref 4.33–5.43)
WBC # BLD AUTO: 20.3 THOU/UL (ref 4.3–10.9)

## 2023-08-04 RX ADMIN — FOLIC ACID SCH MG: 1 TABLET ORAL at 09:54

## 2023-08-04 RX ADMIN — ALBUTEROL SULFATE SCH MG: 2.5 SOLUTION RESPIRATORY (INHALATION) at 01:30

## 2023-08-04 RX ADMIN — AMLODIPINE BESYLATE SCH: 10 TABLET ORAL at 08:00

## 2023-08-04 RX ADMIN — AMOXICILLIN AND CLAVULANATE POTASSIUM SCH MG: 600; 42.9 SUSPENSION ORAL at 09:24

## 2023-08-04 RX ADMIN — HUMAN INSULIN SCH: 100 INJECTION, SOLUTION SUBCUTANEOUS at 12:00

## 2023-08-04 RX ADMIN — IPRATROPIUM BROMIDE SCH MG: 0.5 SOLUTION RESPIRATORY (INHALATION) at 13:30

## 2023-08-04 RX ADMIN — LOSARTAN POTASSIUM SCH: 50 TABLET, FILM COATED ORAL at 08:00

## 2023-08-04 RX ADMIN — ALBUTEROL SULFATE SCH MG: 2.5 SOLUTION RESPIRATORY (INHALATION) at 13:30

## 2023-08-04 RX ADMIN — IPRATROPIUM BROMIDE SCH MG: 0.5 SOLUTION RESPIRATORY (INHALATION) at 21:25

## 2023-08-04 RX ADMIN — BISACODYL PRN MG: 10 SUPPOSITORY RECTAL at 17:18

## 2023-08-04 RX ADMIN — HUMAN INSULIN SCH: 100 INJECTION, SOLUTION SUBCUTANEOUS at 06:00

## 2023-08-04 RX ADMIN — DULOXETINE HYDROCHLORIDE SCH MG: 30 CAPSULE, DELAYED RELEASE ORAL at 20:45

## 2023-08-04 RX ADMIN — ALBUTEROL SULFATE SCH MG: 2.5 SOLUTION RESPIRATORY (INHALATION) at 21:25

## 2023-08-04 RX ADMIN — IPRATROPIUM BROMIDE SCH MG: 0.5 SOLUTION RESPIRATORY (INHALATION) at 07:35

## 2023-08-04 RX ADMIN — SODIUM CHLORIDE SCH MLS: 0.9 INJECTION, SOLUTION INTRAVENOUS at 14:18

## 2023-08-04 RX ADMIN — METHYLPREDNISOLONE SODIUM SUCCINATE SCH MG: 125 INJECTION, POWDER, FOR SOLUTION INTRAMUSCULAR; INTRAVENOUS at 16:09

## 2023-08-04 RX ADMIN — METOPROLOL TARTRATE SCH MG: 25 TABLET ORAL at 17:18

## 2023-08-04 RX ADMIN — PANTOPRAZOLE SODIUM SCH MG: 40 GRANULE, DELAYED RELEASE ORAL at 08:45

## 2023-08-04 RX ADMIN — LOSARTAN POTASSIUM SCH: 50 TABLET, FILM COATED ORAL at 20:00

## 2023-08-04 RX ADMIN — ALBUTEROL SULFATE SCH MG: 2.5 SOLUTION RESPIRATORY (INHALATION) at 07:35

## 2023-08-04 RX ADMIN — HUMAN INSULIN SCH: 100 INJECTION, SOLUTION SUBCUTANEOUS at 17:17

## 2023-08-04 RX ADMIN — HUMAN INSULIN SCH: 100 INJECTION, SOLUTION SUBCUTANEOUS at 00:00

## 2023-08-04 RX ADMIN — SODIUM CHLORIDE SCH MLS: 9 INJECTION, SOLUTION INTRAVENOUS at 16:17

## 2023-08-04 RX ADMIN — Medication SCH: at 00:19

## 2023-08-04 RX ADMIN — METOPROLOL TARTRATE SCH: 25 TABLET ORAL at 06:00

## 2023-08-04 RX ADMIN — Medication SCH ML: at 09:54

## 2023-08-04 RX ADMIN — IPRATROPIUM BROMIDE SCH MG: 0.5 SOLUTION RESPIRATORY (INHALATION) at 01:30

## 2023-08-04 RX ADMIN — PANTOPRAZOLE SODIUM SCH MG: 40 GRANULE, DELAYED RELEASE ORAL at 20:44

## 2023-08-04 RX ADMIN — TERAZOSIN HYDROCHLORIDE ANHYDROUS SCH MG: 1 CAPSULE ORAL at 20:45

## 2023-08-04 NOTE — RAD REPORT
EXAM DESCRIPTION:  Aicha Single View8/4/2023 2:06 pm

 

CLINICAL HISTORY:  Cough

 

COMPARISON:  July 28, 2023

 

FINDINGS:  Development of mild to moderate left basilar opacities.

 

Right lung appears clear.

 

Heart is normal size

 

IMPRESSION:  Mild to moderate left basilar opacities probably pneumonia

## 2023-08-04 NOTE — P.HP
Patient History


Allergies





No Known Allergies Allergy (Verified 08/03/23 15:50)


   





Home Medications: 








Albuterol Neb [Proventil 0.083% Neb Soln] 1 amp IN Q6H 07/26/23 


Duloxetine HCl [Cymbalta] 30 mg PO BEDTIME 07/26/23 


Folic Acid 1 mg PO DAILY 07/26/23 


Ipratropium Neb [Atrovent*] 1 amp IN Q6H 07/26/23 


Melatonin [Melatonin*] 3 mg PO BEDTIME PRN 07/26/23 


Multivit-Min/Ferrous Fumarate [Multivitamin Liquid] 5 ml PO DAILY 07/26/23 


Pantoprazole Granules [Protonix Granules*] 40 mg PO BID 07/26/23 


Terazosin HCl [Hytrin*] 2 mg PO BEDTIME 07/26/23 


modafiniL [Provigil*] 100 mg PO DAILY 07/26/23 


Amlodipine [Norvasc*] 10 mg PO DAILY  tab 08/03/23 


Amox Tr/Potassium Clavulanate [Augmentin 400-57 mg/5 ml] 10 ml FT BID 10 Days #1

08/03/23 


Losartan Potassium [Cozaar*] 50 mg PO BID 08/03/23 


Vital AF 50 ml FT DAILY  bot 08/03/23 


Metoprolol Tartrate [Lopressor*] 75 mg PO BID 08/04/23 








- Past Medical/Surgical History


Has patient received pneumonia vaccine in the past: Yes


Diabetic: No


-: Hypertension


-: deaf/mute


-: CVA- hemorrhagic


-: tonsillectomy


-: PEG tube


Psychosocial/ Personal History: Patient is Jehovah witness; would not want blood

 transfusion





- Family History


  ** Mother


Medical History: Cancer





- Social History


Smoking Status: Never smoker


Alcohol use: No


CD- Drugs: No


Caffeine use: No


Place of Residence: Home





Physical Examination





- Vital Signs


Temperature: 97.3 F


Blood Pressure: 112/63


Pulse: 91


Respirations: 19


Pulse Ox (%): 92





- Studies


Laboratory Data (last 24 hrs)











  08/04/23 08/04/23





  05:23 05:23


 


WBC   20.30 H


 


Hgb   10.6 L


 


Hct   32.7 L


 


Plt Count   417 H


 


Sodium  137 


 


Potassium  3.9 


 


BUN  29 H 


 


Creatinine  0.89 


 


Glucose  143 H 


 


Magnesium  2.1 











Assessment & Plan





- Advance Directives


Does patient have a Living Will: No


Does patient have a Durable POA for Healthcare: No

## 2023-08-04 NOTE — P.RH.PN
Estimated Length of Stay: 23


Expected Discharge Date: 08/27/23


Discharge Disposition Plan: Skilled Nursing Facility


Family Support: Yes


Long Term Goal: Transfers


Vital Signs: 


                                Last Vital Signs











Temp  97.3 F   08/04/23 13:08


 


Pulse  91 H  08/04/23 13:08


 


Resp  19   08/04/23 13:08


 


BP  112/63   08/04/23 13:08


 


Pulse Ox  92   08/04/23 13:08











Laboratory: 


                             Laboratory Last Values











WBC  20.30 thou/uL (4.3-10.9)  H  08/04/23  05:23    


 


RBC  4.16 M/uL (4.33-5.43)  L  08/04/23  05:23    


 


Hgb  10.6 g/dL (13.6-17.9)  L  08/04/23  05:23    


 


Hct  32.7 % (39.6-49.0)  L  08/04/23  05:23    


 


MCV  78.6 fL ()  L  08/04/23  05:23    


 


MCH  25.4 pg (27.0-35.0)  L  08/04/23  05:23    


 


MCHC  32.3 g/dL (32.0-36.0)   08/04/23  05:23    


 


RDW  15.0 % (12.1-15.2)   08/04/23  05:23    


 


Plt Count  417 thou/uL (152-406)  H  08/04/23  05:23    


 


MPV  7.6 fL (7.6-11.3)   08/04/23  05:23    


 


Neutrophils %  83.3 % (41.7-73.7)  H  08/04/23  05:23    


 


Lymphocytes %  8.7 % (15.3-44.8)  L  08/04/23  05:23    


 


Monocytes %  5.7 % (3.3-12.3)   08/04/23  05:23    


 


Eosinophils %  1.8 % (0-4.4)   08/04/23  05:23    


 


Basophils %  0.5 % (0-1.3)   08/04/23  05:23    


 


Absolute Neutrophils  16.9 K/uL (1.8-8.0)  H  08/04/23  05:23    


 


Absolute Lymphocytes  1.8 K/uL (0.7-4.9)   08/04/23  05:23    


 


Absolute Monocytes  1.2 K/uL (0.1-1.3)   08/04/23  05:23    


 


Absolute Eosinophils  0.4 K/uL (0-0.5)   08/04/23  05:23    


 


Absolute Basophils  0.1 K/uL (0-0.5)   08/04/23  05:23    


 


Sodium  137 mEq/L (136-145)   08/04/23  05:23    


 


Potassium  3.9 mEq/L (3.5-5.1)   08/04/23  05:23    


 


Chloride  106 mEq/L ()   08/04/23  05:23    


 


Carbon Dioxide  26 mEq/L (21-32)   08/04/23  05:23    


 


Anion Gap  8.9 mEq/L (5.0-15.0)   08/04/23  05:23    


 


BUN  29 mg/dL (7-18)  H  08/04/23  05:23    


 


Creatinine  0.89 mg/dL (0.70-1.30)   08/04/23  05:23    


 


Est GFR (CKD-EPI)  101 ml/min (=/>90)   08/04/23  05:23    


 


Glucose  143 mg/dL ()  H  08/04/23  05:23    


 


POC Glucose  118 mg/dL ()   08/04/23  12:00    


 


Calcium  8.7 mg/dL (8.5-10.1)   08/04/23  05:23    


 


Magnesium  2.1 mg/dL (1.6-2.4)   08/04/23  05:23    


 


Albumin  2.6 g/dL (3.4-5.0)  L  08/04/23  05:23    


 


Prealbumin  28.6 mg/dL (20-40)   08/04/23  05:23    











Weight: 146 lb


Within Defined Parameters: No


Wound Present: No


Closed Surgical Incision Present: No


Negative Pressure Wound Therapy Present: No


Physician Update: Patient admitted yesterday afternoon. Patient is complete tube

feeds. On antibiotics. Patient had gross hematuria. Patient is still seeing 

Urology. Right abdominal pain.


Medical Issues: Numerous issues:


Education Needed: Using ASL line for communications.


Functional Improvement: Maximum assist


Functional Improvement Occupational Therapy: Not participating much.


Speech Therapy Update: Severe oropharyngeal dysphagia


Summary: Patient's care plan and long term goals have been reviewed and revised 

as necessary. Please see the Rehabilitation Signature page for all necessary 

signatures.

## 2023-08-05 LAB
BUN BLD-MCNC: 30 MG/DL (ref 7–18)
GLUCOSE SERPLBLD-MCNC: 165 MG/DL (ref 74–106)
HCT VFR BLD CALC: 32.9 % (ref 39.6–49)
HCT VFR BLD CALC: 34.2 % (ref 39.6–49)
LYMPHOCYTES # SPEC AUTO: 0.9 K/UL (ref 0.7–4.9)
LYMPHOCYTES # SPEC AUTO: 1.8 K/UL (ref 0.7–4.9)
MCV RBC: 78.4 FL (ref 80–100)
MCV RBC: 78.4 FL (ref 80–100)
MORPHOLOGY BLD-IMP: (no result)
PMV BLD: 7.4 FL (ref 7.6–11.3)
PMV BLD: 7.5 FL (ref 7.6–11.3)
POTASSIUM SERPL-SCNC: 4.3 MEQ/L (ref 3.5–5.1)
RBC # BLD: 4.2 M/UL (ref 4.33–5.43)
RBC # BLD: 4.36 M/UL (ref 4.33–5.43)
WBC # BLD AUTO: 25.1 THOU/UL (ref 4.3–10.9)
WBC # BLD AUTO: 30.2 THOU/UL (ref 4.3–10.9)

## 2023-08-05 RX ADMIN — MELATONIN PRN MG: 3 TAB ORAL at 20:08

## 2023-08-05 RX ADMIN — SODIUM CHLORIDE SCH MLS: 9 INJECTION, SOLUTION INTRAVENOUS at 08:39

## 2023-08-05 RX ADMIN — METOPROLOL TARTRATE SCH MG: 25 TABLET ORAL at 05:45

## 2023-08-05 RX ADMIN — AMLODIPINE BESYLATE SCH MG: 5 TABLET ORAL at 10:32

## 2023-08-05 RX ADMIN — PANTOPRAZOLE SODIUM SCH MG: 40 GRANULE, DELAYED RELEASE ORAL at 07:31

## 2023-08-05 RX ADMIN — ALBUTEROL SULFATE SCH MG: 2.5 SOLUTION RESPIRATORY (INHALATION) at 13:50

## 2023-08-05 RX ADMIN — TERAZOSIN HYDROCHLORIDE ANHYDROUS SCH MG: 1 CAPSULE ORAL at 20:08

## 2023-08-05 RX ADMIN — HUMAN INSULIN SCH: 100 INJECTION, SOLUTION SUBCUTANEOUS at 05:45

## 2023-08-05 RX ADMIN — IPRATROPIUM BROMIDE SCH MG: 0.5 SOLUTION RESPIRATORY (INHALATION) at 20:25

## 2023-08-05 RX ADMIN — PANTOPRAZOLE SODIUM SCH MG: 40 GRANULE, DELAYED RELEASE ORAL at 20:09

## 2023-08-05 RX ADMIN — HUMAN INSULIN SCH: 100 INJECTION, SOLUTION SUBCUTANEOUS at 12:00

## 2023-08-05 RX ADMIN — IPRATROPIUM BROMIDE SCH MG: 0.5 SOLUTION RESPIRATORY (INHALATION) at 08:00

## 2023-08-05 RX ADMIN — FOLIC ACID SCH MG: 1 TABLET ORAL at 08:42

## 2023-08-05 RX ADMIN — SODIUM CHLORIDE SCH MLS: 0.9 INJECTION, SOLUTION INTRAVENOUS at 05:48

## 2023-08-05 RX ADMIN — SODIUM CHLORIDE SCH MLS: 9 INJECTION, SOLUTION INTRAVENOUS at 00:16

## 2023-08-05 RX ADMIN — METOPROLOL TARTRATE SCH MG: 25 TABLET ORAL at 17:26

## 2023-08-05 RX ADMIN — ACETAMINOPHEN PRN MG: 500 TABLET, FILM COATED ORAL at 10:14

## 2023-08-05 RX ADMIN — IPRATROPIUM BROMIDE SCH: 0.5 SOLUTION RESPIRATORY (INHALATION) at 02:00

## 2023-08-05 RX ADMIN — AMLODIPINE BESYLATE SCH: 10 TABLET ORAL at 08:00

## 2023-08-05 RX ADMIN — SODIUM CHLORIDE SCH: 0.9 INJECTION, SOLUTION INTRAVENOUS at 15:56

## 2023-08-05 RX ADMIN — LOSARTAN POTASSIUM SCH MG: 50 TABLET, FILM COATED ORAL at 20:08

## 2023-08-05 RX ADMIN — METHYLPREDNISOLONE SODIUM SUCCINATE SCH MG: 125 INJECTION, POWDER, FOR SOLUTION INTRAMUSCULAR; INTRAVENOUS at 00:17

## 2023-08-05 RX ADMIN — DULOXETINE HYDROCHLORIDE SCH MG: 30 CAPSULE, DELAYED RELEASE ORAL at 20:09

## 2023-08-05 RX ADMIN — ALBUTEROL SULFATE SCH: 2.5 SOLUTION RESPIRATORY (INHALATION) at 02:00

## 2023-08-05 RX ADMIN — LOSARTAN POTASSIUM SCH MG: 50 TABLET, FILM COATED ORAL at 08:41

## 2023-08-05 RX ADMIN — AMLODIPINE BESYLATE SCH MG: 5 TABLET ORAL at 20:08

## 2023-08-05 RX ADMIN — SCOPALAMINE SCH PAT: 1 PATCH, EXTENDED RELEASE TRANSDERMAL at 09:05

## 2023-08-05 RX ADMIN — IPRATROPIUM BROMIDE SCH MG: 0.5 SOLUTION RESPIRATORY (INHALATION) at 13:50

## 2023-08-05 RX ADMIN — HUMAN INSULIN SCH: 100 INJECTION, SOLUTION SUBCUTANEOUS at 17:39

## 2023-08-05 RX ADMIN — ALBUTEROL SULFATE SCH MG: 2.5 SOLUTION RESPIRATORY (INHALATION) at 20:25

## 2023-08-05 RX ADMIN — Medication SCH ML: at 07:42

## 2023-08-05 RX ADMIN — SODIUM CHLORIDE SCH MLS: 9 INJECTION, SOLUTION INTRAVENOUS at 17:05

## 2023-08-05 RX ADMIN — HUMAN INSULIN SCH: 100 INJECTION, SOLUTION SUBCUTANEOUS at 00:00

## 2023-08-05 RX ADMIN — ALBUTEROL SULFATE SCH MG: 2.5 SOLUTION RESPIRATORY (INHALATION) at 08:00

## 2023-08-06 LAB
BUN BLD-MCNC: 27 MG/DL (ref 7–18)
GLUCOSE SERPLBLD-MCNC: 143 MG/DL (ref 74–106)
HCT VFR BLD CALC: 30.5 % (ref 39.6–49)
LYMPHOCYTES # SPEC AUTO: 1.8 K/UL (ref 0.7–4.9)
MCV RBC: 79.6 FL (ref 80–100)
PMV BLD: 7.5 FL (ref 7.6–11.3)
POTASSIUM SERPL-SCNC: 3.6 MEQ/L (ref 3.5–5.1)
RBC # BLD: 3.84 M/UL (ref 4.33–5.43)
WBC # BLD AUTO: 18.4 THOU/UL (ref 4.3–10.9)

## 2023-08-06 RX ADMIN — DULOXETINE HYDROCHLORIDE SCH MG: 30 CAPSULE, DELAYED RELEASE ORAL at 19:53

## 2023-08-06 RX ADMIN — PANTOPRAZOLE SODIUM SCH MG: 40 GRANULE, DELAYED RELEASE ORAL at 07:19

## 2023-08-06 RX ADMIN — IPRATROPIUM BROMIDE SCH MG: 0.5 SOLUTION RESPIRATORY (INHALATION) at 07:55

## 2023-08-06 RX ADMIN — SODIUM CHLORIDE SCH: 0.9 INJECTION, SOLUTION INTRAVENOUS at 18:26

## 2023-08-06 RX ADMIN — HUMAN INSULIN SCH: 100 INJECTION, SOLUTION SUBCUTANEOUS at 18:00

## 2023-08-06 RX ADMIN — IPRATROPIUM BROMIDE SCH MG: 0.5 SOLUTION RESPIRATORY (INHALATION) at 19:45

## 2023-08-06 RX ADMIN — IPRATROPIUM BROMIDE SCH MG: 0.5 SOLUTION RESPIRATORY (INHALATION) at 01:30

## 2023-08-06 RX ADMIN — ALBUTEROL SULFATE SCH MG: 2.5 SOLUTION RESPIRATORY (INHALATION) at 01:30

## 2023-08-06 RX ADMIN — ALBUTEROL SULFATE SCH MG: 2.5 SOLUTION RESPIRATORY (INHALATION) at 07:55

## 2023-08-06 RX ADMIN — SODIUM CHLORIDE SCH: 0.9 INJECTION, SOLUTION INTRAVENOUS at 05:25

## 2023-08-06 RX ADMIN — SODIUM CHLORIDE SCH MLS: 0.9 INJECTION, SOLUTION INTRAVENOUS at 02:05

## 2023-08-06 RX ADMIN — ALBUTEROL SULFATE SCH MG: 2.5 SOLUTION RESPIRATORY (INHALATION) at 19:45

## 2023-08-06 RX ADMIN — LOSARTAN POTASSIUM SCH MG: 50 TABLET, FILM COATED ORAL at 19:51

## 2023-08-06 RX ADMIN — IPRATROPIUM BROMIDE SCH MG: 0.5 SOLUTION RESPIRATORY (INHALATION) at 14:25

## 2023-08-06 RX ADMIN — SODIUM CHLORIDE SCH MLS: 9 INJECTION, SOLUTION INTRAVENOUS at 17:03

## 2023-08-06 RX ADMIN — METOPROLOL TARTRATE SCH MG: 25 TABLET ORAL at 17:03

## 2023-08-06 RX ADMIN — LOSARTAN POTASSIUM SCH MG: 50 TABLET, FILM COATED ORAL at 07:18

## 2023-08-06 RX ADMIN — Medication SCH ML: at 07:19

## 2023-08-06 RX ADMIN — METOPROLOL TARTRATE SCH MG: 25 TABLET ORAL at 05:35

## 2023-08-06 RX ADMIN — SODIUM CHLORIDE SCH MLS: 9 INJECTION, SOLUTION INTRAVENOUS at 07:00

## 2023-08-06 RX ADMIN — ALBUTEROL SULFATE SCH MG: 2.5 SOLUTION RESPIRATORY (INHALATION) at 14:25

## 2023-08-06 RX ADMIN — AMLODIPINE BESYLATE SCH MG: 5 TABLET ORAL at 07:19

## 2023-08-06 RX ADMIN — HUMAN INSULIN SCH: 100 INJECTION, SOLUTION SUBCUTANEOUS at 11:55

## 2023-08-06 RX ADMIN — AMLODIPINE BESYLATE SCH MG: 5 TABLET ORAL at 19:50

## 2023-08-06 RX ADMIN — SODIUM CHLORIDE SCH MLS: 9 INJECTION, SOLUTION INTRAVENOUS at 00:00

## 2023-08-06 RX ADMIN — SODIUM CHLORIDE SCH MLS: 0.9 INJECTION, SOLUTION INTRAVENOUS at 15:01

## 2023-08-06 RX ADMIN — Medication SCH ML: at 06:59

## 2023-08-06 RX ADMIN — HUMAN INSULIN SCH: 100 INJECTION, SOLUTION SUBCUTANEOUS at 05:24

## 2023-08-06 RX ADMIN — PANTOPRAZOLE SODIUM SCH MG: 40 GRANULE, DELAYED RELEASE ORAL at 19:52

## 2023-08-06 RX ADMIN — TERAZOSIN HYDROCHLORIDE ANHYDROUS SCH MG: 1 CAPSULE ORAL at 19:50

## 2023-08-06 RX ADMIN — FOLIC ACID SCH MG: 1 TABLET ORAL at 07:19

## 2023-08-06 RX ADMIN — HUMAN INSULIN SCH: 100 INJECTION, SOLUTION SUBCUTANEOUS at 00:00

## 2023-08-07 RX ADMIN — SODIUM CHLORIDE SCH MLS: 0.9 INJECTION, SOLUTION INTRAVENOUS at 04:03

## 2023-08-07 RX ADMIN — SODIUM CHLORIDE SCH MLS: 9 INJECTION, SOLUTION INTRAVENOUS at 06:53

## 2023-08-07 RX ADMIN — ALBUTEROL SULFATE SCH MG: 2.5 SOLUTION RESPIRATORY (INHALATION) at 08:00

## 2023-08-07 RX ADMIN — HUMAN INSULIN SCH: 100 INJECTION, SOLUTION SUBCUTANEOUS at 06:00

## 2023-08-07 RX ADMIN — METOPROLOL TARTRATE SCH MG: 25 TABLET ORAL at 16:49

## 2023-08-07 RX ADMIN — AMLODIPINE BESYLATE SCH MG: 5 TABLET ORAL at 06:52

## 2023-08-07 RX ADMIN — DULOXETINE HYDROCHLORIDE SCH MG: 30 CAPSULE, DELAYED RELEASE ORAL at 20:42

## 2023-08-07 RX ADMIN — LOSARTAN POTASSIUM SCH MG: 50 TABLET, FILM COATED ORAL at 20:41

## 2023-08-07 RX ADMIN — IPRATROPIUM BROMIDE SCH MG: 0.5 SOLUTION RESPIRATORY (INHALATION) at 20:05

## 2023-08-07 RX ADMIN — SODIUM CHLORIDE SCH: 0.9 INJECTION, SOLUTION INTRAVENOUS at 08:40

## 2023-08-07 RX ADMIN — Medication SCH ML: at 06:52

## 2023-08-07 RX ADMIN — Medication SCH PATCH: at 15:25

## 2023-08-07 RX ADMIN — AMLODIPINE BESYLATE SCH MG: 5 TABLET ORAL at 20:42

## 2023-08-07 RX ADMIN — ALBUTEROL SULFATE SCH MG: 2.5 SOLUTION RESPIRATORY (INHALATION) at 01:50

## 2023-08-07 RX ADMIN — PANTOPRAZOLE SODIUM SCH MG: 40 GRANULE, DELAYED RELEASE ORAL at 06:51

## 2023-08-07 RX ADMIN — HUMAN INSULIN SCH: 100 INJECTION, SOLUTION SUBCUTANEOUS at 17:13

## 2023-08-07 RX ADMIN — HUMAN INSULIN SCH: 100 INJECTION, SOLUTION SUBCUTANEOUS at 00:30

## 2023-08-07 RX ADMIN — SODIUM CHLORIDE SCH MLS: 0.9 INJECTION, SOLUTION INTRAVENOUS at 19:04

## 2023-08-07 RX ADMIN — IPRATROPIUM BROMIDE SCH MG: 0.5 SOLUTION RESPIRATORY (INHALATION) at 08:00

## 2023-08-07 RX ADMIN — SODIUM CHLORIDE SCH MLS: 9 INJECTION, SOLUTION INTRAVENOUS at 16:49

## 2023-08-07 RX ADMIN — ALBUTEROL SULFATE SCH: 2.5 SOLUTION RESPIRATORY (INHALATION) at 14:00

## 2023-08-07 RX ADMIN — METOPROLOL TARTRATE SCH MG: 25 TABLET ORAL at 05:15

## 2023-08-07 RX ADMIN — HUMAN INSULIN SCH: 100 INJECTION, SOLUTION SUBCUTANEOUS at 11:40

## 2023-08-07 RX ADMIN — BISACODYL PRN MG: 10 SUPPOSITORY RECTAL at 17:11

## 2023-08-07 RX ADMIN — IPRATROPIUM BROMIDE SCH: 0.5 SOLUTION RESPIRATORY (INHALATION) at 14:00

## 2023-08-07 RX ADMIN — SODIUM CHLORIDE SCH MLS: 9 INJECTION, SOLUTION INTRAVENOUS at 00:05

## 2023-08-07 RX ADMIN — FOLIC ACID SCH MG: 1 TABLET ORAL at 06:51

## 2023-08-07 RX ADMIN — TERAZOSIN HYDROCHLORIDE ANHYDROUS SCH MG: 1 CAPSULE ORAL at 20:42

## 2023-08-07 RX ADMIN — ALBUTEROL SULFATE SCH MG: 2.5 SOLUTION RESPIRATORY (INHALATION) at 20:05

## 2023-08-07 RX ADMIN — Medication SCH ML: at 06:29

## 2023-08-07 RX ADMIN — IPRATROPIUM BROMIDE SCH MG: 0.5 SOLUTION RESPIRATORY (INHALATION) at 01:50

## 2023-08-07 RX ADMIN — LOSARTAN POTASSIUM SCH MG: 50 TABLET, FILM COATED ORAL at 06:52

## 2023-08-07 RX ADMIN — PANTOPRAZOLE SODIUM SCH MG: 40 GRANULE, DELAYED RELEASE ORAL at 20:42

## 2023-08-08 RX ADMIN — ALBUTEROL SULFATE SCH: 2.5 SOLUTION RESPIRATORY (INHALATION) at 01:46

## 2023-08-08 RX ADMIN — Medication SCH ML: at 06:32

## 2023-08-08 RX ADMIN — METOPROLOL TARTRATE SCH MG: 25 TABLET ORAL at 17:14

## 2023-08-08 RX ADMIN — ALBUTEROL SULFATE SCH MG: 2.5 SOLUTION RESPIRATORY (INHALATION) at 07:15

## 2023-08-08 RX ADMIN — Medication SCH PATCH: at 09:25

## 2023-08-08 RX ADMIN — AMLODIPINE BESYLATE SCH MG: 5 TABLET ORAL at 07:08

## 2023-08-08 RX ADMIN — LOSARTAN POTASSIUM SCH MG: 50 TABLET, FILM COATED ORAL at 21:32

## 2023-08-08 RX ADMIN — METOPROLOL TARTRATE SCH MG: 25 TABLET ORAL at 05:28

## 2023-08-08 RX ADMIN — LOSARTAN POTASSIUM SCH MG: 50 TABLET, FILM COATED ORAL at 07:08

## 2023-08-08 RX ADMIN — HUMAN INSULIN SCH: 100 INJECTION, SOLUTION SUBCUTANEOUS at 12:00

## 2023-08-08 RX ADMIN — ALBUTEROL SULFATE SCH MG: 2.5 SOLUTION RESPIRATORY (INHALATION) at 14:00

## 2023-08-08 RX ADMIN — SODIUM CHLORIDE SCH MLS: 0.9 INJECTION, SOLUTION INTRAVENOUS at 22:04

## 2023-08-08 RX ADMIN — TERAZOSIN HYDROCHLORIDE ANHYDROUS SCH MG: 1 CAPSULE ORAL at 21:32

## 2023-08-08 RX ADMIN — HUMAN INSULIN SCH: 100 INJECTION, SOLUTION SUBCUTANEOUS at 06:00

## 2023-08-08 RX ADMIN — IPRATROPIUM BROMIDE SCH MG: 0.5 SOLUTION RESPIRATORY (INHALATION) at 07:15

## 2023-08-08 RX ADMIN — IPRATROPIUM BROMIDE SCH MG: 0.5 SOLUTION RESPIRATORY (INHALATION) at 19:50

## 2023-08-08 RX ADMIN — SODIUM CHLORIDE SCH MLS: 9 INJECTION, SOLUTION INTRAVENOUS at 07:02

## 2023-08-08 RX ADMIN — IPRATROPIUM BROMIDE SCH MG: 0.5 SOLUTION RESPIRATORY (INHALATION) at 14:00

## 2023-08-08 RX ADMIN — SODIUM CHLORIDE SCH MLS: 9 INJECTION, SOLUTION INTRAVENOUS at 17:15

## 2023-08-08 RX ADMIN — SODIUM CHLORIDE SCH: 0.9 INJECTION, SOLUTION INTRAVENOUS at 11:20

## 2023-08-08 RX ADMIN — SODIUM CHLORIDE SCH MLS: 0.9 INJECTION, SOLUTION INTRAVENOUS at 07:02

## 2023-08-08 RX ADMIN — SODIUM CHLORIDE SCH MLS: 9 INJECTION, SOLUTION INTRAVENOUS at 00:02

## 2023-08-08 RX ADMIN — HUMAN INSULIN SCH: 100 INJECTION, SOLUTION SUBCUTANEOUS at 00:00

## 2023-08-08 RX ADMIN — DULOXETINE HYDROCHLORIDE SCH MG: 30 CAPSULE, DELAYED RELEASE ORAL at 21:32

## 2023-08-08 RX ADMIN — HUMAN INSULIN SCH: 100 INJECTION, SOLUTION SUBCUTANEOUS at 18:00

## 2023-08-08 RX ADMIN — SCOPALAMINE SCH PAT: 1 PATCH, EXTENDED RELEASE TRANSDERMAL at 09:26

## 2023-08-08 RX ADMIN — AMLODIPINE BESYLATE SCH MG: 5 TABLET ORAL at 21:31

## 2023-08-08 RX ADMIN — Medication SCH ML: at 07:07

## 2023-08-08 RX ADMIN — ALBUTEROL SULFATE SCH MG: 2.5 SOLUTION RESPIRATORY (INHALATION) at 19:50

## 2023-08-08 RX ADMIN — PANTOPRAZOLE SODIUM SCH MG: 40 GRANULE, DELAYED RELEASE ORAL at 21:31

## 2023-08-08 RX ADMIN — MAGNESIUM OXIDE TAB 400 MG (241.3 MG ELEMENTAL MG) SCH MG: 400 (241.3 MG) TAB at 14:22

## 2023-08-08 RX ADMIN — IPRATROPIUM BROMIDE SCH: 0.5 SOLUTION RESPIRATORY (INHALATION) at 01:46

## 2023-08-08 RX ADMIN — APIXABAN SCH MG: 2.5 TABLET, FILM COATED ORAL at 21:32

## 2023-08-08 RX ADMIN — PANTOPRAZOLE SODIUM SCH MG: 40 GRANULE, DELAYED RELEASE ORAL at 07:07

## 2023-08-08 RX ADMIN — FOLIC ACID SCH MG: 1 TABLET ORAL at 07:08

## 2023-08-08 NOTE — HP
Date of Admission:  08/03/2023



Time Of Service:  12:30 p.m.



Chief Complaint:  Stroke and right face and arm more than leg weakness.  Mr. Tietz communicates via s
ign language and a communication device with a sign language expert at the end of an ASL  f
or deafness was used for communication.



History Of Present Illness:  Mr. Tietz is a 57-year-old, right-handed Jehovah witness patient with hi
story of hemifacial spasm, right basal ganglia hemorrhagic stroke after a nuclear infarct, hypertensi
on, tobacco abuse, who was originally seen at [facility] on the 4th of July with seizures.  
He was found to have a 2.3 x 1.7 cm acute hemorrhagic infarct in the left brainstem and was treated w
ith Decadron, Keppra and transferred to [facility] for higher leve
l of care.  There he received Versed, fentanyl, etomidate and was intubated and was unresponsive when
 he arrived at [facility].  A CT scan showed no worsening bleed and no hydrocephalus.  He did have attemp
ts at extubation and received Haldol for agitation and was successfully intubated on July 7th.  He di
d develops aspiration pneumonia with positive sputum for Haemophilus influenzae and was started on me
ropenem on 07/06.  He had tachycardia on 07/11 and prerenal dysfunction with obstructive nephropathy.
  He did improve and had voiding trials.  He was started on terazosin in the ICU, which did help with
 urination.  He was febrile and had leukocytosis and pneumonia again noted.  He was able to perform s
elf-suctioning while in the ICU, but could not manage oral feeding and was placed on PEG tube.  When 
the patient came to [facility] originally was admitted on 07/26/2023.  However, the patient 
immediately worsened with worsening leukocytosis and was determined to have worsening pneumonia with 
urinary tract infection and sepsis and was determined to require IV antibiotics.  His urine showed si
gnificant hematuria.  He was transferred to the acute care floor for further management.  There he di
d receive Urology consultation which was determined that the source of infection was likely related t
o the urine and his Lopez catheter was replaced with an 18-Polish tip catheter with adequate position
ing verified.  The catheter was irrigated and did remain blood-tinged.  He did have significant eleva
tions in blood pressure and hypoglycemia along with increased tone in the right upper extremity where
 he has significant dense paresis in the right face and arm more than leg.  He did begin to work with
 rehabilitation including physical, occupational, and speech therapy.  It was determined that the tashi nunez is a more appropriate candidate to return back to the inpatient rehabilitation unit once his med
ical condition has been stabilized enough for it to be managed in the rehabilitation unit and was the
refore admitted again to the rehabilitation unit on 08/03/2023 to begin more aggressive therapy.



Past Medical History:  Hypertension, he is deaf and mute, stroke with PEG tube placement, and asthma,
 benign prostatic hypertrophy, hemifacial spasm, PEG tube placement on 07/21/2023, and an endoscope w
as done at that time.



Family History:  Noncontributory.



Allergies:  NO KNOWN DRUG ALLERGIES.



Medications:  Albuterol nebulizer 2.5 mg every 6 hours, ampicillin sulbactam 3 g every 6 hours, Cymba
lta 30 mg at bedtime, Lovenox 40 mg subcutaneously daily, folic acid 1 mg daily, Atrovent 0.5 mg nebu
lized every 6 hours, melatonin 3 mg at night, Provigil 100 mg daily, multivitamin 5 mL per PEG tube d
aily, Protonix 40 mg per PEG tube daily, Hytrin 2 mg per PEG tube daily.



Review of Systems:

The patient does report some spasmodic areas in the right shoulder where a stroke is impacting him an
d he has very little voluntary movement.  He is doing some passive movement with the left side.  Also
 denies any significant depression given his situation and is still upbeat.  Otherwise, denies any ch
ills, any other myalgias, no arthralgias, no rash.  Still has significant difficulty with secretion a
nd he uses suction for that.



Social History:  The patient lives with wife and hope he is able to back at home with his wife.  Curr
ently, no alcohol, tobacco, or IV drug use.



Laboratory Studies:  White cell count yesterday 18.4, down from 25.1 on 08/05/2023, his neutrophils d
own to 80.3 from 86.7, hemoglobin 9.9, hematocrit 30.5, platelets 357.  INR 1.17.  Blood sugars range
d from 118 to 136.  Sodium 139, potassium 3.6, chloride 110, carbon dioxide 25, BUN 27, creatinine 0.
90, calcium 8.3.  Procalcitonin on the 5th of August is less than 0.05, calcium 9.0.



X-ray Imaging:  Chest x-ray from 08/04/2023, shows mild to moderate left basilar opacities probably p
neumonia. 



Note on consultations, the patient was seen by Dr. Luis Daniel Cárdenas on 08/03/2023, with followup cystos
copy at the outpatient basis.  Urethral Lopez catheter in place, draining translucent clear tea-color
ed old hematuria.



Physical Examination:

Vital Signs:  Blood pressure 117/71, pulse 64, respiratory rate 18, temperature 97.2, oxygen saturati
on 96%.  Pain level between 0 and 2. 

General:  Mr. Tietz is resting in his room.  His wife at bedside through the 
.  The patient does report the mild disturbance in terms of some spasms in the right arm.  Otherwise,
 he has no significant complaints as his mood is okay given his situation.  He is otherwise in terms 
of examination some oral secretions noted.  These sounds wet as he tries to vocalize and there is edgardo
e mild coughing and he does have suction.  He is capable of using the suction on his own with the lef
t arm. 

HEENT:  In terms of his general exam otherwise, he is normocephalic and atraumatic.  Sclerae anicteri
c.  Decreased breath sounds bilaterally. 

Abdomen:  Soft and nondistended. 

Extremities:  He has no clubbing, cyanosis, edema in the lower extremities. 

Heart:  Regular. 

Neurological:  On cranial nerves, he has right nasolabial fold decrease, decreased to light touch in 
the right face compared to the left side.  Unable to make good labial lingual or guttural sounds.  He
 has motor exam, in the right upper extremity around 1/5 in the shoulder are otherwise and the right 
forearm and hand around 1/5 as well in terms of flexion extension at the biceps.  Right lower extremi
ty around 1 to 2/5 proximally and distally.  Sensory exam decreased to light touch and temperature in
 the right compared to left upper and lower extremity.  Tone increased in the right upper and lower c
ompared to the left upper and lower extremities.  Reflexes increased around 2 to 3 in the right upper
 and lower extremity compared to 1 in the left upper and lower extremity.  Coordination intact in the
 left upper and lower extremity.  Gait, he is unable to ambulate.



Current Level Of Functioning:  Today, he was able to mobilize a wheelchair 40 feet with the left uppe
r extremity and left lower extremity, able to mobilize the wheelchair, did require multiple cues.  He
 did fatigue quickly.  He did work on bed mobility, turning side-to-side and scooting from left to ri
ght.  Work on supine to sit movement with physical therapist, but required maximum assistance for madison
t.  The  was required during all therapy sessions.  He did complete oral motor exercises 10
 times with 50% accuracy.  Left facial droop noted and continued to use oral secretion management.



Rehabilitation And Medical Assessment And Plan:  Mr. Tietz is admitted to the rehabilitation unit wit
h an impairment category of 01 stroke.  His impairment group code is 01.2 right hemibody, left brain.
 



His etiologic diagnosis is left hemorrhagic stroke.  His comorbidities are hematuria, hypertension, s
epsis, dysphagia, aspiration pneumonia, dehydration, respiratory distress.



Plan:  

1.He will have physical, occupational, and speech therapy for 3.5 hours, 5 of 7 days.

2.We will continue albuterol nebulizers for his respiratory distress.  Continue Norvasc 5 mg twice d
aily for hypertension.  Continue Cymbalta 30 mg at bedtime for depression.  Continue folic acid 1 mg 
daily for stroke risk reduction.  Continue Atrovent along with the albuterol for nebulizer treatments
.  Continue lidocaine patch in the right arm for pain.  Continue Cozaar 50 mg twice daily for hyperte
nsion, melatonin 3 mg at night for sleep.  Continue Protonix for GE reflux.  Continue piperacillin-ta
zobactam 3.375 mg every 8 hours.

3.Scopolamine patch for symptoms of vertigo.

4.Hytrin 2 mg at bedtime for urinary retention.



Impact Of Comorbids:  Mr. Tietz has significant difficulty with his ability to swallow and he is on P
EG tube feeding.  Currently, he is able to suction himself and is beginning to show improvement with 
his transfers and ability to mobilize with wheelchair, but those are significantly limiting factors. 
 His communication is by a sign language and does require a , but that is being worked thro
[facility].



Rehabilitation Specific Plan:  

1.Mr. Tietz will have physical, occupational, speech therapy for 3.5 hours for 5 of 7 days to improv
e his ability to communicate, to comprehend speech, to follow instructions, to be able to swallow, an
d he will be evaluated by barium swallow at the appropriate time to see if he can begin to have oral 
hydration and nutrition.

2.He will have physical and occupational therapy to improve his ability to transfer to mobilize a wh
eelchair and to begin to stand and get to the commode, get to the shower.

3.Occupational therapy will help him with dressing upper and lower body, grooming, eating, and perfo
rming an activities of daily living.

4.Mr. Tietz and his wife has a good understanding of the process of admission to the inpatient rehab
ilitation unit and has a good potential to improve and require less help and to be more on his own.  
In addition, if need be the Respiratory Service, ID Service, Nutrition Service, and Renal Service korina
l be consulted.

5.Given the complex medical condition and risk of further worsening, rehabilitation services cannot 
be safely or affectively provided at a lower level care such as a skilled nursing unit.



Barriers To Discharge:  He is deaf and mute and does require sign language for communication and he i
s on antibiotics for sepsis likely from bladder infection.  He has had significant drop in hemoglobin
 and hematocrit, and he is high risk of aspiration pneumonia and all is already being treated for madison
t.



Length Of Stay:  About 3 weeks.



Disposition:  He is hoping to go home with family, but may potentially depending on how he is doing, 
go to skilled nursing.



Prognosis:  Fair overall.



Rehabilitation Goals:  

1.Be able to assist in transfers with minimum assistance required to go from bed to chair to toilet 
commode.

2.Perform grooming with min assist.

3.Perform dressing upper body with min assist.

4.Perform lower body with mod assist.

5.Perform eating and swallowing with consistencies such as thickened liquids and chopped meats as he
 is working hard with physical therapy.

6.Perform his cognitive functioning independently.

7.The above goals have been discussed with Mr. Tietz and he is in agreement. 



I acknowledge that I personally performed a full physical examination on Mr. Tietz after admission to
 the inpatient rehabilitation facility and to determine that he is able to tolerate the above course 
of treatment at an intensive level for a reasonable period of time.  A detailed individualized plan o
f care for him will be completed by the hospital day 4 of his admission to the rehabilitation unit ba
sed on the preadmission screen, history and physical, and therapy evaluations.





LB/MODL

DD:  08/07/2023 16:56:59Voice ID:  [id number]

## 2023-08-09 RX ADMIN — HUMAN INSULIN SCH: 100 INJECTION, SOLUTION SUBCUTANEOUS at 23:58

## 2023-08-09 RX ADMIN — LOSARTAN POTASSIUM SCH MG: 50 TABLET, FILM COATED ORAL at 09:55

## 2023-08-09 RX ADMIN — LOSARTAN POTASSIUM SCH MG: 50 TABLET, FILM COATED ORAL at 20:18

## 2023-08-09 RX ADMIN — IPRATROPIUM BROMIDE SCH MG: 0.5 SOLUTION RESPIRATORY (INHALATION) at 14:05

## 2023-08-09 RX ADMIN — MAGNESIUM OXIDE TAB 400 MG (241.3 MG ELEMENTAL MG) SCH MG: 400 (241.3 MG) TAB at 08:12

## 2023-08-09 RX ADMIN — Medication SCH ML: at 12:14

## 2023-08-09 RX ADMIN — SODIUM CHLORIDE SCH MLS: 0.9 INJECTION, SOLUTION INTRAVENOUS at 11:03

## 2023-08-09 RX ADMIN — HUMAN INSULIN SCH: 100 INJECTION, SOLUTION SUBCUTANEOUS at 17:36

## 2023-08-09 RX ADMIN — APIXABAN SCH MG: 2.5 TABLET, FILM COATED ORAL at 08:10

## 2023-08-09 RX ADMIN — ALBUTEROL SULFATE SCH MG: 2.5 SOLUTION RESPIRATORY (INHALATION) at 07:20

## 2023-08-09 RX ADMIN — TERAZOSIN HYDROCHLORIDE ANHYDROUS SCH MG: 1 CAPSULE ORAL at 20:17

## 2023-08-09 RX ADMIN — IPRATROPIUM BROMIDE SCH MG: 0.5 SOLUTION RESPIRATORY (INHALATION) at 01:15

## 2023-08-09 RX ADMIN — HUMAN INSULIN SCH: 100 INJECTION, SOLUTION SUBCUTANEOUS at 12:00

## 2023-08-09 RX ADMIN — SODIUM CHLORIDE SCH: 0.9 INJECTION, SOLUTION INTRAVENOUS at 00:09

## 2023-08-09 RX ADMIN — ALBUTEROL SULFATE SCH MG: 2.5 SOLUTION RESPIRATORY (INHALATION) at 20:15

## 2023-08-09 RX ADMIN — BISACODYL PRN MG: 10 SUPPOSITORY RECTAL at 17:33

## 2023-08-09 RX ADMIN — AMLODIPINE BESYLATE SCH MG: 5 TABLET ORAL at 08:12

## 2023-08-09 RX ADMIN — IPRATROPIUM BROMIDE SCH MG: 0.5 SOLUTION RESPIRATORY (INHALATION) at 07:20

## 2023-08-09 RX ADMIN — AMLODIPINE BESYLATE SCH MG: 5 TABLET ORAL at 20:17

## 2023-08-09 RX ADMIN — METOPROLOL TARTRATE SCH MG: 25 TABLET ORAL at 17:02

## 2023-08-09 RX ADMIN — SODIUM CHLORIDE SCH MLS: 9 INJECTION, SOLUTION INTRAVENOUS at 00:10

## 2023-08-09 RX ADMIN — PANTOPRAZOLE SODIUM SCH MG: 40 GRANULE, DELAYED RELEASE ORAL at 08:09

## 2023-08-09 RX ADMIN — ALBUTEROL SULFATE SCH MG: 2.5 SOLUTION RESPIRATORY (INHALATION) at 01:15

## 2023-08-09 RX ADMIN — METOPROLOL TARTRATE SCH MG: 25 TABLET ORAL at 05:38

## 2023-08-09 RX ADMIN — ALBUTEROL SULFATE SCH MG: 2.5 SOLUTION RESPIRATORY (INHALATION) at 14:05

## 2023-08-09 RX ADMIN — DULOXETINE HYDROCHLORIDE SCH MG: 30 CAPSULE, DELAYED RELEASE ORAL at 20:18

## 2023-08-09 RX ADMIN — FOLIC ACID SCH MG: 1 TABLET ORAL at 08:11

## 2023-08-09 RX ADMIN — Medication SCH ML: at 08:14

## 2023-08-09 RX ADMIN — PANTOPRAZOLE SODIUM SCH MG: 40 GRANULE, DELAYED RELEASE ORAL at 20:18

## 2023-08-09 RX ADMIN — IPRATROPIUM BROMIDE SCH MG: 0.5 SOLUTION RESPIRATORY (INHALATION) at 20:15

## 2023-08-09 RX ADMIN — HUMAN INSULIN SCH: 100 INJECTION, SOLUTION SUBCUTANEOUS at 05:51

## 2023-08-09 RX ADMIN — SODIUM CHLORIDE SCH MLS: 9 INJECTION, SOLUTION INTRAVENOUS at 08:14

## 2023-08-09 RX ADMIN — SODIUM CHLORIDE SCH MLS: 9 INJECTION, SOLUTION INTRAVENOUS at 16:15

## 2023-08-09 RX ADMIN — HUMAN INSULIN SCH: 100 INJECTION, SOLUTION SUBCUTANEOUS at 00:00

## 2023-08-09 RX ADMIN — LOSARTAN POTASSIUM SCH: 50 TABLET, FILM COATED ORAL at 08:00

## 2023-08-09 RX ADMIN — Medication SCH PATCH: at 08:15

## 2023-08-09 RX ADMIN — APIXABAN SCH MG: 2.5 TABLET, FILM COATED ORAL at 20:18

## 2023-08-09 NOTE — PN
Date of Progress Note:  08/08/2023



Face-to-Face Progress Note Visit



Time Of Service:  1 p.m.



Subjective:  Mr. Tietz, through a  reports that his 
mood is upbeat.  He did give a thumbs up sign with the left hand and he is glad 
so far about his level of recovery.  He is currently working with the speech 
pathologist.



Review of Systems:

Some pain and spasm noted in the right forearm and he is on magnesium to help 
with that.  Otherwise, he denies fevers, chills.  No rash.  No headache.  No 
evidence of significant depression despite his circumstance.  No other 
complaints.



Physical Examination:

Vital Signs:  Blood pressure 125/79, pulse 64, respiratory rate 17, temperature 
97.7, oxygen saturation 92%. 

General: Mr. Tietz is resting in bed. 

Extremities:  He does have today a significant change in terms of more 
improvement in the right upper extremity and lower extremity strength.  He was 
able to show around 2 to 3/5 proximal and distal strength in the right upper 
extremity and about 3/5 strength in the right lower extremity.  Some decrease to
touch and temperature in the right upper and lower extremity.  Left side is 
fully strong.  

Neurologic:  He does have more significant difficulty with any form of 
communication verbally for which he actually uses sign language.  His 
oropharyngeal functioning is lagging behind recovery of his arm and leg 
strength.  He has very poor oropharyngeal strength, difficulty maintaining his 
secretions and is using suction with the left arm and the suction device at bed.
 He is independent with his own suctioning.



Laboratory Studies:  Blood sugars ranged from 112-137.



X-ray/imaging:  No new x-rays or imaging.



Medications:  Norvasc 5 mg twice daily, Eliquis 2.5 mg twice daily, albuterol 
nebulizer 2.5 mg every 6 hours, Tylenol 500 mg every 6 hours as needed, Dulcolax
10 mg per rectum as needed, Cymbalta 30 mg at bedtime, folic acid 1 mg daily, 
Atrovent nebulizer 0.5 mg every 6 hours as needed, lidocaine patch apply 
topically daily, Cozaar 50 mg twice daily, magnesium oxide 400 mg daily, 
Lopressor 75 mg twice daily, multivitamin that is a Cerovite liquid 5 mL per 
feeding tube daily and as noted, he is actually on feeding tube and nothing 
oral.  He has Protonix 40 mL a packet twice daily and is continuing on IV 
antibiotics, piperacillin-tazobactam 3.375 mg every 8 hours IV, he has 
transdermal scopolamine patch and he is also on terazosin 2 mg at bedtime and is
receiving IV fluids 1 L, 25 cc/hour.



Current Functional Status:  Today, he did work on gait training and parallel 
bars, standing upright with the left hand placement with  helping. 
After several attempts, he was finally able to stand with good posture and tried
to begin to step.  He was able to walk 4 feet with two attempts on the parallel 
bars.  He worked on a wheelchair mobilization for 30 feet, with 2 rest breaks to
the left upper and left lower extremity.  He did communicate he was tired, 
worked on sit-to-stand transfers 3 times and stand to pivot transfers with 2 
physical therapist assisting.  Pain was up to 7/10 on the right upper extremity 
as he was mobilizing.  With speech therapy, he worked on improving oral motor 
strength and volitional swallowing, worked on lip strengthening, lingual 
strengthening and range of motion, also had thermal tactile stimulation to 
facilitate swallowing reflex.  He did moderate to max assistance and cues for 
those activities.  He did have a delay in swallowing reflex by 3-5 seconds.  He 
did participate actively in those exercises.  With his occupational therapy, 
wheelchair transfer with maximum assistance, sit to stand maximum assistance, to
go moving from out of bed requires maximum assistance.



Progress Toward Rehabilitation Goals:  Mr. Norwood is making fair progress, which
is a good and he is working, as he begins to mobilize, he is doing better with a
wheelchair.  He is working on the parallel bars, working with oropharyngeal 
strengthening exercises and making fair overall progress.  He is still on IV 
antibiotics for pneumonia, which is aspiration pneumonia and has no capacity at 
this point to swallow orally, but is beginning to work with physical therapy to 
improve strength and oropharyngeal coordinated activities.



Assessment And Plan:  Mr. Tietz is a 57-year-old patient in the rehabilitation 
unit with the left hemorrhagic stroke producing paresis of the right upper and 
lower extremity with dysarthria, dysphagia and numbness in the right upper and 
lower extremity.  He has comorbid hypertension, his hematuria is improving, and 
he has sepsis, aspiration pneumonia, dehydration, respiratory distress.



Plan:  

1.   Continue physical, occupational and speech therapy for 3.5 hours, 5 out of 
7 days.

2.   Continue with nebulizers.

3.   Continue Norvasc at a lower dose as appropriate for managing his blood 
pressure.

4.   Continue with Cozaar 50 mg twice daily.

5.   Protonix for GE reflux.  

6.   Continue with piperacillin-tazobactam 3.375 mg every 8 hours for his 
pneumonia.

7.   Continue Hytrin for urinary retention.

8.   Continue scopolamine for vertiginous symptoms.



Comorbids That Are Continuing To Impact Rehabilitation Process:  Currently, he 
requires IV antibiotics, which should be ongoing for a while and he requires a 
 for a .  However, those are being worked 
through and are not negatively impacting his ability to improve and do well.





PJ/CHITO

DD:  08/08/2023 22:40:54   Voice ID:  [id number]

DT:  08/09/2023 03:46:10   Report ID:  [id number]

[email or web address]

## 2023-08-10 LAB
ALBUMIN SERPL BCP-MCNC: 2.6 G/DL (ref 3.4–5)
BUN BLD-MCNC: 14 MG/DL (ref 7–18)
GLUCOSE SERPLBLD-MCNC: 145 MG/DL (ref 74–106)
HCT VFR BLD CALC: 31.4 % (ref 39.6–49)
LYMPHOCYTES # SPEC AUTO: 1.4 K/UL (ref 0.7–4.9)
MAGNESIUM SERPL-MCNC: 2.2 MG/DL (ref 1.6–2.4)
MCV RBC: 78.6 FL (ref 80–100)
PMV BLD: 7.6 FL (ref 7.6–11.3)
POTASSIUM SERPL-SCNC: 3.7 MEQ/L (ref 3.5–5.1)
PREALB SERPL-MCNC: 25.2 MG/DL (ref 20–40)
RBC # BLD: 3.99 M/UL (ref 4.33–5.43)
WBC # BLD AUTO: 13.4 THOU/UL (ref 4.3–10.9)

## 2023-08-10 RX ADMIN — ALBUTEROL SULFATE SCH MG: 2.5 SOLUTION RESPIRATORY (INHALATION) at 13:35

## 2023-08-10 RX ADMIN — Medication SCH PATCH: at 08:13

## 2023-08-10 RX ADMIN — SODIUM CHLORIDE SCH MLS: 0.9 INJECTION, SOLUTION INTRAVENOUS at 00:01

## 2023-08-10 RX ADMIN — MAGNESIUM OXIDE TAB 400 MG (241.3 MG ELEMENTAL MG) SCH MG: 400 (241.3 MG) TAB at 08:14

## 2023-08-10 RX ADMIN — METOPROLOL TARTRATE SCH: 25 TABLET ORAL at 17:07

## 2023-08-10 RX ADMIN — SODIUM CHLORIDE SCH MLS: 9 INJECTION, SOLUTION INTRAVENOUS at 08:57

## 2023-08-10 RX ADMIN — IPRATROPIUM BROMIDE SCH MG: 0.5 SOLUTION RESPIRATORY (INHALATION) at 01:40

## 2023-08-10 RX ADMIN — IPRATROPIUM BROMIDE SCH MG: 0.5 SOLUTION RESPIRATORY (INHALATION) at 07:18

## 2023-08-10 RX ADMIN — SODIUM CHLORIDE SCH MLS: 0.9 INJECTION, SOLUTION INTRAVENOUS at 13:52

## 2023-08-10 RX ADMIN — ALBUTEROL SULFATE SCH MG: 2.5 SOLUTION RESPIRATORY (INHALATION) at 01:40

## 2023-08-10 RX ADMIN — HUMAN INSULIN SCH: 100 INJECTION, SOLUTION SUBCUTANEOUS at 18:00

## 2023-08-10 RX ADMIN — APIXABAN SCH MG: 2.5 TABLET, FILM COATED ORAL at 07:40

## 2023-08-10 RX ADMIN — IPRATROPIUM BROMIDE SCH MG: 0.5 SOLUTION RESPIRATORY (INHALATION) at 20:15

## 2023-08-10 RX ADMIN — METOPROLOL TARTRATE SCH MG: 25 TABLET ORAL at 05:15

## 2023-08-10 RX ADMIN — HUMAN INSULIN SCH: 100 INJECTION, SOLUTION SUBCUTANEOUS at 05:22

## 2023-08-10 RX ADMIN — HUMAN INSULIN SCH: 100 INJECTION, SOLUTION SUBCUTANEOUS at 12:00

## 2023-08-10 RX ADMIN — DULOXETINE HYDROCHLORIDE SCH MG: 30 CAPSULE, DELAYED RELEASE ORAL at 20:17

## 2023-08-10 RX ADMIN — Medication SCH ML: at 08:07

## 2023-08-10 RX ADMIN — LOSARTAN POTASSIUM SCH MG: 50 TABLET, FILM COATED ORAL at 20:18

## 2023-08-10 RX ADMIN — PANTOPRAZOLE SODIUM SCH MG: 40 GRANULE, DELAYED RELEASE ORAL at 20:32

## 2023-08-10 RX ADMIN — APIXABAN SCH MG: 2.5 TABLET, FILM COATED ORAL at 20:19

## 2023-08-10 RX ADMIN — FOLIC ACID SCH MG: 1 TABLET ORAL at 08:07

## 2023-08-10 RX ADMIN — SODIUM CHLORIDE SCH MLS: 9 INJECTION, SOLUTION INTRAVENOUS at 00:01

## 2023-08-10 RX ADMIN — ALBUTEROL SULFATE SCH MG: 2.5 SOLUTION RESPIRATORY (INHALATION) at 20:15

## 2023-08-10 RX ADMIN — MELATONIN PRN MG: 3 TAB ORAL at 20:19

## 2023-08-10 RX ADMIN — SODIUM CHLORIDE SCH: 0.9 INJECTION, SOLUTION INTRAVENOUS at 03:03

## 2023-08-10 RX ADMIN — PANTOPRAZOLE SODIUM SCH MG: 40 GRANULE, DELAYED RELEASE ORAL at 07:30

## 2023-08-10 RX ADMIN — ALBUTEROL SULFATE SCH MG: 2.5 SOLUTION RESPIRATORY (INHALATION) at 07:18

## 2023-08-10 RX ADMIN — IPRATROPIUM BROMIDE SCH MG: 0.5 SOLUTION RESPIRATORY (INHALATION) at 13:35

## 2023-08-10 RX ADMIN — Medication SCH ML: at 17:00

## 2023-08-10 RX ADMIN — LOSARTAN POTASSIUM SCH: 50 TABLET, FILM COATED ORAL at 08:00

## 2023-08-10 RX ADMIN — TERAZOSIN HYDROCHLORIDE ANHYDROUS SCH MG: 1 CAPSULE ORAL at 20:32

## 2023-08-10 RX ADMIN — AMLODIPINE BESYLATE SCH MG: 5 TABLET ORAL at 20:18

## 2023-08-10 RX ADMIN — AMLODIPINE BESYLATE SCH: 5 TABLET ORAL at 08:00

## 2023-08-10 RX ADMIN — SODIUM CHLORIDE SCH MLS: 9 INJECTION, SOLUTION INTRAVENOUS at 16:59

## 2023-08-10 NOTE — PN
Date of Progress Note:  08/09/2023



Time Of Service:  1:50 p.m.



Subjective:  Mr. Tietz is actually beginning to go from a sit-to-stand position with the physical the
rapist and the  is on the iPad.  He is denying any significant pain in the ri
t arm, where there was some muscle spasms and he also has good mood despite the circumstances.



Review of Systems:

Again, very mild pain in the right arm.  His magnesium is stretching the extremity, which helps.  No 
fevers.  No chills.  No headache.  No depression.



Physical Examination:

Vital Signs:  Blood pressure 123/75, pulse 61, respiratory rate 16, temperature 97.5, oxygen saturati
on 96%. 

General:  Mr. Tietz is resting well again as he is standing. 

Neurologic:  He actually has improved strength in the right upper extremity, now around at least 3 to
 4/5 proximally and distally.  He would open and close his hand better.  He has some improving coordi
nation in the upper extremity.  Right lower extremity, also improvement.  He can go against gravity e
xtending his right leg.  Sensation, he reports to be still intact.  He still has marked difficulty ma
viry any sounds such as lingual, labial, or guttural sounds and working with Speech Pathology.



Laboratory Studies:  Blood sugars ranged from 117 to 138.



X-ray/imaging:  No new x-rays or imaging.



Medications:  His medications have been reviewed and remained unchanged.



Current Functional Status:  Today, he did work in the parallel bars 3 times, trying to do short steps
, but required maximum assistance.  Sit-to-stand transfers, 5 times done with maximum assistance.  Mauricio
pine-to-sit transfers with maximum assistance.  With his occupational therapy, all other care require
d minimum assistance to brush teeth, grooming was at supervision level.  He is able to suction with m
odified independence once he can reach the suction device.  With speech, he did work on labial sealin
g, lingual strength, and lingual range of motion and even tongue extension and retraction exercises. 
 He did 10-15 sets with breaks in between.



Progress Towards Rehabilitation Goals:  Mr. Tietz is making slow progress overall with his goals of b
eing able to transfer with supervision for bed to chair, mobilizing with supervision to modified inde
pendence and being able to swallow thickened liquids and pureed consistency with modified independenc
e.



Assessment:  Mr. Tietz is a 57-year-old patient in the rehabilitation unit with a left hemispheric he
morrhagic stroke, who has right upper and lower extremity weakness which is improving.  He has dysart
hria and dysphagia and some numbness in the right upper and lower extremities.  He has hypertension i
ncluding hematuria including aspiration pneumonia, dehydration, including sepsis.



Plan:  

1.Continue physical, occupational, and speech therapy for 3-1/2 hours, 5 of 7 days.

2.He will continue Norvasc and Cozaar for hypertension.  Continue Hytrin for urinary retention.  Con
tinue piperacillin/tazobactam 3.375 __________ every 8 hours for pneumonia.  Continue Protonix for GE
 reflux.  Continue scopolamine patch for vertigo.

3.Comorbidities continue to impact his rehabilitation process.  He is on IV antibiotics every 8 hour
s and that will continue and schedules worked around that and for effective communication, a sign senthil
guage  was used, and all therapy is scheduled around the access to receive that and he is d
oing well with that.





PJ/CHITO

DD:  08/09/2023 22:25:43Voice ID:  [id number]

DT:  08/10/2023 01:49:06Report ID:  [id number]

## 2023-08-10 NOTE — PN
Date of Progress Note:  08/10/2023



Time Of Service:  1:35 p.m.



Subjective:  Mr. Tietz is resting in bed with speech pathologist at bedside.  He is smiling.  He is s
howing a thumbs up sign with the left hand and he was able to say through the speech therapist doing 
some minimum sign language that he is feeling well despite his situation and he is making some progre
ss, says he feels that way and not much muscle spasms.



Review of Systems:

No fevers, chills.  No nausea, vomiting.  No rash.  No signs of depression.



Physical Examination:

Vital Signs:  Blood pressure 108/54, pulse 74, respiratory rate 16, temperature 97.5, oxygen saturati
on 97%. 

General:  Mr. Tietz is again resting in bed.  He has a somewhat decrease in right nasolabial fold. 

Extremities:  Right upper and lower extremities showing improving strength on a daily basis at least 
3 to 4/5 proximally and distally.  Sensation intact in the right upper and lower extremity, left side
 is fully strong.  He has poor movement of the lips, tongue, and oropharyngeal region.



Laboratory Studies:  White blood cell count improved to 13.4 from 18.4 on the 6th, neutrophils 88.1, 
hemoglobin 10.3, platelets 362.  Sodium 137, potassium 3.7, chloride 108, carbon dioxide 26, BUN 14, 
creatinine 0.91, glucose ranged from 116 to 145, calcium 8.3.  Magnesium 2.2.  Prealbumin 25.2.



X-ray/imaging:  No new x-ray imaging.



Medications:  His medications have been reviewed and remained unchanged.



Current Functional Status:  Today, he did ambulate with a platform walker completing 25 feet with max
imum assist and doing it 3 times with physical therapists, rehab tech, and RN helping with the [facility]
hair.  He also completed another 35 feet ambulating with maximal assistance with a wheelchair.  He di
d another 50 feet twice with minimum assistance and another 15 feet again with min assist.  [facility]
 therapy, did oromotor exercises with 90% accuracy for 10 reps with each exercises.  Volitional swa
llowing was completed within 1-3 seconds on 4/4 attempts.



Progress Towards Rehabilitation Goals:  Mr. Tietz is making much better progress today towards his go
als of requiring minimum assistance for his transfers from bed to chair to toilet and to ambulate wit
h a rolling walker and mobilized wheelchair.  In addition, also supervision or minimal assistance wit
h upper and lower body dressing and toileting and to begin to take oral intake, he is making slow pro
alissa there.  He is fed by NG tube.  Medicines are given by NG tube.



Assessment:  Mr. Tietz is a 57-year-old patient in the rehabilitation unit with a left hemispheric he
morrhagic stroke producing right upper and lower extremity weakness for which he is improving fairly 
well.  He has dysarthria, dysphagia and is fed by NG tube.  He has hypertension, hematuria, which has
 resolved.  Aspiration pneumonia, which is resolving.  Dehydration.  Again, sepsis, which is resolvin
g.



Plan:  

1.Continue physical, occupational, and speech therapy for 3.5 hours, 5/7 days.

2.Continue with management for hypertension using Norvasc and Cozaar.  Continue with Hytrin for urin
ladonna tract infection.  Continue with his antibiotics, piperacillin/tazobactam 3.375 g every 8 hours.

3.Continue Protonix for GE reflux.

4.Continue scopolamine patch for the vertigo.



Comorbids That Continue To Impact Rehabilitation Process:  His comorbids are stably managed and do no
t negatively impact his rehabilitation including the IV antibiotics, which he is receiving every 8 ho
urs.





PJ/CHITO

DD:  08/10/2023 19:06:51Voice ID:  [id number]

DT:  08/10/2023 22:53:35Report ID:  [id number]

## 2023-08-11 RX ADMIN — HUMAN INSULIN SCH: 100 INJECTION, SOLUTION SUBCUTANEOUS at 00:00

## 2023-08-11 RX ADMIN — PANTOPRAZOLE SODIUM SCH MG: 40 GRANULE, DELAYED RELEASE ORAL at 06:59

## 2023-08-11 RX ADMIN — SODIUM CHLORIDE SCH MLS: 0.9 INJECTION, SOLUTION INTRAVENOUS at 19:33

## 2023-08-11 RX ADMIN — LOSARTAN POTASSIUM SCH: 50 TABLET, FILM COATED ORAL at 08:00

## 2023-08-11 RX ADMIN — ALBUTEROL SULFATE SCH MG: 2.5 SOLUTION RESPIRATORY (INHALATION) at 02:00

## 2023-08-11 RX ADMIN — HUMAN INSULIN SCH: 100 INJECTION, SOLUTION SUBCUTANEOUS at 05:11

## 2023-08-11 RX ADMIN — SODIUM CHLORIDE SCH MLS: 0.9 INJECTION, SOLUTION INTRAVENOUS at 03:55

## 2023-08-11 RX ADMIN — HUMAN INSULIN SCH: 100 INJECTION, SOLUTION SUBCUTANEOUS at 18:00

## 2023-08-11 RX ADMIN — SODIUM CHLORIDE SCH MLS: 9 INJECTION, SOLUTION INTRAVENOUS at 17:01

## 2023-08-11 RX ADMIN — ACETAMINOPHEN PRN MG: 500 TABLET, FILM COATED ORAL at 09:06

## 2023-08-11 RX ADMIN — TERAZOSIN HYDROCHLORIDE ANHYDROUS SCH MG: 1 CAPSULE ORAL at 21:29

## 2023-08-11 RX ADMIN — LOSARTAN POTASSIUM SCH MG: 50 TABLET, FILM COATED ORAL at 21:28

## 2023-08-11 RX ADMIN — ALBUTEROL SULFATE SCH MG: 2.5 SOLUTION RESPIRATORY (INHALATION) at 07:29

## 2023-08-11 RX ADMIN — IPRATROPIUM BROMIDE SCH MG: 0.5 SOLUTION RESPIRATORY (INHALATION) at 07:29

## 2023-08-11 RX ADMIN — SODIUM CHLORIDE SCH MLS: 9 INJECTION, SOLUTION INTRAVENOUS at 09:20

## 2023-08-11 RX ADMIN — HUMAN INSULIN SCH: 100 INJECTION, SOLUTION SUBCUTANEOUS at 11:43

## 2023-08-11 RX ADMIN — SODIUM CHLORIDE SCH MLS: 9 INJECTION, SOLUTION INTRAVENOUS at 00:38

## 2023-08-11 RX ADMIN — DULOXETINE HYDROCHLORIDE SCH MG: 30 CAPSULE, DELAYED RELEASE ORAL at 21:28

## 2023-08-11 RX ADMIN — APIXABAN SCH MG: 2.5 TABLET, FILM COATED ORAL at 21:28

## 2023-08-11 RX ADMIN — METOPROLOL TARTRATE SCH MG: 25 TABLET ORAL at 05:11

## 2023-08-11 RX ADMIN — AMLODIPINE BESYLATE SCH MG: 5 TABLET ORAL at 21:28

## 2023-08-11 RX ADMIN — Medication SCH ML: at 09:10

## 2023-08-11 RX ADMIN — SCOPALAMINE SCH PAT: 1 PATCH, EXTENDED RELEASE TRANSDERMAL at 09:10

## 2023-08-11 RX ADMIN — MAGNESIUM OXIDE TAB 400 MG (241.3 MG ELEMENTAL MG) SCH: 400 (241.3 MG) TAB at 07:08

## 2023-08-11 RX ADMIN — SENNOSIDES AND DOCUSATE SODIUM SCH TAB: 8.6; 5 TABLET ORAL at 21:29

## 2023-08-11 RX ADMIN — PANTOPRAZOLE SODIUM SCH MG: 40 GRANULE, DELAYED RELEASE ORAL at 21:28

## 2023-08-11 RX ADMIN — AMLODIPINE BESYLATE SCH: 5 TABLET ORAL at 08:00

## 2023-08-11 RX ADMIN — SODIUM CHLORIDE SCH: 0.9 INJECTION, SOLUTION INTRAVENOUS at 05:12

## 2023-08-11 RX ADMIN — IPRATROPIUM BROMIDE SCH MG: 0.5 SOLUTION RESPIRATORY (INHALATION) at 02:00

## 2023-08-11 RX ADMIN — METOPROLOL TARTRATE SCH MG: 50 TABLET, FILM COATED ORAL at 17:22

## 2023-08-11 RX ADMIN — APIXABAN SCH MG: 2.5 TABLET, FILM COATED ORAL at 09:00

## 2023-08-11 RX ADMIN — MELATONIN PRN MG: 3 TAB ORAL at 21:29

## 2023-08-11 RX ADMIN — Medication SCH PATCH: at 09:06

## 2023-08-11 RX ADMIN — FOLIC ACID SCH MG: 1 TABLET ORAL at 09:07

## 2023-08-11 NOTE — P.RH.PN
Estimated Length of Stay: 23


Expected Discharge Date: 08/25/23


Discharge Disposition Plan: Home


Family Support: Yes


Long Term Goal: Mobility, Transfers, Self Care


Vital Signs: 


                                Last Vital Signs











Temp  97.5 F   08/11/23 06:43


 


Pulse  63   08/11/23 08:00


 


Resp  17   08/11/23 06:43


 


BP  117/69   08/11/23 08:00


 


Pulse Ox  97   08/11/23 06:43











Laboratory: 


                             Laboratory Last Values











WBC  13.40 thou/uL (4.3-10.9)  H  08/10/23  03:44    


 


RBC  3.99 M/uL (4.33-5.43)  L  08/10/23  03:44    


 


Hgb  10.3 g/dL (13.6-17.9)  L  08/10/23  03:44    


 


Hct  31.4 % (39.6-49.0)  L  08/10/23  03:44    


 


MCV  78.6 fL ()  L  08/10/23  03:44    


 


MCH  25.9 pg (27.0-35.0)  L  08/10/23  03:44    


 


MCHC  32.9 g/dL (32.0-36.0)   08/10/23  03:44    


 


RDW  15.4 % (12.1-15.2)  H  08/10/23  03:44    


 


Plt Count  362 thou/uL (152-406)   08/10/23  03:44    


 


MPV  7.6 fL (7.6-11.3)   08/10/23  03:44    


 


Neutrophils %  80.1 % (41.7-73.7)  H  08/10/23  03:44    


 


Lymphocytes %  10.1 % (15.3-44.8)  L  08/10/23  03:44    


 


Monocytes %  7.0 % (3.3-12.3)   08/10/23  03:44    


 


Eosinophils %  2.3 % (0-4.4)   08/10/23  03:44    


 


Basophils %  0.5 % (0-1.3)   08/10/23  03:44    


 


Absolute Neutrophils  10.7 K/uL (1.8-8.0)  H  08/10/23  03:44    


 


Segmented Neutrophils  96 % (40-80)  H  08/05/23  07:10    


 


Absolute Lymphocytes  1.4 K/uL (0.7-4.9)   08/10/23  03:44    


 


Lymphocytes  3 % (15-42)  L  08/05/23  07:10    


 


Monocytes  1 % (0-10)   08/05/23  07:10    


 


Absolute Monocytes  0.9 K/uL (0.1-1.3)   08/10/23  03:44    


 


Absolute Eosinophils  0.3 K/uL (0-0.5)   08/10/23  03:44    


 


Absolute Basophils  0.1 K/uL (0-0.5)   08/10/23  03:44    


 


Platelet Estimate  Adeq   08/05/23  07:10    


 


Morphology Comment  Not seen  (NOT SEEN)   08/05/23  07:10    


 


Sodium  137 mEq/L (136-145)   08/10/23  03:44    


 


Potassium  3.7 mEq/L (3.5-5.1)   08/10/23  03:44    


 


Chloride  108 mEq/L ()  H  08/10/23  03:44    


 


Carbon Dioxide  26 mEq/L (21-32)   08/10/23  03:44    


 


Anion Gap  6.7 mEq/L (5.0-15.0)   08/10/23  03:44    


 


BUN  14 mg/dL (7-18)   08/10/23  03:44    


 


Creatinine  0.91 mg/dL (0.70-1.30)   08/10/23  03:44    


 


Est GFR (CKD-EPI)  99 ml/min (=/>90)   08/10/23  03:44    


 


Glucose  145 mg/dL ()  H  08/10/23  03:44    


 


POC Glucose  111 mg/dL ()   08/11/23  11:11    


 


Calcium  8.3 mg/dL (8.5-10.1)  L  08/10/23  03:44    


 


Magnesium  2.2 mg/dL (1.6-2.4)   08/10/23  03:44    


 


Albumin  2.6 g/dL (3.4-5.0)  L  08/10/23  03:44    


 


Prealbumin  25.2 mg/dL (20-40)   08/10/23  03:44    


 


Procalcitonin  < 0.05 ng/mL (<0.050)   08/05/23  07:10    











Weight: 151 lb


Within Defined Parameters: No


Wound Present: No


Closed Surgical Incision Present: No


Negative Pressure Wound Therapy Present: No


Physician Update: He is making fair overall progress with all therapy. Blood 

work is stable. Improved transfers, oral hygiene and mobilization. Still on IV 

Zosyn for 10 days. Will repeat chest x-ray and blood work next week.


Medical Issues: Numerous issues:


Education Needed: Using ASL line for communications.


Functional Improvement: Maximum assist


Functional Improvement Occupational Therapy: Not participating much.


Speech Therapy Update: Severe oropharyngeal dysphagia


Summary: Patient's care plan and long term goals have been reviewed and revised 

as necessary. Please see the Rehabilitation Signature page for all necessary 

signatures.

## 2023-08-11 NOTE — RAD REPORT
EXAM DESCRIPTION:  RAD - Chest Single View - 8/11/2023 5:11 am

 

CLINICAL HISTORY:  ff/up

 

COMPARISON:  Chest Single View dated 8/4/2023; Chest Single View dated 7/29/2023; Abdomen 1 View (KUB
) dated 7/27/2023; Chest Single View dated 7/26/2023

 

FINDINGS:  Lines: None.

Lungs: Airspace disease at the left lung base has nearly completely resolved.

Pleural: No significant pleural effusions or pneumothorax.

Cardiac: The heart size is within normal limits.

Mediastinum: Within normal limits.

Bones: No acute fractures.

Other: None

 

IMPRESSION:  Near complete resolution of left basilar airspace disease likely reflecting pneumonia. I
f the patient is improving, continued follow-up is probably not necessary.

## 2023-08-12 RX ADMIN — TERAZOSIN HYDROCHLORIDE ANHYDROUS SCH MG: 1 CAPSULE ORAL at 20:05

## 2023-08-12 RX ADMIN — IPRATROPIUM BROMIDE SCH MG: 0.5 SOLUTION RESPIRATORY (INHALATION) at 21:15

## 2023-08-12 RX ADMIN — HUMAN INSULIN SCH: 100 INJECTION, SOLUTION SUBCUTANEOUS at 05:08

## 2023-08-12 RX ADMIN — MAGNESIUM OXIDE TAB 400 MG (241.3 MG ELEMENTAL MG) SCH: 400 (241.3 MG) TAB at 08:00

## 2023-08-12 RX ADMIN — HUMAN INSULIN SCH: 100 INJECTION, SOLUTION SUBCUTANEOUS at 23:52

## 2023-08-12 RX ADMIN — METOPROLOL TARTRATE SCH MG: 50 TABLET, FILM COATED ORAL at 05:07

## 2023-08-12 RX ADMIN — HUMAN INSULIN SCH: 100 INJECTION, SOLUTION SUBCUTANEOUS at 17:45

## 2023-08-12 RX ADMIN — PANTOPRAZOLE SODIUM SCH MG: 40 GRANULE, DELAYED RELEASE ORAL at 09:29

## 2023-08-12 RX ADMIN — BISACODYL PRN MG: 10 SUPPOSITORY RECTAL at 17:26

## 2023-08-12 RX ADMIN — FOLIC ACID SCH MG: 1 TABLET ORAL at 09:29

## 2023-08-12 RX ADMIN — ALBUTEROL SULFATE SCH MG: 2.5 SOLUTION RESPIRATORY (INHALATION) at 21:15

## 2023-08-12 RX ADMIN — Medication SCH PATCH: at 10:47

## 2023-08-12 RX ADMIN — AMLODIPINE BESYLATE SCH: 5 TABLET ORAL at 08:00

## 2023-08-12 RX ADMIN — HUMAN INSULIN SCH: 100 INJECTION, SOLUTION SUBCUTANEOUS at 00:00

## 2023-08-12 RX ADMIN — DULOXETINE HYDROCHLORIDE SCH MG: 30 CAPSULE, DELAYED RELEASE ORAL at 20:07

## 2023-08-12 RX ADMIN — HUMAN INSULIN SCH: 100 INJECTION, SOLUTION SUBCUTANEOUS at 12:00

## 2023-08-12 RX ADMIN — LOSARTAN POTASSIUM SCH: 50 TABLET, FILM COATED ORAL at 08:00

## 2023-08-12 RX ADMIN — PANTOPRAZOLE SODIUM SCH MG: 40 GRANULE, DELAYED RELEASE ORAL at 20:06

## 2023-08-12 RX ADMIN — SENNOSIDES AND DOCUSATE SODIUM SCH: 8.6; 5 TABLET ORAL at 20:07

## 2023-08-12 RX ADMIN — METOPROLOL TARTRATE SCH MG: 50 TABLET, FILM COATED ORAL at 17:25

## 2023-08-12 RX ADMIN — SODIUM CHLORIDE SCH MLS: 9 INJECTION, SOLUTION INTRAVENOUS at 16:59

## 2023-08-12 RX ADMIN — AMLODIPINE BESYLATE SCH MG: 5 TABLET ORAL at 20:06

## 2023-08-12 RX ADMIN — APIXABAN SCH MG: 2.5 TABLET, FILM COATED ORAL at 20:06

## 2023-08-12 RX ADMIN — SODIUM CHLORIDE SCH MLS: 0.9 INJECTION, SOLUTION INTRAVENOUS at 05:09

## 2023-08-12 RX ADMIN — APIXABAN SCH MG: 2.5 TABLET, FILM COATED ORAL at 09:00

## 2023-08-12 RX ADMIN — SODIUM CHLORIDE SCH MLS: 9 INJECTION, SOLUTION INTRAVENOUS at 01:04

## 2023-08-12 RX ADMIN — GABAPENTIN SCH MG: 100 CAPSULE ORAL at 09:30

## 2023-08-12 RX ADMIN — SODIUM CHLORIDE SCH MLS: 9 INJECTION, SOLUTION INTRAVENOUS at 09:28

## 2023-08-12 RX ADMIN — LOSARTAN POTASSIUM SCH MG: 50 TABLET, FILM COATED ORAL at 20:05

## 2023-08-12 RX ADMIN — SODIUM CHLORIDE SCH MLS: 0.9 INJECTION, SOLUTION INTRAVENOUS at 21:13

## 2023-08-12 RX ADMIN — Medication SCH ML: at 09:06

## 2023-08-13 LAB
BUN BLD-MCNC: 12 MG/DL (ref 7–18)
GLUCOSE SERPLBLD-MCNC: 129 MG/DL (ref 74–106)
HCT VFR BLD CALC: 34.6 % (ref 39.6–49)
LYMPHOCYTES # SPEC AUTO: 1.3 K/UL (ref 0.7–4.9)
MCV RBC: 79.5 FL (ref 80–100)
PMV BLD: 7.6 FL (ref 7.6–11.3)
POTASSIUM SERPL-SCNC: 3.7 MEQ/L (ref 3.5–5.1)
RBC # BLD: 4.35 M/UL (ref 4.33–5.43)
WBC # BLD AUTO: 8.3 THOU/UL (ref 4.3–10.9)

## 2023-08-13 RX ADMIN — FOLIC ACID SCH MG: 1 TABLET ORAL at 07:50

## 2023-08-13 RX ADMIN — METOPROLOL TARTRATE SCH MG: 50 TABLET, FILM COATED ORAL at 16:42

## 2023-08-13 RX ADMIN — HUMAN INSULIN SCH: 100 INJECTION, SOLUTION SUBCUTANEOUS at 11:51

## 2023-08-13 RX ADMIN — SODIUM CHLORIDE SCH MLS: 9 INJECTION, SOLUTION INTRAVENOUS at 16:43

## 2023-08-13 RX ADMIN — HUMAN INSULIN SCH: 100 INJECTION, SOLUTION SUBCUTANEOUS at 17:11

## 2023-08-13 RX ADMIN — METOPROLOL TARTRATE SCH MG: 50 TABLET, FILM COATED ORAL at 05:12

## 2023-08-13 RX ADMIN — ALBUTEROL SULFATE SCH MG: 2.5 SOLUTION RESPIRATORY (INHALATION) at 23:20

## 2023-08-13 RX ADMIN — PANTOPRAZOLE SODIUM SCH MG: 40 GRANULE, DELAYED RELEASE ORAL at 19:06

## 2023-08-13 RX ADMIN — IPRATROPIUM BROMIDE SCH MG: 0.5 SOLUTION RESPIRATORY (INHALATION) at 08:54

## 2023-08-13 RX ADMIN — IPRATROPIUM BROMIDE SCH MG: 0.5 SOLUTION RESPIRATORY (INHALATION) at 23:20

## 2023-08-13 RX ADMIN — SENNOSIDES AND DOCUSATE SODIUM SCH TAB: 8.6; 5 TABLET ORAL at 19:06

## 2023-08-13 RX ADMIN — AMLODIPINE BESYLATE SCH MG: 5 TABLET ORAL at 07:49

## 2023-08-13 RX ADMIN — SODIUM CHLORIDE SCH: 0.9 INJECTION, SOLUTION INTRAVENOUS at 23:54

## 2023-08-13 RX ADMIN — HUMAN INSULIN SCH: 100 INJECTION, SOLUTION SUBCUTANEOUS at 05:13

## 2023-08-13 RX ADMIN — AMLODIPINE BESYLATE SCH MG: 5 TABLET ORAL at 19:09

## 2023-08-13 RX ADMIN — Medication SCH ML: at 04:12

## 2023-08-13 RX ADMIN — APIXABAN SCH MG: 2.5 TABLET, FILM COATED ORAL at 07:50

## 2023-08-13 RX ADMIN — ALBUTEROL SULFATE SCH MG: 2.5 SOLUTION RESPIRATORY (INHALATION) at 08:54

## 2023-08-13 RX ADMIN — GABAPENTIN SCH MG: 100 CAPSULE ORAL at 07:49

## 2023-08-13 RX ADMIN — Medication SCH PATCH: at 07:48

## 2023-08-13 RX ADMIN — DULOXETINE HYDROCHLORIDE SCH MG: 30 CAPSULE, DELAYED RELEASE ORAL at 19:06

## 2023-08-13 RX ADMIN — APIXABAN SCH MG: 2.5 TABLET, FILM COATED ORAL at 19:06

## 2023-08-13 RX ADMIN — DOCUSATE SODIUM LIQUID SCH MG: 50 LIQUID ORAL at 07:49

## 2023-08-13 RX ADMIN — SODIUM CHLORIDE SCH MLS: 9 INJECTION, SOLUTION INTRAVENOUS at 00:00

## 2023-08-13 RX ADMIN — Medication SCH ML: at 07:51

## 2023-08-13 RX ADMIN — LOSARTAN POTASSIUM SCH MG: 50 TABLET, FILM COATED ORAL at 07:52

## 2023-08-13 RX ADMIN — HUMAN INSULIN SCH: 100 INJECTION, SOLUTION SUBCUTANEOUS at 23:55

## 2023-08-13 RX ADMIN — LOSARTAN POTASSIUM SCH MG: 50 TABLET, FILM COATED ORAL at 19:10

## 2023-08-13 RX ADMIN — TERAZOSIN HYDROCHLORIDE ANHYDROUS SCH MG: 1 CAPSULE ORAL at 19:07

## 2023-08-13 RX ADMIN — SODIUM CHLORIDE SCH MLS: 9 INJECTION, SOLUTION INTRAVENOUS at 07:48

## 2023-08-13 RX ADMIN — SODIUM CHLORIDE SCH MLS: 0.9 INJECTION, SOLUTION INTRAVENOUS at 11:50

## 2023-08-13 RX ADMIN — PANTOPRAZOLE SODIUM SCH MG: 40 GRANULE, DELAYED RELEASE ORAL at 07:50

## 2023-08-14 RX ADMIN — LOSARTAN POTASSIUM SCH MG: 50 TABLET, FILM COATED ORAL at 07:21

## 2023-08-14 RX ADMIN — SODIUM CHLORIDE SCH MLS: 9 INJECTION, SOLUTION INTRAVENOUS at 00:25

## 2023-08-14 RX ADMIN — SODIUM CHLORIDE SCH: 0.9 INJECTION, SOLUTION INTRAVENOUS at 14:00

## 2023-08-14 RX ADMIN — HUMAN INSULIN SCH: 100 INJECTION, SOLUTION SUBCUTANEOUS at 05:35

## 2023-08-14 RX ADMIN — ALBUTEROL SULFATE SCH MG: 2.5 SOLUTION RESPIRATORY (INHALATION) at 08:00

## 2023-08-14 RX ADMIN — PANTOPRAZOLE SODIUM SCH MG: 40 GRANULE, DELAYED RELEASE ORAL at 20:03

## 2023-08-14 RX ADMIN — DOCUSATE SODIUM LIQUID SCH MG: 50 LIQUID ORAL at 07:22

## 2023-08-14 RX ADMIN — AMLODIPINE BESYLATE SCH MG: 5 TABLET ORAL at 07:20

## 2023-08-14 RX ADMIN — APIXABAN SCH MG: 2.5 TABLET, FILM COATED ORAL at 07:20

## 2023-08-14 RX ADMIN — APIXABAN SCH MG: 2.5 TABLET, FILM COATED ORAL at 20:03

## 2023-08-14 RX ADMIN — METOPROLOL TARTRATE SCH MG: 50 TABLET, FILM COATED ORAL at 17:02

## 2023-08-14 RX ADMIN — ALBUTEROL SULFATE SCH MG: 2.5 SOLUTION RESPIRATORY (INHALATION) at 20:50

## 2023-08-14 RX ADMIN — PANTOPRAZOLE SODIUM SCH MG: 40 GRANULE, DELAYED RELEASE ORAL at 07:23

## 2023-08-14 RX ADMIN — SENNOSIDES AND DOCUSATE SODIUM SCH TAB: 8.6; 5 TABLET ORAL at 20:02

## 2023-08-14 RX ADMIN — IPRATROPIUM BROMIDE SCH MG: 0.5 SOLUTION RESPIRATORY (INHALATION) at 08:00

## 2023-08-14 RX ADMIN — IPRATROPIUM BROMIDE SCH MG: 0.5 SOLUTION RESPIRATORY (INHALATION) at 20:50

## 2023-08-14 RX ADMIN — GABAPENTIN SCH MG: 100 CAPSULE ORAL at 07:20

## 2023-08-14 RX ADMIN — HUMAN INSULIN SCH: 100 INJECTION, SOLUTION SUBCUTANEOUS at 12:00

## 2023-08-14 RX ADMIN — SODIUM CHLORIDE SCH MLS: 0.9 INJECTION, SOLUTION INTRAVENOUS at 19:01

## 2023-08-14 RX ADMIN — FOLIC ACID SCH MG: 1 TABLET ORAL at 07:20

## 2023-08-14 RX ADMIN — TERAZOSIN HYDROCHLORIDE ANHYDROUS SCH MG: 1 CAPSULE ORAL at 20:03

## 2023-08-14 RX ADMIN — Medication SCH ML: at 07:21

## 2023-08-14 RX ADMIN — SCOPALAMINE SCH PAT: 1 PATCH, EXTENDED RELEASE TRANSDERMAL at 09:52

## 2023-08-14 RX ADMIN — AMLODIPINE BESYLATE SCH MG: 5 TABLET ORAL at 20:02

## 2023-08-14 RX ADMIN — HUMAN INSULIN SCH: 100 INJECTION, SOLUTION SUBCUTANEOUS at 17:34

## 2023-08-14 RX ADMIN — Medication SCH ML: at 06:13

## 2023-08-14 RX ADMIN — SODIUM CHLORIDE SCH MLS: 0.9 INJECTION, SOLUTION INTRAVENOUS at 01:41

## 2023-08-14 RX ADMIN — Medication SCH PATCH: at 09:53

## 2023-08-14 RX ADMIN — METOPROLOL TARTRATE SCH MG: 50 TABLET, FILM COATED ORAL at 05:21

## 2023-08-14 RX ADMIN — DULOXETINE HYDROCHLORIDE SCH MG: 30 CAPSULE, DELAYED RELEASE ORAL at 20:03

## 2023-08-14 RX ADMIN — SODIUM CHLORIDE SCH MLS: 9 INJECTION, SOLUTION INTRAVENOUS at 07:21

## 2023-08-14 RX ADMIN — LOSARTAN POTASSIUM SCH MG: 50 TABLET, FILM COATED ORAL at 20:03

## 2023-08-15 RX ADMIN — HUMAN INSULIN SCH: 100 INJECTION, SOLUTION SUBCUTANEOUS at 00:00

## 2023-08-15 RX ADMIN — Medication SCH ML: at 07:28

## 2023-08-15 RX ADMIN — LOSARTAN POTASSIUM SCH MG: 50 TABLET, FILM COATED ORAL at 07:27

## 2023-08-15 RX ADMIN — DOCUSATE SODIUM LIQUID SCH MG: 50 LIQUID ORAL at 07:28

## 2023-08-15 RX ADMIN — SENNOSIDES AND DOCUSATE SODIUM SCH TAB: 8.6; 5 TABLET ORAL at 19:48

## 2023-08-15 RX ADMIN — LOSARTAN POTASSIUM SCH MG: 50 TABLET, FILM COATED ORAL at 19:48

## 2023-08-15 RX ADMIN — HUMAN INSULIN SCH: 100 INJECTION, SOLUTION SUBCUTANEOUS at 12:00

## 2023-08-15 RX ADMIN — SODIUM CHLORIDE SCH: 0.9 INJECTION, SOLUTION INTRAVENOUS at 16:40

## 2023-08-15 RX ADMIN — FOLIC ACID SCH MG: 1 TABLET ORAL at 07:26

## 2023-08-15 RX ADMIN — Medication SCH PATCH: at 07:26

## 2023-08-15 RX ADMIN — PANTOPRAZOLE SODIUM SCH MG: 40 GRANULE, DELAYED RELEASE ORAL at 19:47

## 2023-08-15 RX ADMIN — HUMAN INSULIN SCH: 100 INJECTION, SOLUTION SUBCUTANEOUS at 17:54

## 2023-08-15 RX ADMIN — PANTOPRAZOLE SODIUM SCH MG: 40 GRANULE, DELAYED RELEASE ORAL at 07:24

## 2023-08-15 RX ADMIN — GABAPENTIN SCH MG: 100 CAPSULE ORAL at 07:26

## 2023-08-15 RX ADMIN — DULOXETINE HYDROCHLORIDE SCH MG: 30 CAPSULE, DELAYED RELEASE ORAL at 19:48

## 2023-08-15 RX ADMIN — TERAZOSIN HYDROCHLORIDE ANHYDROUS SCH MG: 1 CAPSULE ORAL at 19:48

## 2023-08-15 RX ADMIN — METOPROLOL TARTRATE SCH MG: 50 TABLET, FILM COATED ORAL at 17:16

## 2023-08-15 RX ADMIN — SODIUM CHLORIDE SCH MLS: 0.9 INJECTION, SOLUTION INTRAVENOUS at 08:10

## 2023-08-15 RX ADMIN — IPRATROPIUM BROMIDE SCH MG: 0.5 SOLUTION RESPIRATORY (INHALATION) at 07:30

## 2023-08-15 RX ADMIN — APIXABAN SCH MG: 2.5 TABLET, FILM COATED ORAL at 07:24

## 2023-08-15 RX ADMIN — METOPROLOL TARTRATE SCH MG: 50 TABLET, FILM COATED ORAL at 05:32

## 2023-08-15 RX ADMIN — IPRATROPIUM BROMIDE SCH MG: 0.5 SOLUTION RESPIRATORY (INHALATION) at 20:10

## 2023-08-15 RX ADMIN — ALBUTEROL SULFATE SCH MG: 2.5 SOLUTION RESPIRATORY (INHALATION) at 07:30

## 2023-08-15 RX ADMIN — Medication SCH ML: at 09:37

## 2023-08-15 RX ADMIN — AMLODIPINE BESYLATE SCH MG: 5 TABLET ORAL at 19:49

## 2023-08-15 RX ADMIN — APIXABAN SCH MG: 2.5 TABLET, FILM COATED ORAL at 19:48

## 2023-08-15 RX ADMIN — HUMAN INSULIN SCH: 100 INJECTION, SOLUTION SUBCUTANEOUS at 05:29

## 2023-08-15 RX ADMIN — AMLODIPINE BESYLATE SCH MG: 5 TABLET ORAL at 07:27

## 2023-08-15 RX ADMIN — ALBUTEROL SULFATE SCH MG: 2.5 SOLUTION RESPIRATORY (INHALATION) at 20:10

## 2023-08-15 RX ADMIN — SODIUM CHLORIDE SCH: 0.9 INJECTION, SOLUTION INTRAVENOUS at 03:20

## 2023-08-15 NOTE — PN
Date of Progress Note:  08/15/2023



Time Of Service:  1:30 p.m.



Subjective:  Mr. Tietz is doing very well.  The  is on the monitor and quest
ions were answered about to where he would likely go from here and which is actually a skilled nursin
g facility, which is recommended as the family is not likely to be able to take care of him at home a
nd provide his feedings.  He was told that bolus feedings will begin gently and bolus water administr
ation will begin gently, which is changing for the continuous feeding.  He is happy that right upper 
and lower extremity is improving in terms of strength and his face still movement also is improving t
hat is in the right facial weakness.



Review of Systems:

Denies any fevers, chills.  Mild myalgias or arthralgias.  No rash.  No headache.  No psychiatric iss
ues.  His mood is good.



Physical Examination:

Vital Signs:  Blood pressure 122/66, pulse 61, respiratory rate 16, temperature 97.6, oxygen saturati
on 96%. 

General:  Mr. Tietz is resting in bed.  He is recovering to at least around 4/5 strength proximally a
nd distally in the right upper and lower extremity.  Right face still has a decrease in the nasolabia
l fold.  He is able to do more sign languaging with the right hand, which is the stroke side.  No oth
er new deficits identified.



Laboratory Studies:  Blood sugars ranged from 111 to 120.



X-ray Imaging:  No new x-rays or imaging.



Medications:  Medications have been reviewed and remained unchanged.



Current Functional Status:  With occupational therapy today, he did perform right upper body self ran
ge of motion exercises to maintain joint mobility, decrease potential contractures, completed 4 sets 
of 10 repetition with 5-pound box.  With his physical therapy today, he ambulated 30 feet, 20 feet, 4
0 feet, and 10 feet with moderate assistance.  Leg; right leg may buckle at times.  With Speech Thera
py, he was able to do 20 volitional swallows within 1 to 3 seconds of prompting.  Also manages saliva
 6% of the time with moderate to minimal cues.



Progress Towards Rehabilitation Goals:  Mr. Tietz is making good progress with his overall goals of b
ecoming supervision level with transfers, upper and lower body dressing and ambulating 250 feet and b
eginning to take in oral food and liquid.  However, he still has a long way to go because of his dens
e weakness initially on the right side and difficulty with swallowing and is fed by PEG tube.



Assessment:  Mr. Tietz is a 57-year-old patient in the rehabilitation unit with a left-sided hemorrha
gic stroke producing right upper and lower weakness with dysarthria, dysphagia.  He is improving his 
oropharyngeal strength and coordination as he worked with Physical Therapy, more improving his streng
th in the right upper and lower extremity.  He has hypertension, hematuria, which has resolved, dehyd
ration resolved, and pneumonia and sepsis, which have resolved.



Plan:  

1.Continue with physical, occupational, and speech therapy for 3.5 hours, 5 of 7 days.

2.Continue with aggressive management of hypertension, Hytrin for urinary retention, completed antib
iotics for pneumonia and sepsis.  He is on Protonix for GE reflux.  Continue scopolamine patch for ve
rtigo.  Completed DVT prophylaxis and again continue with aggressive physical, occupational, and spee
ch therapy.



Comorbidities That Continue To Impact His Rehabilitation Process:  At this point, he completed IV ant
ibiotics.  He is only communicating by sign language, but the  is available, 
so not much of a hindrance to his improvement and other factors have been well mediated.





PJ/MODL

DD:  08/15/2023 21:28:21Voice ID:  [id number]

DT:  08/15/2023 23:25:55Report ID:  [id number]

## 2023-08-15 NOTE — PN
Date of Progress Note:  08/14/2023



Time Of Service:  1:30 p.m.



Subjective:  Mr. Tietz is resting in the hospital bed, his speech pathologist at the bedside.  He is 
smiling and appears more back towards his normal baseline functioning and family at the bedside.  He 
has no complaints through sign language, moving the right arm and leg much better.  Also feeling much
 better about his oral movements.



Review of Systems:

No fevers, chills, nausea, vomiting.  No significant myalgias, arthralgias, rash, headache, weight ch
tammy.



Physical Examination:

Vital Signs:  Blood pressure 142/79, pulse 69, respiratory rate 16, temperature 97, oxygen saturation
 97. 

Extremities:  Mr. Tietz has now 3-4 actually strength proximally and right lower extremity and 3-4 st
rength proximally and distally right upper extremity, decreased in the right nasolabial fold, still t
here.  However, he is able to move the face somewhat better.



Laboratory Studies:  Yesterday, white blood cell count, now normal at 8.3, it was down from 20.3 on 0
8/04/2023.  Hemoglobin improved to 11.2 from 10.6 when he first came to the unit.  Platelets are norm
al at 338.  His blood sugars ranged from 115 to 124.



X-ray Imaging:  Chest x-ray done on 08/11/2023 showed near-complete resolution of left basilar airspa
ce disease, likely reflecting pneumonia.



Medications:  His medications have been reviewed and remained unchanged.



Current Functional Status:  Today with physical therapy, he worked on bed mobility and was able to tu
rn left-to-right with minimum assistance and also, right to left.  Supine to sit, done with min-to-mo
d assist for trunk controls.  He ambulated with maximum assistance 35 feet, another 30 feet, and then
 95 feet with 2 seated rest breaks.  With Speech Pathology, he completed 10 repetitions each of oral 
motor exercises with 90% accuracy.  He tolerated 2 ounces of ice chips with 1 cough and attempted 2 a
t the same time again.  With Occupational Therapy, bathing with maximum assistance; upper body dressi
ng, maximal assistance; lower body dressing, total assistance.



Progress Towards Rehabilitation Goals:  Mr. Tietz is making improved progress overall towards his goa
ls of performing transfers with min assist, able to do upper and lower body dressing with min assist 
and to begin to swallow consistencies such as pureed food with thickened liquids with min assist.  He
 is currently fed by NG tube, medicines given by NG tube.



Assessment And Plan:  Mr. Tietz is in the rehabilitation unit with a left hemispheric hemorrhagic str
michelle, which produced right upper and lower extremity weakness along with dysphagia and dysarthria.  He
 is improving strength on the right side and beginning to move oropharyngeal muscles better.  He is f
ed by NG tube.  He has hypertension, hematuria, resolving pneumonia, dehydration, and resolving sepsi
s.



Plan:  

1.Continue with physical, occupational, and speech therapy for 3.5 hours, 5 to 7 days.

2.Norvasc and Cozaar for hypertension.

3.Hytrin for urinary retention.

4.Finish the piperacillin/tazobactam 3.375 mg every 8 hours for his pneumonia and sepsis.

5.Continue Protonix for gastroesophageal reflux.

6.Continue scopolamine patch for vertigo.



Comorbidities That Continue To Impact His Rehabilitation Process:  He has received IV medications for
 pneumonia and has done very well with white blood cell count showing resolution and he completed 30 
bags, starting 08/04/2023.  Also, his mood is better with Cymbalta and he is participating well.  He 
has magnesium for muscle spasms and he has Eliquis for DVT prophylaxis.





PJ/MODL

DD:  08/14/2023 19:24:02Voice ID:  [id number]

DT:  08/15/2023 00:24:31Report ID:  [id number]

## 2023-08-16 RX ADMIN — GABAPENTIN SCH MG: 100 CAPSULE ORAL at 07:30

## 2023-08-16 RX ADMIN — FOLIC ACID SCH MG: 1 TABLET ORAL at 07:32

## 2023-08-16 RX ADMIN — Medication SCH PATCH: at 07:30

## 2023-08-16 RX ADMIN — AMLODIPINE BESYLATE SCH: 5 TABLET ORAL at 07:31

## 2023-08-16 RX ADMIN — LEVETIRACETAM SCH MG: 100 SOLUTION ORAL at 14:40

## 2023-08-16 RX ADMIN — APIXABAN SCH MG: 2.5 TABLET, FILM COATED ORAL at 07:31

## 2023-08-16 RX ADMIN — HUMAN INSULIN SCH: 100 INJECTION, SOLUTION SUBCUTANEOUS at 00:00

## 2023-08-16 RX ADMIN — Medication SCH ML: at 19:26

## 2023-08-16 RX ADMIN — Medication SCH ML: at 07:33

## 2023-08-16 RX ADMIN — TERAZOSIN HYDROCHLORIDE ANHYDROUS SCH MG: 1 CAPSULE ORAL at 19:24

## 2023-08-16 RX ADMIN — PANTOPRAZOLE SODIUM SCH MG: 40 GRANULE, DELAYED RELEASE ORAL at 07:30

## 2023-08-16 RX ADMIN — ALBUTEROL SULFATE SCH MG: 2.5 SOLUTION RESPIRATORY (INHALATION) at 08:22

## 2023-08-16 RX ADMIN — ALBUTEROL SULFATE SCH MG: 2.5 SOLUTION RESPIRATORY (INHALATION) at 21:00

## 2023-08-16 RX ADMIN — METOPROLOL TARTRATE SCH MG: 50 TABLET, FILM COATED ORAL at 05:27

## 2023-08-16 RX ADMIN — LOSARTAN POTASSIUM SCH MG: 50 TABLET, FILM COATED ORAL at 19:25

## 2023-08-16 RX ADMIN — SODIUM CHLORIDE SCH MLS: 0.9 INJECTION, SOLUTION INTRAVENOUS at 16:20

## 2023-08-16 RX ADMIN — METOPROLOL TARTRATE SCH: 25 TABLET ORAL at 17:08

## 2023-08-16 RX ADMIN — HUMAN INSULIN SCH: 100 INJECTION, SOLUTION SUBCUTANEOUS at 05:32

## 2023-08-16 RX ADMIN — LOSARTAN POTASSIUM SCH: 50 TABLET, FILM COATED ORAL at 07:29

## 2023-08-16 RX ADMIN — IPRATROPIUM BROMIDE SCH MG: 0.5 SOLUTION RESPIRATORY (INHALATION) at 21:00

## 2023-08-16 RX ADMIN — PANTOPRAZOLE SODIUM SCH MG: 40 GRANULE, DELAYED RELEASE ORAL at 19:23

## 2023-08-16 RX ADMIN — Medication SCH ML: at 14:35

## 2023-08-16 RX ADMIN — SODIUM CHLORIDE SCH MLS: 0.9 INJECTION, SOLUTION INTRAVENOUS at 00:22

## 2023-08-16 RX ADMIN — DULOXETINE HYDROCHLORIDE SCH MG: 30 CAPSULE, DELAYED RELEASE ORAL at 19:24

## 2023-08-16 RX ADMIN — AMLODIPINE BESYLATE SCH MG: 5 TABLET ORAL at 19:25

## 2023-08-16 RX ADMIN — IPRATROPIUM BROMIDE SCH MG: 0.5 SOLUTION RESPIRATORY (INHALATION) at 08:22

## 2023-08-16 RX ADMIN — APIXABAN SCH MG: 2.5 TABLET, FILM COATED ORAL at 19:25

## 2023-08-16 RX ADMIN — SODIUM CHLORIDE SCH: 0.9 INJECTION, SOLUTION INTRAVENOUS at 05:28

## 2023-08-16 RX ADMIN — DOCUSATE SODIUM LIQUID SCH MG: 50 LIQUID ORAL at 07:29

## 2023-08-16 NOTE — DS
Date of Discharge:  08/01/2023



Discharge Diagnoses:  Left hemispheric stroke with dense right paresis.  The patient had stroke invol
ving the basal ganglia and pontine region; pneumonia, which has led to sepsis and hypertension, hemif
acial spasm, obstructive nephropathy, hypercalcemia, and seizures.



Discharge Condition:  He is serious.  He is discharged to the acute care with pneumonia and worsening
 white count and fever to receive IV antibiotics and acute care.



Activity:  Will be in bed with IV antibiotics.



Allergies:  NO KNOWN DRUG ALLERGIES.



Medications:  Albuterol nebulizer 2.5 mg every 6 hours as needed, Mucomyst 20% 4 mL nebulizer inhaled
 twice daily.  He is on ampicillin, sulbactam 3 g every 6 hours, Artificial Tears 2 drops in each eye
 4 times daily, Dulcolax suppository per rectum, Cymbalta 30 mL per PEG tube daily, Lovenox 40 mg sub
cutaneously daily, folic acid 1 mg per PEG tube daily, he is on a mild insulin sliding scale, melaton
in 3 mg per PEG tube daily, Lopressor 25 mg twice daily, modafinil 100 mg daily, Theravite 5 mL daily
, Protonix 40 mg twice daily, Fleet enema as needed, Hytrin 2 mg per PEG tube daily.  He is receiving
 normal saline, he is at 75 cc an hour, to receive a liter.



Procedures And Studies In Hospital:  He had an abdomen and pelvis CT scan on 07/28.  The study showed
 a Lopez catheter in the bladder.  There were clots and gross hematuria in the bladder lumen with enl
arged prostate.  There is colonic diverticulosis, air-fluid level in the rectum suggesting diarrhea s
ymptoms.  No evidence of colitis.  PEG tube noted in the stomach.



Synopsis Of Events That Led To Admission:  Mr. Tietz is a 57-year-old patient who had a left-sided st
roke producing right-sided weakness.  He came to [facility] on July 4th with seizures.  Glas
tadeo Coma Scale was 8.  Strokes showed a 2.3 x 1.3 cm hemorrhagic stroke in the left aspect of the bra
instem.  He received 2 g of Keppra, 10 mg Decadron and transferred to [facility] for higher level of care.  There, he also received 5 of Versed, 100 of fentanyl, and 10 of etomi
date and was intubated and he was actually unresponsive at the time of his arrival to the [facility] in [place].  Repeat CT scan showed a stable bleed without hydrocephalus.  He did fail an attempte
d extubation and an arterial line was placed on 07/07/2023.  By 07/10/2023, he was extubated successf
ully and had light sedation.  He did develop aspiration pneumonia and sputum was positive for Haemoph
ilus influenzae and he was started on meropenem.  He continued meropenem from 07/06.  After vomiting 
on 07/11 and more tachycardia on 07/11, he was found to have pre renal dysfunction with obstructive n
ephropathy.  He did have significant pooling of oral secretions after stroke and that started to do s
elf suctioning, has a suction, which was provided next to him.  During his sickness and hospitalizati
on, he had become significantly debilitated, requiring maximum assistance for all mobilization, trans
fers, and to perform his activities of daily living.  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 
require an ASL  for deafness and has had again that to communicate.  In addition, he has ha
d a PEG tube for feeding.  Due to his complex medical condition and need for aggressive inpatient padmini
abilitation in the setting of his acute stroke, he was determined to be an appropriate candidate for 
inpatient rehabilitation at the hospital and was therefore, admitted for physical, occupational, and 
speech therapy with skilled nursing and physician management and continue his IV antibiotics.



Hospital Course:  As noted, the patient was admitted to the hospital on 07/26/2023.  However, while h
ospitalized, he developed more shortness of breath and white blood cell count became elevated to 20,0
00 with 86% neutrophils.  He did have significant blood in the urine as well and had elevated heart r
ate with findings consistent with systemic infection.  As a result, he was transferred to the acute c
are floor for aggressive management including multiple IV antibiotics and telemetry monitoring.



Progress Made With Physical And Occupational Therapy:  As a result of the worsening abdominal pain an
d his hematuria and elevated white count, patient was unable to meet any of his goals left at maximum
 assistance for transfers, all activities of daily living, and was sent to the acute care floor.  The
refore, did not meet any of his physical therapy goals.  Regarding speech therapy, also failed to nalini
t any of his goals as he was continued with his PEG tube placement.  His worsening white count, blood
 in the urine, therefore prohibit him from beginning to thrive and doing well with his therapy. 



The patient will follow up with his physicians from [place] and primary care physician.





PJ/CHITO

DD:  08/15/2023 10:30:27Voice ID:  [id number]

DT:  08/16/2023 04:29:32Report ID:  [id number]

## 2023-08-17 LAB
ALBUMIN SERPL BCP-MCNC: 2.9 G/DL (ref 3.4–5)
BUN BLD-MCNC: 15 MG/DL (ref 7–18)
GLUCOSE SERPLBLD-MCNC: 100 MG/DL (ref 74–106)
HCT VFR BLD CALC: 32.7 % (ref 39.6–49)
LYMPHOCYTES # SPEC AUTO: 1.5 K/UL (ref 0.7–4.9)
MAGNESIUM SERPL-MCNC: 2 MG/DL (ref 1.6–2.4)
MCV RBC: 79.3 FL (ref 80–100)
PMV BLD: 7.6 FL (ref 7.6–11.3)
POTASSIUM SERPL-SCNC: 3.6 MEQ/L (ref 3.5–5.1)
PREALB SERPL-MCNC: 24.4 MG/DL (ref 20–40)
RBC # BLD: 4.12 M/UL (ref 4.33–5.43)
WBC # BLD AUTO: 8.4 THOU/UL (ref 4.3–10.9)

## 2023-08-17 RX ADMIN — GABAPENTIN SCH MG: 100 CAPSULE ORAL at 08:43

## 2023-08-17 RX ADMIN — LEVETIRACETAM SCH MG: 100 SOLUTION ORAL at 08:43

## 2023-08-17 RX ADMIN — SODIUM CHLORIDE SCH MLS: 0.9 INJECTION, SOLUTION INTRAVENOUS at 05:46

## 2023-08-17 RX ADMIN — LOSARTAN POTASSIUM SCH MG: 50 TABLET, FILM COATED ORAL at 20:24

## 2023-08-17 RX ADMIN — BISACODYL PRN MG: 10 SUPPOSITORY RECTAL at 09:14

## 2023-08-17 RX ADMIN — SODIUM CHLORIDE SCH MLS: 0.9 INJECTION, SOLUTION INTRAVENOUS at 21:08

## 2023-08-17 RX ADMIN — APIXABAN SCH MG: 2.5 TABLET, FILM COATED ORAL at 07:00

## 2023-08-17 RX ADMIN — Medication SCH ML: at 04:42

## 2023-08-17 RX ADMIN — ALBUTEROL SULFATE SCH MG: 2.5 SOLUTION RESPIRATORY (INHALATION) at 21:00

## 2023-08-17 RX ADMIN — Medication SCH: at 08:45

## 2023-08-17 RX ADMIN — TERAZOSIN HYDROCHLORIDE ANHYDROUS SCH MG: 1 CAPSULE ORAL at 20:27

## 2023-08-17 RX ADMIN — Medication SCH ML: at 08:42

## 2023-08-17 RX ADMIN — FOLIC ACID SCH MG: 1 TABLET ORAL at 08:43

## 2023-08-17 RX ADMIN — METOPROLOL TARTRATE SCH MG: 25 TABLET ORAL at 05:02

## 2023-08-17 RX ADMIN — ALBUTEROL SULFATE SCH MG: 2.5 SOLUTION RESPIRATORY (INHALATION) at 09:28

## 2023-08-17 RX ADMIN — Medication SCH ML: at 14:55

## 2023-08-17 RX ADMIN — PANTOPRAZOLE SODIUM SCH MG: 40 GRANULE, DELAYED RELEASE ORAL at 07:33

## 2023-08-17 RX ADMIN — IPRATROPIUM BROMIDE SCH MG: 0.5 SOLUTION RESPIRATORY (INHALATION) at 21:00

## 2023-08-17 RX ADMIN — AMLODIPINE BESYLATE SCH MG: 5 TABLET ORAL at 20:26

## 2023-08-17 RX ADMIN — SODIUM CHLORIDE SCH: 0.9 INJECTION, SOLUTION INTRAVENOUS at 08:40

## 2023-08-17 RX ADMIN — IPRATROPIUM BROMIDE SCH MG: 0.5 SOLUTION RESPIRATORY (INHALATION) at 09:28

## 2023-08-17 RX ADMIN — DOCUSATE SODIUM LIQUID SCH MG: 50 LIQUID ORAL at 20:24

## 2023-08-17 RX ADMIN — Medication SCH ML: at 20:29

## 2023-08-17 RX ADMIN — LOSARTAN POTASSIUM SCH: 50 TABLET, FILM COATED ORAL at 08:00

## 2023-08-17 RX ADMIN — METOPROLOL TARTRATE SCH MG: 25 TABLET ORAL at 16:44

## 2023-08-17 RX ADMIN — SCOPALAMINE SCH PAT: 1 PATCH, EXTENDED RELEASE TRANSDERMAL at 08:42

## 2023-08-17 RX ADMIN — Medication SCH PATCH: at 08:43

## 2023-08-17 RX ADMIN — APIXABAN SCH MG: 2.5 TABLET, FILM COATED ORAL at 20:26

## 2023-08-17 RX ADMIN — AMLODIPINE BESYLATE SCH: 5 TABLET ORAL at 08:00

## 2023-08-17 RX ADMIN — DULOXETINE HYDROCHLORIDE SCH MG: 30 CAPSULE, DELAYED RELEASE ORAL at 20:28

## 2023-08-17 RX ADMIN — PANTOPRAZOLE SODIUM SCH MG: 40 GRANULE, DELAYED RELEASE ORAL at 20:29

## 2023-08-17 NOTE — PN
Date of Progress Note:  08/17/2023



Time Of Service:  1:30 p.m.



Subjective:  Through the , Mr. Tietz said he is doing well.  He did not have
 any spasms in the right upper extremity as reported yesterday and he is feeling better with his impr
ovement with speech therapy, which actually is improvement in his ability to start to swallow.  He is
 not verbal, but communicates with sign language.



Review of Systems:

Denies any fevers or chills, myalgias or arthralgias.  No rash.  No depression.



Physical Examination:

Vital Signs:  Blood pressure 128/74, pulse 70, respiratory rate 16, temperature 97.4, oxygen saturati
on 98%. 

General:  Mr. Tietz is resting in bed.  In terms of his exam, he continues to have a decrease in the 
right nasolabial fold, but there is fair excursion when he tries to move his face.  He has around 3 t
o 4/5 strength in the right upper and lower extremities, which is improving.  He has some decreased l
ight touch temperature in the right compared to the left side.  Otherwise, he has good air movement. 


Abdomen:  Soft and his Lopez bag in place shows no blood.



Laboratory Studies:  White blood cell 8.4, hemoglobin 10.8, platelets 259.  Sodium 137, potassium 3.6
, chloride 108, BUN 15, creatinine 0.85, glucose ranged from 95 to 111, calcium 8.2, magnesium 2.0, a
lbumin 2.9, prealbumin 24.4.



X-ray/imaging:  No new x-rays or imaging.



Medications:  Medications have been reviewed and remained unchanged.



Current Functional Status:  Today, with physical therapy he completed 40 feet with max assist with a 
rolling walker and completed another 60 feet with mod to min assist.  His right knee did buckle at ti
mes.  The patient actually is now on bolus feedings and is actually doing well with bowel movements i
n between his sessions.  With Occupational Therapy, he did range of motion exercises, right upper ext
remity with 1 pound weight and 10 sets of repetitions twice.  In terms of speech, he did oral motor e
xercises completing 10 repetitions with 100% accuracy of volitional swallowing completed 10 of 10 marvin
es within 1 to 3 seconds each.  Tongue base retraction exercises complete 10 of 10 times with minimum
 visual cues.  He was given oral trials of pureed 2 ounces and nectar thick 3 ounces.  No overt signs
 of aspiration with puree demonstrated adequate oral clearing.  On nectar thick liquids, he demonstra
lissa 2 coughs.  He attempted to take large swallows and needed tactile and visual cues to slow the rat
e and decreased bolus size during oral trials.



Progress Towards Rehabilitation Goals:  Mr. Tietz is making good progress so far towards his goals of
 requiring supervision to min assist for his transfers, his ability to mobilize short distances at le
ast household distances and to dress upper and lower body and to begin to take oral intake.  Currentl
y, he is fed mostly by his PEG tube.  He was started on some Keppra for possible seizures, which cons
ist of the right-sided stiffness.  He has no other episodes of that and has noted he is beginning to 
make great progress in terms of speech, in his ability to regain that swallowing function, and his st
rength and coordination, gait balance.



Assessment:  Mr. Tietz is a 57-year-old patient with a left-sided hemorrhagic stroke and right-sided 
weakness, numbness, dysphasia, who is making some good improvement now with all therapies including p
hysical, occupational, and speech therapy.  He has hypertension, hematuria that is resolved, dehydrat
ion, resolved, pneumonia and sepsis which have improved and resolved.  He still has GE reflux, is on 
scopolamine patch, which may be removed and urinary retention.



Plan:  

1.Continue with physical, occupational, and speech therapy 3.5 hours, 5 of 7 days.

2.Continue with aggressive management of his hypertension, maintain upright position, and suctioning
 to reduce risk of pneumonia.  Scopolamine patch may be removed.  He will continue with DVT prophylax
is.



Comorbidities That Continue To Impact Rehabilitation Process:  He has finished all the IV antibiotics
 that is improving.  Does require sign 

, but those have been worked around very well and he is managing excellently in [facility] rehab unit.





LB/MODL

DD:  08/17/2023 20:37:02Voice ID:  [id number]

DT:  08/17/2023 22:11:09Report ID:  [id number]

## 2023-08-18 RX ADMIN — APIXABAN SCH MG: 2.5 TABLET, FILM COATED ORAL at 19:47

## 2023-08-18 RX ADMIN — DOCUSATE SODIUM LIQUID SCH MG: 50 LIQUID ORAL at 19:46

## 2023-08-18 RX ADMIN — AMLODIPINE BESYLATE SCH MG: 5 TABLET ORAL at 19:47

## 2023-08-18 RX ADMIN — ALBUTEROL SULFATE SCH MG: 2.5 SOLUTION RESPIRATORY (INHALATION) at 19:40

## 2023-08-18 RX ADMIN — DOCUSATE SODIUM LIQUID SCH MG: 50 LIQUID ORAL at 07:38

## 2023-08-18 RX ADMIN — Medication SCH ML: at 14:17

## 2023-08-18 RX ADMIN — METOPROLOL TARTRATE SCH MG: 25 TABLET ORAL at 05:04

## 2023-08-18 RX ADMIN — SODIUM CHLORIDE SCH MLS: 0.9 INJECTION, SOLUTION INTRAVENOUS at 12:35

## 2023-08-18 RX ADMIN — DULOXETINE HYDROCHLORIDE SCH MG: 30 CAPSULE, DELAYED RELEASE ORAL at 19:48

## 2023-08-18 RX ADMIN — PANTOPRAZOLE SODIUM SCH MG: 40 GRANULE, DELAYED RELEASE ORAL at 19:48

## 2023-08-18 RX ADMIN — IPRATROPIUM BROMIDE SCH MG: 0.5 SOLUTION RESPIRATORY (INHALATION) at 07:40

## 2023-08-18 RX ADMIN — Medication SCH ML: at 07:38

## 2023-08-18 RX ADMIN — IPRATROPIUM BROMIDE SCH MG: 0.5 SOLUTION RESPIRATORY (INHALATION) at 19:40

## 2023-08-18 RX ADMIN — Medication SCH PATCH: at 07:36

## 2023-08-18 RX ADMIN — FOLIC ACID SCH MG: 1 TABLET ORAL at 07:37

## 2023-08-18 RX ADMIN — PANTOPRAZOLE SODIUM SCH MG: 40 GRANULE, DELAYED RELEASE ORAL at 07:04

## 2023-08-18 RX ADMIN — Medication SCH: at 20:00

## 2023-08-18 RX ADMIN — GABAPENTIN SCH MG: 100 CAPSULE ORAL at 07:36

## 2023-08-18 RX ADMIN — ALBUTEROL SULFATE SCH MG: 2.5 SOLUTION RESPIRATORY (INHALATION) at 07:40

## 2023-08-18 RX ADMIN — Medication SCH ML: at 05:00

## 2023-08-18 RX ADMIN — Medication SCH ML: at 08:27

## 2023-08-18 RX ADMIN — LOSARTAN POTASSIUM SCH MG: 50 TABLET, FILM COATED ORAL at 19:46

## 2023-08-18 RX ADMIN — TERAZOSIN HYDROCHLORIDE ANHYDROUS SCH MG: 1 CAPSULE ORAL at 19:48

## 2023-08-18 RX ADMIN — LEVETIRACETAM SCH MG: 100 SOLUTION ORAL at 07:38

## 2023-08-18 RX ADMIN — APIXABAN SCH MG: 2.5 TABLET, FILM COATED ORAL at 07:04

## 2023-08-18 RX ADMIN — AMLODIPINE BESYLATE SCH MG: 5 TABLET ORAL at 07:37

## 2023-08-18 RX ADMIN — METOPROLOL TARTRATE SCH MG: 25 TABLET ORAL at 17:25

## 2023-08-18 RX ADMIN — Medication SCH ML: at 19:47

## 2023-08-18 RX ADMIN — LOSARTAN POTASSIUM SCH MG: 50 TABLET, FILM COATED ORAL at 09:11

## 2023-08-18 NOTE — P.RH.PN
Estimated Length of Stay: 23


Expected Discharge Date: 08/25/23


Discharge Disposition Plan: Home


Family Support: Yes


Long Term Goal: Mobility, Transfers, Self Care


Vital Signs: 


                                Last Vital Signs











Temp  98.1 F   08/18/23 06:57


 


Pulse  81   08/18/23 07:37


 


Resp  19   08/18/23 06:57


 


BP  131/85   08/18/23 07:37


 


Pulse Ox  97   08/18/23 06:57











Laboratory: 


                             Laboratory Last Values











WBC  8.40 thou/uL (4.3-10.9)   08/17/23  03:35    


 


RBC  4.12 M/uL (4.33-5.43)  L  08/17/23  03:35    


 


Hgb  10.8 g/dL (13.6-17.9)  L  08/17/23  03:35    


 


Hct  32.7 % (39.6-49.0)  L  08/17/23  03:35    


 


MCV  79.3 fL ()  L  08/17/23  03:35    


 


MCH  26.1 pg (27.0-35.0)  L  08/17/23  03:35    


 


MCHC  33.0 g/dL (32.0-36.0)   08/17/23  03:35    


 


RDW  16.0 % (12.1-15.2)  H  08/17/23  03:35    


 


Plt Count  259 thou/uL (152-406)   08/17/23  03:35    


 


MPV  7.6 fL (7.6-11.3)   08/17/23  03:35    


 


Neutrophils %  71.0 % (41.7-73.7)   08/17/23  03:35    


 


Lymphocytes %  17.3 % (15.3-44.8)   08/17/23  03:35    


 


Monocytes %  8.4 % (3.3-12.3)   08/17/23  03:35    


 


Eosinophils %  2.6 % (0-4.4)   08/17/23  03:35    


 


Basophils %  0.7 % (0-1.3)   08/17/23  03:35    


 


Absolute Neutrophils  6.0 K/uL (1.8-8.0)   08/17/23  03:35    


 


Segmented Neutrophils  96 % (40-80)  H  08/05/23  07:10    


 


Absolute Lymphocytes  1.5 K/uL (0.7-4.9)   08/17/23  03:35    


 


Lymphocytes  3 % (15-42)  L  08/05/23  07:10    


 


Monocytes  1 % (0-10)   08/05/23  07:10    


 


Absolute Monocytes  0.7 K/uL (0.1-1.3)   08/17/23  03:35    


 


Absolute Eosinophils  0.2 K/uL (0-0.5)   08/17/23  03:35    


 


Absolute Basophils  0.1 K/uL (0-0.5)   08/17/23  03:35    


 


Platelet Estimate  Adeq   08/05/23  07:10    


 


Morphology Comment  Not seen  (NOT SEEN)   08/05/23  07:10    


 


Sodium  137 mEq/L (136-145)   08/17/23  03:35    


 


Potassium  3.6 mEq/L (3.5-5.1)   08/17/23  03:35    


 


Chloride  108 mEq/L ()  H  08/17/23  03:35    


 


Carbon Dioxide  26 mEq/L (21-32)   08/17/23  03:35    


 


Anion Gap  6.6 mEq/L (5.0-15.0)   08/17/23  03:35    


 


BUN  15 mg/dL (7-18)   08/17/23  03:35    


 


Creatinine  0.85 mg/dL (0.70-1.30)   08/17/23  03:35    


 


Est GFR (CKD-EPI)  102 ml/min (=/>90)   08/17/23  03:35    


 


Glucose  100 mg/dL ()   08/17/23  03:35    


 


POC Glucose  95 mg/dL ()   08/16/23  05:21    


 


Calcium  8.2 mg/dL (8.5-10.1)  L  08/17/23  03:35    


 


Magnesium  2.0 mg/dL (1.6-2.4)   08/17/23  03:35    


 


Albumin  2.9 g/dL (3.4-5.0)  L  08/17/23  03:35    


 


Prealbumin  24.4 mg/dL (20-40)   08/17/23  03:35    


 


Procalcitonin  < 0.05 ng/mL (<0.050)   08/05/23  07:10    











Weight: 150 lb 1.6 oz


Within Defined Parameters: No


Wound Present: No


Closed Surgical Incision Present: No


Negative Pressure Wound Therapy Present: No


Physician Update: His labs are stable. His bolus feeding and water intake via 

the PEG is working well. May have and barium study is OK with speech. Doing well

with speech therapy.  Improving swallowing.  Improving well with physical 

therapy.  Walking 160' with two seated breaks.


Medical Issues: Numerous issues:


Education Needed: Using ASL line for communications.


Functional Improvement: Maximum assist


Functional Improvement Occupational Therapy: Not participating much.


Speech Therapy Update: Severe oropharyngeal dysphagia


Summary: Patient's care plan and long term goals have been reviewed and revised 

as necessary. Please see the Rehabilitation Signature page for all necessary 

signatures.

## 2023-08-19 RX ADMIN — FOLIC ACID SCH MG: 1 TABLET ORAL at 07:19

## 2023-08-19 RX ADMIN — TERAZOSIN HYDROCHLORIDE ANHYDROUS SCH MG: 1 CAPSULE ORAL at 19:54

## 2023-08-19 RX ADMIN — PANTOPRAZOLE SODIUM SCH MG: 40 GRANULE, DELAYED RELEASE ORAL at 07:17

## 2023-08-19 RX ADMIN — SODIUM CHLORIDE SCH MLS: 0.9 INJECTION, SOLUTION INTRAVENOUS at 13:22

## 2023-08-19 RX ADMIN — ALBUTEROL SULFATE SCH MG: 2.5 SOLUTION RESPIRATORY (INHALATION) at 20:05

## 2023-08-19 RX ADMIN — LOSARTAN POTASSIUM SCH MG: 50 TABLET, FILM COATED ORAL at 07:18

## 2023-08-19 RX ADMIN — Medication SCH ML: at 19:54

## 2023-08-19 RX ADMIN — IPRATROPIUM BROMIDE SCH MG: 0.5 SOLUTION RESPIRATORY (INHALATION) at 20:05

## 2023-08-19 RX ADMIN — SODIUM CHLORIDE SCH MLS: 0.9 INJECTION, SOLUTION INTRAVENOUS at 01:30

## 2023-08-19 RX ADMIN — METOPROLOL TARTRATE SCH MG: 25 TABLET ORAL at 16:50

## 2023-08-19 RX ADMIN — DOCUSATE SODIUM LIQUID SCH MG: 50 LIQUID ORAL at 07:20

## 2023-08-19 RX ADMIN — APIXABAN SCH MG: 2.5 TABLET, FILM COATED ORAL at 19:53

## 2023-08-19 RX ADMIN — Medication SCH ML: at 08:31

## 2023-08-19 RX ADMIN — APIXABAN SCH MG: 2.5 TABLET, FILM COATED ORAL at 07:17

## 2023-08-19 RX ADMIN — ALBUTEROL SULFATE SCH MG: 2.5 SOLUTION RESPIRATORY (INHALATION) at 07:35

## 2023-08-19 RX ADMIN — IPRATROPIUM BROMIDE SCH MG: 0.5 SOLUTION RESPIRATORY (INHALATION) at 07:35

## 2023-08-19 RX ADMIN — DOCUSATE SODIUM LIQUID SCH MG: 50 LIQUID ORAL at 19:52

## 2023-08-19 RX ADMIN — AMLODIPINE BESYLATE SCH: 5 TABLET ORAL at 19:53

## 2023-08-19 RX ADMIN — Medication SCH ML: at 04:00

## 2023-08-19 RX ADMIN — AMLODIPINE BESYLATE SCH MG: 5 TABLET ORAL at 07:18

## 2023-08-19 RX ADMIN — DULOXETINE HYDROCHLORIDE SCH MG: 30 CAPSULE, DELAYED RELEASE ORAL at 19:53

## 2023-08-19 RX ADMIN — METOPROLOL TARTRATE SCH MG: 25 TABLET ORAL at 05:23

## 2023-08-19 RX ADMIN — LOSARTAN POTASSIUM SCH: 50 TABLET, FILM COATED ORAL at 19:53

## 2023-08-19 RX ADMIN — LEVETIRACETAM SCH MG: 100 SOLUTION ORAL at 07:20

## 2023-08-19 RX ADMIN — PANTOPRAZOLE SODIUM SCH MG: 40 GRANULE, DELAYED RELEASE ORAL at 19:54

## 2023-08-19 RX ADMIN — Medication SCH ML: at 13:24

## 2023-08-19 RX ADMIN — Medication SCH ML: at 07:20

## 2023-08-19 RX ADMIN — Medication SCH PATCH: at 07:18

## 2023-08-19 RX ADMIN — GABAPENTIN SCH MG: 100 CAPSULE ORAL at 07:19

## 2023-08-20 RX ADMIN — PANTOPRAZOLE SODIUM SCH MG: 40 GRANULE, DELAYED RELEASE ORAL at 07:11

## 2023-08-20 RX ADMIN — Medication SCH ML: at 07:13

## 2023-08-20 RX ADMIN — LEVETIRACETAM SCH MG: 100 SOLUTION ORAL at 07:13

## 2023-08-20 RX ADMIN — METOPROLOL TARTRATE SCH MG: 25 TABLET ORAL at 16:51

## 2023-08-20 RX ADMIN — LOSARTAN POTASSIUM SCH MG: 50 TABLET, FILM COATED ORAL at 19:19

## 2023-08-20 RX ADMIN — DULOXETINE HYDROCHLORIDE SCH MG: 30 CAPSULE, DELAYED RELEASE ORAL at 19:20

## 2023-08-20 RX ADMIN — AMLODIPINE BESYLATE SCH MG: 5 TABLET ORAL at 19:19

## 2023-08-20 RX ADMIN — IPRATROPIUM BROMIDE SCH MG: 0.5 SOLUTION RESPIRATORY (INHALATION) at 08:00

## 2023-08-20 RX ADMIN — METOPROLOL TARTRATE SCH MG: 25 TABLET ORAL at 05:02

## 2023-08-20 RX ADMIN — ALBUTEROL SULFATE SCH MG: 2.5 SOLUTION RESPIRATORY (INHALATION) at 19:45

## 2023-08-20 RX ADMIN — FOLIC ACID SCH MG: 1 TABLET ORAL at 07:13

## 2023-08-20 RX ADMIN — Medication SCH ML: at 04:33

## 2023-08-20 RX ADMIN — PANTOPRAZOLE SODIUM SCH MG: 40 GRANULE, DELAYED RELEASE ORAL at 19:20

## 2023-08-20 RX ADMIN — SCOPALAMINE SCH PAT: 1 PATCH, EXTENDED RELEASE TRANSDERMAL at 07:17

## 2023-08-20 RX ADMIN — TERAZOSIN HYDROCHLORIDE ANHYDROUS SCH MG: 1 CAPSULE ORAL at 19:20

## 2023-08-20 RX ADMIN — Medication SCH ML: at 19:20

## 2023-08-20 RX ADMIN — Medication SCH ML: at 08:40

## 2023-08-20 RX ADMIN — Medication SCH PATCH: at 07:12

## 2023-08-20 RX ADMIN — SODIUM CHLORIDE SCH MLS: 0.9 INJECTION, SOLUTION INTRAVENOUS at 14:32

## 2023-08-20 RX ADMIN — LOSARTAN POTASSIUM SCH: 50 TABLET, FILM COATED ORAL at 07:46

## 2023-08-20 RX ADMIN — SODIUM CHLORIDE SCH MLS: 0.9 INJECTION, SOLUTION INTRAVENOUS at 03:14

## 2023-08-20 RX ADMIN — ALBUTEROL SULFATE SCH MG: 2.5 SOLUTION RESPIRATORY (INHALATION) at 08:00

## 2023-08-20 RX ADMIN — DOCUSATE SODIUM LIQUID SCH MG: 50 LIQUID ORAL at 07:15

## 2023-08-20 RX ADMIN — APIXABAN SCH MG: 2.5 TABLET, FILM COATED ORAL at 07:11

## 2023-08-20 RX ADMIN — GABAPENTIN SCH MG: 100 CAPSULE ORAL at 07:13

## 2023-08-20 RX ADMIN — IPRATROPIUM BROMIDE SCH MG: 0.5 SOLUTION RESPIRATORY (INHALATION) at 19:45

## 2023-08-20 RX ADMIN — DOCUSATE SODIUM LIQUID SCH MG: 50 LIQUID ORAL at 19:19

## 2023-08-20 RX ADMIN — Medication SCH ML: at 13:43

## 2023-08-20 RX ADMIN — AMLODIPINE BESYLATE SCH: 5 TABLET ORAL at 07:47

## 2023-08-20 RX ADMIN — BISACODYL PRN MG: 10 SUPPOSITORY RECTAL at 11:36

## 2023-08-21 RX ADMIN — FOLIC ACID SCH MG: 1 TABLET ORAL at 07:45

## 2023-08-21 RX ADMIN — DULOXETINE HYDROCHLORIDE SCH MG: 30 CAPSULE, DELAYED RELEASE ORAL at 19:44

## 2023-08-21 RX ADMIN — Medication SCH PATCH: at 12:19

## 2023-08-21 RX ADMIN — IPRATROPIUM BROMIDE SCH MG: 0.5 SOLUTION RESPIRATORY (INHALATION) at 08:05

## 2023-08-21 RX ADMIN — DOCUSATE SODIUM LIQUID SCH MG: 50 LIQUID ORAL at 07:47

## 2023-08-21 RX ADMIN — TERAZOSIN HYDROCHLORIDE ANHYDROUS SCH MG: 1 CAPSULE ORAL at 19:44

## 2023-08-21 RX ADMIN — SODIUM CHLORIDE SCH MLS: 0.9 INJECTION, SOLUTION INTRAVENOUS at 19:43

## 2023-08-21 RX ADMIN — AMLODIPINE BESYLATE SCH MG: 5 TABLET ORAL at 19:44

## 2023-08-21 RX ADMIN — ALBUTEROL SULFATE SCH MG: 2.5 SOLUTION RESPIRATORY (INHALATION) at 19:45

## 2023-08-21 RX ADMIN — Medication SCH ML: at 19:45

## 2023-08-21 RX ADMIN — PANTOPRAZOLE SODIUM SCH MG: 40 GRANULE, DELAYED RELEASE ORAL at 07:45

## 2023-08-21 RX ADMIN — Medication SCH ML: at 04:45

## 2023-08-21 RX ADMIN — METOPROLOL TARTRATE SCH MG: 25 TABLET ORAL at 17:03

## 2023-08-21 RX ADMIN — Medication SCH: at 14:00

## 2023-08-21 RX ADMIN — PANTOPRAZOLE SODIUM SCH MG: 40 GRANULE, DELAYED RELEASE ORAL at 19:45

## 2023-08-21 RX ADMIN — SODIUM CHLORIDE SCH MLS: 0.9 INJECTION, SOLUTION INTRAVENOUS at 03:06

## 2023-08-21 RX ADMIN — AMLODIPINE BESYLATE SCH MG: 5 TABLET ORAL at 07:45

## 2023-08-21 RX ADMIN — GABAPENTIN SCH MG: 100 CAPSULE ORAL at 07:46

## 2023-08-21 RX ADMIN — ALBUTEROL SULFATE SCH MG: 2.5 SOLUTION RESPIRATORY (INHALATION) at 08:05

## 2023-08-21 RX ADMIN — METOPROLOL TARTRATE SCH MG: 25 TABLET ORAL at 04:45

## 2023-08-21 RX ADMIN — IPRATROPIUM BROMIDE SCH MG: 0.5 SOLUTION RESPIRATORY (INHALATION) at 19:45

## 2023-08-21 RX ADMIN — Medication SCH ML: at 07:47

## 2023-08-21 RX ADMIN — DOCUSATE SODIUM LIQUID SCH MG: 50 LIQUID ORAL at 19:44

## 2023-08-21 RX ADMIN — Medication SCH ML: at 08:45

## 2023-08-21 RX ADMIN — LEVETIRACETAM SCH MG: 100 SOLUTION ORAL at 07:46

## 2023-08-21 RX ADMIN — LOSARTAN POTASSIUM SCH MG: 50 TABLET, FILM COATED ORAL at 07:46

## 2023-08-21 RX ADMIN — LOSARTAN POTASSIUM SCH MG: 50 TABLET, FILM COATED ORAL at 19:44

## 2023-08-21 NOTE — RAD REPORT
EXAM DESCRIPTION:  RAD - Barium Swallow Modified - 8/21/2023 12:12 pm

 

CLINICAL HISTORY:  DYSPHAGIA

 

COMPARISON:  Renal Ultrasound-Complete dated 7/31/2023

 

TECHNIQUE:  The patient was given liquid, semi-solid and solid forms of barium. Lateral view fluorosc
opic imaging was performed in conjunction with speech pathology service.

 

FINDINGS:  Laryngeal penetration and aspiration identified with nectar consistency and cup. See speec
h pathology note.

 

Total fluoroscopy time: 2 minutes 1 seconds

## 2023-08-22 LAB
BUN BLD-MCNC: 12 MG/DL (ref 7–18)
GLUCOSE SERPLBLD-MCNC: 205 MG/DL (ref 74–106)
HCT VFR BLD CALC: 32.8 % (ref 39.6–49)
LYMPHOCYTES # SPEC AUTO: 1 K/UL (ref 0.7–4.9)
MCV RBC: 80.3 FL (ref 80–100)
PMV BLD: 8.2 FL (ref 7.6–11.3)
POTASSIUM SERPL-SCNC: 3.6 MEQ/L (ref 3.5–5.1)
RBC # BLD: 4.09 M/UL (ref 4.33–5.43)
WBC # BLD AUTO: 8.3 THOU/UL (ref 4.3–10.9)

## 2023-08-22 RX ADMIN — METOPROLOL TARTRATE SCH MG: 25 TABLET ORAL at 17:16

## 2023-08-22 RX ADMIN — AMLODIPINE BESYLATE SCH: 5 TABLET ORAL at 08:00

## 2023-08-22 RX ADMIN — Medication SCH: at 09:00

## 2023-08-22 RX ADMIN — DOCUSATE SODIUM LIQUID SCH MG: 50 LIQUID ORAL at 20:04

## 2023-08-22 RX ADMIN — LOSARTAN POTASSIUM SCH MG: 50 TABLET, FILM COATED ORAL at 20:05

## 2023-08-22 RX ADMIN — METOPROLOL TARTRATE SCH MG: 25 TABLET ORAL at 04:47

## 2023-08-22 RX ADMIN — DULOXETINE HYDROCHLORIDE SCH MG: 30 CAPSULE, DELAYED RELEASE ORAL at 20:05

## 2023-08-22 RX ADMIN — TERAZOSIN HYDROCHLORIDE ANHYDROUS SCH MG: 1 CAPSULE ORAL at 20:05

## 2023-08-22 RX ADMIN — IPRATROPIUM BROMIDE SCH MG: 0.5 SOLUTION RESPIRATORY (INHALATION) at 21:50

## 2023-08-22 RX ADMIN — Medication SCH: at 20:00

## 2023-08-22 RX ADMIN — AMLODIPINE BESYLATE SCH MG: 5 TABLET ORAL at 20:05

## 2023-08-22 RX ADMIN — PANTOPRAZOLE SODIUM SCH MG: 40 GRANULE, DELAYED RELEASE ORAL at 07:28

## 2023-08-22 RX ADMIN — IPRATROPIUM BROMIDE SCH MG: 0.5 SOLUTION RESPIRATORY (INHALATION) at 08:07

## 2023-08-22 RX ADMIN — LOSARTAN POTASSIUM SCH: 50 TABLET, FILM COATED ORAL at 08:00

## 2023-08-22 RX ADMIN — LEVETIRACETAM SCH MG: 100 SOLUTION ORAL at 08:17

## 2023-08-22 RX ADMIN — SODIUM CHLORIDE SCH MLS: 0.9 INJECTION, SOLUTION INTRAVENOUS at 11:19

## 2023-08-22 RX ADMIN — FOLIC ACID SCH MG: 1 TABLET ORAL at 08:16

## 2023-08-22 RX ADMIN — Medication SCH ML: at 08:19

## 2023-08-22 RX ADMIN — ALBUTEROL SULFATE SCH MG: 2.5 SOLUTION RESPIRATORY (INHALATION) at 08:07

## 2023-08-22 RX ADMIN — ALBUTEROL SULFATE SCH MG: 2.5 SOLUTION RESPIRATORY (INHALATION) at 21:50

## 2023-08-22 RX ADMIN — GABAPENTIN SCH MG: 100 CAPSULE ORAL at 08:16

## 2023-08-22 RX ADMIN — Medication SCH PATCH: at 07:29

## 2023-08-22 RX ADMIN — PANTOPRAZOLE SODIUM SCH MG: 40 GRANULE, DELAYED RELEASE ORAL at 20:05

## 2023-08-22 RX ADMIN — Medication SCH ML: at 14:14

## 2023-08-22 RX ADMIN — DOCUSATE SODIUM LIQUID SCH MG: 50 LIQUID ORAL at 08:18

## 2023-08-22 RX ADMIN — Medication SCH ML: at 04:46

## 2023-08-22 NOTE — PN
Date of Progress Note:  08/21/2023



Time Of Service:  1 p.m.



Subjective:  Mr. Tietz is doing well.  He is giving a thumbs up with the left hand.  Has to be encour
aged to use the right hand, right leg.  Speech therapist at the bedside and he is happy about the res
ults of his barium swallow done earlier today.  He has no new complaints except he had tickets to the
 [place] in [place] and is wondering if he is still able to attend.  He was told he may do so, bu
t may need a wheelchair to get around.



Review of Systems:

Denies any recent fevers or chills.  No __________ or myalgias.  There is some stiffness in the right
 upper and lower extremity, which he has already acknowledged.



Physical Examination:

Vital Signs:  Blood pressure 121/63, pulse 69, respiratory rate of 16, temperature 97.5, oxygen satur
ation 96%. 

General:  Mr. Tietz is resting in bed. 

Neurologic:  Right upper and lower extremity show improving strength, still increased tone and around
 3 to 4/5 proximally and distally.  The hand dexterity is improving slightly, but still significantly
 impaired.  He has right facial drooping and there is some more improvement noted in his ability to m
ove the face.



Laboratory Studies:  White blood cell count 8.4, hemoglobin 10.8, and platelets 259.  Sodium 137, pot
assium 3.6, chloride 108, BUN 15, creatinine 0.85, prealbumin 24.4.  His glucose ranged from 95 to 11
1.



X-ray/imaging:  Modified barium swallow done earlier today showed laryngeal penetration and aspiratio
n with nectar consistency in cup.  The patient was able to manage only up to 2 spoons at a time of ne
ctar-thick and honey-thick consistencies.  Otherwise, there was aspiration noted.



Medications:  His medications have been reviewed.  His Eliquis has been discontinued, as earlier the 
patient did have some blood tinging in the urine with Eliquis when it was resumed and that cleared up
 when Eliquis is stopped.  Otherwise, medications have been unchanged.



Current Functional Status:  Today, with physical therapy, he worked on wheelchair mobility techniques
.  He had a lot of difficulty navigating his wheelchair and making turns.  Gait training, he ambulate
d 40 feet 3 times with a rolling walker.  He also __________ need maximal assistance as he had mobili
zed wheelchair, did cover 30 feet 3 times.  With his speech, he was able to do oral motor exercises i
ndependently with 15-20 repetitions, require minimal assistance for tactile stimulation.  After modif
ied barium swallow, it was recommended that he be on a pureed diet with honey-thick liquids using spo
on only.  Given oral trials of pureed diet and honey-thick liquids using a spoon, no overt signs of a
spiration were noted.  With occupational therapy, the patient did easily fatigue as he was doing exer
cises with the right upper extremity, which is the stroke affected side.



Progress Made With Physical And Occupational Therapy:  Currently, he is making fair progress towards 
the capacity to transfer with min assist also to mobilize a walker and wheelchair with min assist.  G
oals also to be able to eat orally without using the PEG tube, which he has been making slow progress
 with.



Assessment:  Mr. Tietz is a 57-year-old patient with left hemispheric stroke, right-sided numbness, w
eakness, and dysphagia.  He communicates via sign language.  He has hypertension, resolved hematuria,
 resolving pneumonia and sepsis, gastroesophageal reflux, and urinary retention.



Plan:  

1.Continue with physical, occupational, and speech therapy for 3.5 hours, 5 of 7 days.

2.Continue with management of his comorbid conditions as listed above including his hypertension, hi
s vertiginous type symptoms, GE reflux, prostate hypertrophy, and continue DVT prophylaxis.  Chest x-
ray may be repeated as appropriate and blood work again to rule out any worsening infection.



Comorbidities That Continue To Impact Rehabilitation:  At this point, his comorbidities are being man
aged.  He does have need for communication via sign language interpretation and that is available at 
the sign language interpretation interface device at the bedside.  

The patient at this point will likely have to go to skilled nursing as he is not able to be managed a
t home by family.





PJ/CHITO

DD:  08/21/2023 19:58:06Voice ID:  [id number]

DT:  08/22/2023 00:33:00Report ID:  [id number]

## 2023-08-22 NOTE — RAD REPORT
EXAM DESCRIPTION:  RAD - Chest Single View - 8/22/2023 4:40 am

 

CLINICAL HISTORY:  R/O aspiration pneumonia

 

COMPARISON:  Chest Single View dated 8/11/2023; Chest Single View dated 8/4/2023; Chest Single View d
ated 7/29/2023; Abdomen 1 View (KUB) dated 7/27/2023

 

FINDINGS:  Lines: None.

Lungs: No evidence of edema or pneumonia.

Pleural: No significant pleural effusions or pneumothorax.

Cardiac: The heart size is within normal limits.

Mediastinum: Within normal limits.

Bones: No acute fractures.

Other: None

 

IMPRESSION:  No acute cardiopulmonary disease.

## 2023-08-23 RX ADMIN — LOSARTAN POTASSIUM SCH: 50 TABLET, FILM COATED ORAL at 08:00

## 2023-08-23 RX ADMIN — Medication SCH: at 19:36

## 2023-08-23 RX ADMIN — ALBUTEROL SULFATE SCH MG: 2.5 SOLUTION RESPIRATORY (INHALATION) at 07:30

## 2023-08-23 RX ADMIN — FOLIC ACID SCH MG: 1 TABLET ORAL at 09:09

## 2023-08-23 RX ADMIN — DULOXETINE HYDROCHLORIDE SCH MG: 30 CAPSULE, DELAYED RELEASE ORAL at 19:34

## 2023-08-23 RX ADMIN — LEVETIRACETAM SCH MG: 100 SOLUTION ORAL at 09:10

## 2023-08-23 RX ADMIN — SCOPALAMINE SCH PAT: 1 PATCH, EXTENDED RELEASE TRANSDERMAL at 07:25

## 2023-08-23 RX ADMIN — AMLODIPINE BESYLATE SCH MG: 5 TABLET ORAL at 19:35

## 2023-08-23 RX ADMIN — Medication SCH: at 04:00

## 2023-08-23 RX ADMIN — DOCUSATE SODIUM LIQUID SCH MG: 50 LIQUID ORAL at 09:15

## 2023-08-23 RX ADMIN — Medication SCH: at 09:00

## 2023-08-23 RX ADMIN — AMLODIPINE BESYLATE SCH: 5 TABLET ORAL at 08:00

## 2023-08-23 RX ADMIN — METOPROLOL TARTRATE SCH MG: 25 TABLET ORAL at 17:15

## 2023-08-23 RX ADMIN — SODIUM CHLORIDE SCH: 0.9 INJECTION, SOLUTION INTRAVENOUS at 23:45

## 2023-08-23 RX ADMIN — Medication SCH: at 13:45

## 2023-08-23 RX ADMIN — SODIUM CHLORIDE SCH: 0.9 INJECTION, SOLUTION INTRAVENOUS at 11:20

## 2023-08-23 RX ADMIN — SODIUM CHLORIDE SCH MLS: 0.9 INJECTION, SOLUTION INTRAVENOUS at 18:33

## 2023-08-23 RX ADMIN — TERAZOSIN HYDROCHLORIDE ANHYDROUS SCH MG: 1 CAPSULE ORAL at 19:34

## 2023-08-23 RX ADMIN — SODIUM CHLORIDE SCH MLS: 0.9 INJECTION, SOLUTION INTRAVENOUS at 00:08

## 2023-08-23 RX ADMIN — PANTOPRAZOLE SODIUM SCH MG: 40 GRANULE, DELAYED RELEASE ORAL at 19:34

## 2023-08-23 RX ADMIN — IPRATROPIUM BROMIDE SCH MG: 0.5 SOLUTION RESPIRATORY (INHALATION) at 07:30

## 2023-08-23 RX ADMIN — PANTOPRAZOLE SODIUM SCH MG: 40 GRANULE, DELAYED RELEASE ORAL at 06:59

## 2023-08-23 RX ADMIN — DOCUSATE SODIUM LIQUID SCH MG: 50 LIQUID ORAL at 19:35

## 2023-08-23 RX ADMIN — GABAPENTIN SCH: 100 CAPSULE ORAL at 08:00

## 2023-08-23 RX ADMIN — Medication SCH ML: at 09:14

## 2023-08-23 RX ADMIN — Medication SCH PATCH: at 07:25

## 2023-08-23 RX ADMIN — LOSARTAN POTASSIUM SCH MG: 50 TABLET, FILM COATED ORAL at 19:36

## 2023-08-23 RX ADMIN — METOPROLOL TARTRATE SCH MG: 25 TABLET ORAL at 05:03

## 2023-08-23 NOTE — PN
Date of Progress Note:  08/22/2023



Time Of Service:  1:50 p.m.



Subjective:  Mr. Tietz is doing very well.  He is able to give a thumbs up sign with the left hand ea
sily and the right hand, which has stroke is also able to, but at a slower pace.  Still has some diff
iculty moving his face, but he communicates with sign language.  He has no new complaints.



Review of Systems:

No issues such as significant fevers or chills.  No cough.  No myalgias, but there is some stiffness 
in the right upper and lower extremity.



Physical Examination:

Vital Signs:  Blood pressure 122/66, pulse 82, respiratory rate 16, temperature 97.7 oxygen saturatio
n 95%. 

General:  Mr. Tietz is resting in bed. 

Neuro:  Does have decrease in the right nasolabial fold with fair excursions as he tries to move the 
face.  He has 3-4 strength in the upper and lower extremity on the right side, 5/5 on the left side. 
 Does have incoordination in the left upper and lower extremity on the right.



Laboratory Studies:  White blood cell count 8.3, hemoglobin 10.9, platelets 218.  Sodium 139, potassi
um 3.6, chloride 108, carbon dioxide 25, BUN 12, creatinine 0.94, calcium 8.4.



X-rays/imaging:  Chest x-ray was done today.  The study showed no acute cardiopulmonary process.



Medications:  His medications have been reviewed and remain unchanged.



Current Functional Status:  Today, with physical therapy, he was able to ambulate 50 feet with a roll
ing walker and minimum assistance.  He completed wheelchair and mobilization 100 feet twice with mini
mum assistance.  With his speech therapy, he did a few oral trials of pureed diet and honey thick liq
uids using a spoon.  There was no overt aspiration.  Recommended he continue with pureed diet at meal
times, but still has bulk of his nutrition via PEG tube, medications can be crushed as appropriate, b
ut mostly via PEG tube.



Progress Towards Rehabilitation Goals:  Mr. Tietz is making fair progress now towards goals of beginn
ing to transfer with modified independence from bed to toilet to chair, to be able to ambulate househ
old distances with modified independence, still needs significant help with his nutrition as he has c
ontinued dysphagia with high aspiration risk, but again chest x-ray today is negative.  He has family
 who are participating in training for use of the PEG tube and how to assist him to proper consistenc
y of meals.



Assessment:  Mr. Tietz is a 57-year-old patient with a left hemispheric stroke producing right-sided 
face, arm, and leg numbness and weakness with dysarthria and dysphagia.  He communicates via sign senthil
guage.  He has hypertension, resolved pneumonia, resolved hematuria, resolved sepsis, gastroesophagea
l reflux, and urinary retention.



Plan:  

1.Continue with physical, occupational, and speech therapy for 3.5 hours, 5/7 days.

2.We will continue aspiration precautions.  Continue with meal consistency as directed by Speech, wh
ich does include pureed food and honey thick liquids along with nectar thick as well.  Continue with 
DVT prophylaxis.  Continue with GE reflux treatment with Protonix and for urinary retention, medicati
ons continued as well.



Comorbids That Continue To Impact Rehabilitation:  At this point, he does have a high risk of aspirat
ion and will need to have aspiration precautions at all times even after leaving perhaps for several 
more weeks if not months.  Otherwise, his transfers are doing well.  He does have some risk of infect
ion from his catheter, which is still in place.  He will follow up with Urology, perhaps for urodynam
ics and 

appropriate treatment to begin with proper bladder draining and removal of the Lopez.





PJ/CHITO

DD:  08/22/2023 21:58:24Voice ID:  [id number]

DT:  08/22/2023 23:56:21Report ID:  [id number]

## 2023-08-24 VITALS — DIASTOLIC BLOOD PRESSURE: 83 MMHG | TEMPERATURE: 97.7 F | SYSTOLIC BLOOD PRESSURE: 137 MMHG

## 2023-08-24 LAB
ALBUMIN SERPL BCP-MCNC: 2.9 G/DL (ref 3.4–5)
BUN BLD-MCNC: 12 MG/DL (ref 7–18)
GLUCOSE SERPLBLD-MCNC: 102 MG/DL (ref 74–106)
HCT VFR BLD CALC: 33.1 % (ref 39.6–49)
LYMPHOCYTES # SPEC AUTO: 1.1 K/UL (ref 0.7–4.9)
MAGNESIUM SERPL-MCNC: 2.1 MG/DL (ref 1.6–2.4)
MCV RBC: 79.6 FL (ref 80–100)
PMV BLD: 7.8 FL (ref 7.6–11.3)
POTASSIUM SERPL-SCNC: 3.4 MEQ/L (ref 3.5–5.1)
PREALB SERPL-MCNC: 16 MG/DL (ref 20–40)
RBC # BLD: 4.15 M/UL (ref 4.33–5.43)
WBC # BLD AUTO: 6.8 THOU/UL (ref 4.3–10.9)

## 2023-08-24 RX ADMIN — METOPROLOL TARTRATE SCH MG: 25 TABLET ORAL at 05:01

## 2023-08-24 RX ADMIN — PANTOPRAZOLE SODIUM SCH MG: 40 GRANULE, DELAYED RELEASE ORAL at 06:44

## 2023-08-24 RX ADMIN — FOLIC ACID SCH MG: 1 TABLET ORAL at 08:55

## 2023-08-24 RX ADMIN — LOSARTAN POTASSIUM SCH MG: 50 TABLET, FILM COATED ORAL at 09:23

## 2023-08-24 RX ADMIN — GABAPENTIN SCH: 100 CAPSULE ORAL at 08:00

## 2023-08-24 RX ADMIN — Medication SCH: at 03:28

## 2023-08-24 RX ADMIN — AMLODIPINE BESYLATE SCH MG: 5 TABLET ORAL at 10:26

## 2023-08-24 RX ADMIN — Medication SCH ML: at 08:53

## 2023-08-24 RX ADMIN — SODIUM CHLORIDE SCH MLS: 0.9 INJECTION, SOLUTION INTRAVENOUS at 07:53

## 2023-08-24 RX ADMIN — DOCUSATE SODIUM LIQUID SCH MG: 50 LIQUID ORAL at 08:53

## 2023-08-24 RX ADMIN — LEVETIRACETAM SCH MG: 100 SOLUTION ORAL at 08:53

## 2023-08-24 RX ADMIN — Medication SCH: at 09:00

## 2023-08-24 RX ADMIN — Medication SCH PATCH: at 07:53

## 2023-08-24 RX ADMIN — Medication SCH: at 14:00

## 2023-08-24 RX ADMIN — BISACODYL PRN MG: 10 SUPPOSITORY RECTAL at 11:08
